# Patient Record
Sex: FEMALE | Race: WHITE | NOT HISPANIC OR LATINO | Employment: OTHER | RURAL
[De-identification: names, ages, dates, MRNs, and addresses within clinical notes are randomized per-mention and may not be internally consistent; named-entity substitution may affect disease eponyms.]

---

## 2017-01-02 ENCOUNTER — HOSPITAL ENCOUNTER (OUTPATIENT)
Dept: PHYSICAL THERAPY | Facility: HOSPITAL | Age: 72
Setting detail: RECURRING SERIES
Discharge: HOME | End: 2017-02-21
Attending: PHYSICIAN ASSISTANT | Admitting: PHYSICIAN ASSISTANT

## 2017-01-05 ENCOUNTER — HOSPITAL ENCOUNTER (OUTPATIENT)
Dept: ORTHOPEDIC SURGERY | Facility: CLINIC | Age: 72
Discharge: HOME | End: 2017-01-05
Attending: PODIATRIST | Admitting: PODIATRIST

## 2017-02-16 ENCOUNTER — HOSPITAL ENCOUNTER (OUTPATIENT)
Dept: ORTHOPEDIC SURGERY | Facility: CLINIC | Age: 72
Discharge: HOME | End: 2017-02-16
Attending: PODIATRIST | Admitting: PODIATRIST

## 2017-04-20 ENCOUNTER — HOSPITAL ENCOUNTER (OUTPATIENT)
Dept: ORTHOPEDIC SURGERY | Facility: CLINIC | Age: 72
Discharge: HOME | End: 2017-04-20
Attending: PODIATRIST | Admitting: PODIATRIST

## 2017-05-08 ENCOUNTER — HOSPITAL ENCOUNTER (OUTPATIENT)
Dept: LAB | Facility: HOSPITAL | Age: 72
Discharge: HOME | End: 2017-05-08
Attending: GENERAL PRACTICE | Admitting: GENERAL PRACTICE

## 2017-05-08 LAB
ALT SERPL-CCNC: 21 IU/L (ref 14–54)
CHOLEST SERPL-MCNC: 296 MG/DL
CHOLEST/HDLC SERPL: 5.9 {RATIO}
CONV LDL CHOLESTEROL DIRECT: 189 MG/DL (ref 0–100)
HDLC SERPL-MCNC: 50 MG/DL
LDLC/HDLC SERPL: 3.8 {RATIO}
LIPID INTERPRETATION: ABNORMAL
TRIGL SERPL-MCNC: 247 MG/DL
VLDLC SERPL CALC-MCNC: 57.1 MG/DL

## 2017-05-30 ENCOUNTER — HOSPITAL ENCOUNTER (OUTPATIENT)
Dept: OTHER | Facility: HOSPITAL | Age: 72
Discharge: HOME | End: 2017-05-30
Attending: GENERAL PRACTICE | Admitting: GENERAL PRACTICE

## 2017-05-30 LAB
ALBUMIN SERPL-MCNC: 4.1 G/DL (ref 3.5–4.8)
ALBUMIN/GLOB SERPL: 1.3 {RATIO} (ref 1–1.7)
ALP SERPL-CCNC: 74 IU/L (ref 32–91)
ALT SERPL-CCNC: 23 IU/L (ref 14–54)
ANION GAP SERPL CALC-SCNC: 14.8 MMOL/L (ref 10–20)
AST SERPL-CCNC: 25 IU/L (ref 15–41)
BASOPHILS # BLD AUTO: 0.1 10*3/UL (ref 0–0.2)
BASOPHILS NFR BLD AUTO: 1 % (ref 0–2)
BILIRUB SERPL-MCNC: 0.5 MG/DL (ref 0.3–1.2)
BUN SERPL-MCNC: 13 MG/DL (ref 8–20)
BUN/CREAT SERPL: 14.4 (ref 5.4–26.2)
CALCIUM SERPL-MCNC: 9.8 MG/DL (ref 8.9–10.3)
CHLORIDE SERPL-SCNC: 109 MMOL/L (ref 101–111)
CONV CO2: 22 MMOL/L (ref 22–32)
CONV TOTAL PROTEIN: 7.3 G/DL (ref 6.1–7.9)
CREAT UR-MCNC: 0.9 MG/DL (ref 0.4–1)
DIFFERENTIAL METHOD BLD: (no result)
EOSINOPHIL # BLD AUTO: 0.4 10*3/UL (ref 0–0.3)
EOSINOPHIL # BLD AUTO: 6 % (ref 0–3)
ERYTHROCYTE [DISTWIDTH] IN BLOOD BY AUTOMATED COUNT: 13 % (ref 11.5–14.5)
GLOBULIN UR ELPH-MCNC: 3.2 G/DL (ref 2.5–3.8)
GLUCOSE SERPL-MCNC: 104 MG/DL (ref 65–99)
HCT VFR BLD AUTO: 42.9 % (ref 35–49)
HGB BLD-MCNC: 14.3 G/DL (ref 12–15)
LYMPHOCYTES # BLD AUTO: 3.3 10*3/UL (ref 0.8–4.8)
LYMPHOCYTES NFR BLD AUTO: 44 % (ref 18–42)
MCH RBC QN AUTO: 29.5 PG (ref 26–32)
MCHC RBC AUTO-ENTMCNC: 33.3 G/DL (ref 32–36)
MCV RBC AUTO: 88.6 FL (ref 80–94)
MONOCYTES # BLD AUTO: 0.6 10*3/UL (ref 0.1–1.3)
MONOCYTES NFR BLD AUTO: 8 % (ref 2–11)
NEUTROPHILS # BLD AUTO: 3.1 10*3/UL (ref 2.3–8.6)
NEUTROPHILS NFR BLD AUTO: 41 % (ref 50–75)
NRBC BLD AUTO-RTO: 0 /100{WBCS}
NRBC/RBC NFR BLD MANUAL: 0 10*3/UL
PLATELET # BLD AUTO: 214 10*3/UL (ref 150–450)
PMV BLD AUTO: 10.9 FL (ref 7.4–10.4)
POTASSIUM SERPL-SCNC: 4.8 MMOL/L (ref 3.6–5.1)
RBC # BLD AUTO: 4.84 10*6/UL (ref 4–5.4)
SODIUM SERPL-SCNC: 141 MMOL/L (ref 136–144)
T3 SERPL-MCNC: 1.29 NG/ML (ref 0.87–1.78)
T4 FREE SERPL-MCNC: 0.66 NG/DL (ref 0.58–1.64)
TSH SERPL-ACNC: 2.13 UIU/ML (ref 0.34–5.6)
WBC # BLD AUTO: 7.6 10*3/UL (ref 4.5–11.5)

## 2017-08-08 ENCOUNTER — HOSPITAL ENCOUNTER (OUTPATIENT)
Dept: LAB | Facility: HOSPITAL | Age: 72
Discharge: HOME | End: 2017-08-08
Attending: GENERAL PRACTICE | Admitting: GENERAL PRACTICE

## 2017-08-08 LAB
CHOLEST SERPL-MCNC: 214 MG/DL
CHOLEST/HDLC SERPL: 2.7 {RATIO}
CONV LDL CHOLESTEROL DIRECT: 115 MG/DL (ref 0–100)
HDLC SERPL-MCNC: 79 MG/DL
LDLC/HDLC SERPL: 1.5 {RATIO}
LIPID INTERPRETATION: ABNORMAL
TRIGL SERPL-MCNC: 114 MG/DL
VLDLC SERPL CALC-MCNC: 20.2 MG/DL

## 2017-10-23 ENCOUNTER — HOSPITAL ENCOUNTER (OUTPATIENT)
Dept: LAB | Facility: HOSPITAL | Age: 72
Discharge: HOME | End: 2017-10-23
Attending: GENERAL PRACTICE | Admitting: GENERAL PRACTICE

## 2017-10-23 LAB
ALBUMIN SERPL-MCNC: 3.9 G/DL (ref 3.5–4.8)
ALBUMIN/GLOB SERPL: 1.3 {RATIO} (ref 1–1.7)
ALP SERPL-CCNC: 63 IU/L (ref 32–91)
ALT SERPL-CCNC: 28 IU/L (ref 14–54)
ANION GAP SERPL CALC-SCNC: 12.3 MMOL/L (ref 10–20)
AST SERPL-CCNC: 34 IU/L (ref 15–41)
BILIRUB SERPL-MCNC: 0.7 MG/DL (ref 0.3–1.2)
BUN SERPL-MCNC: 13 MG/DL (ref 8–20)
BUN/CREAT SERPL: 11.8 (ref 5.4–26.2)
CALCIUM SERPL-MCNC: 9.5 MG/DL (ref 8.9–10.3)
CHLORIDE SERPL-SCNC: 106 MMOL/L (ref 101–111)
CONV CO2: 25 MMOL/L (ref 22–32)
CONV TOTAL PROTEIN: 6.9 G/DL (ref 6.1–7.9)
CREAT UR-MCNC: 1.1 MG/DL (ref 0.4–1)
GLOBULIN UR ELPH-MCNC: 3 G/DL (ref 2.5–3.8)
GLUCOSE SERPL-MCNC: 97 MG/DL (ref 65–99)
POTASSIUM SERPL-SCNC: 4.3 MMOL/L (ref 3.6–5.1)
SODIUM SERPL-SCNC: 139 MMOL/L (ref 136–144)
T3 SERPL-MCNC: 1.96 NG/ML (ref 0.87–1.78)
T4 FREE SERPL-MCNC: 1.05 NG/DL (ref 0.58–1.64)
TSH SERPL-ACNC: 1.31 UIU/ML (ref 0.34–5.6)

## 2017-11-13 ENCOUNTER — HOSPITAL ENCOUNTER (OUTPATIENT)
Dept: CARDIOLOGY | Facility: HOSPITAL | Age: 72
Discharge: HOME | End: 2017-11-13
Attending: GENERAL PRACTICE | Admitting: GENERAL PRACTICE

## 2017-11-22 ENCOUNTER — HOSPITAL ENCOUNTER (OUTPATIENT)
Dept: CARDIOLOGY | Facility: HOSPITAL | Age: 72
Discharge: HOME | End: 2017-11-22
Attending: INTERNAL MEDICINE | Admitting: INTERNAL MEDICINE

## 2017-12-12 ENCOUNTER — ON CAMPUS - OUTPATIENT (AMBULATORY)
Dept: URBAN - METROPOLITAN AREA HOSPITAL 2 | Facility: HOSPITAL | Age: 72
End: 2017-12-12

## 2017-12-12 ENCOUNTER — OFFICE (AMBULATORY)
Dept: URBAN - METROPOLITAN AREA CLINIC 64 | Facility: CLINIC | Age: 72
End: 2017-12-12

## 2017-12-12 ENCOUNTER — HOSPITAL ENCOUNTER (OUTPATIENT)
Dept: OTHER | Facility: HOSPITAL | Age: 72
Setting detail: SPECIMEN
Discharge: HOME | End: 2017-12-12
Attending: INTERNAL MEDICINE | Admitting: INTERNAL MEDICINE

## 2017-12-12 VITALS
HEART RATE: 68 BPM | SYSTOLIC BLOOD PRESSURE: 113 MMHG | RESPIRATION RATE: 14 BRPM | SYSTOLIC BLOOD PRESSURE: 156 MMHG | DIASTOLIC BLOOD PRESSURE: 63 MMHG | SYSTOLIC BLOOD PRESSURE: 106 MMHG | RESPIRATION RATE: 16 BRPM | HEART RATE: 69 BPM | DIASTOLIC BLOOD PRESSURE: 80 MMHG | DIASTOLIC BLOOD PRESSURE: 72 MMHG | SYSTOLIC BLOOD PRESSURE: 155 MMHG | WEIGHT: 142.6 LBS | SYSTOLIC BLOOD PRESSURE: 166 MMHG | HEART RATE: 75 BPM | DIASTOLIC BLOOD PRESSURE: 61 MMHG | DIASTOLIC BLOOD PRESSURE: 59 MMHG | RESPIRATION RATE: 18 BRPM | DIASTOLIC BLOOD PRESSURE: 75 MMHG | HEART RATE: 70 BPM | SYSTOLIC BLOOD PRESSURE: 125 MMHG | DIASTOLIC BLOOD PRESSURE: 81 MMHG | OXYGEN SATURATION: 99 % | OXYGEN SATURATION: 98 % | RESPIRATION RATE: 19 BRPM | HEIGHT: 60 IN | TEMPERATURE: 98.2 F | HEART RATE: 65 BPM | OXYGEN SATURATION: 97 % | SYSTOLIC BLOOD PRESSURE: 132 MMHG | OXYGEN SATURATION: 100 % | DIASTOLIC BLOOD PRESSURE: 56 MMHG | HEART RATE: 72 BPM | SYSTOLIC BLOOD PRESSURE: 134 MMHG

## 2017-12-12 DIAGNOSIS — K57.30 DIVERTICULOSIS OF LARGE INTESTINE WITHOUT PERFORATION OR ABS: ICD-10-CM

## 2017-12-12 DIAGNOSIS — K62.1 RECTAL POLYP: ICD-10-CM

## 2017-12-12 DIAGNOSIS — Z86.010 PERSONAL HISTORY OF COLONIC POLYPS: ICD-10-CM

## 2017-12-12 DIAGNOSIS — K64.1 SECOND DEGREE HEMORRHOIDS: ICD-10-CM

## 2017-12-12 LAB
GI HISTOLOGY: A. UNSPECIFIED: (no result)
GI HISTOLOGY: PDF REPORT: (no result)

## 2017-12-12 PROCEDURE — 88305 TISSUE EXAM BY PATHOLOGIST: CPT | Mod: 26 | Performed by: INTERNAL MEDICINE

## 2017-12-12 PROCEDURE — 45380 COLONOSCOPY AND BIOPSY: CPT | Performed by: INTERNAL MEDICINE

## 2017-12-12 RX ADMIN — PROPOFOL: 10 INJECTION, EMULSION INTRAVENOUS at 14:10

## 2018-03-12 ENCOUNTER — HOSPITAL ENCOUNTER (OUTPATIENT)
Dept: PHYSICAL THERAPY | Facility: HOSPITAL | Age: 73
Setting detail: RECURRING SERIES
Discharge: HOME | End: 2018-03-22
Attending: ORTHOPAEDIC SURGERY | Admitting: ORTHOPAEDIC SURGERY

## 2018-04-12 ENCOUNTER — HOSPITAL ENCOUNTER (OUTPATIENT)
Dept: LAB | Facility: HOSPITAL | Age: 73
Discharge: HOME | End: 2018-04-12
Attending: GENERAL PRACTICE | Admitting: GENERAL PRACTICE

## 2018-04-12 LAB
ALBUMIN SERPL-MCNC: 4.1 G/DL (ref 3.5–4.8)
ALBUMIN/GLOB SERPL: 1.1 {RATIO} (ref 1–1.7)
ALP SERPL-CCNC: 67 IU/L (ref 32–91)
ALT SERPL-CCNC: 19 IU/L (ref 14–54)
ANION GAP SERPL CALC-SCNC: 11.1 MMOL/L (ref 10–20)
AST SERPL-CCNC: 25 IU/L (ref 15–41)
BASOPHILS # BLD AUTO: 0.1 10*3/UL (ref 0–0.2)
BASOPHILS NFR BLD AUTO: 1 % (ref 0–2)
BILIRUB SERPL-MCNC: 0.6 MG/DL (ref 0.3–1.2)
BUN SERPL-MCNC: 13 MG/DL (ref 8–20)
BUN/CREAT SERPL: 14.4 (ref 5.4–26.2)
CALCIUM SERPL-MCNC: 9.7 MG/DL (ref 8.9–10.3)
CHLORIDE SERPL-SCNC: 106 MMOL/L (ref 101–111)
CHOLEST SERPL-MCNC: 156 MG/DL
CHOLEST/HDLC SERPL: 3.2 {RATIO}
CONV CO2: 24 MMOL/L (ref 22–32)
CONV LDL CHOLESTEROL DIRECT: 82 MG/DL (ref 0–100)
CONV TOTAL PROTEIN: 7.7 G/DL (ref 6.1–7.9)
CREAT UR-MCNC: 0.9 MG/DL (ref 0.4–1)
DIFFERENTIAL METHOD BLD: (no result)
EOSINOPHIL # BLD AUTO: 0.3 10*3/UL (ref 0–0.3)
EOSINOPHIL # BLD AUTO: 5 % (ref 0–3)
ERYTHROCYTE [DISTWIDTH] IN BLOOD BY AUTOMATED COUNT: 13.2 % (ref 11.5–14.5)
GLOBULIN UR ELPH-MCNC: 3.6 G/DL (ref 2.5–3.8)
GLUCOSE SERPL-MCNC: 120 MG/DL (ref 65–99)
HCT VFR BLD AUTO: 43.1 % (ref 35–49)
HDLC SERPL-MCNC: 48 MG/DL
HGB BLD-MCNC: 14.5 G/DL (ref 12–15)
LDLC/HDLC SERPL: 1.7 {RATIO}
LIPID INTERPRETATION: ABNORMAL
LYMPHOCYTES # BLD AUTO: 2.8 10*3/UL (ref 0.8–4.8)
LYMPHOCYTES NFR BLD AUTO: 45 % (ref 18–42)
MCH RBC QN AUTO: 30 PG (ref 26–32)
MCHC RBC AUTO-ENTMCNC: 33.6 G/DL (ref 32–36)
MCV RBC AUTO: 89.2 FL (ref 80–94)
MONOCYTES # BLD AUTO: 0.5 10*3/UL (ref 0.1–1.3)
MONOCYTES NFR BLD AUTO: 8 % (ref 2–11)
NEUTROPHILS # BLD AUTO: 2.6 10*3/UL (ref 2.3–8.6)
NEUTROPHILS NFR BLD AUTO: 41 % (ref 50–75)
NRBC BLD AUTO-RTO: 0 /100{WBCS}
NRBC/RBC NFR BLD MANUAL: 0 10*3/UL
PLATELET # BLD AUTO: 238 10*3/UL (ref 150–450)
PMV BLD AUTO: 11.4 FL (ref 7.4–10.4)
POTASSIUM SERPL-SCNC: 4.1 MMOL/L (ref 3.6–5.1)
RBC # BLD AUTO: 4.83 10*6/UL (ref 4–5.4)
SODIUM SERPL-SCNC: 137 MMOL/L (ref 136–144)
TRIGL SERPL-MCNC: 135 MG/DL
VLDLC SERPL CALC-MCNC: 25.2 MG/DL
WBC # BLD AUTO: 6.2 10*3/UL (ref 4.5–11.5)

## 2018-04-30 ENCOUNTER — OFFICE (AMBULATORY)
Dept: URBAN - METROPOLITAN AREA CLINIC 64 | Facility: CLINIC | Age: 73
End: 2018-04-30

## 2018-04-30 VITALS
WEIGHT: 144 LBS | HEART RATE: 72 BPM | DIASTOLIC BLOOD PRESSURE: 64 MMHG | HEIGHT: 60 IN | SYSTOLIC BLOOD PRESSURE: 118 MMHG

## 2018-04-30 DIAGNOSIS — R11.2 NAUSEA WITH VOMITING, UNSPECIFIED: ICD-10-CM

## 2018-04-30 DIAGNOSIS — R14.2 ERUCTATION: ICD-10-CM

## 2018-04-30 PROCEDURE — 99213 OFFICE O/P EST LOW 20 MIN: CPT | Performed by: INTERNAL MEDICINE

## 2018-04-30 RX ORDER — PANTOPRAZOLE SODIUM 40 MG/1
TABLET, DELAYED RELEASE ORAL
Qty: 30 | Refills: 8 | Status: COMPLETED
End: 2022-10-21

## 2018-07-09 ENCOUNTER — HOSPITAL ENCOUNTER (OUTPATIENT)
Dept: PREOP | Facility: HOSPITAL | Age: 73
Setting detail: HOSPITAL OUTPATIENT SURGERY
Discharge: HOME | End: 2018-07-09
Attending: INTERNAL MEDICINE | Admitting: INTERNAL MEDICINE

## 2018-07-09 ENCOUNTER — ON CAMPUS - OUTPATIENT (AMBULATORY)
Dept: URBAN - METROPOLITAN AREA HOSPITAL 85 | Facility: HOSPITAL | Age: 73
End: 2018-07-09

## 2018-07-09 DIAGNOSIS — R11.2 NAUSEA WITH VOMITING, UNSPECIFIED: ICD-10-CM

## 2018-07-09 DIAGNOSIS — R14.2 ERUCTATION: ICD-10-CM

## 2018-07-09 DIAGNOSIS — K31.84 GASTROPARESIS: ICD-10-CM

## 2018-07-09 DIAGNOSIS — K29.60 OTHER GASTRITIS WITHOUT BLEEDING: ICD-10-CM

## 2018-07-09 DIAGNOSIS — R10.13 EPIGASTRIC PAIN: ICD-10-CM

## 2018-07-09 PROCEDURE — 43239 EGD BIOPSY SINGLE/MULTIPLE: CPT | Performed by: INTERNAL MEDICINE

## 2018-07-12 ENCOUNTER — HOSPITAL ENCOUNTER (OUTPATIENT)
Dept: CT IMAGING | Facility: HOSPITAL | Age: 73
Discharge: HOME | End: 2018-07-12
Attending: INTERNAL MEDICINE | Admitting: INTERNAL MEDICINE

## 2018-07-12 LAB — CREAT BLDA-MCNC: 1.1 MG/DL (ref 0.6–1.3)

## 2018-08-07 ENCOUNTER — HOSPITAL ENCOUNTER (OUTPATIENT)
Dept: NEUROLOGY | Facility: HOSPITAL | Age: 73
Discharge: HOME | End: 2018-08-07

## 2019-03-04 ENCOUNTER — HOSPITAL ENCOUNTER (OUTPATIENT)
Dept: OTHER | Facility: HOSPITAL | Age: 74
Discharge: HOME | End: 2019-03-04
Attending: FAMILY MEDICINE | Admitting: FAMILY MEDICINE

## 2019-03-29 ENCOUNTER — HOSPITAL ENCOUNTER (OUTPATIENT)
Dept: PREADMISSION TESTING | Facility: HOSPITAL | Age: 74
Discharge: HOME | End: 2019-04-15
Attending: SURGERY | Admitting: SURGERY

## 2019-03-29 LAB
ANION GAP SERPL CALC-SCNC: 15.5 MMOL/L (ref 10–20)
BASOPHILS # BLD AUTO: 0.1 10*3/UL (ref 0–0.2)
BASOPHILS NFR BLD AUTO: 1 % (ref 0–2)
BUN SERPL-MCNC: 11 MG/DL (ref 8–20)
BUN/CREAT SERPL: 12.2 (ref 5.4–26.2)
CALCIUM SERPL-MCNC: 9.6 MG/DL (ref 8.9–10.3)
CHLORIDE SERPL-SCNC: 106 MMOL/L (ref 101–111)
CONV CO2: 22 MMOL/L (ref 22–32)
CREAT UR-MCNC: 0.9 MG/DL (ref 0.4–1)
DIFFERENTIAL METHOD BLD: (no result)
EOSINOPHIL # BLD AUTO: 0.4 10*3/UL (ref 0–0.3)
EOSINOPHIL # BLD AUTO: 4 % (ref 0–3)
ERYTHROCYTE [DISTWIDTH] IN BLOOD BY AUTOMATED COUNT: 13.6 % (ref 11.5–14.5)
GLUCOSE SERPL-MCNC: 107 MG/DL (ref 65–99)
HCT VFR BLD AUTO: 43.3 % (ref 35–49)
HGB BLD-MCNC: 14.4 G/DL (ref 12–15)
LYMPHOCYTES # BLD AUTO: 3.4 10*3/UL (ref 0.8–4.8)
LYMPHOCYTES NFR BLD AUTO: 37 % (ref 18–42)
MCH RBC QN AUTO: 29.9 PG (ref 26–32)
MCHC RBC AUTO-ENTMCNC: 33.3 G/DL (ref 32–36)
MCV RBC AUTO: 89.8 FL (ref 80–94)
MONOCYTES # BLD AUTO: 0.8 10*3/UL (ref 0.1–1.3)
MONOCYTES NFR BLD AUTO: 9 % (ref 2–11)
NEUTROPHILS # BLD AUTO: 4.6 10*3/UL (ref 2.3–8.6)
NEUTROPHILS NFR BLD AUTO: 49 % (ref 50–75)
NRBC BLD AUTO-RTO: 0 /100{WBCS}
NRBC/RBC NFR BLD MANUAL: 0 10*3/UL
PLATELET # BLD AUTO: 282 10*3/UL (ref 150–450)
PMV BLD AUTO: 10.7 FL (ref 7.4–10.4)
POTASSIUM SERPL-SCNC: 4.5 MMOL/L (ref 3.6–5.1)
RBC # BLD AUTO: 4.82 10*6/UL (ref 4–5.4)
SODIUM SERPL-SCNC: 139 MMOL/L (ref 136–144)
WBC # BLD AUTO: 9.3 10*3/UL (ref 4.5–11.5)

## 2019-04-01 ENCOUNTER — HOSPITAL ENCOUNTER (OUTPATIENT)
Dept: OTHER | Facility: HOSPITAL | Age: 74
Discharge: HOME | End: 2019-04-01
Attending: FAMILY MEDICINE | Admitting: FAMILY MEDICINE

## 2019-04-05 ENCOUNTER — CONVERSION ENCOUNTER (OUTPATIENT)
Dept: FAMILY MEDICINE CLINIC | Facility: CLINIC | Age: 74
End: 2019-04-05

## 2019-04-05 LAB
BILIRUB UR QL STRIP: NEGATIVE
CONV PROTEIN IN URINE BY AUTOMATED TEST STRIP: NEGATIVE
CONV UROBILINOGEN IN URINE BY AUTOMATED TEST STRIP: NEGATIVE MG/DL
GLUCOSE UR QL: NEGATIVE MG/DL
KETONES UR QL STRIP: NEGATIVE
NITRITE UR QL STRIP: NEGATIVE
PH UR STRIP.AUTO: 5 [PH]
SP GR UR STRIP: 1.01 (ref 1–1.03)

## 2019-04-08 ENCOUNTER — HOSPITAL ENCOUNTER (OUTPATIENT)
Dept: CARDIOLOGY | Facility: HOSPITAL | Age: 74
Discharge: HOME | End: 2019-04-08
Attending: INTERNAL MEDICINE | Admitting: INTERNAL MEDICINE

## 2019-04-18 ENCOUNTER — HOSPITAL ENCOUNTER (OUTPATIENT)
Dept: PREOP | Facility: HOSPITAL | Age: 74
Setting detail: HOSPITAL OUTPATIENT SURGERY
Discharge: HOME | End: 2019-04-18
Attending: SURGERY | Admitting: SURGERY

## 2019-06-27 ENCOUNTER — CONVERSION ENCOUNTER (OUTPATIENT)
Dept: FAMILY MEDICINE CLINIC | Facility: CLINIC | Age: 74
End: 2019-06-27

## 2019-06-28 LAB
ALBUMIN SERPL-MCNC: 4.3 G/DL (ref 3.6–5.1)
ALP SERPL-CCNC: 87 U/L (ref 33–130)
ALT SERPL-CCNC: 11 U/L (ref 6–29)
AST SERPL-CCNC: 16 U/L (ref 10–35)
BILIRUB SERPL-MCNC: 0.4 MG/DL (ref 0.2–1.2)
BUN SERPL-MCNC: 17 MG/DL (ref 7–25)
BUN/CREAT SERPL: 17 (CALC) (ref 6–22)
CALCIUM SERPL-MCNC: 9.7 MG/DL (ref 8.6–10.4)
CHLORIDE SERPL-SCNC: 107 MMOL/L (ref 98–110)
CHOLEST SERPL-MCNC: 193 MG/DL
CHOLEST/HDLC SERPL: 3.7 (CALC)
CONV CO2: 24 MMOL/L (ref 20–32)
CONV TOTAL PROTEIN: 7.4 G/DL (ref 6.1–8.1)
CREAT UR-MCNC: 1 MG/DL (ref 0.6–0.93)
GLOBULIN UR ELPH-MCNC: 3.1 MG/DL (ref 1.9–3.7)
GLUCOSE UR QL: 105 MG/DL (ref 65–99)
HDLC SERPL-MCNC: 52 MG/DL
INSULIN SERPL-ACNC: 1.4 (CALC) (ref 1–2.5)
LDLC SERPL CALC-MCNC: 116 MG/DL
NONHDLC SERPL-MCNC: 141 MG/DL
POTASSIUM SERPL-SCNC: 4.3 MMOL/L (ref 3.5–5.3)
SODIUM SERPL-SCNC: 141 MMOL/L (ref 135–146)
TRIGL SERPL-MCNC: 142 MG/DL

## 2019-07-01 ENCOUNTER — OFFICE VISIT (OUTPATIENT)
Dept: FAMILY MEDICINE CLINIC | Facility: CLINIC | Age: 74
End: 2019-07-01

## 2019-07-01 VITALS
TEMPERATURE: 98.1 F | HEART RATE: 78 BPM | RESPIRATION RATE: 18 BRPM | WEIGHT: 135 LBS | DIASTOLIC BLOOD PRESSURE: 56 MMHG | OXYGEN SATURATION: 98 % | HEIGHT: 60 IN | BODY MASS INDEX: 26.5 KG/M2 | SYSTOLIC BLOOD PRESSURE: 124 MMHG

## 2019-07-01 DIAGNOSIS — E78.2 MIXED HYPERLIPIDEMIA: ICD-10-CM

## 2019-07-01 DIAGNOSIS — I25.10 CORONARY ARTERY DISEASE INVOLVING NATIVE CORONARY ARTERY OF NATIVE HEART WITHOUT ANGINA PECTORIS: Primary | ICD-10-CM

## 2019-07-01 DIAGNOSIS — K21.9 GERD WITHOUT ESOPHAGITIS: ICD-10-CM

## 2019-07-01 PROBLEM — R92.2 DENSE BREAST: Status: ACTIVE | Noted: 2019-07-01

## 2019-07-01 PROBLEM — Z95.818 PRESENCE OF OTHER CARDIAC IMPLANTS AND GRAFTS: Status: ACTIVE | Noted: 2018-06-25

## 2019-07-01 PROBLEM — M19.90 ARTHRITIS: Status: ACTIVE | Noted: 2019-07-01

## 2019-07-01 PROBLEM — E78.5 HYPERLIPIDEMIA: Status: ACTIVE | Noted: 2017-11-03

## 2019-07-01 PROBLEM — R92.30 DENSE BREAST: Status: ACTIVE | Noted: 2019-07-01

## 2019-07-01 PROBLEM — I44.7 LEFT BUNDLE BRANCH BLOCK (LBBB): Status: ACTIVE | Noted: 2019-04-05

## 2019-07-01 PROBLEM — F33.0 DEPRESSION, MAJOR, RECURRENT, MILD (HCC): Status: ACTIVE | Noted: 2019-07-01

## 2019-07-01 PROBLEM — Z86.19 HISTORY OF CHICKEN POX: Status: ACTIVE | Noted: 2019-07-01

## 2019-07-01 PROBLEM — N95.8 OTHER SPECIFIED MENOPAUSAL AND POSTMENOPAUSAL DISORDERS: Status: ACTIVE | Noted: 2019-07-01

## 2019-07-01 PROCEDURE — 99214 OFFICE O/P EST MOD 30 MIN: CPT | Performed by: FAMILY MEDICINE

## 2019-07-01 RX ORDER — MIDODRINE HYDROCHLORIDE 2.5 MG/1
2.5 TABLET ORAL 3 TIMES DAILY
COMMUNITY
Start: 2019-06-29 | End: 2019-12-30 | Stop reason: SDUPTHER

## 2019-07-01 RX ORDER — PANTOPRAZOLE SODIUM 40 MG/1
TABLET, DELAYED RELEASE ORAL DAILY
Status: ON HOLD | COMMUNITY
Start: 2019-01-30 | End: 2020-01-27

## 2019-07-01 RX ORDER — ROSUVASTATIN CALCIUM 10 MG/1
10 TABLET, COATED ORAL DAILY
COMMUNITY
Start: 2019-03-28 | End: 2020-01-16

## 2019-07-01 RX ORDER — ACETAMINOPHEN 160 MG/5ML
SUSPENSION, ORAL (FINAL DOSE FORM) ORAL DAILY
COMMUNITY
End: 2019-07-01

## 2019-07-01 NOTE — PROGRESS NOTES
Rooming Tab(CC,VS,Pt Hx,Fall Screen)  Chief Complaint   Patient presents with   • Hyperlipidemia       Subjective   Rachael Quevedo is a 73 y.o. female.     Hyperlipidemia   This is a chronic problem. The current episode started more than 1 year ago. The problem is controlled. Recent lipid tests were reviewed and are normal. Factors aggravating her hyperlipidemia include fatty foods. Pertinent negatives include no chest pain, myalgias or shortness of breath. Current antihyperlipidemic treatment includes diet change. The current treatment provides significant improvement of lipids. There are no compliance problems.    Heartburn   She reports no abdominal pain, no chest pain, no coughing or no nausea. This is a chronic problem. The current episode started more than 1 year ago. The problem occurs occasionally. The problem has been waxing and waning. The symptoms are aggravated by certain foods. Pertinent negatives include no orthopnea or weight loss. She has tried a PPI for the symptoms. The treatment provided moderate relief.   Coronary Artery Disease   Presents for follow-up visit. Pertinent negatives include no chest pain, chest pressure, chest tightness, dizziness or shortness of breath. Risk factors include hyperlipidemia. The symptoms have been resolved. Compliance with diet is good. Compliance with exercise is good. Compliance with medications is good.        The following portions of the patient's history were reviewed and updated as appropriate: allergies, current medications, past family history, past medical history, past social history, past surgical history and problem list.    Patient Care Team:  Alexa Shah MD as PCP - General (Family Medicine)  Alexa Shah MD as PCP - Family Medicine    Problem List Tab  Medications Tab  Synopsis Tab  Chart Review Tab  Care Everywhere Tab  Immunizations Tab  Patient History Tab    Social History     Tobacco Use   • Smoking status: Never Smoker   Substance  "Use Topics   • Alcohol use: Yes     Alcohol/week: 2.4 - 3.0 oz     Types: 4 - 5 Glasses of wine per week       Review of Systems   Constitutional: Negative for activity change, fever and unexpected weight loss.   HENT: Negative for ear pain, rhinorrhea, sinus pressure and voice change.    Eyes: Negative for visual disturbance.   Respiratory: Negative for cough, chest tightness and shortness of breath.    Cardiovascular: Negative for chest pain.   Gastrointestinal: Negative for abdominal pain, diarrhea, nausea and vomiting.   Endocrine: Negative for cold intolerance and heat intolerance.   Genitourinary: Negative for frequency and urgency.   Musculoskeletal: Negative for arthralgias and myalgias.   Skin: Negative for rash.   Neurological: Negative for dizziness and syncope.   Hematological: Does not bruise/bleed easily.   Psychiatric/Behavioral: Negative for depressed mood. The patient is not nervous/anxious.        Objective     Rooming Tab(CC,VS,Pt Hx,Fall Screen)  /56   Pulse 78   Temp 98.1 °F (36.7 °C)   Resp 18   Ht 152.4 cm (60\")   Wt 61.2 kg (135 lb)   SpO2 98%   BMI 26.37 kg/m²     Body mass index is 26.37 kg/m².    Physical Exam   Constitutional: She is oriented to person, place, and time. She appears well-developed and well-nourished. She is cooperative.   Cardiovascular: Normal rate, regular rhythm and normal heart sounds. Exam reveals no gallop and no friction rub.   No murmur heard.  Pulmonary/Chest: Effort normal and breath sounds normal. She has no wheezes. She has no rales.   Abdominal: Soft. Bowel sounds are normal. She exhibits no distension. There is no tenderness.   Neurological: She is alert and oriented to person, place, and time. Coordination normal.   Skin: Skin is warm and dry.   Psychiatric: She has a normal mood and affect.   Vitals reviewed.       Statin Choice Calculator  Data Reviewed:                   Assessment/Plan   Order Review Tab  Health Maintenance Tab  Patient " Plan/Order Tab    Problem List Items Addressed This Visit        Cardiovascular and Mediastinum    Coronary artery disease - Primary    Relevant Medications    midodrine (PROAMATINE) 2.5 MG tablet    Hyperlipidemia    Relevant Medications    rosuvastatin (CRESTOR) 10 MG tablet       Digestive    GERD without esophagitis    Overview     Dr Duncan- protonix         Relevant Medications    pantoprazole (PROTONIX) 40 MG EC tablet          Wrapup Tab  Return in about 3 months (around 10/1/2019) for Recheck.

## 2019-08-15 ENCOUNTER — OFFICE VISIT (OUTPATIENT)
Dept: PHYSICAL THERAPY | Facility: CLINIC | Age: 74
End: 2019-08-15

## 2019-08-15 DIAGNOSIS — M54.2 PAIN, NECK: ICD-10-CM

## 2019-08-15 DIAGNOSIS — G89.29 CHRONIC PAIN OF BOTH SHOULDERS: Primary | ICD-10-CM

## 2019-08-15 DIAGNOSIS — M25.512 CHRONIC PAIN OF BOTH SHOULDERS: Primary | ICD-10-CM

## 2019-08-15 DIAGNOSIS — M25.511 CHRONIC PAIN OF BOTH SHOULDERS: Primary | ICD-10-CM

## 2019-08-15 PROCEDURE — 97110 THERAPEUTIC EXERCISES: CPT | Performed by: PHYSICAL THERAPIST

## 2019-08-15 PROCEDURE — 97161 PT EVAL LOW COMPLEX 20 MIN: CPT | Performed by: PHYSICAL THERAPIST

## 2019-08-15 PROCEDURE — 97035 APP MDLTY 1+ULTRASOUND EA 15: CPT | Performed by: PHYSICAL THERAPIST

## 2019-08-15 PROCEDURE — 97140 MANUAL THERAPY 1/> REGIONS: CPT | Performed by: PHYSICAL THERAPIST

## 2019-08-15 NOTE — PROGRESS NOTES
Physical Therapy Initial Evaluation and Plan of Care    Patient: Rachael Quevedo   : 1945  Diagnosis/ICD-10 Code:  Chronic pain of both shoulders [M25.511, G89.29, M25.512]  Referring practitioner: No ref. provider found  Date of Initial Visit: 8/15/2019  Today's Date: 8/15/2019  Patient seen for 1 sessions             Subjective Evaluation    History of Present Illness  Mechanism of injury: Patient is a 73 y.o. WF who returns to PT with B shoulder pain of insidious onset over the past 12 months or so.  She had injections in both shoulders which helped L shoulder some but not much change in R.  She has had RCR in the past.  History of L sided neck pain which may be contributing to L shoulder.  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  No x-ray or special tests thus far.    Quality of life: good    Pain  Current pain ratin  At best pain ratin  At worst pain ratin  Quality: dull ache and sharp  Aggravating factors: overhead activity, lifting and movement  Progression: no change    Hand dominance: right    Patient Goals  Patient goals for therapy: decreased pain             Objective QuickDASH indicates 16% impairment with limitations of recreation, pain, ADLs.  Cervical rotation L lacks 25% and increases L shoulder pain.  R shoulder IR limited 50% compared to L.  Shoulders are slightly protracted but good posture.  She has pain returning to neutral from R shoulder flexion.  Mild tenderness R anterior shoulder.  MMT:  4/5 R flexion, abduction.      Assessment & Plan     Assessment  Impairments: abnormal or restricted ROM, impaired physical strength, lacks appropriate home exercise program and pain with function  Barriers to therapy: OA  Prognosis: good  Functional Limitations: carrying objects, lifting, uncomfortable because of pain and reaching overhead  Goals  Plan Goals: Patient independent with HEP in 2 weeks  Able to return to driving in 2 weeks  Decrease pain to 2/10 by  discharge (5/10 initially)  Improve R shoulder strength flexion and abduction to 4+/5 by discharge       Plan  Therapy options: will be seen for skilled physical therapy services  Planned modality interventions: ultrasound and traction  Other planned modality interventions: physical modalities as needed  Planned therapy interventions: strengthening, stretching, manual therapy, flexibility and home exercise program  Treatment plan discussed with: patient  Plan details: Plan to continue 1-2x's per week up to 30 visits if needed.        Timed:         Manual Therapy:    15     mins  27163;     Therapeutic Exercise:    15     mins  95002;     Neuromuscular Abelino:        mins  48365;    Therapeutic Activity:          mins  68886;     Gait Training:           mins  97015;     Ultrasound:    10      mins  84259;    Ionto                                   mins   71218  Self-care  ____ mins 73602    Un-Timed:  Electrical Stimulation:         mins  39279 ( );  Dry Needling          mins self-pay  Traction          mins 80865  Low Eval     20     Mins  65942  Mod Eval          Mins  94436  High Eval                            Mins  63781  Canal repositioning _____ mins  67098        Timed Treatment:   40   mins   Total Treatment:     60   mins    PT SIGNATURE: Robin A Sprigler, ANKIT   DATE TREATMENT INITIATED: 8/15/2019    Initial Certification  Certification Period: 11/13/2019  I certify that the therapy services are furnished while this patient is under my care.  The services outlined above are required by this patient, and will be reviewed every 90 days.     PHYSICIAN:  Dr. Bill Chandler MD ____________________________________________________________________________________________________________    DATE: _______________________________________________________________________________________________________________________    Please sign and return via fax to  [phone] Thank you, [facility].

## 2019-08-19 ENCOUNTER — TRANSCRIBE ORDERS (OUTPATIENT)
Dept: PHYSICAL THERAPY | Facility: CLINIC | Age: 74
End: 2019-08-19

## 2019-08-19 ENCOUNTER — OFFICE VISIT (OUTPATIENT)
Dept: PHYSICAL THERAPY | Facility: CLINIC | Age: 74
End: 2019-08-19

## 2019-08-19 DIAGNOSIS — M25.512 CHRONIC PAIN OF BOTH SHOULDERS: Primary | ICD-10-CM

## 2019-08-19 DIAGNOSIS — G89.29 CHRONIC PAIN OF BOTH SHOULDERS: Primary | ICD-10-CM

## 2019-08-19 DIAGNOSIS — M67.912 TENDINOPATHY OF ROTATOR CUFF, LEFT: ICD-10-CM

## 2019-08-19 DIAGNOSIS — M54.2 PAIN, NECK: ICD-10-CM

## 2019-08-19 DIAGNOSIS — M25.511 CHRONIC PAIN OF BOTH SHOULDERS: Primary | ICD-10-CM

## 2019-08-19 DIAGNOSIS — M67.911 TENDINOPATHY OF RIGHT SHOULDER: Primary | ICD-10-CM

## 2019-08-19 PROCEDURE — 97012 MECHANICAL TRACTION THERAPY: CPT | Performed by: PHYSICAL THERAPIST

## 2019-08-19 PROCEDURE — 97035 APP MDLTY 1+ULTRASOUND EA 15: CPT | Performed by: PHYSICAL THERAPIST

## 2019-08-19 PROCEDURE — 97110 THERAPEUTIC EXERCISES: CPT | Performed by: PHYSICAL THERAPIST

## 2019-08-19 NOTE — PROGRESS NOTES
Physical Therapy Daily Progress Note    Patient: Rachael Quevedo   : 1945  Diagnosis/ICD-10 Code:  Chronic pain of both shoulders [M25.511, G89.29, M25.512]  Referring practitioner: Bill Chandler MD  Date of Initial Visit: Type: THERAPY  Noted: 8/15/2019  Today's Date: 2019  Patient seen for 2 sessions             Subjective Patient reports some soreness R shoulder after last visit.  Feels like US helped.    Objective   See Exercise, Manual, and Modality Logs for complete treatment. Added scapular stabilization in gym with good response. Added cervical traction with good initial response.      Assessment/Plan Assess response to treatment next visit.    Progress per Plan of Care           Timed:         Manual Therapy:         mins  19056;     Therapeutic Exercise:    15     mins  23690;     Neuromuscular Abelino:        mins  38194;    Therapeutic Activity:          mins  99968;     Gait Training:           mins  57103;     Ultrasound:     10     mins  62126;    Ionto                                   mins   10066  Self Care                            mins   08651      Un-Timed:  Electrical Stimulation:         mins  70244 ( );  Dry Needling          mins self-pay  Traction     15     mins 55620  Low Eval          Mins  59735  Mod Eval          Mins  70452  High Eval                            Mins  28931  Canalith Repos                   mins  06979    Timed Treatment:   25   mins   Total Treatment:     40   mins    Robin A Sprigler, PT  Physical Therapist

## 2019-08-23 ENCOUNTER — OFFICE VISIT (OUTPATIENT)
Dept: PHYSICAL THERAPY | Facility: CLINIC | Age: 74
End: 2019-08-23

## 2019-08-23 DIAGNOSIS — M25.511 CHRONIC PAIN OF BOTH SHOULDERS: Primary | ICD-10-CM

## 2019-08-23 DIAGNOSIS — M25.512 CHRONIC PAIN OF BOTH SHOULDERS: Primary | ICD-10-CM

## 2019-08-23 DIAGNOSIS — M54.2 PAIN, NECK: ICD-10-CM

## 2019-08-23 DIAGNOSIS — G89.29 CHRONIC PAIN OF BOTH SHOULDERS: Primary | ICD-10-CM

## 2019-08-23 PROCEDURE — 97012 MECHANICAL TRACTION THERAPY: CPT | Performed by: PHYSICAL THERAPIST

## 2019-08-23 PROCEDURE — 97110 THERAPEUTIC EXERCISES: CPT | Performed by: PHYSICAL THERAPIST

## 2019-08-23 PROCEDURE — 97035 APP MDLTY 1+ULTRASOUND EA 15: CPT | Performed by: PHYSICAL THERAPIST

## 2019-08-23 NOTE — PROGRESS NOTES
Physical Therapy Daily Progress Note    Patient: Rachael Quevedo   : 1945  Diagnosis/ICD-10 Code:  Chronic pain of both shoulders [M25.511, G89.29, M25.512]  Referring practitioner: Bill Chandler MD  Date of Initial Visit: Type: THERAPY  Noted: 8/15/2019  Today's Date: 2019  Patient seen for 3 sessions             Subjective Patient reports she is doing well and neck and both shoulders are feeling better with less pain.    Objective   See Exercise, Manual, and Modality Logs for complete treatment. Added prone cervical extension/rotation and supine overhead flexion with weight.      Assessment/Plan Pain decreasing, ROM and strength improving.  Patient independent with HEP in 2 weeks - PARTIALLY MET  Able to return to driving in 2 weeks - MET  Decrease pain to 2/10 by discharge (5/10 initially) - PARTIALLY MET  Improve R shoulder strength flexion and abduction to 4+/5 by discharge - MET    Progress per Plan of Care         Timed:         Manual Therapy:         mins  02967;     Therapeutic Exercise:    15     mins  66217;     Neuromuscular Abelino:        mins  58433;    Therapeutic Activity:          mins  79204;     Gait Training:           mins  41650;     Ultrasound:     10     mins  08335;    Ionto                                   mins   67104  Self Care                            mins   41169      Un-Timed:  Electrical Stimulation:         mins  29239 ( );  Dry Needling          mins self-pay  Traction     15     mins 93558  Low Eval          Mins  62536  Mod Eval          Mins  04180  High Eval                            Mins  26293  Canalith Repos                   mins  33456    Timed Treatment:   25   mins   Total Treatment:     40   mins    Robin A Sprigler, PT  Physical Therapist

## 2019-08-29 ENCOUNTER — OFFICE VISIT (OUTPATIENT)
Dept: PHYSICAL THERAPY | Facility: CLINIC | Age: 74
End: 2019-08-29

## 2019-08-29 DIAGNOSIS — M25.512 CHRONIC PAIN OF BOTH SHOULDERS: Primary | ICD-10-CM

## 2019-08-29 DIAGNOSIS — M25.511 CHRONIC PAIN OF BOTH SHOULDERS: Primary | ICD-10-CM

## 2019-08-29 DIAGNOSIS — M54.2 PAIN, NECK: ICD-10-CM

## 2019-08-29 DIAGNOSIS — G89.29 CHRONIC PAIN OF BOTH SHOULDERS: Primary | ICD-10-CM

## 2019-08-29 PROCEDURE — 97035 APP MDLTY 1+ULTRASOUND EA 15: CPT | Performed by: PHYSICAL THERAPIST

## 2019-08-29 PROCEDURE — 97012 MECHANICAL TRACTION THERAPY: CPT | Performed by: PHYSICAL THERAPIST

## 2019-08-29 PROCEDURE — 97110 THERAPEUTIC EXERCISES: CPT | Performed by: PHYSICAL THERAPIST

## 2019-08-29 NOTE — PROGRESS NOTES
Physical Therapy Daily Progress Note    Patient: Rachael Quevedo   : 1945  Diagnosis/ICD-10 Code:  Chronic pain of both shoulders [M25.511, G89.29, M25.512]  Referring practitioner: Bill Chandler MD  Date of Initial Visit: Type: THERAPY  Noted: 8/15/2019  Today's Date: 2019  Patient seen for 4 sessions             Subjective Patient feeling well, voices readiness for self-management.    Objective   See Exercise, Manual, and Modality Logs for complete treatment.       Assessment/Plan     Patient independent with HEP in 2 weeks - MET  Able to return to driving in 2 weeks - MET  Decrease pain to 2/10 by discharge (5/10 initially) - MET  Improve R shoulder strength flexion and abduction to 4+/5 by discharge - MET    Other Goals set at evaluation all met.  Will plan to discharge  unless patient needs to return before then.             Timed:         Manual Therapy:         mins  32585;     Therapeutic Exercise:    15     mins  31337;     Neuromuscular Abeilno:        mins  14292;    Therapeutic Activity:          mins  45042;     Gait Training:           mins  44335;     Ultrasound:     10     mins  23714;    Ionto                                   mins   46233  Self Care                            mins   09536      Un-Timed:  Electrical Stimulation:         mins  92134 ( );  Dry Needling          mins self-pay  Traction     15     mins 96620  Low Eval          Mins  07155  Mod Eval          Mins  80426  High Eval                            Mins  24500  Canalith Repos                   mins  73028    Timed Treatment:   25   mins   Total Treatment:     40   mins    Robin A Sprigler, PT  Physical Therapist

## 2019-09-12 ENCOUNTER — DOCUMENTATION (OUTPATIENT)
Dept: PHYSICAL THERAPY | Facility: CLINIC | Age: 74
End: 2019-09-12

## 2019-09-20 ENCOUNTER — TELEPHONE (OUTPATIENT)
Dept: FAMILY MEDICINE CLINIC | Facility: CLINIC | Age: 74
End: 2019-09-20

## 2019-09-20 NOTE — TELEPHONE ENCOUNTER
She has a follow up 11/4 to check up on her kidneys. She wanted to know if she needs to have labs drawn before her appointment?

## 2019-09-25 DIAGNOSIS — E78.2 MIXED HYPERLIPIDEMIA: Primary | ICD-10-CM

## 2019-11-04 ENCOUNTER — OFFICE VISIT (OUTPATIENT)
Dept: FAMILY MEDICINE CLINIC | Facility: CLINIC | Age: 74
End: 2019-11-04

## 2019-11-04 VITALS
BODY MASS INDEX: 26.66 KG/M2 | TEMPERATURE: 98.3 F | DIASTOLIC BLOOD PRESSURE: 82 MMHG | RESPIRATION RATE: 18 BRPM | WEIGHT: 135.8 LBS | OXYGEN SATURATION: 98 % | HEART RATE: 84 BPM | HEIGHT: 60 IN | SYSTOLIC BLOOD PRESSURE: 142 MMHG

## 2019-11-04 DIAGNOSIS — N18.30 CKD (CHRONIC KIDNEY DISEASE) STAGE 3, GFR 30-59 ML/MIN (HCC): ICD-10-CM

## 2019-11-04 DIAGNOSIS — E78.2 MIXED HYPERLIPIDEMIA: ICD-10-CM

## 2019-11-04 DIAGNOSIS — R94.4 ABNORMAL RESULTS OF KIDNEY FUNCTION STUDIES: ICD-10-CM

## 2019-11-04 DIAGNOSIS — K21.9 GERD WITHOUT ESOPHAGITIS: Primary | ICD-10-CM

## 2019-11-04 DIAGNOSIS — R03.0 ELEVATED BLOOD PRESSURE READING: ICD-10-CM

## 2019-11-04 PROCEDURE — 99214 OFFICE O/P EST MOD 30 MIN: CPT | Performed by: FAMILY MEDICINE

## 2019-11-04 NOTE — PROGRESS NOTES
Subjective   Rachael Quevedo is a 73 y.o. female.     Chief Complaint   Patient presents with   • Abdominal Pain       Follow up laboratory test results:  Rachael is here today for a follow up on Lab results: abnormal kidney function tests. Date: (06/27/2019). Current symptom include no current symptoms.        Heartburn   She complains of belching. She reports no chest pain, no coughing or no nausea. This is a chronic problem. The current episode started more than 1 year ago. The problem occurs occasionally. The problem has been gradually worsening. Nothing aggravates the symptoms. Pertinent negatives include no orthopnea. Treatments tried: Protonix was discontinued at last office visit due to abnormal kidney results. The treatment provided no relief.   Hyperlipidemia   This is a chronic problem. The current episode started more than 1 year ago. The problem is uncontrolled. Recent lipid tests were reviewed and are high. Exacerbating diseases include chronic renal disease. There are no known factors aggravating her hyperlipidemia. Pertinent negatives include no chest pain, focal sensory loss or shortness of breath. Current antihyperlipidemic treatment includes statins. The current treatment provides mild improvement of lipids. Compliance problems include adherence to diet.  Risk factors for coronary artery disease include stress.   Chronic Kidney Disease   This is a chronic problem. The current episode started more than 1 month ago. The problem occurs constantly. The problem has been unchanged. Pertinent negatives include no arthralgias, chest pain, coughing, nausea, rash or vomiting. Nothing aggravates the symptoms. She has tried nothing for the symptoms.        The following portions of the patient's history were reviewed and updated as appropriate: allergies, current medications, past family history, past medical history, past social history, past surgical history and problem list.    Allergies:  No Known  Allergies    Social History:  Social History     Socioeconomic History   • Marital status:      Spouse name: Not on file   • Number of children: Not on file   • Years of education: Not on file   • Highest education level: Not on file   Tobacco Use   • Smoking status: Never Smoker   • Smokeless tobacco: Never Used   Substance and Sexual Activity   • Alcohol use: Yes     Alcohol/week: 2.4 - 3.0 oz     Types: 4 - 5 Glasses of wine per week     Frequency: 2-3 times a week     Drinks per session: 1 or 2     Binge frequency: Never   • Drug use: No   • Sexual activity: Defer       Family History:  Family History   Problem Relation Age of Onset   • Hyperlipidemia Mother         Elevated cholesterol   • Heart disease Mother    • Heart attack Mother    • Hypertension Father    • Colon cancer Father    • Hyperlipidemia Father         Elevated cholesterol   • Heart disease Father    • Diabetes Brother    • Heart disease Brother    • Diabetes Paternal Aunt    • Colon cancer Paternal Aunt    • Colon cancer Paternal Uncle    • Diabetes Paternal Uncle    • Diabetes Paternal Grandmother    • Heart disease Paternal Grandmother        Past Medical History :  Patient Active Problem List   Diagnosis   • Arthritis   • Coronary artery disease   • Degeneration of intervertebral disc of lumbosacral region   • Dense breast   • Depression, major, recurrent, mild (CMS/HCC)   • GERD without esophagitis   • History of chicken pox   • Presence of other cardiac implants and grafts   • Hyperlipidemia   • Left bundle branch block (LBBB)   • Lumbar disc herniation with radiculopathy   • Other specified menopausal and postmenopausal disorders       Medication List:    Current Outpatient Medications:   •  aspirin 81 MG tablet, Take 81 mg by mouth Daily., Disp: , Rfl:   •  midodrine (PROAMATINE) 2.5 MG tablet, Take 2.5 mg by mouth 3 (Three) Times a Day., Disp: , Rfl:   •  pantoprazole (PROTONIX) 40 MG EC tablet, Take  by mouth Daily., Disp: ,  "Rfl:   •  rosuvastatin (CRESTOR) 10 MG tablet, Take 10 mg by mouth Daily., Disp: , Rfl:     Past Surgical History:  Past Surgical History:   Procedure Laterality Date   • CATARACT EXTRACTION, BILATERAL Bilateral 1999   • GALLBLADDER SURGERY  1996   • LAPAROSCOPIC TUBAL LIGATION  1980   • ROTATOR CUFF REPAIR Right 2002       Review of Systems:  Review of Systems   Constitutional: Negative for activity change.   HENT: Negative for ear pain, rhinorrhea, sinus pressure and voice change.    Eyes: Negative for visual disturbance.   Respiratory: Negative for cough and shortness of breath.    Cardiovascular: Negative for chest pain.   Gastrointestinal: Negative for diarrhea, nausea and vomiting.   Endocrine: Negative for cold intolerance and heat intolerance.   Genitourinary: Negative for frequency and urgency.   Musculoskeletal: Negative for arthralgias.   Skin: Negative for rash.   Neurological: Negative for syncope.   Hematological: Does not bruise/bleed easily.   Psychiatric/Behavioral: Negative for depressed mood. The patient is not nervous/anxious.        Physical Exam:  Vital Signs:  Visit Vitals  /82 (BP Location: Left arm, Patient Position: Sitting, Cuff Size: Adult)   Pulse 84   Temp 98.3 °F (36.8 °C) (Oral)   Resp 18   Ht 152.4 cm (60\")   Wt 61.6 kg (135 lb 12.8 oz)   LMP  (LMP Unknown)   SpO2 98% Comment: Room air   BMI 26.52 kg/m²       Physical Exam   Constitutional: She is oriented to person, place, and time. She appears well-developed and well-nourished. She is cooperative.   Cardiovascular: Normal rate, regular rhythm and normal heart sounds. Exam reveals no gallop and no friction rub.   No murmur heard.  Pulmonary/Chest: Effort normal and breath sounds normal. She has no wheezes. She has no rales.   Abdominal: Soft. Bowel sounds are normal. She exhibits no distension. There is no tenderness. There is no rebound.   Neurological: She is alert and oriented to person, place, and time. Coordination " normal.   Skin: Skin is warm and dry.   Psychiatric: She has a normal mood and affect.   Vitals reviewed.      Assessment and Plan:  Problem List Items Addressed This Visit        Cardiovascular and Mediastinum    Hyperlipidemia       Digestive    GERD without esophagitis - Primary    Overview     Dr Duncan- protonix  Restart it. Will check kidney function           Other Visit Diagnoses     Abnormal results of kidney function studies        CKD (chronic kidney disease) stage 3, GFR 30-59 ml/min (CMS/[facility])        Elevated blood pressure reading              She is under a lot of stress that has made her gerd worse.   BP elevated slightly secondary to stress.   An After Visit Summary and PPPS were given to the patient.

## 2019-12-30 ENCOUNTER — TELEPHONE (OUTPATIENT)
Dept: CARDIOLOGY | Facility: CLINIC | Age: 74
End: 2019-12-30

## 2019-12-30 RX ORDER — MIDODRINE HYDROCHLORIDE 2.5 MG/1
2.5 TABLET ORAL 3 TIMES DAILY
Qty: 90 TABLET | Refills: 0 | Status: SHIPPED | OUTPATIENT
Start: 2019-12-30 | End: 2020-01-09

## 2020-01-09 ENCOUNTER — OFFICE VISIT (OUTPATIENT)
Dept: CARDIOLOGY | Facility: CLINIC | Age: 75
End: 2020-01-09

## 2020-01-09 ENCOUNTER — TELEPHONE (OUTPATIENT)
Dept: FAMILY MEDICINE CLINIC | Facility: CLINIC | Age: 75
End: 2020-01-09

## 2020-01-09 VITALS
HEART RATE: 81 BPM | SYSTOLIC BLOOD PRESSURE: 161 MMHG | WEIGHT: 138 LBS | HEIGHT: 60 IN | BODY MASS INDEX: 27.09 KG/M2 | OXYGEN SATURATION: 98 % | DIASTOLIC BLOOD PRESSURE: 84 MMHG

## 2020-01-09 DIAGNOSIS — I10 ESSENTIAL HYPERTENSION: ICD-10-CM

## 2020-01-09 DIAGNOSIS — I44.7 LEFT BUNDLE BRANCH BLOCK (LBBB): ICD-10-CM

## 2020-01-09 DIAGNOSIS — R06.02 SHORTNESS OF BREATH: ICD-10-CM

## 2020-01-09 DIAGNOSIS — I63.9 CEREBROVASCULAR ACCIDENT (CVA), UNSPECIFIED MECHANISM (HCC): ICD-10-CM

## 2020-01-09 DIAGNOSIS — I25.10 CORONARY ARTERY DISEASE INVOLVING NATIVE CORONARY ARTERY OF NATIVE HEART WITHOUT ANGINA PECTORIS: Primary | ICD-10-CM

## 2020-01-09 DIAGNOSIS — I44.7 LBBB (LEFT BUNDLE BRANCH BLOCK): ICD-10-CM

## 2020-01-09 DIAGNOSIS — E78.00 PURE HYPERCHOLESTEROLEMIA: ICD-10-CM

## 2020-01-09 PROCEDURE — 99213 OFFICE O/P EST LOW 20 MIN: CPT | Performed by: INTERNAL MEDICINE

## 2020-01-09 RX ORDER — CARVEDILOL 3.12 MG/1
3.12 TABLET ORAL 2 TIMES DAILY WITH MEALS
COMMUNITY
End: 2020-01-09 | Stop reason: SDUPTHER

## 2020-01-09 RX ORDER — CARVEDILOL 3.12 MG/1
3.12 TABLET ORAL 2 TIMES DAILY WITH MEALS
Qty: 180 TABLET | Refills: 3 | Status: SHIPPED | OUTPATIENT
Start: 2020-01-09 | End: 2021-01-04

## 2020-01-09 RX ORDER — CLOPIDOGREL BISULFATE 75 MG/1
75 TABLET ORAL DAILY
Status: ON HOLD | COMMUNITY
Start: 2020-01-09 | End: 2020-01-28 | Stop reason: SDUPTHER

## 2020-01-09 NOTE — PROGRESS NOTES
Subjective:     Encounter Date:01/09/2020      Patient ID: Rachael Quevedo is a 74 y.o. female.    Chief Complaint:  History of Present Illness 74-year-old white female with history of coronary disease history of hypertension hyperlipidemia history of recent CVA and new lumbar branch block presents to my office for follow-up.  Patient is currently stable without any symptoms of chest pain but has been having some shortness of breath with exertion.  No complaints of any PND orthopnea.  No palpitations but has some dizziness.  No syncope or swelling of the feet.  She was having some symptoms and went to the ER and was noted to have a CVA with problematic episode to the basal ganglia.  Patient had echocardiogram which showed EF of 45 to 50%.  She did not have any other test done at that time.  She is taking her medicines regularly.    The following portions of the patient's history were reviewed and updated as appropriate: allergies, current medications, past family history, past medical history, past social history, past surgical history and problem list.  Past Medical History:   Diagnosis Date   • Ankle fracture, left     Comments: 2016   • Arthritis    • Cataract, acquired    • Cough     Impression: Most likely reactive airway prescription as below Start antihistamine at home   • Dense breast    • Depression, major, recurrent, mild (CMS/HCC)    • GERD without esophagitis     Comments: Dr Duncan- protonix   • Hematuria, microscopic    • History of chicken pox    • History of loop recorder    • Hyperglycemia    • Hyperlipidemia    • Injury of back    • Injury of neck    • Malaise and fatigue    • Medicare annual wellness visit, initial     with abnormal findings   • Other specified menopausal and perimenopausal disorders     Other specified menopausal and postmenopausal disorders   • Pap smear, low-risk    • S/P right rotator cuff repair    • Screening for alcoholism     10 or more drinks per week   • Screening  "for depression     Negative Depression Screening ( 9 or less) ()   • Screening mammogram, encounter for    • Vasovagal near-syncope     Impression: from illness and cough Discussed if there is loss of vision in an eye, confusion, weakness or numbness in an extremity, drooping of a side of the face, intractible pain, intractible vomiting, to go to the ER.     Past Surgical History:   Procedure Laterality Date   • CATARACT EXTRACTION, BILATERAL Bilateral 1999   • GALLBLADDER SURGERY  1996   • LAPAROSCOPIC TUBAL LIGATION  1980   • ROTATOR CUFF REPAIR Right 2002     /84 (BP Location: Left arm, Patient Position: Sitting)   Pulse 81   Ht 152.4 cm (60\")   Wt 62.6 kg (138 lb)   LMP  (LMP Unknown)   SpO2 98%   BMI 26.95 kg/m²   Family History   Problem Relation Age of Onset   • Hyperlipidemia Mother         Elevated cholesterol   • Heart disease Mother    • Heart attack Mother    • Hypertension Father    • Colon cancer Father    • Hyperlipidemia Father         Elevated cholesterol   • Heart disease Father    • Diabetes Brother    • Heart disease Brother    • Diabetes Paternal Aunt    • Colon cancer Paternal Aunt    • Colon cancer Paternal Uncle    • Diabetes Paternal Uncle    • Diabetes Paternal Grandmother    • Heart disease Paternal Grandmother        Current Outpatient Medications:   •  clopidogrel (PLAVIX) 75 MG tablet, Take 75 mg by mouth Daily., Disp: , Rfl:   •  Magnesium 400 MG capsule, Take  by mouth., Disp: , Rfl:   •  pantoprazole (PROTONIX) 40 MG EC tablet, Take  by mouth Daily., Disp: , Rfl:   •  rosuvastatin (CRESTOR) 10 MG tablet, Take 10 mg by mouth Daily., Disp: , Rfl:   No Known Allergies  Social History     Socioeconomic History   • Marital status:      Spouse name: Not on file   • Number of children: Not on file   • Years of education: Not on file   • Highest education level: Not on file   Tobacco Use   • Smoking status: Never Smoker   • Smokeless tobacco: Never Used   Substance " and Sexual Activity   • Alcohol use: Yes     Alcohol/week: 4.0 - 5.0 standard drinks     Types: 4 - 5 Glasses of wine per week     Frequency: 2-3 times a week     Drinks per session: 1 or 2     Binge frequency: Never   • Drug use: No   • Sexual activity: Defer     Review of Systems   Constitution: Negative for fever and malaise/fatigue.   Cardiovascular: Negative for chest pain, dyspnea on exertion and palpitations.   Respiratory: Positive for shortness of breath. Negative for cough.    Skin: Negative for rash.   Gastrointestinal: Negative for abdominal pain, nausea and vomiting.   Neurological: Positive for dizziness and light-headedness. Negative for focal weakness and headaches.   All other systems reviewed and are negative.             Objective:     Physical Exam   Constitutional: She appears well-developed and well-nourished.   HENT:   Head: Normocephalic and atraumatic.   Eyes: Conjunctivae are normal. No scleral icterus.   Neck: Normal range of motion. Neck supple. No JVD present. Carotid bruit is not present.   Cardiovascular: Normal rate, regular rhythm, S1 normal, S2 normal, normal heart sounds and intact distal pulses. PMI is not displaced.   Pulmonary/Chest: Effort normal and breath sounds normal. She has no wheezes. She has no rales.   Abdominal: Soft. Bowel sounds are normal.   Neurological: She is alert. She has normal strength.   Skin: Skin is warm and dry. No rash noted.     Procedures    Lab Review:       Assessment:          Diagnosis Plan   1. Coronary artery disease involving native coronary artery of native heart without angina pectoris     2. Pure hypercholesterolemia     3. Left bundle branch block (LBBB)     4. Cerebrovascular accident (CVA), unspecified mechanism (CMS/HCC)     5. Essential hypertension     6. Shortness of breath     7. LBBB (left bundle branch block)            Plan:       Patient has history of coronary disease and is currently having symptoms of shortness of  breath  Patient had an echocardiogram which showed LV dysfunction which is new  Patient will have a Lexiscan Myoview to rule out ischemia  Patient had a recent CVA and will have a event monitor to rule out any significant arrhythmias  Patient is currently on Plavix for her CVA  Patient's blood pressure is high and hence was started on Coreg  Patient's lipid levels are followed by the primary care doctor and she is on Crestor

## 2020-01-10 ENCOUNTER — TRANSITIONAL CARE MANAGEMENT TELEPHONE ENCOUNTER (OUTPATIENT)
Dept: FAMILY MEDICINE CLINIC | Facility: CLINIC | Age: 75
End: 2020-01-10

## 2020-01-10 PROBLEM — I63.9 CVA (CEREBRAL VASCULAR ACCIDENT): Status: ACTIVE | Noted: 2020-01-06

## 2020-01-16 ENCOUNTER — OFFICE VISIT (OUTPATIENT)
Dept: FAMILY MEDICINE CLINIC | Facility: CLINIC | Age: 75
End: 2020-01-16

## 2020-01-16 VITALS
HEART RATE: 70 BPM | RESPIRATION RATE: 18 BRPM | DIASTOLIC BLOOD PRESSURE: 72 MMHG | SYSTOLIC BLOOD PRESSURE: 128 MMHG | OXYGEN SATURATION: 99 % | BODY MASS INDEX: 27.29 KG/M2 | TEMPERATURE: 98.1 F | HEIGHT: 60 IN | WEIGHT: 139 LBS

## 2020-01-16 DIAGNOSIS — E78.2 MIXED HYPERLIPIDEMIA: ICD-10-CM

## 2020-01-16 DIAGNOSIS — I63.9 CEREBROVASCULAR ACCIDENT (CVA), UNSPECIFIED MECHANISM (HCC): Primary | ICD-10-CM

## 2020-01-16 DIAGNOSIS — F33.0 DEPRESSION, MAJOR, RECURRENT, MILD (HCC): ICD-10-CM

## 2020-01-16 DIAGNOSIS — R53.81 MALAISE AND FATIGUE: ICD-10-CM

## 2020-01-16 DIAGNOSIS — I44.7 LEFT BUNDLE BRANCH BLOCK (LBBB): ICD-10-CM

## 2020-01-16 DIAGNOSIS — R53.83 MALAISE AND FATIGUE: ICD-10-CM

## 2020-01-16 PROCEDURE — 99214 OFFICE O/P EST MOD 30 MIN: CPT | Performed by: FAMILY MEDICINE

## 2020-01-16 RX ORDER — MIDODRINE HYDROCHLORIDE 2.5 MG/1
2.5 TABLET ORAL 3 TIMES DAILY
COMMUNITY
End: 2020-01-21

## 2020-01-16 RX ORDER — ROSUVASTATIN CALCIUM 20 MG/1
20 TABLET, COATED ORAL DAILY
Qty: 30 TABLET | Refills: 12 | Status: SHIPPED | OUTPATIENT
Start: 2020-01-16 | End: 2021-04-08

## 2020-01-16 NOTE — PROGRESS NOTES
Subjective   Rachael Quevedo is a 74 y.o. female.     Chief Complaint   Patient presents with   • Cerebrovascular Accident     Follow up from [facility]       Pt seen 1/6/2020 and on 1/13/2020    Stroke    Cerebrovascular Accident   This is a new problem. The current episode started 1 to 4 weeks ago. Associated symptoms include fatigue. Pertinent negatives include no abdominal pain, arthralgias, chest pain, coughing, fever, headaches, joint swelling, nausea, rash, sore throat, vertigo, visual change or vomiting. Nothing aggravates the symptoms.   Fatigue   This is a recurrent problem. The current episode started more than 1 month ago. The problem occurs 2 to 4 times per day. The problem has been waxing and waning. Associated symptoms include fatigue. Pertinent negatives include no abdominal pain, arthralgias, chest pain, coughing, fever, headaches, joint swelling, nausea, rash, sore throat, vertigo, visual change or vomiting. Nothing aggravates the symptoms.   Depression   Visit Type: follow-up  Patient presents with the following symptoms: fatigue.  Patient is not experiencing: anhedonia, chest pain, choking sensation, compulsions, confusion, decreased concentration, depressed mood, dizziness, dry mouth, excessive worry, feelings of worthlessness, hypersomnia, hyperventilation, impotence, insomnia, irritability, nervousness/anxiety, palpitations, psychomotor agitation, psychomotor retardation, shortness of breath and suicidal planning.  Frequency of symptoms: occasionally   Severity: moderate   Sleep quality: fair           The following portions of the patient's history were reviewed and updated as appropriate: allergies, current medications, past family history, past medical history, past social history, past surgical history and problem list.    Allergies:  No Known Allergies    Social History:  Social History     Socioeconomic History   • Marital status:      Spouse name: Not on file   • Number of  children: Not on file   • Years of education: Not on file   • Highest education level: Not on file   Tobacco Use   • Smoking status: Never Smoker   • Smokeless tobacco: Never Used   Substance and Sexual Activity   • Alcohol use: Yes     Alcohol/week: 4.0 - 5.0 standard drinks     Types: 4 - 5 Glasses of wine per week     Frequency: 2-3 times a week     Drinks per session: 1 or 2     Binge frequency: Never   • Drug use: No   • Sexual activity: Defer       Family History:  Family History   Problem Relation Age of Onset   • Hyperlipidemia Mother         Elevated cholesterol   • Heart disease Mother    • Heart attack Mother    • Hypertension Father    • Colon cancer Father    • Hyperlipidemia Father         Elevated cholesterol   • Heart disease Father    • Diabetes Brother    • Heart disease Brother    • Diabetes Paternal Aunt    • Colon cancer Paternal Aunt    • Colon cancer Paternal Uncle    • Diabetes Paternal Uncle    • Diabetes Paternal Grandmother    • Heart disease Paternal Grandmother        Past Medical History :  Patient Active Problem List   Diagnosis   • Arthritis   • Coronary artery disease   • Degeneration of intervertebral disc of lumbosacral region   • Dense breast   • Depression, major, recurrent, mild (CMS/HCC)   • GERD without esophagitis   • History of chicken pox   • Presence of other cardiac implants and grafts   • Mixed hyperlipidemia   • Left bundle branch block (LBBB)   • Lumbar disc herniation with radiculopathy   • Other specified menopausal and postmenopausal disorders   • CVA (cerebral vascular accident) (CMS/HCC)   • Angina at rest (CMS/HCC)   • Abnormal nuclear stress test       Medication List:    Current Outpatient Medications:   •  carvedilol (COREG) 3.125 MG tablet, Take 1 tablet by mouth 2 (Two) Times a Day With Meals., Disp: 180 tablet, Rfl: 3  •  aspirin 81 MG EC tablet, Take 1 tablet by mouth Daily., Disp: 30 tablet, Rfl: 5  •  clopidogrel (PLAVIX) 75 MG tablet, Take 1 tablet by  mouth Daily., Disp: 30 tablet, Rfl: 5  •  midodrine (PROAMATINE) 2.5 MG tablet, TAKE 1 TABLET BY MOUTH THREE TIMES A DAY, Disp: 90 tablet, Rfl: 2  •  rosuvastatin (CRESTOR) 20 MG tablet, Take 1 tablet by mouth Daily., Disp: 30 tablet, Rfl: 12    Past Surgical History:  Past Surgical History:   Procedure Laterality Date   • CARDIAC CATHETERIZATION N/A 1/27/2020    Procedure: Left Heart Cath with angiogram;  Surgeon: Jermaine Delong MD;  Location: [facility] CATH INVASIVE LOCATION;  Service: Cardiovascular   • CARDIAC CATHETERIZATION N/A 1/27/2020    Procedure: Stent BOBBY coronary;  Surgeon: Jermaine Delong MD;  Location: [facility] CATH INVASIVE LOCATION;  Service: Cardiovascular   • CARDIAC CATHETERIZATION N/A 1/27/2020    Procedure: Left ventriculography;  Surgeon: Jermaine Delong MD;  Location: [facility] CATH INVASIVE LOCATION;  Service: Cardiovascular   • CARDIAC CATHETERIZATION N/A 1/27/2020    Procedure: Coronary angiography;  Surgeon: Jermaine Delong MD;  Location: [facility] CATH INVASIVE LOCATION;  Service: Cardiovascular   • CATARACT EXTRACTION, BILATERAL Bilateral 1999   • GALLBLADDER SURGERY  1996   • LAPAROSCOPIC TUBAL LIGATION  1980   • ROTATOR CUFF REPAIR Right 2002       Review of Systems:  Review of Systems   Constitutional: Positive for fatigue. Negative for activity change, fever and irritability.   HENT: Negative for ear pain, rhinorrhea, sinus pressure, sore throat and voice change.    Eyes: Negative for visual disturbance.   Respiratory: Negative for cough, choking and shortness of breath.    Cardiovascular: Negative for chest pain and palpitations.   Gastrointestinal: Negative for abdominal pain, diarrhea, nausea and vomiting.   Endocrine: Negative for cold intolerance and heat intolerance.   Genitourinary: Negative for frequency, impotence and urgency.   Musculoskeletal: Negative for arthralgias and joint swelling.   Skin: Negative for rash.   Neurological: Negative for vertigo, syncope and confusion.  "  Hematological: Does not bruise/bleed easily.   Psychiatric/Behavioral: Negative for decreased concentration and depressed mood. The patient is not nervous/anxious and does not have insomnia.        Physical Exam:  Vital Signs:  Visit Vitals  /72 Comment: at home   Pulse 70   Temp 98.1 °F (36.7 °C) (Oral)   Resp 18   Ht 152.4 cm (60\")   Wt 63 kg (139 lb)   LMP  (LMP Unknown)   SpO2 99%   BMI 27.15 kg/m²       Physical Exam   Constitutional: She is oriented to person, place, and time. She appears well-developed and well-nourished. She is cooperative.   HENT:   Head: Normocephalic and atraumatic.   Right Ear: External ear normal.   Left Ear: External ear normal.   Eyes: Pupils are equal, round, and reactive to light. Conjunctivae and EOM are normal.   Cardiovascular: Normal rate, regular rhythm and normal heart sounds. Exam reveals no gallop and no friction rub.   No murmur heard.  Pulmonary/Chest: Effort normal and breath sounds normal. She has no wheezes. She has no rales.   Neurological: She is alert and oriented to person, place, and time. She has normal strength. She displays normal reflexes. No cranial nerve deficit or sensory deficit. She exhibits normal muscle tone. Coordination and gait normal.   Skin: Skin is warm and dry.   Psychiatric: She has a normal mood and affect.   Vitals reviewed.      Assessment and Plan:  Problem List Items Addressed This Visit        Cardiovascular and Mediastinum    Mixed hyperlipidemia    Overview     Crestor increased to 20mg post cva         Relevant Medications    rosuvastatin (CRESTOR) 20 MG tablet    Other Relevant Orders    Comprehensive Metabolic Panel    Lipid Panel With / Chol / HDL Ratio    Left bundle branch block (LBBB)    Overview     Seeing Dr Delong         CVA (cerebral vascular accident) (CMS/[facility]) - Primary    Overview     Right basal ganglia lacunar infarct. She is on a 30 day monitor to look for paroxysmal afib. asprin stopped. She is on plavix.       "       Other    Depression, major, recurrent, mild (CMS/HCC)    Overview     Stable  She is doing well without medication           Other Visit Diagnoses     Malaise and fatigue        Relevant Orders    CBC & Differential    TSH        She is going to see Dr Delong next week to discuss her midodrine.   Dicussed if there is loss of vision, confusion, weakness or numbness in an extremity, dropping of an eyelid or one side of the face, severe pain, intractable vomiting, go to the ER      An After Visit Summary and PPPS were given to the patient.

## 2020-01-21 RX ORDER — MIDODRINE HYDROCHLORIDE 2.5 MG/1
TABLET ORAL
Qty: 90 TABLET | Refills: 2 | Status: SHIPPED | OUTPATIENT
Start: 2020-01-21 | End: 2020-05-18

## 2020-01-23 ENCOUNTER — HOSPITAL ENCOUNTER (OUTPATIENT)
Dept: CARDIOLOGY | Facility: HOSPITAL | Age: 75
Discharge: HOME | End: 2020-01-23

## 2020-01-23 ENCOUNTER — TELEPHONE (OUTPATIENT)
Dept: CARDIOLOGY | Facility: CLINIC | Age: 75
End: 2020-01-23

## 2020-01-23 DIAGNOSIS — I44.7 LEFT BUNDLE BRANCH BLOCK (LBBB): ICD-10-CM

## 2020-01-23 DIAGNOSIS — R94.39 ABNORMAL NUCLEAR STRESS TEST: ICD-10-CM

## 2020-01-23 DIAGNOSIS — E78.00 PURE HYPERCHOLESTEROLEMIA: ICD-10-CM

## 2020-01-23 DIAGNOSIS — R06.02 SHORTNESS OF BREATH: ICD-10-CM

## 2020-01-23 DIAGNOSIS — I10 ESSENTIAL HYPERTENSION: ICD-10-CM

## 2020-01-23 DIAGNOSIS — I20.8 ANGINA AT REST (HCC): Primary | ICD-10-CM

## 2020-01-23 DIAGNOSIS — I25.10 CORONARY ARTERY DISEASE INVOLVING NATIVE CORONARY ARTERY OF NATIVE HEART WITHOUT ANGINA PECTORIS: ICD-10-CM

## 2020-01-23 DIAGNOSIS — I44.7 LBBB (LEFT BUNDLE BRANCH BLOCK): ICD-10-CM

## 2020-01-23 DIAGNOSIS — I63.9 CEREBROVASCULAR ACCIDENT (CVA), UNSPECIFIED MECHANISM (HCC): ICD-10-CM

## 2020-01-23 LAB
BH CV STRESS COMMENTS STAGE 1: NORMAL
BH CV STRESS DOSE REGADENOSON STAGE 1: 0.4
BH CV STRESS DURATION MIN STAGE 1: 0
BH CV STRESS DURATION SEC STAGE 1: 10
BH CV STRESS PROTOCOL 1: NORMAL
BH CV STRESS RECOVERY BP: NORMAL MMHG
BH CV STRESS RECOVERY HR: 100 BPM
BH CV STRESS STAGE 1: 1
LV EF NUC BP: 40 %
MAXIMAL PREDICTED HEART RATE: 147 BPM
STRESS BASELINE BP: NORMAL MMHG
STRESS BASELINE HR: 61 BPM
STRESS TARGET HR: 125 BPM

## 2020-01-23 PROCEDURE — 93017 CV STRESS TEST TRACING ONLY: CPT

## 2020-01-23 PROCEDURE — 25010000002 REGADENOSON 0.4 MG/5ML SOLUTION: Performed by: INTERNAL MEDICINE

## 2020-01-23 PROCEDURE — A9500 TC99M SESTAMIBI: HCPCS | Performed by: INTERNAL MEDICINE

## 2020-01-23 PROCEDURE — 0 TECHNETIUM SESTAMIBI: Performed by: INTERNAL MEDICINE

## 2020-01-23 PROCEDURE — 78452 HT MUSCLE IMAGE SPECT MULT: CPT

## 2020-01-23 PROCEDURE — 93016 CV STRESS TEST SUPVJ ONLY: CPT | Performed by: NURSE PRACTITIONER

## 2020-01-23 PROCEDURE — 93018 CV STRESS TEST I&R ONLY: CPT | Performed by: INTERNAL MEDICINE

## 2020-01-23 PROCEDURE — 78452 HT MUSCLE IMAGE SPECT MULT: CPT | Performed by: INTERNAL MEDICINE

## 2020-01-23 RX ADMIN — TECHNETIUM TC 99M SESTAMIBI 1 DOSE: 1 INJECTION INTRAVENOUS at 09:15

## 2020-01-23 RX ADMIN — REGADENOSON 0.4 MG: 0.08 INJECTION, SOLUTION INTRAVENOUS at 11:15

## 2020-01-24 PROBLEM — I20.89 ANGINA AT REST: Status: ACTIVE | Noted: 2020-01-24

## 2020-01-24 PROBLEM — R94.39 ABNORMAL NUCLEAR STRESS TEST: Status: ACTIVE | Noted: 2020-01-24

## 2020-01-24 PROBLEM — I20.8 ANGINA AT REST: Status: ACTIVE | Noted: 2020-01-24

## 2020-01-27 ENCOUNTER — HOSPITAL ENCOUNTER (OUTPATIENT)
Facility: HOSPITAL | Age: 75
Discharge: HOME | End: 2020-01-28
Attending: INTERNAL MEDICINE | Admitting: INTERNAL MEDICINE

## 2020-01-27 DIAGNOSIS — I20.8 ANGINA AT REST (HCC): ICD-10-CM

## 2020-01-27 DIAGNOSIS — R94.39 ABNORMAL NUCLEAR STRESS TEST: ICD-10-CM

## 2020-01-27 LAB
ACT BLD: 180 SECONDS (ref 89–137)
ACT BLD: 219 SECONDS (ref 89–137)
ANION GAP SERPL CALCULATED.3IONS-SCNC: 11 MMOL/L (ref 5–15)
BASOPHILS # BLD AUTO: 0.1 10*3/MM3 (ref 0–0.2)
BASOPHILS NFR BLD AUTO: 1.2 % (ref 0–1.5)
BUN BLD-MCNC: 16 MG/DL (ref 8–23)
BUN/CREAT SERPL: 17.8 (ref 7–25)
CALCIUM SPEC-SCNC: 10.4 MG/DL (ref 8.6–10.5)
CHLORIDE SERPL-SCNC: 103 MMOL/L (ref 98–107)
CHOLEST SERPL-MCNC: 168 MG/DL (ref 0–200)
CO2 SERPL-SCNC: 24 MMOL/L (ref 22–29)
CREAT BLD-MCNC: 0.9 MG/DL (ref 0.57–1)
DEPRECATED RDW RBC AUTO: 40.7 FL (ref 37–54)
EOSINOPHIL # BLD AUTO: 0.3 10*3/MM3 (ref 0–0.4)
EOSINOPHIL NFR BLD AUTO: 4.2 % (ref 0.3–6.2)
ERYTHROCYTE [DISTWIDTH] IN BLOOD BY AUTOMATED COUNT: 13.1 % (ref 12.3–15.4)
GFR SERPL CREATININE-BSD FRML MDRD: 61 ML/MIN/1.73
GLUCOSE BLD-MCNC: 96 MG/DL (ref 65–99)
HCT VFR BLD AUTO: 42.5 % (ref 34–46.6)
HDLC SERPL-MCNC: 56 MG/DL (ref 40–60)
HGB BLD-MCNC: 14.7 G/DL (ref 12–15.9)
LDLC SERPL CALC-MCNC: 62 MG/DL (ref 0–100)
LDLC/HDLC SERPL: 1.11 {RATIO}
LYMPHOCYTES # BLD AUTO: 2.4 10*3/MM3 (ref 0.7–3.1)
LYMPHOCYTES NFR BLD AUTO: 31.6 % (ref 19.6–45.3)
MCH RBC QN AUTO: 30.8 PG (ref 26.6–33)
MCHC RBC AUTO-ENTMCNC: 34.7 G/DL (ref 31.5–35.7)
MCV RBC AUTO: 88.9 FL (ref 79–97)
MONOCYTES # BLD AUTO: 0.6 10*3/MM3 (ref 0.1–0.9)
MONOCYTES NFR BLD AUTO: 7.9 % (ref 5–12)
NEUTROPHILS # BLD AUTO: 4.3 10*3/MM3 (ref 1.7–7)
NEUTROPHILS NFR BLD AUTO: 55.1 % (ref 42.7–76)
NRBC BLD AUTO-RTO: 0 /100 WBC (ref 0–0.2)
PLATELET # BLD AUTO: 242 10*3/MM3 (ref 140–450)
PMV BLD AUTO: 10.6 FL (ref 6–12)
POTASSIUM BLD-SCNC: 4.4 MMOL/L (ref 3.5–5.2)
RBC # BLD AUTO: 4.78 10*6/MM3 (ref 3.77–5.28)
SODIUM BLD-SCNC: 138 MMOL/L (ref 136–145)
TRIGL SERPL-MCNC: 249 MG/DL (ref 0–150)
VLDLC SERPL-MCNC: 49.8 MG/DL
WBC NRBC COR # BLD: 7.8 10*3/MM3 (ref 3.4–10.8)

## 2020-01-27 PROCEDURE — 93458 L HRT ARTERY/VENTRICLE ANGIO: CPT | Performed by: INTERNAL MEDICINE

## 2020-01-27 PROCEDURE — 0 IOPAMIDOL PER 1 ML: Performed by: INTERNAL MEDICINE

## 2020-01-27 PROCEDURE — 99152 MOD SED SAME PHYS/QHP 5/>YRS: CPT | Performed by: INTERNAL MEDICINE

## 2020-01-27 PROCEDURE — 99153 MOD SED SAME PHYS/QHP EA: CPT | Performed by: INTERNAL MEDICINE

## 2020-01-27 PROCEDURE — C1769 GUIDE WIRE: HCPCS | Performed by: INTERNAL MEDICINE

## 2020-01-27 PROCEDURE — 92928 PRQ TCAT PLMT NTRAC ST 1 LES: CPT | Performed by: INTERNAL MEDICINE

## 2020-01-27 PROCEDURE — 85347 COAGULATION TIME ACTIVATED: CPT

## 2020-01-27 PROCEDURE — C1874 STENT, COATED/COV W/DEL SYS: HCPCS | Performed by: INTERNAL MEDICINE

## 2020-01-27 PROCEDURE — C1894 INTRO/SHEATH, NON-LASER: HCPCS | Performed by: INTERNAL MEDICINE

## 2020-01-27 PROCEDURE — 63710000001 MIDODRINE 2.5 MG TABLET: Performed by: INTERNAL MEDICINE

## 2020-01-27 PROCEDURE — C9600 PERC DRUG-EL COR STENT SING: HCPCS | Performed by: INTERNAL MEDICINE

## 2020-01-27 PROCEDURE — C1887 CATHETER, GUIDING: HCPCS | Performed by: INTERNAL MEDICINE

## 2020-01-27 PROCEDURE — 25010000002 FENTANYL CITRATE (PF) 100 MCG/2ML SOLUTION: Performed by: INTERNAL MEDICINE

## 2020-01-27 PROCEDURE — 80048 BASIC METABOLIC PNL TOTAL CA: CPT | Performed by: INTERNAL MEDICINE

## 2020-01-27 PROCEDURE — 25010000002 MIDAZOLAM PER 1 MG: Performed by: INTERNAL MEDICINE

## 2020-01-27 PROCEDURE — A9270 NON-COVERED ITEM OR SERVICE: HCPCS | Performed by: INTERNAL MEDICINE

## 2020-01-27 PROCEDURE — G0378 HOSPITAL OBSERVATION PER HR: HCPCS

## 2020-01-27 PROCEDURE — 80061 LIPID PANEL: CPT | Performed by: INTERNAL MEDICINE

## 2020-01-27 PROCEDURE — 85025 COMPLETE CBC W/AUTO DIFF WBC: CPT | Performed by: INTERNAL MEDICINE

## 2020-01-27 PROCEDURE — 25010000002 HEPARIN (PORCINE) PER 1000 UNITS: Performed by: INTERNAL MEDICINE

## 2020-01-27 DEVICE — XIENCE SIERRA™ EVEROLIMUS ELUTING CORONARY STENT SYSTEM 2.50 MM X 12 MM / RAPID-EXCHANGE
Type: IMPLANTABLE DEVICE | Status: FUNCTIONAL
Brand: XIENCE SIERRA™

## 2020-01-27 DEVICE — XIENCE SIERRA™ EVEROLIMUS ELUTING CORONARY STENT SYSTEM 2.25 MM X 12 MM / RAPID-EXCHANGE
Type: IMPLANTABLE DEVICE | Status: FUNCTIONAL
Brand: XIENCE SIERRA™

## 2020-01-27 RX ORDER — CLOPIDOGREL BISULFATE 75 MG/1
TABLET ORAL AS NEEDED
Status: DISCONTINUED | OUTPATIENT
Start: 2020-01-27 | End: 2020-01-27 | Stop reason: HOSPADM

## 2020-01-27 RX ORDER — SODIUM CHLORIDE 9 MG/ML
75 INJECTION, SOLUTION INTRAVENOUS CONTINUOUS
Status: DISCONTINUED | OUTPATIENT
Start: 2020-01-27 | End: 2020-01-28 | Stop reason: HOSPADM

## 2020-01-27 RX ORDER — ASPIRIN 81 MG/1
81 TABLET ORAL DAILY
Status: DISCONTINUED | OUTPATIENT
Start: 2020-01-27 | End: 2020-01-28 | Stop reason: HOSPADM

## 2020-01-27 RX ORDER — SODIUM CHLORIDE 9 MG/ML
250 INJECTION, SOLUTION INTRAVENOUS ONCE AS NEEDED
Status: DISCONTINUED | OUTPATIENT
Start: 2020-01-27 | End: 2020-01-28 | Stop reason: HOSPADM

## 2020-01-27 RX ORDER — ASPIRIN 325 MG
TABLET ORAL AS NEEDED
Status: DISCONTINUED | OUTPATIENT
Start: 2020-01-27 | End: 2020-01-27 | Stop reason: HOSPADM

## 2020-01-27 RX ORDER — ROSUVASTATIN CALCIUM 10 MG/1
20 TABLET, COATED ORAL DAILY
Status: DISCONTINUED | OUTPATIENT
Start: 2020-01-27 | End: 2020-01-28 | Stop reason: HOSPADM

## 2020-01-27 RX ORDER — CARVEDILOL 3.12 MG/1
3.12 TABLET ORAL 2 TIMES DAILY WITH MEALS
Status: DISCONTINUED | OUTPATIENT
Start: 2020-01-27 | End: 2020-01-28 | Stop reason: HOSPADM

## 2020-01-27 RX ORDER — ATROPINE SULFATE 1 MG/ML
0.5 INJECTION, SOLUTION INTRAMUSCULAR; INTRAVENOUS; SUBCUTANEOUS
Status: DISCONTINUED | OUTPATIENT
Start: 2020-01-27 | End: 2020-01-28 | Stop reason: HOSPADM

## 2020-01-27 RX ORDER — LIDOCAINE HYDROCHLORIDE 20 MG/ML
INJECTION, SOLUTION INFILTRATION; PERINEURAL AS NEEDED
Status: DISCONTINUED | OUTPATIENT
Start: 2020-01-27 | End: 2020-01-27 | Stop reason: HOSPADM

## 2020-01-27 RX ORDER — HEPARIN SODIUM 1000 [USP'U]/ML
INJECTION, SOLUTION INTRAVENOUS; SUBCUTANEOUS AS NEEDED
Status: DISCONTINUED | OUTPATIENT
Start: 2020-01-27 | End: 2020-01-27 | Stop reason: HOSPADM

## 2020-01-27 RX ORDER — FENTANYL CITRATE 50 UG/ML
INJECTION, SOLUTION INTRAMUSCULAR; INTRAVENOUS AS NEEDED
Status: DISCONTINUED | OUTPATIENT
Start: 2020-01-27 | End: 2020-01-27 | Stop reason: HOSPADM

## 2020-01-27 RX ORDER — CLOPIDOGREL BISULFATE 75 MG/1
75 TABLET ORAL DAILY
Status: DISCONTINUED | OUTPATIENT
Start: 2020-01-28 | End: 2020-01-28 | Stop reason: HOSPADM

## 2020-01-27 RX ORDER — MIDAZOLAM HYDROCHLORIDE 1 MG/ML
INJECTION INTRAMUSCULAR; INTRAVENOUS AS NEEDED
Status: DISCONTINUED | OUTPATIENT
Start: 2020-01-27 | End: 2020-01-27 | Stop reason: HOSPADM

## 2020-01-27 RX ORDER — MIDODRINE HYDROCHLORIDE 2.5 MG/1
2.5 TABLET ORAL 3 TIMES DAILY
Status: DISCONTINUED | OUTPATIENT
Start: 2020-01-27 | End: 2020-01-28 | Stop reason: HOSPADM

## 2020-01-27 RX ADMIN — MIDODRINE HYDROCHLORIDE 2.5 MG: 2.5 TABLET ORAL at 22:37

## 2020-01-27 RX ADMIN — CARVEDILOL 3.12 MG: 3.12 TABLET, FILM COATED ORAL at 17:31

## 2020-01-27 RX ADMIN — ROSUVASTATIN CALCIUM 20 MG: 10 TABLET, FILM COATED ORAL at 17:30

## 2020-01-27 RX ADMIN — ASPIRIN 81 MG: 81 TABLET, DELAYED RELEASE ORAL at 17:30

## 2020-01-28 VITALS
OXYGEN SATURATION: 100 % | DIASTOLIC BLOOD PRESSURE: 51 MMHG | WEIGHT: 136.91 LBS | HEART RATE: 72 BPM | SYSTOLIC BLOOD PRESSURE: 124 MMHG | HEIGHT: 60 IN | BODY MASS INDEX: 26.88 KG/M2 | TEMPERATURE: 98.6 F | RESPIRATION RATE: 15 BRPM

## 2020-01-28 PROCEDURE — A9270 NON-COVERED ITEM OR SERVICE: HCPCS | Performed by: INTERNAL MEDICINE

## 2020-01-28 PROCEDURE — G0378 HOSPITAL OBSERVATION PER HR: HCPCS

## 2020-01-28 PROCEDURE — 99217 PR OBSERVATION CARE DISCHARGE MANAGEMENT: CPT | Performed by: INTERNAL MEDICINE

## 2020-01-28 PROCEDURE — 63710000001 MIDODRINE 2.5 MG TABLET: Performed by: INTERNAL MEDICINE

## 2020-01-28 PROCEDURE — 63710000001 CARVEDILOL 3.125 MG TABLET: Performed by: INTERNAL MEDICINE

## 2020-01-28 RX ORDER — ASPIRIN 81 MG/1
81 TABLET ORAL DAILY
Qty: 30 TABLET | Refills: 5 | Status: SHIPPED | OUTPATIENT
Start: 2020-01-29 | End: 2020-02-06

## 2020-01-28 RX ORDER — CLOPIDOGREL BISULFATE 75 MG/1
75 TABLET ORAL DAILY
Qty: 30 TABLET | Refills: 5 | Status: SHIPPED | OUTPATIENT
Start: 2020-01-28 | End: 2020-04-14

## 2020-01-28 RX ADMIN — CARVEDILOL 3.12 MG: 3.12 TABLET, FILM COATED ORAL at 08:18

## 2020-01-28 RX ADMIN — MIDODRINE HYDROCHLORIDE 2.5 MG: 2.5 TABLET ORAL at 08:19

## 2020-01-28 NOTE — PLAN OF CARE
Problem: Patient Care Overview  Goal: Interprofessional Rounds/Family Conf  Outcome: Ongoing (interventions implemented as appropriate)     Problem: Cardiac Catheterization (Diagnostic/Interventional) (Adult)  Goal: Signs and Symptoms of Listed Potential Problems Will be Absent, Minimized or Managed (Cardiac Catheterization)  Outcome: Ongoing (interventions implemented as appropriate)

## 2020-01-28 NOTE — DISCHARGE SUMMARY
Date of Discharge:  1/28/2020    Discharge Diagnosis: Chest pain, coronary disease    Presenting Problem/History of Present Illness  Active Hospital Problems    Diagnosis  POA   • Angina at rest (CMS/HCC) [I20.8]  Unknown     Priority: High   • Abnormal nuclear stress test [R94.39]  Unknown     Priority: High      Resolved Hospital Problems   No resolved problems to display.          Hospital Course  Patient was having symptoms of chest pain or shortness of breath and had a stress Myoview which is abnormal and she underwent a cardiac evaluation which showed 80% disease in the LAD and a 90% in the circumflex artery for which she underwent stent placement with drug-eluting stents.  Patient has normal LV systolic function.  Patient did very well postoperatively and ambulated very well.  She is being discharged to home on the following medications her diet and activity status are given.      Procedures Performed    Procedure(s):  Left Heart Cath with angiogram  Stent BOBBY coronary  Left ventriculography  Coronary angiography  -------------------       Consults:   Consults     No orders found for last 30 day(s).          Pertinent Test Results:   cardiac graphics:    ECG:   No orders to display      Echocardiogram:     Stress Test: Abnormal    Condition on Discharge: Good    Vital Signs  Temp:  [98.1 °F (36.7 °C)-98.6 °F (37 °C)] 98.6 °F (37 °C)  Heart Rate:  [60-77] 77  Resp:  [14-17] 15  BP: (105-154)/() 124/51    Physical Exam:  Physical Exam   Constitutional: She appears well-developed and well-nourished.   HENT:   Head: Normocephalic and atraumatic.   Eyes: Conjunctivae are normal. No scleral icterus.   Neck: Normal range of motion. Neck supple. No JVD present. Carotid bruit is not present.   Cardiovascular: Normal rate, regular rhythm, S1 normal, S2 normal, normal heart sounds and intact distal pulses. PMI is not displaced.   Pulmonary/Chest: Effort normal and breath sounds normal. She has no wheezes. She  has no rales.   Abdominal: Soft. Bowel sounds are normal.   Neurological: She is alert. She has normal strength.   Skin: Skin is warm and dry. No rash noted.       Discharge Disposition  Home or Self Care    Discharge Medications     Discharge Medications      New Medications      Instructions Start Date   aspirin 81 MG EC tablet   81 mg, Oral, Daily   Start Date:  January 29, 2020        Continue These Medications      Instructions Start Date   carvedilol 3.125 MG tablet  Commonly known as:  COREG   3.125 mg, Oral, 2 Times Daily With Meals      clopidogrel 75 MG tablet  Commonly known as:  PLAVIX   75 mg, Oral, Daily      midodrine 2.5 MG tablet  Commonly known as:  PROAMATINE   TAKE 1 TABLET BY MOUTH THREE TIMES A DAY      rosuvastatin 20 MG tablet  Commonly known as:  CRESTOR   20 mg, Oral, Daily             Discharge Diet:     Activity at Discharge:     Follow-up Appointments  Future Appointments   Date Time Provider Department Center   4/14/2020  9:45 AM Alexa Shah MD [facility]         Test Results Pending at Discharge       Jermaine Delong MD  01/28/20  10:22 AM    Time: Discharge 30 min

## 2020-01-28 NOTE — DISCHARGE INSTRUCTIONS
Post Cath Instructions    1) Drink plenty of fluids for the next 24 hours.  This helps to eliminate the dye used in your procedure through urination.  You may resume a normal diet; however, try to avoid foods that would cause gas or constipation.    2) Sedative medication given to you during your catheterization may decrease your judgement and reaction time for up to 24-48 hours.  Therefore:  a. DO NOT drive or operate hazardous machinery (48 hours)  b. DO NOT consume alcoholic beverages  c. DO NOT make any important/legal decisions  d. Have someone stay with you for at least 24 hours    3) To allow proper healing and prevent bleeding, the following activities are to be strictly avoided for the next 24-48 hours:  a. Excessive bending at wound site  b. Straining (anything that would tense up muscles around the affected puncture site)  c. Lifting objects greater than 5 pounds, pushing, or pulling for 5 days  i. For Groin Cases:  1. Refrain from sexual activity  2. Refrain from running or vigorous walking  3. No prolonged sitting or standing  4. Limit stair climbing as much as possible    4) Keep the puncture site clean and dry.  You may remove the dressing tomorrow and replace it with a band-aid for at least one additional day.  Gently clean the site with mild soap and water.  No scrubbing/rubbing and lightly pat the area dry.  Showers are acceptable; however, avoid submerging in water (tub baths, hot tubs, swimming pools, dishwater, etc…) for at least one week.  The site should be completely healed before resuming these activities to reduce the risk of infection.  Check the site often.  Watch for signs and symptoms of infection and notify your physician if any of the following occur:  a. Bleeding or an increase in swelling at the puncture site  b. Fever  c. Increased soreness around puncture site  d. Foul odor or significant drainage from the puncture site  e. Swelling, redness, or warmth at the puncture site    **A  bruise or small “pea sized” lump under the skin at the puncture site is not unusual.  This should disappear within 3-4 weeks.**    5) CONTACT YOUR PHYSICIAN OR CALL 911 IF YOU EXPERIENCE ANY OF THE FOLLOWING:  a. Increased angina (chest pain) or frequent sensations of pressure, burning, pain, or other discomfort in the chest, arm, jaws, or stomach  b. Lightheadedness, dizziness, faint feeling, sweating, or difficulty breathing  c. Odd sensation changes like numbness, tingling, coldness, or pain in the arm or leg in which the catheter was inserted  d. Limb in which the catheter was inserted becomes pale/bluish in color    IMPORTANT:  Although this occurs very rarely, if you should develop bright red or excessive bleeding, feel a “pop” inside at the insertion site, or notice a sudden increase in swelling larger than a walnut, you should call 911.  Hold continuous firm pressure to the access site until emergency personnel arrive.  It is best if someone else can do this for you.

## 2020-01-28 NOTE — PROGRESS NOTES
Discharge Planning Assessment  [facility]     Patient Name: Rachael Quevedo  MRN: [id number]  Today's Date: 1/28/2020    Admit Date: 1/27/2020    Discharge Needs Assessment     Row Name 01/28/20 0746       Living Environment    Lives With  spouse    Name(s) of Who Lives With Patient  spouse - Gene    Current Living Arrangements  home/apartment/condo    Primary Care Provided by  self    Family Caregiver if Needed  spouse    Quality of Family Relationships  helpful;involved    Able to Return to Prior Arrangements  yes       Resource/Environmental Concerns    Resource/Environmental Concerns  none    Transportation Concerns  car, none       Transition Planning    Patient/Family Anticipates Transition to  home with family    Patient/Family Anticipated Services at Transition  none    Transportation Anticipated  family or friend will provide       Discharge Needs Assessment    Concerns to be Addressed  no discharge needs identified;denies needs/concerns at this time    Equipment Currently Used at Home  none    Anticipated Changes Related to Illness  none    Equipment Needed After Discharge  none              Demographic Summary     Row Name 01/28/20 0745       General Information    Admission Type  observation    Arrived From  home    Referral Source  admission list    Reason for Consult  discharge planning    Preferred Language  English     Used During This Interaction  no          Liss [place] [address]  [phone] office  [phone]  fax  [email or web address]

## 2020-01-29 ENCOUNTER — TRANSITIONAL CARE MANAGEMENT TELEPHONE ENCOUNTER (OUTPATIENT)
Dept: FAMILY MEDICINE CLINIC | Facility: CLINIC | Age: 75
End: 2020-01-29

## 2020-01-29 NOTE — OUTREACH NOTE
Spoke with this nice lady. She is doing quite well s/p emergent cardiac cath and stent placement. She has had no further chest pain. No nausea, fever, chills. She did have episode today of mild SOB but feels it is due to her cold. She has started ASA and continues on Plavix. Confirms receipt and understanding of d/c orders. Her appetite is good. I have sched to to see Dr Shah for [facility] on 02/06/20.

## 2020-02-06 ENCOUNTER — OFFICE VISIT (OUTPATIENT)
Dept: FAMILY MEDICINE CLINIC | Facility: CLINIC | Age: 75
End: 2020-02-06

## 2020-02-06 VITALS
WEIGHT: 136.2 LBS | OXYGEN SATURATION: 99 % | RESPIRATION RATE: 16 BRPM | TEMPERATURE: 98.2 F | HEIGHT: 60 IN | SYSTOLIC BLOOD PRESSURE: 136 MMHG | HEART RATE: 88 BPM | DIASTOLIC BLOOD PRESSURE: 80 MMHG | BODY MASS INDEX: 26.74 KG/M2

## 2020-02-06 DIAGNOSIS — I25.10 CORONARY ARTERY DISEASE INVOLVING NATIVE CORONARY ARTERY OF NATIVE HEART WITHOUT ANGINA PECTORIS: ICD-10-CM

## 2020-02-06 DIAGNOSIS — I44.7 LEFT BUNDLE BRANCH BLOCK (LBBB): Primary | ICD-10-CM

## 2020-02-06 DIAGNOSIS — E78.2 MIXED HYPERLIPIDEMIA: ICD-10-CM

## 2020-02-06 PROBLEM — I20.89 ANGINA AT REST: Status: RESOLVED | Noted: 2020-01-24 | Resolved: 2020-02-06

## 2020-02-06 PROBLEM — I20.8 ANGINA AT REST: Status: RESOLVED | Noted: 2020-01-24 | Resolved: 2020-02-06

## 2020-02-06 PROBLEM — R94.39 ABNORMAL NUCLEAR STRESS TEST: Status: RESOLVED | Noted: 2020-01-24 | Resolved: 2020-02-06

## 2020-02-06 PROCEDURE — 99495 TRANSJ CARE MGMT MOD F2F 14D: CPT | Performed by: FAMILY MEDICINE

## 2020-02-06 RX ORDER — ASPIRIN 81 MG/1
81 TABLET ORAL DAILY
Qty: 30 TABLET | Refills: 5 | Status: SHIPPED | OUTPATIENT
Start: 2020-02-06 | End: 2021-04-13

## 2020-02-06 NOTE — PROGRESS NOTES
Subjective   Rachael Quevedo is a 74 y.o. female.     Chief Complaint   Patient presents with   • Shortness of Breath   • Transitional Care Management     BHF       Shortness of Breath   This is a new problem. The current episode started more than 1 month ago. The problem occurs intermittently. The problem has been gradually improving. Pertinent negatives include no abdominal pain, chest pain, ear pain, fever, leg swelling, rash, rhinorrhea or vomiting. The symptoms are aggravated by exercise. The patient has no known risk factors for DVT/PE. She has tried nothing for the symptoms.        The following portions of the patient's history were reviewed and updated as appropriate: allergies, current medications, past family history, past medical history, past social history, past surgical history and problem list.    Allergies:  No Known Allergies    Social History:  Social History     Socioeconomic History   • Marital status:      Spouse name: Not on file   • Number of children: Not on file   • Years of education: Not on file   • Highest education level: Not on file   Tobacco Use   • Smoking status: Never Smoker   • Smokeless tobacco: Never Used   Substance and Sexual Activity   • Alcohol use: Yes     Alcohol/week: 4.0 - 5.0 standard drinks     Types: 4 - 5 Glasses of wine per week     Frequency: 2-3 times a week     Drinks per session: 1 or 2     Binge frequency: Never   • Drug use: No   • Sexual activity: Defer       Family History:  Family History   Problem Relation Age of Onset   • Hyperlipidemia Mother         Elevated cholesterol   • Heart disease Mother    • Heart attack Mother    • Hypertension Father    • Colon cancer Father    • Hyperlipidemia Father         Elevated cholesterol   • Heart disease Father    • Diabetes Brother    • Heart disease Brother    • Diabetes Paternal Aunt    • Colon cancer Paternal Aunt    • Colon cancer Paternal Uncle    • Diabetes Paternal Uncle    • Diabetes Paternal  Grandmother    • Heart disease Paternal Grandmother        Past Medical History :  Patient Active Problem List   Diagnosis   • Arthritis   • Coronary artery disease   • Degeneration of intervertebral disc of lumbosacral region   • Dense breast   • Depression, major, recurrent, mild (CMS/HCC)   • GERD without esophagitis   • History of chicken pox   • Presence of other cardiac implants and grafts   • Mixed hyperlipidemia   • Left bundle branch block (LBBB)   • Lumbar disc herniation with radiculopathy   • Other specified menopausal and postmenopausal disorders   • CVA (cerebral vascular accident) (CMS/HCC)       Medication List:  Outpatient Encounter Medications as of 2/6/2020   Medication Sig Dispense Refill   • carvedilol (COREG) 3.125 MG tablet Take 1 tablet by mouth 2 (Two) Times a Day With Meals. 180 tablet 3   • clopidogrel (PLAVIX) 75 MG tablet Take 1 tablet by mouth Daily. 30 tablet 5   • midodrine (PROAMATINE) 2.5 MG tablet TAKE 1 TABLET BY MOUTH THREE TIMES A DAY 90 tablet 2   • rosuvastatin (CRESTOR) 20 MG tablet Take 1 tablet by mouth Daily. 30 tablet 12   • aspirin 81 MG EC tablet Take 1 tablet by mouth Daily. 30 tablet 5   • [DISCONTINUED] aspirin 81 MG EC tablet Take 1 tablet by mouth Daily. 30 tablet 5     No facility-administered encounter medications on file as of 2/6/2020.        Past Surgical History:  Past Surgical History:   Procedure Laterality Date   • CARDIAC CATHETERIZATION N/A 1/27/2020    Procedure: Left Heart Cath with angiogram;  Surgeon: Jermaine Delong MD;  Location: [facility] CATH INVASIVE LOCATION;  Service: Cardiovascular   • CARDIAC CATHETERIZATION N/A 1/27/2020    Procedure: Stent BOBBY coronary;  Surgeon: Jermaine Delong MD;  Location: [facility] CATH INVASIVE LOCATION;  Service: Cardiovascular   • CARDIAC CATHETERIZATION N/A 1/27/2020    Procedure: Left ventriculography;  Surgeon: Jermaine Delong MD;  Location: [facility] CATH INVASIVE LOCATION;  Service: Cardiovascular   • CARDIAC  "CATHETERIZATION N/A 1/27/2020    Procedure: Coronary angiography;  Surgeon: Jermaine Delong MD;  Location: [facility] CATH INVASIVE LOCATION;  Service: Cardiovascular   • CATARACT EXTRACTION, BILATERAL Bilateral 1999   • GALLBLADDER SURGERY  1996   • LAPAROSCOPIC TUBAL LIGATION  1980   • ROTATOR CUFF REPAIR Right 2002       Review of Systems:  Review of Systems   Constitutional: Negative for activity change and fever.   HENT: Negative for ear pain, rhinorrhea, sinus pressure and voice change.    Eyes: Negative for visual disturbance.   Respiratory: Positive for shortness of breath. Negative for cough.    Cardiovascular: Negative for chest pain and leg swelling.   Gastrointestinal: Negative for abdominal pain, diarrhea, nausea and vomiting.   Endocrine: Negative for cold intolerance and heat intolerance.   Genitourinary: Negative for frequency and urgency.   Musculoskeletal: Negative for arthralgias.   Skin: Negative for rash.   Neurological: Negative for syncope.   Hematological: Does not bruise/bleed easily.   Psychiatric/Behavioral: Negative for depressed mood. The patient is not nervous/anxious.        I have reviewed and confirmed the accuracy of the ROS as documented by the MA/LPN/RN Alexa Shah MD    Vital Signs:  Visit Vitals  /80   Pulse 88   Temp 98.2 °F (36.8 °C)   Resp 16   Ht 151.1 cm (59.5\")   Wt 61.8 kg (136 lb 3.2 oz)   LMP  (LMP Unknown)   SpO2 99%   BMI 27.05 kg/m²       Physical Exam   Constitutional: She is oriented to person, place, and time. She appears well-developed and well-nourished. She is cooperative.   Cardiovascular: Normal rate, regular rhythm and normal heart sounds. Exam reveals no gallop and no friction rub.   No murmur heard.  Pulmonary/Chest: Effort normal and breath sounds normal. She has no wheezes. She has no rales.   Neurological: She is alert and oriented to person, place, and time. Coordination normal.   Skin: Skin is warm and dry.   Psychiatric: She has a normal mood " and affect.   Vitals reviewed.      Assessment and Plan:  Problem List Items Addressed This Visit        Cardiovascular and Mediastinum    Coronary artery disease    Overview     Cath by Dr Delong showed 80% LAD, 90% in circumflex with normal LV. Drug eluding stents placed in both.          Relevant Medications    aspirin 81 MG EC tablet    Mixed hyperlipidemia    Overview     Crestor increased to 20mg post cva         Left bundle branch block (LBBB) - Primary    Overview     Seeing Dr Delong               An After Visit Summary and PPPS were given to the patient.

## 2020-02-13 ENCOUNTER — OFFICE VISIT (OUTPATIENT)
Dept: CARDIOLOGY | Facility: CLINIC | Age: 75
End: 2020-02-13

## 2020-02-13 VITALS
OXYGEN SATURATION: 98 % | SYSTOLIC BLOOD PRESSURE: 138 MMHG | HEART RATE: 71 BPM | BODY MASS INDEX: 26.5 KG/M2 | WEIGHT: 135 LBS | HEIGHT: 60 IN | DIASTOLIC BLOOD PRESSURE: 97 MMHG

## 2020-02-13 DIAGNOSIS — E78.00 PURE HYPERCHOLESTEROLEMIA: ICD-10-CM

## 2020-02-13 DIAGNOSIS — I25.10 CORONARY ARTERY DISEASE INVOLVING NATIVE CORONARY ARTERY OF NATIVE HEART WITHOUT ANGINA PECTORIS: Primary | ICD-10-CM

## 2020-02-13 DIAGNOSIS — I10 ESSENTIAL HYPERTENSION: ICD-10-CM

## 2020-02-13 PROCEDURE — 99213 OFFICE O/P EST LOW 20 MIN: CPT | Performed by: INTERNAL MEDICINE

## 2020-02-13 RX ORDER — CLOPIDOGREL BISULFATE 75 MG/1
75 TABLET ORAL DAILY
Qty: 90 TABLET | Refills: 3 | Status: SHIPPED | OUTPATIENT
Start: 2020-02-13 | End: 2021-03-03

## 2020-02-13 NOTE — PROGRESS NOTES
Subjective:     Encounter Date:02/13/2020      Patient ID: Rachael Quevedo is a 74 y.o. female.    Chief Complaint:  History of Present Illness 74-year-old white female with history of coronary status post stent placement to the LAD and circumflex artery history of hypertension hyperlipidemia presents to my office for follow-up.  Patient is currently stable without any symptoms of chest pain or shortness of breath at rest on exertion.  No complaint of any PND orthopnea.  No palpitation dizziness syncope or swelling of the feet.  He is taking her medicines regularly.  She does not smoke.  She is tried exercise regularly    The following portions of the patient's history were reviewed and updated as appropriate: allergies, current medications, past family history, past medical history, past social history, past surgical history and problem list.  Past Medical History:   Diagnosis Date   • Ankle fracture, left     Comments: 2016   • Arthritis    • Cataract, acquired    • Cough     Impression: Most likely reactive airway prescription as below Start antihistamine at home   • Dense breast    • Depression, major, recurrent, mild (CMS/HCC)    • GERD without esophagitis     Comments: Dr Maritza almonte   • Hematuria, microscopic    • History of chicken pox    • History of loop recorder    • Hyperglycemia    • Hyperlipidemia    • Injury of back    • Injury of neck    • Malaise and fatigue    • Medicare annual wellness visit, initial     with abnormal findings   • Other specified menopausal and perimenopausal disorders     Other specified menopausal and postmenopausal disorders   • Pap smear, low-risk    • S/P right rotator cuff repair    • Screening for alcoholism     10 or more drinks per week   • Screening for depression     Negative Depression Screening ( 9 or less) ()   • Screening mammogram, encounter for    • Stroke (CMS/HCC)     1/5/2020 no residual effects   • Vasovagal near-syncope     Impression: from  "illness and cough Discussed if there is loss of vision in an eye, confusion, weakness or numbness in an extremity, drooping of a side of the face, intractible pain, intractible vomiting, to go to the ER.     Past Surgical History:   Procedure Laterality Date   • CARDIAC CATHETERIZATION N/A 1/27/2020    Procedure: Left Heart Cath with angiogram;  Surgeon: Jermaine Delong MD;  Location: [facility] CATH INVASIVE LOCATION;  Service: Cardiovascular   • CARDIAC CATHETERIZATION N/A 1/27/2020    Procedure: Stent BOBBY coronary;  Surgeon: Jermaine Delong MD;  Location: [facility] CATH INVASIVE LOCATION;  Service: Cardiovascular   • CARDIAC CATHETERIZATION N/A 1/27/2020    Procedure: Left ventriculography;  Surgeon: Jermaine Delong MD;  Location: [facility] CATH INVASIVE LOCATION;  Service: Cardiovascular   • CARDIAC CATHETERIZATION N/A 1/27/2020    Procedure: Coronary angiography;  Surgeon: Jermaine Delong MD;  Location: [facility] CATH INVASIVE LOCATION;  Service: Cardiovascular   • CATARACT EXTRACTION, BILATERAL Bilateral 1999   • GALLBLADDER SURGERY  1996   • LAPAROSCOPIC TUBAL LIGATION  1980   • ROTATOR CUFF REPAIR Right 2002     /97 (BP Location: Left arm, Patient Position: Sitting)   Pulse 71   Ht 151.1 cm (59.5\")   Wt 61.2 kg (135 lb)   LMP  (LMP Unknown)   SpO2 98%   BMI 26.81 kg/m²   Family History   Problem Relation Age of Onset   • Hyperlipidemia Mother         Elevated cholesterol   • Heart disease Mother    • Heart attack Mother    • Hypertension Father    • Colon cancer Father    • Hyperlipidemia Father         Elevated cholesterol   • Heart disease Father    • Diabetes Brother    • Heart disease Brother    • Diabetes Paternal Aunt    • Colon cancer Paternal Aunt    • Colon cancer Paternal Uncle    • Diabetes Paternal Uncle    • Diabetes Paternal Grandmother    • Heart disease Paternal Grandmother        Current Outpatient Medications:   •  aspirin 81 MG EC tablet, Take 1 tablet by mouth Daily., Disp: 30 tablet, Rfl: " 5  •  carvedilol (COREG) 3.125 MG tablet, Take 1 tablet by mouth 2 (Two) Times a Day With Meals., Disp: 180 tablet, Rfl: 3  •  clopidogrel (PLAVIX) 75 MG tablet, Take 1 tablet by mouth Daily., Disp: 30 tablet, Rfl: 5  •  midodrine (PROAMATINE) 2.5 MG tablet, TAKE 1 TABLET BY MOUTH THREE TIMES A DAY, Disp: 90 tablet, Rfl: 2  •  rosuvastatin (CRESTOR) 20 MG tablet, Take 1 tablet by mouth Daily., Disp: 30 tablet, Rfl: 12  •  clopidogrel (PLAVIX) 75 MG tablet, Take 1 tablet by mouth Daily., Disp: 90 tablet, Rfl: 3  No Known Allergies  Social History     Socioeconomic History   • Marital status:      Spouse name: Not on file   • Number of children: Not on file   • Years of education: Not on file   • Highest education level: Not on file   Tobacco Use   • Smoking status: Never Smoker   • Smokeless tobacco: Never Used   Substance and Sexual Activity   • Alcohol use: Yes     Alcohol/week: 4.0 - 5.0 standard drinks     Types: 4 - 5 Glasses of wine per week     Frequency: 2-3 times a week     Drinks per session: 1 or 2     Binge frequency: Never   • Drug use: No   • Sexual activity: Defer     Review of Systems   Constitution: Negative for fever and malaise/fatigue.   Cardiovascular: Negative for chest pain, dyspnea on exertion and palpitations.   Respiratory: Negative for cough and shortness of breath.    Skin: Negative for rash.   Gastrointestinal: Negative for abdominal pain, nausea and vomiting.   Neurological: Negative for focal weakness and headaches.   All other systems reviewed and are negative.             Objective:     Physical Exam   Constitutional: She appears well-developed and well-nourished.   HENT:   Head: Normocephalic and atraumatic.   Eyes: Conjunctivae are normal. No scleral icterus.   Neck: Normal range of motion. Neck supple. No JVD present. Carotid bruit is not present.   Cardiovascular: Normal rate, regular rhythm, S1 normal, S2 normal, normal heart sounds and intact distal pulses. PMI is not  displaced.   Pulmonary/Chest: Effort normal and breath sounds normal. She has no wheezes. She has no rales.   Abdominal: Soft. Bowel sounds are normal.   Neurological: She is alert. She has normal strength.   Skin: Skin is warm and dry. No rash noted.     Procedures    Lab Review:       Assessment:          Diagnosis Plan   1. Coronary artery disease involving native coronary artery of native heart without angina pectoris     2. Essential hypertension     3. Pure hypercholesterolemia            Plan:       Patient has history of coronary disease status post and placement to the LAD and circumflex artery recently  Patient has mildly dysfunction with an EF of 40%  Patient is currently on medical therapy and stable especially beta-blockers  Patient blood pressure and heart rate stable  Patient lipid levels are followed by the primary care doctor

## 2020-03-13 ENCOUNTER — TELEPHONE (OUTPATIENT)
Dept: FAMILY MEDICINE CLINIC | Facility: CLINIC | Age: 75
End: 2020-03-13

## 2020-03-13 NOTE — TELEPHONE ENCOUNTER
----- Message from Rachael Quevedo sent at 3/7/2020 12:25 PM EST -----  Regarding: Prescription Question  Contact: [phone]  Would it be ok for me to take calcium, get back on Viactiv, and Vitamin D with the prescriptions I am currently taking?

## 2020-03-16 NOTE — TELEPHONE ENCOUNTER
Pt needs dye test (cath?) so does she need to continue to wear event monitor?  Please call her at [phone]   Home

## 2020-03-30 DIAGNOSIS — I63.9 CEREBROVASCULAR ACCIDENT (CVA), UNSPECIFIED MECHANISM (HCC): Primary | ICD-10-CM

## 2020-04-02 ENCOUNTER — TELEPHONE (OUTPATIENT)
Dept: FAMILY MEDICINE CLINIC | Facility: CLINIC | Age: 75
End: 2020-04-02

## 2020-04-02 NOTE — TELEPHONE ENCOUNTER
Pt called and reported that the Neurologist referral apt is not till August. Pt questioned if she needed to see to follow up from her stroke with Dr. Shah since the appointment was so far out ?

## 2020-04-02 NOTE — TELEPHONE ENCOUNTER
We can try a video visit if she has my chart and a phone with a camera or a computer with a camera

## 2020-04-09 ENCOUNTER — TELEPHONE (OUTPATIENT)
Dept: FAMILY MEDICINE CLINIC | Facility: CLINIC | Age: 75
End: 2020-04-09

## 2020-04-09 NOTE — TELEPHONE ENCOUNTER
Called to check on patient. She stated she was doing well and she has a video visit for next week to follow up with you next week.

## 2020-04-10 ENCOUNTER — RESULTS ENCOUNTER (OUTPATIENT)
Dept: FAMILY MEDICINE CLINIC | Facility: CLINIC | Age: 75
End: 2020-04-10

## 2020-04-10 DIAGNOSIS — R53.83 MALAISE AND FATIGUE: ICD-10-CM

## 2020-04-10 DIAGNOSIS — E78.2 MIXED HYPERLIPIDEMIA: ICD-10-CM

## 2020-04-10 DIAGNOSIS — R53.81 MALAISE AND FATIGUE: ICD-10-CM

## 2020-04-11 LAB
ALBUMIN SERPL-MCNC: 4.5 G/DL (ref 3.7–4.7)
ALBUMIN/GLOB SERPL: 1.4 {RATIO} (ref 1.2–2.2)
ALP SERPL-CCNC: 75 IU/L (ref 39–117)
ALT SERPL-CCNC: 15 IU/L (ref 0–32)
AST SERPL-CCNC: 21 IU/L (ref 0–40)
BASOPHILS # BLD AUTO: 0.1 X10E3/UL (ref 0–0.2)
BASOPHILS NFR BLD AUTO: 2 %
BILIRUB SERPL-MCNC: 0.4 MG/DL (ref 0–1.2)
BUN SERPL-MCNC: 15 MG/DL (ref 8–27)
BUN/CREAT SERPL: 13 (ref 12–28)
CALCIUM SERPL-MCNC: 9.5 MG/DL (ref 8.7–10.3)
CHLORIDE SERPL-SCNC: 104 MMOL/L (ref 96–106)
CHOLEST SERPL-MCNC: 169 MG/DL (ref 100–199)
CHOLEST/HDLC SERPL: 3.1 RATIO (ref 0–4.4)
CO2 SERPL-SCNC: 21 MMOL/L (ref 20–29)
CREAT SERPL-MCNC: 1.15 MG/DL (ref 0.57–1)
EOSINOPHIL # BLD AUTO: 0.4 X10E3/UL (ref 0–0.4)
EOSINOPHIL NFR BLD AUTO: 7 %
ERYTHROCYTE [DISTWIDTH] IN BLOOD BY AUTOMATED COUNT: 12.5 % (ref 11.7–15.4)
GLOBULIN SER CALC-MCNC: 3.2 G/DL (ref 1.5–4.5)
GLUCOSE SERPL-MCNC: 127 MG/DL (ref 65–99)
HCT VFR BLD AUTO: 42.1 % (ref 34–46.6)
HDLC SERPL-MCNC: 55 MG/DL
HGB BLD-MCNC: 13.9 G/DL (ref 11.1–15.9)
IMM GRANULOCYTES # BLD AUTO: 0 X10E3/UL (ref 0–0.1)
IMM GRANULOCYTES NFR BLD AUTO: 0 %
LDLC SERPL CALC-MCNC: 87 MG/DL (ref 0–99)
LYMPHOCYTES # BLD AUTO: 2.4 X10E3/UL (ref 0.7–3.1)
LYMPHOCYTES NFR BLD AUTO: 40 %
MCH RBC QN AUTO: 29.9 PG (ref 26.6–33)
MCHC RBC AUTO-ENTMCNC: 33 G/DL (ref 31.5–35.7)
MCV RBC AUTO: 91 FL (ref 79–97)
MONOCYTES # BLD AUTO: 0.6 X10E3/UL (ref 0.1–0.9)
MONOCYTES NFR BLD AUTO: 10 %
NEUTROPHILS # BLD AUTO: 2.6 X10E3/UL (ref 1.4–7)
NEUTROPHILS NFR BLD AUTO: 41 %
PLATELET # BLD AUTO: 261 X10E3/UL (ref 150–450)
POTASSIUM SERPL-SCNC: 4.5 MMOL/L (ref 3.5–5.2)
PROT SERPL-MCNC: 7.7 G/DL (ref 6–8.5)
RBC # BLD AUTO: 4.65 X10E6/UL (ref 3.77–5.28)
SODIUM SERPL-SCNC: 142 MMOL/L (ref 134–144)
TRIGL SERPL-MCNC: 135 MG/DL (ref 0–149)
TSH SERPL DL<=0.005 MIU/L-ACNC: 2.5 UIU/ML (ref 0.45–4.5)
VLDLC SERPL CALC-MCNC: 27 MG/DL (ref 5–40)
WBC # BLD AUTO: 6.1 X10E3/UL (ref 3.4–10.8)

## 2020-04-14 ENCOUNTER — TELEMEDICINE (OUTPATIENT)
Dept: FAMILY MEDICINE CLINIC | Facility: CLINIC | Age: 75
End: 2020-04-14

## 2020-04-14 VITALS
SYSTOLIC BLOOD PRESSURE: 117 MMHG | DIASTOLIC BLOOD PRESSURE: 68 MMHG | BODY MASS INDEX: 25.94 KG/M2 | WEIGHT: 130.6 LBS | HEART RATE: 63 BPM

## 2020-04-14 DIAGNOSIS — I63.9 CEREBROVASCULAR ACCIDENT (CVA), UNSPECIFIED MECHANISM (HCC): Primary | ICD-10-CM

## 2020-04-14 DIAGNOSIS — R73.9 HYPERGLYCEMIA: ICD-10-CM

## 2020-04-14 DIAGNOSIS — E78.2 MIXED HYPERLIPIDEMIA: ICD-10-CM

## 2020-04-14 DIAGNOSIS — N28.9 RENAL INSUFFICIENCY: ICD-10-CM

## 2020-04-14 DIAGNOSIS — I25.10 CORONARY ARTERY DISEASE INVOLVING NATIVE CORONARY ARTERY OF NATIVE HEART WITHOUT ANGINA PECTORIS: ICD-10-CM

## 2020-04-14 PROCEDURE — 99214 OFFICE O/P EST MOD 30 MIN: CPT | Performed by: FAMILY MEDICINE

## 2020-04-14 NOTE — PROGRESS NOTES
Subjective   Rachael Quevedo is a 74 y.o. female.     The use of a video visit has been reviewed with the patient and verbal informed consent has been obtained.      Cerebrovascular Accident   This is a new problem. The current episode started 1 to 4 weeks ago. The problem occurs rarely. The problem has been resolved. Pertinent negatives include no abdominal pain, arthralgias, chest pain, coughing, fever, nausea, rash or vomiting. Nothing aggravates the symptoms. Treatments tried: medication.   Coronary Artery Disease   Presents for follow-up visit. Pertinent negatives include no chest pain or shortness of breath. Risk factors include hyperlipidemia. The symptoms have been stable. Compliance with diet is good. Compliance with exercise is good. Compliance with medications is good.   Hyperlipidemia   This is a chronic problem. The current episode started more than 1 year ago. She has no history of chronic renal disease or diabetes. There are no known factors aggravating her hyperlipidemia. Pertinent negatives include no chest pain or shortness of breath.        The following portions of the patient's history were reviewed and updated as appropriate: allergies, current medications, past family history, past medical history, past social history, past surgical history and problem list.    Family History   Problem Relation Age of Onset   • Hyperlipidemia Mother         Elevated cholesterol   • Heart disease Mother    • Heart attack Mother    • Hypertension Father    • Colon cancer Father    • Hyperlipidemia Father         Elevated cholesterol   • Heart disease Father    • Diabetes Brother    • Heart disease Brother    • Diabetes Paternal Aunt    • Colon cancer Paternal Aunt    • Colon cancer Paternal Uncle    • Diabetes Paternal Uncle    • Diabetes Paternal Grandmother    • Heart disease Paternal Grandmother        Current Outpatient Medications:   •  midodrine (PROAMATINE) 2.5 MG tablet, TAKE 1 TABLET BY MOUTH THREE  TIMES A DAY (Patient taking differently: Take 2.5 mg by mouth 2 (Two) Times a Day.), Disp: 90 tablet, Rfl: 2  •  aspirin 81 MG EC tablet, Take 1 tablet by mouth Daily., Disp: 30 tablet, Rfl: 5  •  carvedilol (COREG) 3.125 MG tablet, Take 1 tablet by mouth 2 (Two) Times a Day With Meals., Disp: 180 tablet, Rfl: 3  •  clopidogrel (PLAVIX) 75 MG tablet, Take 1 tablet by mouth Daily., Disp: 90 tablet, Rfl: 3  •  rosuvastatin (CRESTOR) 20 MG tablet, Take 1 tablet by mouth Daily., Disp: 30 tablet, Rfl: 12    Past Surgical History:   Procedure Laterality Date   • CARDIAC CATHETERIZATION N/A 1/27/2020    Procedure: Left Heart Cath with angiogram;  Surgeon: Jermaine Delong MD;  Location: [facility] CATH INVASIVE LOCATION;  Service: Cardiovascular   • CARDIAC CATHETERIZATION N/A 1/27/2020    Procedure: Stent BOBBY coronary;  Surgeon: Jermaine Delong MD;  Location: [facility] CATH INVASIVE LOCATION;  Service: Cardiovascular   • CARDIAC CATHETERIZATION N/A 1/27/2020    Procedure: Left ventriculography;  Surgeon: Jermaine Delong MD;  Location: [facility] CATH INVASIVE LOCATION;  Service: Cardiovascular   • CARDIAC CATHETERIZATION N/A 1/27/2020    Procedure: Coronary angiography;  Surgeon: Jermaine Delong MD;  Location: [facility] CATH INVASIVE LOCATION;  Service: Cardiovascular   • CATARACT EXTRACTION, BILATERAL Bilateral 1999   • GALLBLADDER SURGERY  1996   • LAPAROSCOPIC TUBAL LIGATION  1980   • ROTATOR CUFF REPAIR Right 2002         Review of Systems   Constitutional: Negative for activity change and fever.   HENT: Negative for ear pain, rhinorrhea, sinus pressure and voice change.    Eyes: Negative for visual disturbance.   Respiratory: Negative for cough and shortness of breath.    Cardiovascular: Negative for chest pain.   Gastrointestinal: Negative for abdominal pain, diarrhea, nausea and vomiting.   Endocrine: Negative for cold intolerance and heat intolerance.   Genitourinary: Negative for frequency and urgency.   Musculoskeletal: Negative  for arthralgias.   Skin: Negative for rash.   Neurological: Negative for syncope.   Hematological: Does not bruise/bleed easily.   Psychiatric/Behavioral: Negative for depressed mood. The patient is not nervous/anxious.        Objective   Physical Exam   Constitutional: She is oriented to person, place, and time. She appears well-developed and well-nourished.   HENT:   Head: Normocephalic and atraumatic.   Eyes: Pupils are equal, round, and reactive to light. EOM are normal.   Pulmonary/Chest: Effort normal. No respiratory distress. She has no wheezes.   Neurological: She is alert and oriented to person, place, and time. No cranial nerve deficit. Coordination normal.   Skin: Skin is warm and dry.   Psychiatric: She has a normal mood and affect.       /68   Pulse 63   Wt 59.2 kg (130 lb 9.6 oz)   LMP  (LMP Unknown)   BMI 25.94 kg/m²       Assessment/Plan     Problem List Items Addressed This Visit        Cardiovascular and Mediastinum    Coronary artery disease     Cath by Dr Delong showed 80% LAD, 90% in circumflex with normal LV. Drug eluding stents placed in both.   Stable         Mixed hyperlipidemia     Crestor now at 20mg post CVA         CVA (cerebral vascular accident) (CMS/HCC) - Primary     She is on aspirin and plavix.   Stable            Genitourinary    Renal insufficiency     New  Will recheck labs in 3 months. Be careful with nsaids (ibuprofen, aleve etc)           Other Visit Diagnoses     Hyperglycemia        Noted pn labs. Will get A1C        Time spent 15 min

## 2020-04-15 ENCOUNTER — TELEPHONE (OUTPATIENT)
Dept: CARDIOLOGY | Facility: CLINIC | Age: 75
End: 2020-04-15

## 2020-04-15 NOTE — TELEPHONE ENCOUNTER
Please review patients message below and advise.     ----- Message from Rachael Quevedo sent at 4/15/2020 10:56 AM EDT -----    Regarding: Non-Urgent Medical Question  Contact: [phone]    I had a video appointment with my GP yesterday and she questioned the fact that I am on Midodrine (to keep my blood pressure up) and Coreg (to keep the BP down).  Am I correct that you want me to continue on both of these medications?  Also my lab test showed my    Creatinine at 1.15.  Is that a concern?  Thank you  Carley Quevedo

## 2020-04-16 NOTE — ASSESSMENT & PLAN NOTE
Cath by Dr Delong showed 80% LAD, 90% in circumflex with normal LV. Drug eluding stents placed in both.   Stable

## 2020-04-21 ENCOUNTER — TELEPHONE (OUTPATIENT)
Dept: FAMILY MEDICINE CLINIC | Facility: CLINIC | Age: 75
End: 2020-04-21

## 2020-04-21 DIAGNOSIS — E78.2 MIXED HYPERLIPIDEMIA: Primary | ICD-10-CM

## 2020-04-21 DIAGNOSIS — R73.9 HYPERGLYCEMIA: ICD-10-CM

## 2020-04-21 DIAGNOSIS — N28.9 RENAL INSUFFICIENCY: ICD-10-CM

## 2020-04-21 NOTE — TELEPHONE ENCOUNTER
Pt called and asked if the lab orders were entered in from her visit that was on 4/14/20 for 2 wks after her visit? Only see A1C in the orders as active?

## 2020-04-22 ENCOUNTER — RESULTS ENCOUNTER (OUTPATIENT)
Dept: FAMILY MEDICINE CLINIC | Facility: CLINIC | Age: 75
End: 2020-04-22

## 2020-04-22 DIAGNOSIS — N28.9 RENAL INSUFFICIENCY: ICD-10-CM

## 2020-04-22 DIAGNOSIS — R73.9 HYPERGLYCEMIA: ICD-10-CM

## 2020-04-24 ENCOUNTER — TELEPHONE (OUTPATIENT)
Dept: FAMILY MEDICINE CLINIC | Facility: CLINIC | Age: 75
End: 2020-04-24

## 2020-04-28 ENCOUNTER — TELEPHONE (OUTPATIENT)
Dept: CARDIOLOGY | Facility: CLINIC | Age: 75
End: 2020-04-28

## 2020-04-28 NOTE — TELEPHONE ENCOUNTER
Patient recently discharged from [facility]. Told to make f/u with Baljeet. She is not sure if she really needs to follow up at this time.  Has apt in September.  do you want to see her back for a f/u?

## 2020-05-07 ENCOUNTER — TELEMEDICINE (OUTPATIENT)
Dept: CARDIOLOGY | Facility: CLINIC | Age: 75
End: 2020-05-07

## 2020-05-07 VITALS — DIASTOLIC BLOOD PRESSURE: 72 MMHG | SYSTOLIC BLOOD PRESSURE: 136 MMHG | HEART RATE: 66 BPM

## 2020-05-07 DIAGNOSIS — I63.9 CEREBROVASCULAR ACCIDENT (CVA), UNSPECIFIED MECHANISM (HCC): ICD-10-CM

## 2020-05-07 DIAGNOSIS — I44.7 LEFT BUNDLE BRANCH BLOCK (LBBB): ICD-10-CM

## 2020-05-07 DIAGNOSIS — I25.10 CORONARY ARTERY DISEASE INVOLVING NATIVE CORONARY ARTERY OF NATIVE HEART WITHOUT ANGINA PECTORIS: Primary | ICD-10-CM

## 2020-05-07 DIAGNOSIS — E78.2 MIXED HYPERLIPIDEMIA: ICD-10-CM

## 2020-05-07 PROCEDURE — 99213 OFFICE O/P EST LOW 20 MIN: CPT | Performed by: INTERNAL MEDICINE

## 2020-05-07 NOTE — PROGRESS NOTES
You have chosen to receive care through a telehealth visit.  Do you consent to use a video/audio connection for your medical care today? Yes.  Time spent 15 minutes      Subjective:     Encounter Date:05/07/2020      Patient ID: Rachael Quevedo is a 74 y.o. female.    Chief Complaint:  History of Present Illness 74-year-old white female with history of coronary artery disease hypertension hyperlipidemia presents to my office for a video visit today.  Patient is currently stable without any symptoms of chest pain or shortness of breath at rest on exertion.  No complains of any PND orthopnea.  No palpitation dizziness syncope or swelling of the feet.  Recently patient had sharp pains in her chest and went to the ER and had EKG and blood work which did not show any heart attack.  She is taking her medicines regularly.  She is also exercising regularly.  She follows a good diet.    The following portions of the patient's history were reviewed and updated as appropriate: allergies, current medications, past family history, past medical history, past social history, past surgical history and problem list.  Past Medical History:   Diagnosis Date   • Ankle fracture, left     Comments: 2016   • Arthritis    • Cataract, acquired    • Coronary artery disease    • Cough     Impression: Most likely reactive airway prescription as below Start antihistamine at home   • Dense breast    • Depression, major, recurrent, mild (CMS/HCC)    • GERD without esophagitis     Comments: Dr Duncan- protonix   • Hematuria, microscopic    • History of chicken pox    • History of loop recorder    • Hyperglycemia    • Hyperlipidemia    • Hypertension 1-4-2020   • Injury of back    • Injury of neck    • Malaise and fatigue    • Medicare annual wellness visit, initial     with abnormal findings   • Other specified menopausal and perimenopausal disorders     Other specified menopausal and postmenopausal disorders   • Pap smear, low-risk    • S/P  right rotator cuff repair    • Screening for alcoholism     10 or more drinks per week   • Screening for depression     Negative Depression Screening ( 9 or less) ()   • Screening mammogram, encounter for    • Stroke (CMS/[facility])     1/5/2020 no residual effects   • Vasovagal near-syncope     Impression: from illness and cough Discussed if there is loss of vision in an eye, confusion, weakness or numbness in an extremity, drooping of a side of the face, intractible pain, intractible vomiting, to go to the ER.     Past Surgical History:   Procedure Laterality Date   • CARDIAC CATHETERIZATION N/A 1/27/2020    Procedure: Left Heart Cath with angiogram;  Surgeon: Jermaine Delong MD;  Location: [facility] CATH INVASIVE LOCATION;  Service: Cardiovascular   • CARDIAC CATHETERIZATION N/A 1/27/2020    Procedure: Stent BOBBY coronary;  Surgeon: Jermaine Delong MD;  Location: [facility] CATH INVASIVE LOCATION;  Service: Cardiovascular   • CARDIAC CATHETERIZATION N/A 1/27/2020    Procedure: Left ventriculography;  Surgeon: Jermaine Delong MD;  Location: [facility] CATH INVASIVE LOCATION;  Service: Cardiovascular   • CARDIAC CATHETERIZATION N/A 1/27/2020    Procedure: Coronary angiography;  Surgeon: Jermaine Delong MD;  Location: [facility] CATH INVASIVE LOCATION;  Service: Cardiovascular   • CATARACT EXTRACTION, BILATERAL Bilateral 1999   • GALLBLADDER SURGERY  1996   • LAPAROSCOPIC TUBAL LIGATION  1980   • RENAL ARTERY STENT  1-   • ROTATOR CUFF REPAIR Right 2002     /72   Pulse 66   LMP  (LMP Unknown)   Family History   Problem Relation Age of Onset   • Hyperlipidemia Mother         Elevated cholesterol   • Heart disease Mother    • Heart attack Mother    • Hypertension Father    • Colon cancer Father    • Hyperlipidemia Father         Elevated cholesterol   • Heart disease Father    • Diabetes Brother    • Heart disease Brother    • Diabetes Paternal Aunt    • Colon cancer Paternal Aunt    • Colon cancer Paternal Uncle    •  Diabetes Paternal Uncle    • Diabetes Paternal Grandmother    • Heart disease Paternal Grandmother        Current Outpatient Medications:   •  aspirin 81 MG EC tablet, Take 1 tablet by mouth Daily., Disp: 30 tablet, Rfl: 5  •  carvedilol (COREG) 3.125 MG tablet, Take 1 tablet by mouth 2 (Two) Times a Day With Meals., Disp: 180 tablet, Rfl: 3  •  clopidogrel (PLAVIX) 75 MG tablet, Take 1 tablet by mouth Daily., Disp: 90 tablet, Rfl: 3  •  midodrine (PROAMATINE) 2.5 MG tablet, TAKE 1 TABLET BY MOUTH THREE TIMES A DAY (Patient taking differently: Take 2.5 mg by mouth 2 (Two) Times a Day.), Disp: 90 tablet, Rfl: 2  •  rosuvastatin (CRESTOR) 20 MG tablet, Take 1 tablet by mouth Daily., Disp: 30 tablet, Rfl: 12  No Known Allergies  Social History     Socioeconomic History   • Marital status:      Spouse name: Not on file   • Number of children: Not on file   • Years of education: Not on file   • Highest education level: Not on file   Tobacco Use   • Smoking status: Never Smoker   • Smokeless tobacco: Never Used   • Tobacco comment: Many family members smoked around me when I was a child   Substance and Sexual Activity   • Alcohol use: Yes     Alcohol/week: 4.0 - 5.0 standard drinks     Types: 4 - 5 Glasses of wine per week     Frequency: 2-3 times a week     Drinks per session: 1 or 2     Binge frequency: Never   • Drug use: No   • Sexual activity: Yes     Partners: Male     Birth control/protection: None     Comment: Very occasional     Review of Systems   Constitution: Negative for fever and malaise/fatigue.   HENT: Negative for congestion and hearing loss.    Eyes: Negative for double vision and visual disturbance.   Cardiovascular: Negative for chest pain, claudication, dyspnea on exertion, leg swelling and syncope.   Respiratory: Negative for cough and shortness of breath.    Endocrine: Negative for cold intolerance.   Skin: Negative for color change and rash.   Musculoskeletal: Negative for arthritis and  joint pain.   Gastrointestinal: Negative for abdominal pain and heartburn.   Genitourinary: Negative for hematuria.   Neurological: Negative for excessive daytime sleepiness and dizziness.   Psychiatric/Behavioral: Negative for depression. The patient is not nervous/anxious.    All other systems reviewed and are negative.             Objective:     Physical Exam  Procedures    Lab Review:       Assessment:          Diagnosis Plan   1. Coronary artery disease involving native coronary artery of native heart without angina pectoris     2. Mixed hyperlipidemia     3. Left bundle branch block (LBBB)     4. Cerebrovascular accident (CVA), unspecified mechanism (CMS/HCC)            Plan:     Patient has history of coronary disease and is currently stable on medical therapy  Patient recently had atypical chest pains and is currently doing well  Patient's blood pressure and heart rate are stable  Patient's lipid levels are followed by the primary care doctor and she is on a statin  Patient had CVA in the past but does not have any residual deficits.  Continue current medicines and follow her in 3 to 6 months

## 2020-05-18 RX ORDER — MIDODRINE HYDROCHLORIDE 2.5 MG/1
TABLET ORAL
Qty: 270 TABLET | Refills: 3 | Status: SHIPPED | OUTPATIENT
Start: 2020-05-18 | End: 2021-04-20 | Stop reason: SDUPTHER

## 2020-06-29 NOTE — PROGRESS NOTES
Discharge Summary  Discharge Summary from Physical Therapy Report          Goals: All Met    Discharge Plan: Continue with current home exercise program as instructed    Comments   Patient seen for 4 sessions                  Subjective Patient feeling well, voices readiness for self-management.     Objective   See Exercise, Manual, and Modality Logs for complete treatment.         Assessment/Plan      Patient independent with HEP in 2 weeks - MET  Able to return to driving in 2 weeks - MET  Decrease pain to 2/10 by discharge (5/10 initially) - MET  Improve R shoulder strength flexion and abduction to 4+/5 by discharge - MET     Other Goals set at evaluation all met.  Will plan to discharge 9/12 unless patient needs to return before then.         Date of Discharge 9/12/19        Robin A Sprigler, PT  Physical Therapist

## 2020-08-17 ENCOUNTER — OFFICE VISIT (OUTPATIENT)
Dept: CARDIOLOGY | Facility: CLINIC | Age: 75
End: 2020-08-17

## 2020-08-17 VITALS
HEIGHT: 60 IN | SYSTOLIC BLOOD PRESSURE: 138 MMHG | WEIGHT: 133 LBS | HEART RATE: 69 BPM | BODY MASS INDEX: 26.11 KG/M2 | OXYGEN SATURATION: 98 % | DIASTOLIC BLOOD PRESSURE: 77 MMHG

## 2020-08-17 DIAGNOSIS — I10 ESSENTIAL HYPERTENSION: ICD-10-CM

## 2020-08-17 DIAGNOSIS — E78.00 PURE HYPERCHOLESTEROLEMIA: ICD-10-CM

## 2020-08-17 DIAGNOSIS — I25.10 CORONARY ARTERY DISEASE INVOLVING NATIVE CORONARY ARTERY OF NATIVE HEART WITHOUT ANGINA PECTORIS: Primary | ICD-10-CM

## 2020-08-17 PROCEDURE — 99213 OFFICE O/P EST LOW 20 MIN: CPT | Performed by: INTERNAL MEDICINE

## 2020-08-17 RX ORDER — MAGNESIUM GLUCONATE 27 MG(500)
27 TABLET ORAL DAILY
COMMUNITY

## 2020-08-17 NOTE — PROGRESS NOTES
Subjective:     Encounter Date:08/17/2020      Patient ID: Rachael Quevedo is a 74 y.o. female.    Chief Complaint:  History of Present Illness 74-year-old white female with history of coronary status post and placement of LAD and circumflex artery history of hypertension hyperlipidemia presents to my office for follow-up.  Patient is currently stable without any symptoms of chest pain or shortness of breath at rest on exertion.  No complains any PND orthopnea.  No palpitation dizziness syncope or swelling of the feet.  Patient is taking all the medicines regularly.  Patient does not smoke but she is trying to exercise regularly she follows a good diet    The following portions of the patient's history were reviewed and updated as appropriate: allergies, current medications, past family history, past medical history, past social history, past surgical history and problem list.  Past Medical History:   Diagnosis Date   • Ankle fracture, left     Comments: 2016   • Arthritis    • Cataract, acquired    • Coronary artery disease    • Cough     Impression: Most likely reactive airway prescription as below Start antihistamine at home   • Dense breast    • Depression, major, recurrent, mild (CMS/HCC)    • GERD without esophagitis     Comments: Dr Duncan- protonix   • Hematuria, microscopic    • History of chicken pox    • History of loop recorder    • Hyperglycemia    • Hyperlipidemia    • Hypertension 1-4-2020   • Injury of back    • Injury of neck    • Left bundle branch block (LBBB)    • Malaise and fatigue    • Medicare annual wellness visit, initial     with abnormal findings   • Other specified menopausal and perimenopausal disorders     Other specified menopausal and postmenopausal disorders   • Pap smear, low-risk    • Renal insufficiency    • S/P right rotator cuff repair    • Screening for alcoholism     10 or more drinks per week   • Screening for depression     Negative Depression Screening ( 9 or less)  "()   • Screening mammogram, encounter for    • Stroke (CMS/HCC)     1/5/2020 no residual effects   • Vasovagal near-syncope     Impression: from illness and cough Discussed if there is loss of vision in an eye, confusion, weakness or numbness in an extremity, drooping of a side of the face, intractible pain, intractible vomiting, to go to the ER.     Past Surgical History:   Procedure Laterality Date   • CARDIAC CATHETERIZATION N/A 1/27/2020    Procedure: Left Heart Cath with angiogram;  Surgeon: Jermaine Delong MD;  Location: [facility] CATH INVASIVE LOCATION;  Service: Cardiovascular   • CARDIAC CATHETERIZATION N/A 1/27/2020    Procedure: Stent BOBBY coronary;  Surgeon: Jermaine Delong MD;  Location: [facility] CATH INVASIVE LOCATION;  Service: Cardiovascular   • CARDIAC CATHETERIZATION N/A 1/27/2020    Procedure: Left ventriculography;  Surgeon: Jermaine Delong MD;  Location: [facility] CATH INVASIVE LOCATION;  Service: Cardiovascular   • CARDIAC CATHETERIZATION N/A 1/27/2020    Procedure: Coronary angiography;  Surgeon: Jermaine Delong MD;  Location: [facility] CATH INVASIVE LOCATION;  Service: Cardiovascular   • CATARACT EXTRACTION, BILATERAL Bilateral 1999   • GALLBLADDER SURGERY  1996   • LAPAROSCOPIC TUBAL LIGATION  1980   • RENAL ARTERY STENT  1-   • ROTATOR CUFF REPAIR Right 2002     /77 (BP Location: Left arm, Patient Position: Sitting)   Pulse 69   Ht 151.1 cm (59.5\")   Wt 60.3 kg (133 lb)   LMP  (LMP Unknown)   SpO2 98%   BMI 26.41 kg/m²   Family History   Problem Relation Age of Onset   • Hyperlipidemia Mother         Elevated cholesterol   • Heart disease Mother    • Heart attack Mother    • Hypertension Father    • Colon cancer Father    • Hyperlipidemia Father         Elevated cholesterol   • Heart disease Father    • Diabetes Brother    • Heart disease Brother    • Diabetes Paternal Aunt    • Colon cancer Paternal Aunt    • Colon cancer Paternal Uncle    • Diabetes Paternal Uncle    • Diabetes " Paternal Grandmother    • Heart disease Paternal Grandmother        Current Outpatient Medications:   •  aspirin 81 MG EC tablet, Take 1 tablet by mouth Daily., Disp: 30 tablet, Rfl: 5  •  carvedilol (COREG) 3.125 MG tablet, Take 1 tablet by mouth 2 (Two) Times a Day With Meals., Disp: 180 tablet, Rfl: 3  •  clopidogrel (PLAVIX) 75 MG tablet, Take 1 tablet by mouth Daily., Disp: 90 tablet, Rfl: 3  •  magnesium gluconate (MAGONATE) 500 MG tablet, Take 27 mg by mouth 2 (Two) Times a Day., Disp: , Rfl:   •  midodrine (PROAMATINE) 2.5 MG tablet, TAKE 1 TABLET BY MOUTH THREE TIMES A DAY, Disp: 270 tablet, Rfl: 3  •  rosuvastatin (CRESTOR) 20 MG tablet, Take 1 tablet by mouth Daily., Disp: 30 tablet, Rfl: 12  No Known Allergies  Social History     Socioeconomic History   • Marital status:      Spouse name: Not on file   • Number of children: Not on file   • Years of education: Not on file   • Highest education level: Not on file   Tobacco Use   • Smoking status: Never Smoker   • Smokeless tobacco: Never Used   • Tobacco comment: Many family members smoked around me when I was a child   Substance and Sexual Activity   • Alcohol use: Yes     Alcohol/week: 4.0 - 5.0 standard drinks     Types: 4 - 5 Glasses of wine per week     Frequency: 2-3 times a week     Drinks per session: 1 or 2     Binge frequency: Never   • Drug use: No   • Sexual activity: Yes     Partners: Male     Birth control/protection: None     Comment: Very occasional     Review of Systems   Constitution: Negative for fever and malaise/fatigue.   Cardiovascular: Negative for chest pain, dyspnea on exertion and palpitations.   Respiratory: Negative for cough and shortness of breath.    Skin: Negative for rash.   Gastrointestinal: Negative for abdominal pain, nausea and vomiting.   Neurological: Negative for focal weakness and headaches.   All other systems reviewed and are negative.             Objective:     Physical Exam   Constitutional: She appears  well-developed and well-nourished.   HENT:   Head: Normocephalic and atraumatic.   Eyes: Conjunctivae are normal. No scleral icterus.   Neck: Normal range of motion. Neck supple. No JVD present. Carotid bruit is not present.   Cardiovascular: Normal rate, regular rhythm, S1 normal, S2 normal, normal heart sounds and intact distal pulses. PMI is not displaced.   Pulmonary/Chest: Effort normal and breath sounds normal. She has no wheezes. She has no rales.   Abdominal: Soft. Bowel sounds are normal.   Neurological: She is alert. She has normal strength.   Skin: Skin is warm and dry. No rash noted.     Procedures    Lab Review:       Assessment:          Diagnosis Plan   1. Coronary artery disease involving native coronary artery of native heart without angina pectoris     2. Essential hypertension     3. Pure hypercholesterolemia            Plan:     Patient has history of coronary artery and is currently stable on medical therapy  Patient has normal systolic function and is status post and placement to the LAD and circumflex artery  Patient blood pressure heart rate stable  Patient lipids are followed by the primary care doctor she is on a statin  Continue current medicines and follow up in 6 months

## 2020-08-31 ENCOUNTER — OFFICE VISIT (OUTPATIENT)
Dept: NEUROLOGY | Facility: CLINIC | Age: 75
End: 2020-08-31

## 2020-08-31 ENCOUNTER — LAB (OUTPATIENT)
Dept: LAB | Facility: HOSPITAL | Age: 75
End: 2020-08-31

## 2020-08-31 VITALS
BODY MASS INDEX: 25.72 KG/M2 | HEIGHT: 60 IN | HEART RATE: 58 BPM | WEIGHT: 131 LBS | TEMPERATURE: 98.6 F | DIASTOLIC BLOOD PRESSURE: 77 MMHG | SYSTOLIC BLOOD PRESSURE: 137 MMHG

## 2020-08-31 DIAGNOSIS — I63.81 CEREBROVASCULAR ACCIDENT (CVA) DUE TO OCCLUSION OF SMALL ARTERY (HCC): ICD-10-CM

## 2020-08-31 DIAGNOSIS — R06.83 HABITUAL SNORING: ICD-10-CM

## 2020-08-31 DIAGNOSIS — G47.33 OBSTRUCTIVE SLEEP APNEA: ICD-10-CM

## 2020-08-31 DIAGNOSIS — I63.81 CEREBROVASCULAR ACCIDENT (CVA) DUE TO OCCLUSION OF SMALL ARTERY (HCC): Primary | ICD-10-CM

## 2020-08-31 PROBLEM — R55 VASOVAGAL SYNCOPE: Status: ACTIVE | Noted: 2020-08-31

## 2020-08-31 LAB
FOLATE SERPL-MCNC: 12.2 NG/ML (ref 4.78–24.2)
VIT B12 BLD-MCNC: 262 PG/ML (ref 211–946)

## 2020-08-31 PROCEDURE — 82607 VITAMIN B-12: CPT

## 2020-08-31 PROCEDURE — 99204 OFFICE O/P NEW MOD 45 MIN: CPT | Performed by: PSYCHIATRY & NEUROLOGY

## 2020-08-31 PROCEDURE — 82746 ASSAY OF FOLIC ACID SERUM: CPT | Performed by: PSYCHIATRY & NEUROLOGY

## 2020-08-31 PROCEDURE — 36415 COLL VENOUS BLD VENIPUNCTURE: CPT | Performed by: PSYCHIATRY & NEUROLOGY

## 2020-08-31 NOTE — PROGRESS NOTES
Subjective: CVA    Patient ID: Rachael Quevedo is a 74 y.o. female.    CHIEF COMPLAINT:CVA    New patient referred by Dr. Shah for CVA she is currently taking Plavix.   Patient had CVA 2020  Pt had symptoms of dizziness and feeling of being off, no headache, dizziness lasted for two or three day   So went to er at [facility],  On mri and ct of head had evidence of a new stroke  Then admitted at [facility] for several days  Work up was not remarkable,   Latter pt was found to have cad and had stints placed  History of coronary status post and placement of LAD and circumflex artery   history of hypertension and hyperlipidemia. No diabetes, never smoked,  FH of heart disease mother  of MI    No issues no medication problems.     She goes to bed at 10 pm, she wakes up 4:30.    Patient complains of numbness in right foot and calf.  She fell in  and has had the numbness since due to a disc in low back    No RLS    She takes benadryl at night so she can sleep, for three weeks.  She wakes up every two hours, it is hard for her to go back to sleep.sleeps better with the benadryl,     Patient states she snores.       The following portions of the patient's history were reviewed and updated as appropriate: allergies, current medications, past family history, past medical history, past social history, past surgical history and problem list.      Family History   Problem Relation Age of Onset   • Hyperlipidemia Mother         Elevated cholesterol   • Heart disease Mother    • Heart attack Mother    • Hypertension Father    • Colon cancer Father    • Hyperlipidemia Father         Elevated cholesterol   • Heart disease Father    • Diabetes Brother    • Heart disease Brother    • Diabetes Paternal Aunt    • Colon cancer Paternal Aunt    • Colon cancer Paternal Uncle    • Diabetes Paternal Uncle    • Diabetes Paternal Grandmother    • Heart disease Paternal Grandmother        Past Medical History:   Diagnosis Date   • Ankle  fracture, left     Comments: 2016   • Arthritis    • Cataract, acquired    • Coronary artery disease    • Cough     Impression: Most likely reactive airway prescription as below Start antihistamine at home   • Dense breast    • Depression, major, recurrent, mild (CMS/HCC)    • GERD without esophagitis     Comments: Dr Maritza almonte   • Hematuria, microscopic    • History of chicken pox    • History of loop recorder    • Hyperglycemia    • Hyperlipidemia    • Hypertension 1-4-2020   • Injury of back    • Injury of neck    • Left bundle branch block (LBBB)    • Malaise and fatigue    • Medicare annual wellness visit, initial     with abnormal findings   • Other specified menopausal and perimenopausal disorders     Other specified menopausal and postmenopausal disorders   • Pap smear, low-risk    • Renal insufficiency    • S/P right rotator cuff repair    • Screening for alcoholism     10 or more drinks per week   • Screening for depression     Negative Depression Screening ( 9 or less) ()   • Screening mammogram, encounter for    • Stroke (CMS/HCC)     1/5/2020 no residual effects   • Vasovagal near-syncope     Impression: from illness and cough Discussed if there is loss of vision in an eye, confusion, weakness or numbness in an extremity, drooping of a side of the face, intractible pain, intractible vomiting, to go to the ER.       Social History     Socioeconomic History   • Marital status:      Spouse name: Not on file   • Number of children: Not on file   • Years of education: Not on file   • Highest education level: Not on file   Tobacco Use   • Smoking status: Never Smoker   • Smokeless tobacco: Never Used   • Tobacco comment: Many family members smoked around me when I was a child   Substance and Sexual Activity   • Alcohol use: Yes     Alcohol/week: 4.0 - 5.0 standard drinks     Types: 4 - 5 Glasses of wine per week     Frequency: 2-3 times a week     Drinks per session: 1 or 2     Binge  frequency: Never   • Drug use: No   • Sexual activity: Yes     Partners: Male     Birth control/protection: None     Comment: Very occasional         Current Outpatient Medications:   •  aspirin 81 MG EC tablet, Take 1 tablet by mouth Daily., Disp: 30 tablet, Rfl: 5  •  carvedilol (COREG) 3.125 MG tablet, Take 1 tablet by mouth 2 (Two) Times a Day With Meals., Disp: 180 tablet, Rfl: 3  •  clopidogrel (PLAVIX) 75 MG tablet, Take 1 tablet by mouth Daily., Disp: 90 tablet, Rfl: 3  •  magnesium gluconate (MAGONATE) 500 MG tablet, Take 27 mg by mouth 2 (Two) Times a Day., Disp: , Rfl:   •  midodrine (PROAMATINE) 2.5 MG tablet, TAKE 1 TABLET BY MOUTH THREE TIMES A DAY, Disp: 270 tablet, Rfl: 3  •  rosuvastatin (CRESTOR) 20 MG tablet, Take 1 tablet by mouth Daily., Disp: 30 tablet, Rfl: 12    Review of Systems   Constitutional: Negative for activity change, appetite change and fatigue.   HENT: Positive for postnasal drip. Negative for sinus pressure, sinus pain and trouble swallowing.    Eyes: Negative for pain and itching.   Respiratory: Negative for chest tightness and shortness of breath.    Cardiovascular: Negative for chest pain.   Gastrointestinal: Negative for abdominal pain and nausea.   Endocrine: Negative for cold intolerance and heat intolerance.   Genitourinary: Negative for urgency.   Musculoskeletal: Positive for neck pain. Negative for back pain and neck stiffness.   Neurological: Negative for dizziness, light-headedness and headaches.   Psychiatric/Behavioral: Positive for sleep disturbance. Negative for agitation, behavioral problems and confusion.        I have reviewed ROS completed by medical assistant.     Objective:    Neurologic Exam     Mental Status   Oriented to person, place, and time.   Attention: normal.   Level of consciousness: alert    Cranial Nerves     CN III, IV, VI   Pupils are equal, round, and reactive to light.  Extraocular motions are normal.     CN V   Facial sensation intact.      CN VIII   CN VIII normal.     Motor Exam   Muscle bulk: normal  Overall muscle tone: normal  Right arm pronator drift: absent  Left arm pronator drift: absent    Sensory Exam   Light touch normal.     Gait, Coordination, and Reflexes     Gait  Gait: normal    Coordination   Romberg: negative    Tremor   Resting tremor: absent  Intention tremor: absent  Action tremor: absent    Reflexes   Reflexes 2+ except as noted.       Physical Exam   Constitutional: She is oriented to person, place, and time. She appears well-developed and well-nourished.   Eyes: Pupils are equal, round, and reactive to light. Conjunctivae and EOM are normal.   Neck: Normal range of motion. Neck supple.   Cardiovascular: Normal rate, regular rhythm and normal heart sounds.   Pulmonary/Chest: Effort normal and breath sounds normal. No respiratory distress.   Musculoskeletal: Normal range of motion. She exhibits no edema or deformity.   Neurological: She is alert and oriented to person, place, and time. No cranial nerve deficit. She has a normal Romberg Test. Gait normal.   Psychiatric: She has a normal mood and affect. Her behavior is normal.   Vitals reviewed.      Assessment/Plan:      EPWORTH SLEEPINESS SCALE  Sitting and reading  0  WatchingTV  0  Sitting, inactive, in a public place  0  As a passenger in a car for 1 hour w/o a break  0  Lying down to rest in the afternoon  1  Sitting and talking to someone  0  Sitting quietly after a lunch  0  In a car, while stopped for traffic or a light  0  Total 1      Rachael was seen today for cerebrovascular accident.    Diagnoses and all orders for this visit:    Cerebrovascular accident (CVA) due to occlusion of small artery (CMS/HCC)  -     Vitamin B12; Future  -     Folate; Future    Habitual snoring  -     Home Sleep Study; Future    Obstructive sleep apnea  -     Home Sleep Study; Future    history of basal ganglia stroke seen on mri  jan 2020, symptoms lasted about three days,   Work up at  [facility] in patient     Pt currently on a statin and anti platelet therapy    Possible tonya with habitual snoring,  Will check hst and b12 level    This document has been electronically signed by Joseph Seipel, MD on August 31, 2020 13:24

## 2020-12-14 ENCOUNTER — TELEPHONE (OUTPATIENT)
Dept: FAMILY MEDICINE CLINIC | Facility: CLINIC | Age: 75
End: 2020-12-14

## 2020-12-14 ENCOUNTER — TELEPHONE (OUTPATIENT)
Dept: CARDIOLOGY | Facility: CLINIC | Age: 75
End: 2020-12-14

## 2020-12-14 NOTE — TELEPHONE ENCOUNTER
She should be seen by someone as soon as she can be seen. I believe Dr Delong is her cardiologist. She needs an appt with him. But if it happens again she should go to the ER.

## 2020-12-14 NOTE — TELEPHONE ENCOUNTER
Pt had a near syncope event at home, pt stated that she rested the rest of the day, and just felt tired. Pt stated she did not go to the ER, due to previous history. Pt wanted noted in her chart if they start to come back more frequently like last time.

## 2020-12-15 NOTE — TELEPHONE ENCOUNTER
I scheduled her with Dr. Delong Monday 12/21/20 at 1:10PM. That is his soonest appointment, they had a cancellation that day.

## 2020-12-21 ENCOUNTER — OFFICE VISIT (OUTPATIENT)
Dept: CARDIOLOGY | Facility: CLINIC | Age: 75
End: 2020-12-21

## 2020-12-21 VITALS
WEIGHT: 130 LBS | DIASTOLIC BLOOD PRESSURE: 81 MMHG | TEMPERATURE: 97.3 F | OXYGEN SATURATION: 100 % | BODY MASS INDEX: 25.52 KG/M2 | SYSTOLIC BLOOD PRESSURE: 142 MMHG | HEART RATE: 59 BPM | HEIGHT: 60 IN

## 2020-12-21 DIAGNOSIS — I25.10 CORONARY ARTERY DISEASE INVOLVING NATIVE CORONARY ARTERY OF NATIVE HEART WITHOUT ANGINA PECTORIS: Primary | ICD-10-CM

## 2020-12-21 DIAGNOSIS — I10 ESSENTIAL HYPERTENSION: ICD-10-CM

## 2020-12-21 DIAGNOSIS — E78.00 PURE HYPERCHOLESTEROLEMIA: ICD-10-CM

## 2020-12-21 PROCEDURE — 99213 OFFICE O/P EST LOW 20 MIN: CPT | Performed by: INTERNAL MEDICINE

## 2020-12-21 RX ORDER — LANOLIN ALCOHOL/MO/W.PET/CERES
1000 CREAM (GRAM) TOPICAL DAILY
COMMUNITY

## 2020-12-21 NOTE — PROGRESS NOTES
Subjective:     Encounter Date:12/21/2020      Patient ID: Rachael Quevedo is a 75 y.o. female.    Chief Complaint:  History of Present Illness 75-year-old white female with history of coronary disease hypertension hyperlipidemia presents to my office for follow-up.  Patient is currently stable without any signs of chest pain with occasional shortness of breath with exertion.  No complains of any PND orthopnea.  She has occasional dizziness when she gets up suddenly.  No syncope or swelling of the feet.  She is taking her medicines regularly.  She is following a good diet.    The following portions of the patient's history were reviewed and updated as appropriate: allergies, current medications, past family history, past medical history, past social history, past surgical history and problem list.  Past Medical History:   Diagnosis Date   • Ankle fracture, left     Comments: 2016   • Arthritis    • Cataract, acquired    • Coronary artery disease    • Cough     Impression: Most likely reactive airway prescription as below Start antihistamine at home   • Dense breast    • Depression, major, recurrent, mild (CMS/HCC)    • GERD without esophagitis     Comments: Dr Duncan- protonix   • Hematuria, microscopic    • History of chicken pox    • History of loop recorder    • Hyperglycemia    • Hyperlipidemia    • Hypertension 1-4-2020   • Injury of back    • Injury of neck    • Left bundle branch block (LBBB)    • Malaise and fatigue    • Medicare annual wellness visit, initial     with abnormal findings   • Other specified menopausal and perimenopausal disorders     Other specified menopausal and postmenopausal disorders   • Pap smear, low-risk    • Renal insufficiency    • S/P right rotator cuff repair    • Screening for alcoholism     10 or more drinks per week   • Screening for depression     Negative Depression Screening ( 9 or less) ()   • Screening mammogram, encounter for    • Stroke (CMS/HCC)     1/5/2020  "no residual effects   • Vasovagal near-syncope     Impression: from illness and cough Discussed if there is loss of vision in an eye, confusion, weakness or numbness in an extremity, drooping of a side of the face, intractible pain, intractible vomiting, to go to the ER.     Past Surgical History:   Procedure Laterality Date   • CARDIAC CATHETERIZATION N/A 1/27/2020    Procedure: Left Heart Cath with angiogram;  Surgeon: Jermaine Delong MD;  Location: [facility] CATH INVASIVE LOCATION;  Service: Cardiovascular   • CARDIAC CATHETERIZATION N/A 1/27/2020    Procedure: Stent BOBBY coronary;  Surgeon: Jermaine Delong MD;  Location: [facility] CATH INVASIVE LOCATION;  Service: Cardiovascular   • CARDIAC CATHETERIZATION N/A 1/27/2020    Procedure: Left ventriculography;  Surgeon: Jermaine Delong MD;  Location: [facility] CATH INVASIVE LOCATION;  Service: Cardiovascular   • CARDIAC CATHETERIZATION N/A 1/27/2020    Procedure: Coronary angiography;  Surgeon: Jermaine Delong MD;  Location: [facility] CATH INVASIVE LOCATION;  Service: Cardiovascular   • CATARACT EXTRACTION, BILATERAL Bilateral 1999   • GALLBLADDER SURGERY  1996   • LAPAROSCOPIC TUBAL LIGATION  1980   • RENAL ARTERY STENT  1-   • ROTATOR CUFF REPAIR Right 2002     /81   Pulse 59   Temp 97.3 °F (36.3 °C)   Ht 152.4 cm (60\")   Wt 59 kg (130 lb)   LMP  (LMP Unknown)   SpO2 100%   BMI 25.39 kg/m²   Family History   Problem Relation Age of Onset   • Hyperlipidemia Mother         Elevated cholesterol   • Heart disease Mother    • Heart attack Mother    • Hypertension Father    • Colon cancer Father    • Hyperlipidemia Father         Elevated cholesterol   • Heart disease Father    • Diabetes Brother    • Heart disease Brother    • Diabetes Paternal Aunt    • Colon cancer Paternal Aunt    • Colon cancer Paternal Uncle    • Diabetes Paternal Uncle    • Diabetes Paternal Grandmother    • Heart disease Paternal Grandmother        Current Outpatient Medications:   •  aspirin " 81 MG EC tablet, Take 1 tablet by mouth Daily., Disp: 30 tablet, Rfl: 5  •  carvedilol (COREG) 3.125 MG tablet, Take 1 tablet by mouth 2 (Two) Times a Day With Meals., Disp: 180 tablet, Rfl: 3  •  clopidogrel (PLAVIX) 75 MG tablet, Take 1 tablet by mouth Daily., Disp: 90 tablet, Rfl: 3  •  magnesium gluconate (MAGONATE) 500 MG tablet, Take 27 mg by mouth 2 (Two) Times a Day., Disp: , Rfl:   •  midodrine (PROAMATINE) 2.5 MG tablet, TAKE 1 TABLET BY MOUTH THREE TIMES A DAY (Patient taking differently: Take 2.5 mg by mouth 2 (two) times a day.), Disp: 270 tablet, Rfl: 3  •  rosuvastatin (CRESTOR) 20 MG tablet, Take 1 tablet by mouth Daily., Disp: 30 tablet, Rfl: 12  •  vitamin B-12 (CYANOCOBALAMIN) 1000 MCG tablet, Take 1,000 mcg by mouth Daily., Disp: , Rfl:   No Known Allergies  Social History     Socioeconomic History   • Marital status:      Spouse name: Not on file   • Number of children: Not on file   • Years of education: Not on file   • Highest education level: Not on file   Tobacco Use   • Smoking status: Never Smoker   • Smokeless tobacco: Never Used   • Tobacco comment: Many family members smoked around me when I was a child   Substance and Sexual Activity   • Alcohol use: Yes     Alcohol/week: 4.0 - 5.0 standard drinks     Types: 4 - 5 Glasses of wine per week     Frequency: 2-3 times a week     Drinks per session: 1 or 2     Binge frequency: Never   • Drug use: No   • Sexual activity: Yes     Partners: Male     Birth control/protection: None     Comment: Very occasional     Review of Systems   Constitution: Negative for fever and malaise/fatigue.   Cardiovascular: Positive for near-syncope. Negative for chest pain, dyspnea on exertion, leg swelling and palpitations.   Respiratory: Positive for shortness of breath. Negative for cough.    Skin: Negative for rash.   Gastrointestinal: Negative for abdominal pain, nausea and vomiting.   Neurological: Positive for light-headedness. Negative for focal  weakness, headaches and numbness.   All other systems reviewed and are negative.             Objective:     Constitutional:       Appearance: Well-developed.   Eyes:      General: No scleral icterus.     Conjunctiva/sclera: Conjunctivae normal.   HENT:      Head: Normocephalic and atraumatic.   Neck:      Musculoskeletal: Normal range of motion and neck supple.      Vascular: No carotid bruit or JVD.   Pulmonary:      Effort: Pulmonary effort is normal.      Breath sounds: Normal breath sounds. No wheezing. No rales.   Cardiovascular:      Normal rate. Regular rhythm.      Murmurs: There is a systolic murmur.   Pulses:     Intact distal pulses.   Abdominal:      General: Bowel sounds are normal.      Palpations: Abdomen is soft.   Skin:     General: Skin is warm and dry.      Findings: No rash.   Neurological:      Mental Status: Alert.       Procedures    Lab Review:       Assessment:          Diagnosis Plan   1. Coronary artery disease involving native coronary artery of native heart without angina pectoris     2. Essential hypertension     3. Pure hypercholesterolemia            Plan:     Patient has history of coronary disease which is nonobstructive and is currently stable on medical therapy  Patient blood pressure slightly on the lower side but she needs her beta-blockers and hence she is also on midodrine and her blood pressure is stable now  Patient lipid levels are followed by the primary care doctor.

## 2021-01-04 RX ORDER — CARVEDILOL 3.12 MG/1
TABLET ORAL
Qty: 180 TABLET | Refills: 3 | Status: SHIPPED | OUTPATIENT
Start: 2021-01-04 | End: 2022-01-30

## 2021-03-03 RX ORDER — CLOPIDOGREL BISULFATE 75 MG/1
TABLET ORAL
Qty: 90 TABLET | Refills: 3 | Status: SHIPPED | OUTPATIENT
Start: 2021-03-03 | End: 2022-02-28

## 2021-04-08 DIAGNOSIS — E78.2 MIXED HYPERLIPIDEMIA: ICD-10-CM

## 2021-04-08 RX ORDER — ROSUVASTATIN CALCIUM 20 MG/1
TABLET, COATED ORAL
Qty: 90 TABLET | Refills: 0 | Status: SHIPPED | OUTPATIENT
Start: 2021-04-08 | End: 2021-05-10

## 2021-04-12 DIAGNOSIS — I25.10 CORONARY ARTERY DISEASE INVOLVING NATIVE CORONARY ARTERY OF NATIVE HEART WITHOUT ANGINA PECTORIS: ICD-10-CM

## 2021-04-13 RX ORDER — ASPIRIN 81 MG/1
TABLET ORAL
Qty: 90 TABLET | Refills: 3 | Status: SHIPPED | OUTPATIENT
Start: 2021-04-13

## 2021-04-20 RX ORDER — MIDODRINE HYDROCHLORIDE 2.5 MG/1
2.5 TABLET ORAL 2 TIMES DAILY
Qty: 180 TABLET | Refills: 1 | Status: SHIPPED | OUTPATIENT
Start: 2021-04-20 | End: 2021-10-19

## 2021-05-08 ENCOUNTER — TELEPHONE (OUTPATIENT)
Dept: FAMILY MEDICINE CLINIC | Facility: CLINIC | Age: 76
End: 2021-05-08

## 2021-05-08 DIAGNOSIS — E78.2 MIXED HYPERLIPIDEMIA: ICD-10-CM

## 2021-05-10 RX ORDER — ROSUVASTATIN CALCIUM 20 MG/1
TABLET, COATED ORAL
Qty: 90 TABLET | Refills: 0 | Status: SHIPPED | OUTPATIENT
Start: 2021-05-10 | End: 2021-10-15

## 2021-05-24 ENCOUNTER — OFFICE VISIT (OUTPATIENT)
Dept: FAMILY MEDICINE CLINIC | Facility: CLINIC | Age: 76
End: 2021-05-24

## 2021-05-24 VITALS
HEIGHT: 60 IN | BODY MASS INDEX: 26.5 KG/M2 | SYSTOLIC BLOOD PRESSURE: 124 MMHG | OXYGEN SATURATION: 97 % | RESPIRATION RATE: 18 BRPM | HEART RATE: 65 BPM | DIASTOLIC BLOOD PRESSURE: 88 MMHG | TEMPERATURE: 97.8 F | WEIGHT: 135 LBS

## 2021-05-24 DIAGNOSIS — R92.2 DENSE BREAST: ICD-10-CM

## 2021-05-24 DIAGNOSIS — E78.2 MIXED HYPERLIPIDEMIA: ICD-10-CM

## 2021-05-24 DIAGNOSIS — Z12.31 SCREENING MAMMOGRAM, ENCOUNTER FOR: ICD-10-CM

## 2021-05-24 DIAGNOSIS — N95.8 OTHER SPECIFIED MENOPAUSAL AND PERIMENOPAUSAL DISORDERS: ICD-10-CM

## 2021-05-24 DIAGNOSIS — I25.10 CORONARY ARTERY DISEASE INVOLVING NATIVE CORONARY ARTERY OF NATIVE HEART WITHOUT ANGINA PECTORIS: ICD-10-CM

## 2021-05-24 DIAGNOSIS — Z00.00 MEDICARE ANNUAL WELLNESS VISIT, SUBSEQUENT: ICD-10-CM

## 2021-05-24 DIAGNOSIS — F33.0 DEPRESSION, MAJOR, RECURRENT, MILD (HCC): Primary | ICD-10-CM

## 2021-05-24 PROBLEM — M54.81 CERVICO-OCCIPITAL NEURALGIA: Status: ACTIVE | Noted: 2021-05-06

## 2021-05-24 PROBLEM — M47.812 CERVICAL SPONDYLOSIS WITHOUT MYELOPATHY: Status: ACTIVE | Noted: 2021-05-06

## 2021-05-24 LAB
BILIRUB BLD-MCNC: NEGATIVE MG/DL
CLARITY, POC: CLEAR
COLOR UR: YELLOW
GLUCOSE UR STRIP-MCNC: NEGATIVE MG/DL
KETONES UR QL: NEGATIVE
LEUKOCYTE EST, POC: ABNORMAL
NITRITE UR-MCNC: NEGATIVE MG/ML
PH UR: 5 [PH] (ref 5–8)
PROT UR STRIP-MCNC: ABNORMAL MG/DL
RBC # UR STRIP: ABNORMAL /UL
SP GR UR: 1.02 (ref 1–1.03)
UROBILINOGEN UR QL: NORMAL

## 2021-05-24 PROCEDURE — G0439 PPPS, SUBSEQ VISIT: HCPCS | Performed by: FAMILY MEDICINE

## 2021-05-24 PROCEDURE — 81003 URINALYSIS AUTO W/O SCOPE: CPT | Performed by: FAMILY MEDICINE

## 2021-05-24 PROCEDURE — 99214 OFFICE O/P EST MOD 30 MIN: CPT | Performed by: FAMILY MEDICINE

## 2021-05-24 RX ORDER — CHLORAL HYDRATE 500 MG
1 CAPSULE ORAL DAILY
COMMUNITY
End: 2023-01-10

## 2021-05-24 RX ORDER — MULTIPLE VITAMINS W/ MINERALS TAB 9MG-400MCG
TAB ORAL
COMMUNITY
End: 2022-02-16

## 2021-05-24 NOTE — ASSESSMENT & PLAN NOTE
Patient's depression is recurrent and is moderate without psychosis. Their depression is currently in partial remission and the condition is improving with lifestyle modifications. This will be reassessed in 3 months. F/U as described: as needed

## 2021-06-02 ENCOUNTER — HOSPITAL ENCOUNTER (OUTPATIENT)
Dept: BONE DENSITY | Facility: HOSPITAL | Age: 76
Discharge: HOME | End: 2021-06-02

## 2021-06-02 ENCOUNTER — HOSPITAL ENCOUNTER (OUTPATIENT)
Dept: MAMMOGRAPHY | Facility: HOSPITAL | Age: 76
Discharge: HOME | End: 2021-06-02

## 2021-06-02 DIAGNOSIS — Z12.31 SCREENING MAMMOGRAM, ENCOUNTER FOR: ICD-10-CM

## 2021-06-02 DIAGNOSIS — N95.8 OTHER SPECIFIED MENOPAUSAL AND PERIMENOPAUSAL DISORDERS: ICD-10-CM

## 2021-06-02 PROCEDURE — 77067 SCR MAMMO BI INCL CAD: CPT

## 2021-06-02 PROCEDURE — 77063 BREAST TOMOSYNTHESIS BI: CPT

## 2021-06-02 PROCEDURE — 77080 DXA BONE DENSITY AXIAL: CPT

## 2021-06-07 ENCOUNTER — TELEPHONE (OUTPATIENT)
Dept: FAMILY MEDICINE CLINIC | Facility: CLINIC | Age: 76
End: 2021-06-07

## 2021-06-23 ENCOUNTER — TELEPHONE (OUTPATIENT)
Dept: FAMILY MEDICINE CLINIC | Facility: CLINIC | Age: 76
End: 2021-06-23

## 2021-07-21 ENCOUNTER — OFFICE VISIT (OUTPATIENT)
Dept: CARDIOLOGY | Facility: CLINIC | Age: 76
End: 2021-07-21

## 2021-07-21 VITALS
BODY MASS INDEX: 26.5 KG/M2 | HEIGHT: 60 IN | HEART RATE: 61 BPM | DIASTOLIC BLOOD PRESSURE: 69 MMHG | SYSTOLIC BLOOD PRESSURE: 121 MMHG | OXYGEN SATURATION: 98 % | WEIGHT: 135 LBS

## 2021-07-21 DIAGNOSIS — I25.10 CORONARY ARTERY DISEASE INVOLVING NATIVE CORONARY ARTERY OF NATIVE HEART WITHOUT ANGINA PECTORIS: Primary | ICD-10-CM

## 2021-07-21 DIAGNOSIS — I10 ESSENTIAL HYPERTENSION: ICD-10-CM

## 2021-07-21 DIAGNOSIS — E78.00 PURE HYPERCHOLESTEROLEMIA: ICD-10-CM

## 2021-07-21 PROCEDURE — 99213 OFFICE O/P EST LOW 20 MIN: CPT | Performed by: INTERNAL MEDICINE

## 2021-07-21 RX ORDER — CHOLECALCIFEROL (VITAMIN D3) 125 MCG
CAPSULE ORAL
COMMUNITY

## 2021-07-21 RX ORDER — PHENOL 1.4 %
600 AEROSOL, SPRAY (ML) MUCOUS MEMBRANE 2 TIMES DAILY WITH MEALS
COMMUNITY

## 2021-07-21 NOTE — PROGRESS NOTES
Subjective:     Encounter Date:07/21/2021      Patient ID: Rachael Quevedo is a 75 y.o. female.    Chief Complaint:  History of Present Illness 75-year-old white female with history of coronary disease hypertension hyperlipidemia presents to my office for follow-up.  Patient is currently stable without any symptoms of chest pain or shortness of breath at rest but has some shortness of breath with exertion.  No complains any PND orthopnea.  No palpitation dizziness syncope or swelling of the feet.  Patient is taking all meds regular.  Patient does not smoke but she is trying to exercise regularly follows a good diet.    The following portions of the patient's history were reviewed and updated as appropriate: allergies, current medications, past family history, past medical history, past social history, past surgical history and problem list.  Past Medical History:   Diagnosis Date   • Ankle fracture, left     Comments: 2016   • Arthritis    • Cataract, acquired    • Coronary artery disease    • Cough     Impression: Most likely reactive airway prescription as below Start antihistamine at home   • Dense breast    • Depression, major, recurrent, mild (CMS/HCC)    • GERD without esophagitis     Comments: Dr Duncan- protonix   • Hematuria, microscopic    • History of chicken pox    • History of loop recorder    • Hyperglycemia    • Hyperlipidemia    • Hypertension 1-4-2020   • Injury of back    • Injury of neck    • Left bundle branch block (LBBB)    • Malaise and fatigue    • Medicare annual wellness visit, initial     with abnormal findings   • Other specified menopausal and perimenopausal disorders     Other specified menopausal and postmenopausal disorders   • Pap smear, low-risk    • Renal insufficiency    • S/P right rotator cuff repair    • Screening for alcoholism     10 or more drinks per week   • Screening for depression     Negative Depression Screening ( 9 or less) ()   • Screening mammogram,  "encounter for    • Stroke (CMS/[facility])     1/5/2020 no residual effects   • Vasovagal near-syncope     Impression: from illness and cough Discussed if there is loss of vision in an eye, confusion, weakness or numbness in an extremity, drooping of a side of the face, intractible pain, intractible vomiting, to go to the ER.     Past Surgical History:   Procedure Laterality Date   • CARDIAC CATHETERIZATION N/A 1/27/2020    Procedure: Left Heart Cath with angiogram;  Surgeon: Jermaine Delong MD;  Location: [facility] CATH INVASIVE LOCATION;  Service: Cardiovascular   • CARDIAC CATHETERIZATION N/A 1/27/2020    Procedure: Stent BOBBY coronary;  Surgeon: Jermaine Delong MD;  Location:  [facility] CATH INVASIVE LOCATION;  Service: Cardiovascular   • CARDIAC CATHETERIZATION N/A 1/27/2020    Procedure: Left ventriculography;  Surgeon: Jermaine Delong MD;  Location:  [facility] CATH INVASIVE LOCATION;  Service: Cardiovascular   • CARDIAC CATHETERIZATION N/A 1/27/2020    Procedure: Coronary angiography;  Surgeon: Jermaine Delong MD;  Location: [facility] CATH INVASIVE LOCATION;  Service: Cardiovascular   • CATARACT EXTRACTION, BILATERAL Bilateral 1999   • GALLBLADDER SURGERY  1996   • LAPAROSCOPIC TUBAL LIGATION  1980   • RENAL ARTERY STENT  1-   • ROTATOR CUFF REPAIR Right 2002     /69   Pulse 61   Ht 152.4 cm (60\")   Wt 61.2 kg (135 lb)   LMP  (LMP Unknown)   SpO2 98%   BMI 26.37 kg/m²   Family History   Problem Relation Age of Onset   • Hyperlipidemia Mother         Elevated cholesterol   • Heart disease Mother    • Heart attack Mother    • Hypertension Father    • Colon cancer Father    • Hyperlipidemia Father         Elevated cholesterol   • Heart disease Father    • Diabetes Brother    • Heart disease Brother    • Diabetes Paternal Aunt    • Colon cancer Paternal Aunt    • Colon cancer Paternal Uncle    • Diabetes Paternal Uncle    • Diabetes Paternal Grandmother    • Heart disease Paternal Grandmother        Current Outpatient " Medications:   •  aspirin 81 MG EC tablet, TAKE 1 TABLET BY MOUTH EVERY DAY, Disp: 90 tablet, Rfl: 3  •  calcium carbonate (OS-SHYLA) 600 MG tablet, Take 600 mg by mouth Daily., Disp: , Rfl:   •  carvedilol (COREG) 3.125 MG tablet, TAKE 1 TABLET BY MOUTH TWICE A DAY WITH MEALS, Disp: 180 tablet, Rfl: 3  •  Cholecalciferol (Vitamin D3) 50 MCG (2000 UT) tablet, Take  by mouth., Disp: , Rfl:   •  clopidogrel (PLAVIX) 75 MG tablet, TAKE 1 TABLET BY MOUTH EVERY DAY, Disp: 90 tablet, Rfl: 3  •  magnesium gluconate (MAGONATE) 500 MG tablet, Take 27 mg by mouth 2 (Two) Times a Day., Disp: , Rfl:   •  midodrine (PROAMATINE) 2.5 MG tablet, Take 1 tablet by mouth 2 (Two) Times a Day., Disp: 180 tablet, Rfl: 1  •  multivitamin with minerals (Multivitamin Adult) tablet tablet, multivitamin capsule, Disp: , Rfl:   •  Omega-3 1000 MG capsule, Omega 3, Disp: , Rfl:   •  rosuvastatin (CRESTOR) 20 MG tablet, TAKE 1 TABLET BY MOUTH EVERY DAY *NEED TO SEE, Disp: 90 tablet, Rfl: 0  •  vitamin B-12 (CYANOCOBALAMIN) 1000 MCG tablet, Take 1,000 mcg by mouth Daily., Disp: , Rfl:   No Known Allergies  Social History     Socioeconomic History   • Marital status:      Spouse name: Not on file   • Number of children: Not on file   • Years of education: Not on file   • Highest education level: Not on file   Tobacco Use   • Smoking status: Never Smoker   • Smokeless tobacco: Never Used   • Tobacco comment: Many family members smoked around me when I was a child   Substance and Sexual Activity   • Alcohol use: Yes     Alcohol/week: 4.0 - 5.0 standard drinks     Types: 4 - 5 Glasses of wine per week   • Drug use: No   • Sexual activity: Yes     Partners: Male     Birth control/protection: None     Comment: Very occasional     Review of Systems   Constitutional: Negative for fever and malaise/fatigue.   Cardiovascular: Negative for chest pain, dyspnea on exertion, leg swelling and palpitations.   Respiratory: Positive for shortness of breath.  Negative for cough.    Skin: Negative for rash.   Gastrointestinal: Negative for abdominal pain, nausea and vomiting.   Neurological: Negative for focal weakness, headaches, light-headedness and numbness.   All other systems reviewed and are negative.             Objective:     Constitutional:       Appearance: Well-developed.   Eyes:      General: No scleral icterus.     Conjunctiva/sclera: Conjunctivae normal.   HENT:      Head: Normocephalic and atraumatic.   Neck:      Vascular: No carotid bruit or JVD.   Pulmonary:      Effort: Pulmonary effort is normal.      Breath sounds: Normal breath sounds. No wheezing. No rales.   Cardiovascular:      Normal rate. Regular rhythm.   Pulses:     Intact distal pulses.   Abdominal:      General: Bowel sounds are normal.      Palpations: Abdomen is soft.   Musculoskeletal:      Cervical back: Normal range of motion and neck supple. Skin:     General: Skin is warm and dry.      Findings: No rash.   Neurological:      Mental Status: Alert.       Procedures    Lab Review:         [facility]  1.  Coronary disease  Patient has nonobstructive disease with normal function is currently stable on medications  2.  Hypertension  Patient blood pressure currently stable on medical therapy including carvedilol  3.  Hyperlipidemia  Patient is on Crestor 20 mg and her lipid levels are followed by the primary doctor.      Patient's previous medical records, labs, and EKG were reviewed and discussed with the patient at today's visit.

## 2021-10-15 DIAGNOSIS — E78.2 MIXED HYPERLIPIDEMIA: ICD-10-CM

## 2021-10-15 RX ORDER — ROSUVASTATIN CALCIUM 20 MG/1
TABLET, COATED ORAL
Qty: 90 TABLET | Refills: 3 | Status: SHIPPED | OUTPATIENT
Start: 2021-10-15 | End: 2022-06-09 | Stop reason: SDUPTHER

## 2021-10-19 RX ORDER — MIDODRINE HYDROCHLORIDE 2.5 MG/1
TABLET ORAL
Qty: 180 TABLET | Refills: 1 | Status: SHIPPED | OUTPATIENT
Start: 2021-10-19 | End: 2022-04-28

## 2021-10-19 NOTE — TELEPHONE ENCOUNTER
Rx Refill Note  Requested Prescriptions     Pending Prescriptions Disp Refills   • midodrine (PROAMATINE) 2.5 MG tablet [Pharmacy Med Name: MIDODRINE HCL 2.5 MG TABLET] 180 tablet 1     Sig: TAKE 1 TABLET BY MOUTH TWICE A DAY      Last office visit with prescribing clinician: 7/21/2021      Next office visit with prescribing clinician: 2/16/2022            Liss Guzman MA  10/19/21, 08:36 EDT

## 2022-01-30 RX ORDER — CARVEDILOL 3.12 MG/1
TABLET ORAL
Qty: 180 TABLET | Refills: 3 | Status: SHIPPED | OUTPATIENT
Start: 2022-01-30 | End: 2023-02-27

## 2022-01-30 NOTE — TELEPHONE ENCOUNTER
Rx Refill Note  Requested Prescriptions     Pending Prescriptions Disp Refills   • carvedilol (COREG) 3.125 MG tablet [Pharmacy Med Name: CARVEDILOL 3.125 MG TABLET] 180 tablet 3     Sig: TAKE 1 TABLET BY MOUTH TWICE A DAY WITH MEALS      Last office visit with prescribing clinician: 7/21/2021      Next office visit with prescribing clinician: 2/16/2022            Erika Fuchs MA  01/30/22, 15:00 EST

## 2022-02-16 ENCOUNTER — OFFICE VISIT (OUTPATIENT)
Dept: CARDIOLOGY | Facility: CLINIC | Age: 77
End: 2022-02-16

## 2022-02-16 VITALS
OXYGEN SATURATION: 99 % | HEART RATE: 62 BPM | BODY MASS INDEX: 26.9 KG/M2 | HEIGHT: 60 IN | WEIGHT: 137 LBS | SYSTOLIC BLOOD PRESSURE: 134 MMHG | DIASTOLIC BLOOD PRESSURE: 83 MMHG

## 2022-02-16 DIAGNOSIS — E78.00 PURE HYPERCHOLESTEROLEMIA: ICD-10-CM

## 2022-02-16 DIAGNOSIS — I10 ESSENTIAL HYPERTENSION: ICD-10-CM

## 2022-02-16 DIAGNOSIS — I25.10 CORONARY ARTERY DISEASE INVOLVING NATIVE CORONARY ARTERY OF NATIVE HEART WITHOUT ANGINA PECTORIS: Primary | ICD-10-CM

## 2022-02-16 PROCEDURE — 99213 OFFICE O/P EST LOW 20 MIN: CPT | Performed by: INTERNAL MEDICINE

## 2022-02-16 NOTE — PROGRESS NOTES
Subjective:     Encounter Date:02/16/2022      Patient ID: Rachael Quevedo is a 76 y.o. female.    Chief Complaint:  History of Present Illness 75-year-old white female with history of coronary disease hypertension hyperlipidemia presents to my office for follow-up.  Patient is currently stable without any symptoms of chest pain but has some shortness of breath with exertion.  No complains of any PND orthopnea.  No palpitation dizziness syncope or swelling of the feet.  Patient has been taking all the medicines regularly.  Patient does not smoke.  Patient is trying to exercise regularly she follows a good diet.    The following portions of the patient's history were reviewed and updated as appropriate: allergies, current medications, past family history, past medical history, past social history, past surgical history and problem list.  Past Medical History:   Diagnosis Date   • Ankle fracture, left     Comments: 2016   • Arthritis    • Cataract, acquired    • Coronary artery disease    • Cough     Impression: Most likely reactive airway prescription as below Start antihistamine at home   • Dense breast    • Depression, major, recurrent, mild (HCC)    • GERD without esophagitis     Comments: Dr Duncan- protonix   • Hematuria, microscopic    • History of chicken pox    • History of loop recorder    • Hyperglycemia    • Hyperlipidemia    • Hypertension 1-4-2020   • Injury of back    • Injury of neck    • Left bundle branch block (LBBB)    • Malaise and fatigue    • Medicare annual wellness visit, initial     with abnormal findings   • Other specified menopausal and perimenopausal disorders     Other specified menopausal and postmenopausal disorders   • Pap smear, low-risk    • Renal insufficiency    • S/P right rotator cuff repair    • Screening for alcoholism     10 or more drinks per week   • Screening for depression     Negative Depression Screening ( 9 or less) ()   • Screening mammogram, encounter for   "  • Stroke (HCC)     1/5/2020 no residual effects   • Vasovagal near-syncope     Impression: from illness and cough Discussed if there is loss of vision in an eye, confusion, weakness or numbness in an extremity, drooping of a side of the face, intractible pain, intractible vomiting, to go to the ER.     Past Surgical History:   Procedure Laterality Date   • CARDIAC CATHETERIZATION N/A 1/27/2020    Procedure: Left Heart Cath with angiogram;  Surgeon: Jermaine Delong MD;  Location: [facility] CATH INVASIVE LOCATION;  Service: Cardiovascular   • CARDIAC CATHETERIZATION N/A 1/27/2020    Procedure: Stent BOBBY coronary;  Surgeon: Jermaine Delong MD;  Location: [facility] CATH INVASIVE LOCATION;  Service: Cardiovascular   • CARDIAC CATHETERIZATION N/A 1/27/2020    Procedure: Left ventriculography;  Surgeon: Jermaine Delong MD;  Location: [facility] CATH INVASIVE LOCATION;  Service: Cardiovascular   • CARDIAC CATHETERIZATION N/A 1/27/2020    Procedure: Coronary angiography;  Surgeon: Jermaine Delong MD;  Location: [facility] CATH INVASIVE LOCATION;  Service: Cardiovascular   • CATARACT EXTRACTION, BILATERAL Bilateral 1999   • GALLBLADDER SURGERY  1996   • LAPAROSCOPIC TUBAL LIGATION  1980   • RENAL ARTERY STENT  1-   • ROTATOR CUFF REPAIR Right 2002     /83 (BP Location: Left arm, Patient Position: Sitting)   Pulse 62   Ht 152.4 cm (60\")   Wt 62.1 kg (137 lb)   LMP  (LMP Unknown)   SpO2 99%   BMI 26.76 kg/m²   Family History   Problem Relation Age of Onset   • Hyperlipidemia Mother         Elevated cholesterol   • Heart disease Mother    • Heart attack Mother    • Hypertension Father    • Colon cancer Father    • Hyperlipidemia Father         Elevated cholesterol   • Heart disease Father    • Diabetes Brother    • Heart disease Brother    • Diabetes Paternal Aunt    • Colon cancer Paternal Aunt    • Colon cancer Paternal Uncle    • Diabetes Paternal Uncle    • Diabetes Paternal Grandmother    • Heart disease Paternal " Grandmother        Current Outpatient Medications:   •  aspirin 81 MG EC tablet, TAKE 1 TABLET BY MOUTH EVERY DAY, Disp: 90 tablet, Rfl: 3  •  calcium carbonate (OS-SHYLA) 600 MG tablet, Take 600 mg by mouth 2 (Two) Times a Day With Meals., Disp: , Rfl:   •  carvedilol (COREG) 3.125 MG tablet, TAKE 1 TABLET BY MOUTH TWICE A DAY WITH MEALS, Disp: 180 tablet, Rfl: 3  •  Cholecalciferol (Vitamin D3) 50 MCG (2000 UT) tablet, Take  by mouth., Disp: , Rfl:   •  clopidogrel (PLAVIX) 75 MG tablet, TAKE 1 TABLET BY MOUTH EVERY DAY, Disp: 90 tablet, Rfl: 3  •  magnesium gluconate (MAGONATE) 500 MG tablet, Take 27 mg by mouth 2 (Two) Times a Day., Disp: , Rfl:   •  midodrine (PROAMATINE) 2.5 MG tablet, TAKE 1 TABLET BY MOUTH TWICE A DAY, Disp: 180 tablet, Rfl: 1  •  Omega-3 1000 MG capsule, Omega 3, Disp: , Rfl:   •  rosuvastatin (CRESTOR) 20 MG tablet, TAKE 1 TABLET BY MOUTH EVERY DAY *NEED TO SEE, Disp: 90 tablet, Rfl: 3  •  vitamin B-12 (CYANOCOBALAMIN) 1000 MCG tablet, Take 1,000 mcg by mouth Daily., Disp: , Rfl:   No Known Allergies  Social History     Socioeconomic History   • Marital status:    Tobacco Use   • Smoking status: Never Smoker   • Smokeless tobacco: Never Used   • Tobacco comment: Many family members smoked around me when I was a child   Substance and Sexual Activity   • Alcohol use: Yes     Alcohol/week: 4.0 - 5.0 standard drinks     Types: 4 - 5 Glasses of wine per week   • Drug use: No   • Sexual activity: Yes     Partners: Male     Birth control/protection: None     Comment: Very occasional     Review of Systems   Constitutional: Negative for fever and malaise/fatigue.   Cardiovascular: Negative for chest pain, dyspnea on exertion and palpitations.   Respiratory: Positive for shortness of breath. Negative for cough.    Skin: Negative for rash.   Gastrointestinal: Negative for abdominal pain, nausea and vomiting.   Neurological: Negative for focal weakness and headaches.   All other systems reviewed  and are negative.             Objective:     Constitutional:       Appearance: Well-developed.   Eyes:      General: No scleral icterus.     Conjunctiva/sclera: Conjunctivae normal.   HENT:      Head: Normocephalic and atraumatic.   Neck:      Vascular: No carotid bruit or JVD.   Pulmonary:      Effort: Pulmonary effort is normal.      Breath sounds: Normal breath sounds. No wheezing. No rales.   Cardiovascular:      Normal rate. Regular rhythm.   Pulses:     Intact distal pulses.   Abdominal:      General: Bowel sounds are normal.      Palpations: Abdomen is soft.   Musculoskeletal:      Cervical back: Normal range of motion and neck supple. Skin:     General: Skin is warm and dry.      Findings: No rash.   Neurological:      Mental Status: Alert.       Procedures    Lab Review:         MDM  1.  Coronary disease  Patient has stent placement to the RCA in the past and is currently stable on medications and symptoms more than a year I will stop the Plavix  2.  Hypertension  Patient blood pressure currently stable on carvedilol   #3 hyperlipidemia  Patient on Crestor and the lipid levels are followed by the primary care doctor.    Patient's previous medical records, labs, and EKG were reviewed and discussed with the patient at today's visit.

## 2022-02-28 ENCOUNTER — TELEPHONE (OUTPATIENT)
Dept: CARDIOLOGY | Facility: CLINIC | Age: 77
End: 2022-02-28

## 2022-02-28 NOTE — TELEPHONE ENCOUNTER
Called patient, let her know that he did want her to stop Plavix and I removed it from her medication list. She verbally understood

## 2022-02-28 NOTE — TELEPHONE ENCOUNTER
Caller:  cesar    Relationship: self     Best call back number:[phone]    What is your medical concern? She was looking in her my chart, she would like her chart to reflect that she is to stop plavix as was discussed at 2/16 ov appt

## 2022-04-28 RX ORDER — CLOPIDOGREL BISULFATE 75 MG/1
TABLET ORAL
Qty: 90 TABLET | Refills: 3 | OUTPATIENT
Start: 2022-04-28

## 2022-04-28 RX ORDER — MIDODRINE HYDROCHLORIDE 2.5 MG/1
TABLET ORAL
Qty: 180 TABLET | Refills: 1 | Status: SHIPPED | OUTPATIENT
Start: 2022-04-28 | End: 2022-11-14

## 2022-04-28 NOTE — TELEPHONE ENCOUNTER
Rx Refill Note  Requested Prescriptions     Pending Prescriptions Disp Refills   • midodrine (PROAMATINE) 2.5 MG tablet [Pharmacy Med Name: MIDODRINE HCL 2.5 MG TABLET] 180 tablet 1     Sig: TAKE 1 TABLET BY MOUTH TWICE A DAY     Refused Prescriptions Disp Refills   • clopidogrel (PLAVIX) 75 MG tablet [Pharmacy Med Name: CLOPIDOGREL 75 MG TABLET] 90 tablet 3     Sig: TAKE 1 TABLET BY MOUTH EVERY DAY      Last office visit with prescribing clinician: 2/16/2022      Next office visit with prescribing clinician: 9/22/2022            Pranav Valerio MA  04/28/22, 07:59 EDT

## 2022-05-27 ENCOUNTER — TELEPHONE (OUTPATIENT)
Dept: FAMILY MEDICINE CLINIC | Facility: CLINIC | Age: 77
End: 2022-05-27

## 2022-05-27 DIAGNOSIS — E78.2 MIXED HYPERLIPIDEMIA: Primary | ICD-10-CM

## 2022-05-27 DIAGNOSIS — R53.83 MALAISE AND FATIGUE: ICD-10-CM

## 2022-05-27 DIAGNOSIS — Z11.59 NEED FOR HEPATITIS C SCREENING TEST: ICD-10-CM

## 2022-05-27 DIAGNOSIS — R53.81 MALAISE AND FATIGUE: ICD-10-CM

## 2022-05-27 DIAGNOSIS — I25.10 CORONARY ARTERY DISEASE INVOLVING NATIVE CORONARY ARTERY OF NATIVE HEART WITHOUT ANGINA PECTORIS: ICD-10-CM

## 2022-05-27 NOTE — TELEPHONE ENCOUNTER
Caller: Rachael Quevedo    Relationship: Self    Best call back number: [phone]    What orders are you requesting (i.e. lab or imaging): LABS    In what timeframe would the patient need to come in: BEFORE WELLNESS VISIT ON 6-9-22     Where will you receive your lab/imaging services: IN OFFICE    Additional notes: PATIENT IS WANTING TO KNOW IF SHE SHOULD HAVE LABS DONE BEFORE HER WELLNESS VISIT ON 6-9. PLEASE ADVISE.

## 2022-06-01 LAB
ALBUMIN SERPL-MCNC: 4.6 G/DL (ref 3.7–4.7)
ALBUMIN/GLOB SERPL: 1.8 {RATIO} (ref 1.2–2.2)
ALP SERPL-CCNC: 58 IU/L (ref 44–121)
ALT SERPL-CCNC: 14 IU/L (ref 0–32)
AST SERPL-CCNC: 21 IU/L (ref 0–40)
BASOPHILS # BLD AUTO: 0.1 X10E3/UL (ref 0–0.2)
BASOPHILS NFR BLD AUTO: 1 %
BILIRUB SERPL-MCNC: 0.4 MG/DL (ref 0–1.2)
BUN SERPL-MCNC: 15 MG/DL (ref 8–27)
BUN/CREAT SERPL: 13 (ref 12–28)
CALCIUM SERPL-MCNC: 10 MG/DL (ref 8.7–10.3)
CHLORIDE SERPL-SCNC: 106 MMOL/L (ref 96–106)
CHOLEST SERPL-MCNC: 156 MG/DL (ref 100–199)
CHOLEST/HDLC SERPL: 2.7 RATIO (ref 0–4.4)
CO2 SERPL-SCNC: 21 MMOL/L (ref 20–29)
CREAT SERPL-MCNC: 1.12 MG/DL (ref 0.57–1)
EGFRCR SERPLBLD CKD-EPI 2021: 51 ML/MIN/1.73
EOSINOPHIL # BLD AUTO: 0.4 X10E3/UL (ref 0–0.4)
EOSINOPHIL NFR BLD AUTO: 6 %
ERYTHROCYTE [DISTWIDTH] IN BLOOD BY AUTOMATED COUNT: 12.5 % (ref 11.7–15.4)
GLOBULIN SER CALC-MCNC: 2.5 G/DL (ref 1.5–4.5)
GLUCOSE SERPL-MCNC: 110 MG/DL (ref 65–99)
HCT VFR BLD AUTO: 42.9 % (ref 34–46.6)
HDLC SERPL-MCNC: 57 MG/DL
HGB BLD-MCNC: 13.9 G/DL (ref 11.1–15.9)
IMM GRANULOCYTES # BLD AUTO: 0 X10E3/UL (ref 0–0.1)
IMM GRANULOCYTES NFR BLD AUTO: 0 %
LDLC SERPL CALC-MCNC: 74 MG/DL (ref 0–99)
LYMPHOCYTES # BLD AUTO: 2.3 X10E3/UL (ref 0.7–3.1)
LYMPHOCYTES NFR BLD AUTO: 39 %
MCH RBC QN AUTO: 28.8 PG (ref 26.6–33)
MCHC RBC AUTO-ENTMCNC: 32.4 G/DL (ref 31.5–35.7)
MCV RBC AUTO: 89 FL (ref 79–97)
MONOCYTES # BLD AUTO: 0.6 X10E3/UL (ref 0.1–0.9)
MONOCYTES NFR BLD AUTO: 10 %
NEUTROPHILS # BLD AUTO: 2.6 X10E3/UL (ref 1.4–7)
NEUTROPHILS NFR BLD AUTO: 44 %
PLATELET # BLD AUTO: 234 X10E3/UL (ref 150–450)
POTASSIUM SERPL-SCNC: 5 MMOL/L (ref 3.5–5.2)
PROT SERPL-MCNC: 7.1 G/DL (ref 6–8.5)
RBC # BLD AUTO: 4.82 X10E6/UL (ref 3.77–5.28)
SODIUM SERPL-SCNC: 141 MMOL/L (ref 134–144)
TRIGL SERPL-MCNC: 148 MG/DL (ref 0–149)
TSH SERPL DL<=0.005 MIU/L-ACNC: 2.06 UIU/ML (ref 0.45–4.5)
VLDLC SERPL CALC-MCNC: 25 MG/DL (ref 5–40)
WBC # BLD AUTO: 6 X10E3/UL (ref 3.4–10.8)

## 2022-06-02 LAB
HCV RNA SERPL NAA+PROBE-ACNC: NORMAL IU/ML
TEST INFORMATION: NORMAL

## 2022-06-07 NOTE — PROGRESS NOTES
Subsequent Medicare Wellness Visit    Chief Complaint   Patient presents with   • Medicare Wellness-subsequent   • Hyperlipidemia       Subjective   History of Present Illness:  Rachael Quevedo is a 76 y.o. female who presents for a Subsequent Medicare Wellness Visit. The patient is here: for coordination of medical care to discuss health maintenance and disease prevention. Overall has: moderate activity with work/home activities, exercises 2 - 3 times per week, good appetite, feels well with no complaints, good energy level and is sleeping well. Nutrition: balanced diet. Last tetanus shot was unknown.    Hyperlipidemia  This is a chronic problem. The current episode started more than 1 year ago. The problem is controlled. Recent lipid tests were reviewed and are normal. She has no history of diabetes or obesity. Associated symptoms include leg pain. Pertinent negatives include no chest pain, myalgias or shortness of breath. Current antihyperlipidemic treatment includes statins. The current treatment provides moderate improvement of lipids. Risk factors for coronary artery disease include dyslipidemia, post-menopausal and family history.       HEALTH RISK ASSESSMENT    Recent Hospitalizations:  No hospitalization(s) within the last year.    Current Medical Providers:  Patient Care Team:  Alexa Shah MD as PCP - General (Family Medicine)  Alexa Shah MD as PCP - Family Medicine  Jermaine Delong MD as Consulting Physician (Cardiology)    Smoking Status:  Social History     Tobacco Use   Smoking Status Never Smoker   Smokeless Tobacco Never Used   Tobacco Comment    Many family members smoked around me when I was a child       Alcohol Consumption:  Social History     Substance and Sexual Activity   Alcohol Use Yes   • Alcohol/week: 4.0 - 5.0 standard drinks   • Types: 4 - 5 Glasses of wine per week       Depression Screen:   PHQ-2/PHQ-9 Depression Screening 6/9/2022   Retired PHQ-9 Total Score -   Retired  Total Score -   Little Interest or Pleasure in Doing Things 0-->not at all   Feeling Down, Depressed or Hopeless 0-->not at all   PHQ-9: Brief Depression Severity Measure Score 0     I spent more than 16 minutes asking patient questions, counseling and documenting in the chart    Fall Risk Screen:  AMY Fall Risk Assessment was completed, and patient is at MODERATE risk for falls. Assessment completed on:6/9/2022    Health Habits and Functional and Cognitive Screening:  Functional & Cognitive Status 6/9/2022   Do you have difficulty preparing food and eating? No   Do you have difficulty bathing yourself, getting dressed or grooming yourself? No   Do you have difficulty using the toilet? No   Do you have difficulty moving around from place to place? No   Do you have trouble with steps or getting out of a bed or a chair? No   Current Diet Well Balanced Diet   Dental Exam Up to date   Eye Exam Up to date   Exercise (times per week) 3 times per week   Current Exercises Include Treadmill   Do you need help using the phone?  No   Are you deaf or do you have serious difficulty hearing?  No   Do you need help with transportation? No   Do you need help shopping? No   Do you need help preparing meals?  No   Do you need help with housework?  No   Do you need help with laundry? No   Do you need help taking your medications? No   Do you need help managing money? No   Do you ever drive or ride in a car without wearing a seat belt? No   Have you felt unusual stress, anger or loneliness in the last month? No   Who do you live with? Spouse   If you need help, do you have trouble finding someone available to you? No   Have you been bothered in the last four weeks by sexual problems? No   Do you have difficulty concentrating, remembering or making decisions? No       Does the patient have evidence of cognitive impairment? No    Asprin use counseling:Taking ASA appropriately as indicated    Recent Lab Results:  Lab Results    Component Value Date    CHLPL 156 05/31/2022    TRIG 148 05/31/2022    HDL 57 05/31/2022    LDL 74 05/31/2022    VLDL 25 05/31/2022        CMP    CMP 5/31/22   Glucose 110 (A)   BUN 15   Creatinine 1.12 (A)   Sodium 141   Potassium 5.0   Chloride 106   Calcium 10.0   Total Protein 7.1   Albumin 4.6   Globulin 2.5   Total Bilirubin 0.4   Alkaline Phosphatase 58   AST (SGOT) 21   ALT (SGPT) 14   (A) Abnormal value            CBC w/diff    CBC w/Diff 5/31/22   WBC 6.0   RBC 4.82   Hemoglobin 13.9   Hematocrit 42.9   MCV 89   MCH 28.8   MCHC 32.4   RDW 12.5   Platelets 234   Neutrophil Rel % 44   Lymphocyte Rel % 39   Monocyte Rel % 10   Eosinophil Rel % 6   Basophil Rel % 1           TSH    TSH 5/31/22   TSH 2.060         CT Head Without Contrast    Result Date: 6/9/2022   1. No acute intracranial abnormality. 2. Chronic lacunar infarcts within the periventricular right frontal lobe and right basal.  Electronically Signed By-Almita Skaggs MD On:6/9/2022 2:45 PM This report was finalized on [id number] by  Almita Skaggs MD.        Age-appropriate Screening Schedule:  Refer to the list below for future screening recommendations based on patient's age, sex and/or medical conditions. Orders for these recommended tests are listed in the plan section. The patient has been provided with a written plan.    Health Maintenance   Topic Date Due   • TDAP/TD VACCINES (1 - Tdap) Never done   • ZOSTER VACCINE (1 of 2) Never done   • INFLUENZA VACCINE  10/01/2022   • LIPID PANEL  05/31/2023   • DXA SCAN  06/02/2023          The following portions of the patient's history were reviewed and updated as appropriate: allergies, current medications, past family history, past medical history, past social history, past surgical history and problem list.      Outpatient Medications Prior to Visit   Medication Sig Dispense Refill   • aspirin 81 MG EC tablet TAKE 1 TABLET BY MOUTH EVERY DAY 90 tablet 3   • calcium carbonate (OS-SHYLA) 600  MG tablet Take 600 mg by mouth 2 (Two) Times a Day With Meals.     • carvedilol (COREG) 3.125 MG tablet TAKE 1 TABLET BY MOUTH TWICE A DAY WITH MEALS 180 tablet 3   • Cholecalciferol (Vitamin D3) 50 MCG (2000 UT) tablet Take  by mouth.     • magnesium gluconate (MAGONATE) 500 MG tablet Take 27 mg by mouth 2 (Two) Times a Day.     • midodrine (PROAMATINE) 2.5 MG tablet TAKE 1 TABLET BY MOUTH TWICE A  tablet 1   • Omega-3 1000 MG capsule Omega 3     • Probiotic Product (PROBIOTIC DAILY PO) Take  by mouth.     • vitamin B-12 (CYANOCOBALAMIN) 1000 MCG tablet Take 1,000 mcg by mouth Daily.     • rosuvastatin (CRESTOR) 20 MG tablet TAKE 1 TABLET BY MOUTH EVERY DAY *NEED TO SEE 90 tablet 3     No facility-administered medications prior to visit.       Patient Active Problem List   Diagnosis   • Arthritis   • Coronary artery disease   • Degeneration of intervertebral disc of lumbosacral region   • Dense breast   • Depression, major, recurrent, mild (HCC)   • GERD without esophagitis   • History of chicken pox   • Presence of other cardiac implants and grafts   • Mixed hyperlipidemia   • Left bundle branch block (LBBB)   • Lumbar disc herniation with radiculopathy   • Other specified menopausal and postmenopausal disorders   • CVA (cerebral vascular accident) (HCC)   • Renal insufficiency   • Vasovagal syncope   • Cervical spondylosis without myelopathy   • Cervico-occipital neuralgia   • Long-term current use of opiate analgesic   • Lumbosacral radiculopathy   • Medicare annual wellness visit, subsequent   • Acute UTI   • Screening mammogram for breast cancer   • Finger pain   • Facial contusion, initial encounter   • Injury, head, initial encounter       Advanced Care Planning:  ACP discussion was held with the patient during this visit. Patient has an advance directive in EMR which is still valid.     A face-to-face visit was completed today with patient.  Counseling explanation, and discussion of advanced  "directives was performed.   The last advanced care visit was performed in 2021.  In a near life ending situation, from which she is not expected to recover functionally, and she is not able to speak for her, she does not want life sustaining measures. We discussed feeding tubes, ventilators and cardiac support as well as dialysis.    I spent less than 16 minutes discussing Advanced Care Planning information and documenting in the chart.    Review of Systems   Constitutional: Negative for activity change and fever.   HENT: Negative for ear pain, rhinorrhea, sinus pressure and voice change.    Eyes: Negative for visual disturbance.   Respiratory: Negative for cough and shortness of breath.    Cardiovascular: Negative for chest pain.   Gastrointestinal: Negative for abdominal pain, diarrhea, nausea and vomiting.   Endocrine: Negative for cold intolerance and heat intolerance.   Genitourinary: Negative for frequency and urgency.   Musculoskeletal: Negative for arthralgias and myalgias.   Skin: Negative for rash.   Neurological: Negative for syncope.   Hematological: Does not bruise/bleed easily.   Psychiatric/Behavioral: Negative for depressed mood. The patient is not nervous/anxious.        I have reviewed and confirmed the accuracy of the HPI and ROS as documented by the MA/LPN/RN Alexa Shah MD    Compared to one year ago, the patient feels her physical health is the same.  Compared to one year ago, the patient feels her mental health is the same.    Reviewed chart for potential of high risk medication in the elderly: yes  Reviewed chart for potential of harmful drug interactions in the elderly:yes    Objective      Vitals:    06/09/22 1336   BP: 140/62   BP Location: Right arm   Patient Position: Sitting   Cuff Size: Adult   Pulse: 73   Resp: 18   Temp: 97.3 °F (36.3 °C)   TempSrc: Temporal   SpO2: 99%   Weight: 61.8 kg (136 lb 3.2 oz)   Height: 152.4 cm (60\")   PainSc: 0-No pain       Body mass index is 26.6 " kg/m².  Discussed the patient's BMI with her. The BMI is above average; BMI management plan is completed.    Physical Exam  Vitals and nursing note reviewed.   Constitutional:       General: She is not in acute distress.     Appearance: She is well-developed. She is not diaphoretic.   HENT:      Head: Normocephalic.      Comments: Facial contusion on the right under eye     Right Ear: Tympanic membrane and external ear normal.      Left Ear: Tympanic membrane and external ear normal.      Nose: Nose normal.      Mouth/Throat:      Pharynx: No oropharyngeal exudate.   Eyes:      General: No scleral icterus.        Right eye: No discharge.         Left eye: No discharge.      Conjunctiva/sclera: Conjunctivae normal.      Pupils: Pupils are equal, round, and reactive to light.   Neck:      Thyroid: No thyromegaly.      Trachea: No tracheal deviation.   Cardiovascular:      Rate and Rhythm: Normal rate and regular rhythm.      Heart sounds: Normal heart sounds. No murmur heard.    No friction rub. No gallop.   Pulmonary:      Effort: Pulmonary effort is normal. No respiratory distress.      Breath sounds: Normal breath sounds. No stridor. No wheezing or rales.   Chest:   Breasts:      Right: Normal. No swelling, bleeding, inverted nipple, mass, nipple discharge, skin change, tenderness or axillary adenopathy.      Left: Normal. No swelling, bleeding, inverted nipple, mass, nipple discharge, skin change, tenderness or axillary adenopathy.       Abdominal:      General: Bowel sounds are normal. There is no distension.      Palpations: Abdomen is soft. There is no mass.      Tenderness: There is no abdominal tenderness. There is no guarding or rebound.   Musculoskeletal:         General: No tenderness or deformity. Normal range of motion.      Cervical back: Normal range of motion and neck supple.   Lymphadenopathy:      Cervical: No cervical adenopathy.      Upper Body:      Right upper body: No axillary adenopathy.       Left upper body: No axillary adenopathy.   Skin:     General: Skin is warm and dry.      Capillary Refill: Capillary refill takes less than 2 seconds.      Coloration: Skin is not pale.      Findings: No erythema or rash.   Neurological:      General: No focal deficit present.      Mental Status: She is alert and oriented to person, place, and time.      Cranial Nerves: No cranial nerve deficit.      Sensory: No sensory deficit.      Motor: No weakness, tremor, atrophy or abnormal muscle tone.      Coordination: Coordination normal.      Gait: Gait normal.      Deep Tendon Reflexes: Reflexes are normal and symmetric. Reflexes normal.   Psychiatric:         Behavior: Behavior normal.         Thought Content: Thought content normal.         Cognition and Memory: Memory is not impaired. She does not exhibit impaired recent memory or impaired remote memory.         Judgment: Judgment normal.         Assessment    Medicare Risks and Personalized Health Plan  CMS Preventative Services Quick Reference  Advance Directive Discussion  Breast Cancer/Mammogram Screening    The above risks/problems have been discussed with the patient.  Pertinent information has been shared with the patient in the After Visit Summary.  Follow up plans and orders are seen below in the Assessment/Plan Section.    Medications were reviewed and deemed appropriate to continue. Refills sent in. Labs ordered for surveillance or reviewed in note.       Visit Diagnoses:    Problems Addressed this Visit        Cardiac and Vasculature    Coronary artery disease     Coronary artery disease is unchanged.  Continue current treatment regimen.  Dietary sodium restriction.  Cardiac status will be reassessed in 3 months.           Mixed hyperlipidemia    Relevant Medications    rosuvastatin (CRESTOR) 10 MG tablet       Genitourinary and Reproductive     Acute UTI     Increase fluids  Urine sent for culture           Relevant Medications    cephalexin (KEFLEX) 500  MG capsule    Other Relevant Orders    Urinalysis With Microscopic - Urine, Clean Catch (Completed)    Urine Culture - Urine, Urine, Random Void (Completed)       Health Encounters    Medicare annual wellness visit, subsequent    Relevant Orders    POCT urinalysis dipstick, manual (Completed)       Mental Health    Depression, major, recurrent, mild (HCC) - Primary     Patient's depression is recurrent and is mild without psychosis. Their depression is currently active and the condition is unchanged. This will be reassessed in 3 months. F/U as described:patient will continue current medication therapy.              Other    Screening mammogram for breast cancer    Relevant Orders    Mammo Screening Digital Tomosynthesis Bilateral With CAD    Facial contusion, initial encounter    Relevant Orders    CT Head Without Contrast (Completed)    Injury, head, initial encounter     CT scan is negative           Relevant Orders    CT Head Without Contrast (Completed)      Other Visit Diagnoses     At low risk for fall        Stage 3a chronic kidney disease (HCC)          Diagnoses       Codes Comments    Depression, major, recurrent, mild (HCC)    -  Primary ICD-10-CM: F33.0  ICD-9-CM: 296.31     Medicare annual wellness visit, subsequent     ICD-10-CM: Z00.00  ICD-9-CM: V70.0     Mixed hyperlipidemia     ICD-10-CM: E78.2  ICD-9-CM: 272.2     At low risk for fall     ICD-10-CM: Z91.81  ICD-9-CM: V49.89     Stage 3a chronic kidney disease (HCC)     ICD-10-CM: N18.31  ICD-9-CM: 585.3     Acute UTI     ICD-10-CM: N39.0  ICD-9-CM: 599.0     Coronary artery disease involving native coronary artery of native heart without angina pectoris     ICD-10-CM: I25.10  ICD-9-CM: 414.01     Screening mammogram for breast cancer     ICD-10-CM: Z12.31  ICD-9-CM: V76.12     Facial contusion, initial encounter     ICD-10-CM: S00.83XA  ICD-9-CM: 920     Injury, head, initial encounter     ICD-10-CM: S09.90XA  ICD-9-CM: 959.01              Follow  Up:  No follow-ups on file.     BMI is >= 25 and <30. (Overweight) The following options were offered after discussion;: weight loss educational material (shared in after visit summary) and exercise counseling/recommendations      An After Visit Summary and PPPS were given to the patient.    I wore protective equipment throughout this patient encounter to include mask and eye protection. Hand hygiene was performed before donning protective equipment and after removal when leaving the room.    Discussed injury prevention, diet and exercise, safe sexual practices, and screening for common diseases. Encouraged use of sunscreen and seatbelts. Discussed timing of  screenings. Avoidance of tobacco encouraged. Limitation or avoidance of alcohol encouraged. Recommend yearly dental and eye exams. Also discussed monitoring of blood pressure, lipids.

## 2022-06-09 ENCOUNTER — OFFICE VISIT (OUTPATIENT)
Dept: FAMILY MEDICINE CLINIC | Facility: CLINIC | Age: 77
End: 2022-06-09

## 2022-06-09 ENCOUNTER — HOSPITAL ENCOUNTER (OUTPATIENT)
Dept: CT IMAGING | Facility: HOSPITAL | Age: 77
Discharge: HOME | End: 2022-06-09
Admitting: FAMILY MEDICINE

## 2022-06-09 VITALS
HEIGHT: 60 IN | TEMPERATURE: 97.3 F | WEIGHT: 136.2 LBS | BODY MASS INDEX: 26.74 KG/M2 | RESPIRATION RATE: 18 BRPM | HEART RATE: 73 BPM | OXYGEN SATURATION: 99 % | SYSTOLIC BLOOD PRESSURE: 140 MMHG | DIASTOLIC BLOOD PRESSURE: 62 MMHG

## 2022-06-09 DIAGNOSIS — I25.10 CORONARY ARTERY DISEASE INVOLVING NATIVE CORONARY ARTERY OF NATIVE HEART WITHOUT ANGINA PECTORIS: ICD-10-CM

## 2022-06-09 DIAGNOSIS — S09.90XA INJURY, HEAD, INITIAL ENCOUNTER: ICD-10-CM

## 2022-06-09 DIAGNOSIS — F33.0 DEPRESSION, MAJOR, RECURRENT, MILD: Primary | ICD-10-CM

## 2022-06-09 DIAGNOSIS — Z91.81 AT LOW RISK FOR FALL: ICD-10-CM

## 2022-06-09 DIAGNOSIS — N39.0 ACUTE UTI: ICD-10-CM

## 2022-06-09 DIAGNOSIS — N18.31 STAGE 3A CHRONIC KIDNEY DISEASE: ICD-10-CM

## 2022-06-09 DIAGNOSIS — S00.83XA FACIAL CONTUSION, INITIAL ENCOUNTER: ICD-10-CM

## 2022-06-09 DIAGNOSIS — E78.2 MIXED HYPERLIPIDEMIA: ICD-10-CM

## 2022-06-09 DIAGNOSIS — Z00.00 MEDICARE ANNUAL WELLNESS VISIT, SUBSEQUENT: ICD-10-CM

## 2022-06-09 DIAGNOSIS — Z12.31 SCREENING MAMMOGRAM FOR BREAST CANCER: ICD-10-CM

## 2022-06-09 PROBLEM — R55 VASOVAGAL SYNCOPE: Status: RESOLVED | Noted: 2020-08-31 | Resolved: 2022-06-09

## 2022-06-09 PROBLEM — M79.646 FINGER PAIN: Status: ACTIVE | Noted: 2022-06-09

## 2022-06-09 LAB
BILIRUB BLD-MCNC: NEGATIVE MG/DL
CLARITY, POC: CLEAR
COLOR UR: YELLOW
GLUCOSE UR STRIP-MCNC: NEGATIVE MG/DL
KETONES UR QL: NEGATIVE
LEUKOCYTE EST, POC: ABNORMAL
NITRITE UR-MCNC: NEGATIVE MG/ML
PH UR: 5 [PH] (ref 5–8)
PROT UR STRIP-MCNC: NEGATIVE MG/DL
RBC # UR STRIP: ABNORMAL /UL
SP GR UR: 1.01 (ref 1–1.03)
UROBILINOGEN UR QL: NORMAL

## 2022-06-09 PROCEDURE — 81002 URINALYSIS NONAUTO W/O SCOPE: CPT | Performed by: FAMILY MEDICINE

## 2022-06-09 PROCEDURE — 1159F MED LIST DOCD IN RCRD: CPT | Performed by: FAMILY MEDICINE

## 2022-06-09 PROCEDURE — 96160 PT-FOCUSED HLTH RISK ASSMT: CPT | Performed by: FAMILY MEDICINE

## 2022-06-09 PROCEDURE — 99214 OFFICE O/P EST MOD 30 MIN: CPT | Performed by: FAMILY MEDICINE

## 2022-06-09 PROCEDURE — 70450 CT HEAD/BRAIN W/O DYE: CPT

## 2022-06-09 PROCEDURE — G0439 PPPS, SUBSEQ VISIT: HCPCS | Performed by: FAMILY MEDICINE

## 2022-06-09 RX ORDER — ROSUVASTATIN CALCIUM 10 MG/1
10 TABLET, COATED ORAL DAILY
Qty: 90 TABLET | Refills: 3 | Status: SHIPPED | OUTPATIENT
Start: 2022-06-09

## 2022-06-09 RX ORDER — CEPHALEXIN 500 MG/1
500 CAPSULE ORAL 3 TIMES DAILY
Qty: 30 CAPSULE | Refills: 0 | Status: SHIPPED | OUTPATIENT
Start: 2022-06-09 | End: 2022-07-12

## 2022-06-09 NOTE — PATIENT INSTRUCTIONS

## 2022-06-10 LAB
APPEARANCE UR: CLEAR
BACTERIA #/AREA URNS HPF: NORMAL /[HPF]
BILIRUB UR QL STRIP: NEGATIVE
CASTS URNS QL MICRO: NORMAL /LPF
COLOR UR: YELLOW
EPI CELLS #/AREA URNS HPF: NORMAL /HPF (ref 0–10)
GLUCOSE UR QL STRIP: NEGATIVE
HGB UR QL STRIP: ABNORMAL
KETONES UR QL STRIP: NEGATIVE
LEUKOCYTE ESTERASE UR QL STRIP: ABNORMAL
MICRO URNS: ABNORMAL
NITRITE UR QL STRIP: NEGATIVE
PH UR STRIP: 7.5 [PH] (ref 5–7.5)
PROT UR QL STRIP: NEGATIVE
RBC #/AREA URNS HPF: NORMAL /HPF (ref 0–2)
SP GR UR STRIP: 1.01 (ref 1–1.03)
UROBILINOGEN UR STRIP-MCNC: 0.2 MG/DL (ref 0.2–1)
WBC #/AREA URNS HPF: NORMAL /HPF (ref 0–5)

## 2022-06-11 LAB
BACTERIA UR CULT: NORMAL
BACTERIA UR CULT: NORMAL

## 2022-06-14 ENCOUNTER — TELEPHONE (OUTPATIENT)
Dept: FAMILY MEDICINE CLINIC | Facility: CLINIC | Age: 77
End: 2022-06-14

## 2022-06-14 NOTE — TELEPHONE ENCOUNTER
----- Message from Alexa Shah MD sent at 6/14/2022  7:36 AM EDT -----  Culture is negative. I need a repeat clean catch

## 2022-06-15 NOTE — ASSESSMENT & PLAN NOTE
Coronary artery disease is unchanged.  Continue current treatment regimen.  Dietary sodium restriction.  Cardiac status will be reassessed in 3 months.

## 2022-06-15 NOTE — ASSESSMENT & PLAN NOTE
Patient's depression is recurrent and is mild without psychosis. Their depression is currently active and the condition is unchanged. This will be reassessed in 3 months. F/U as described:patient will continue current medication therapy.

## 2022-06-16 ENCOUNTER — CLINICAL SUPPORT (OUTPATIENT)
Dept: FAMILY MEDICINE CLINIC | Facility: CLINIC | Age: 77
End: 2022-06-16

## 2022-06-16 DIAGNOSIS — R31.29 MICROHEMATURIA: Primary | ICD-10-CM

## 2022-06-16 LAB
BILIRUB BLD-MCNC: NEGATIVE MG/DL
CLARITY, POC: CLEAR
COLOR UR: YELLOW
GLUCOSE UR STRIP-MCNC: NEGATIVE MG/DL
KETONES UR QL: NEGATIVE
LEUKOCYTE EST, POC: NEGATIVE
NITRITE UR-MCNC: NEGATIVE MG/ML
PH UR: 6 [PH] (ref 5–8)
PROT UR STRIP-MCNC: ABNORMAL MG/DL
RBC # UR STRIP: ABNORMAL /UL
SP GR UR: 1.01 (ref 1–1.03)
UROBILINOGEN UR QL: NORMAL

## 2022-06-16 PROCEDURE — 81003 URINALYSIS AUTO W/O SCOPE: CPT | Performed by: FAMILY MEDICINE

## 2022-06-23 ENCOUNTER — HOSPITAL ENCOUNTER (OUTPATIENT)
Dept: MAMMOGRAPHY | Facility: HOSPITAL | Age: 77
Discharge: HOME | End: 2022-06-23
Admitting: FAMILY MEDICINE

## 2022-06-23 DIAGNOSIS — Z12.31 SCREENING MAMMOGRAM FOR BREAST CANCER: ICD-10-CM

## 2022-06-23 PROCEDURE — 77063 BREAST TOMOSYNTHESIS BI: CPT

## 2022-06-23 PROCEDURE — 77067 SCR MAMMO BI INCL CAD: CPT

## 2022-07-12 ENCOUNTER — OFFICE VISIT (OUTPATIENT)
Dept: FAMILY MEDICINE CLINIC | Facility: CLINIC | Age: 77
End: 2022-07-12

## 2022-07-12 VITALS
BODY MASS INDEX: 26.55 KG/M2 | SYSTOLIC BLOOD PRESSURE: 136 MMHG | DIASTOLIC BLOOD PRESSURE: 86 MMHG | HEART RATE: 82 BPM | HEIGHT: 60 IN | WEIGHT: 135.2 LBS | OXYGEN SATURATION: 98 % | RESPIRATION RATE: 16 BRPM | TEMPERATURE: 96.6 F

## 2022-07-12 DIAGNOSIS — E66.3 OVERWEIGHT WITH BODY MASS INDEX (BMI) OF 26 TO 26.9 IN ADULT: ICD-10-CM

## 2022-07-12 DIAGNOSIS — J30.1 SEASONAL ALLERGIC RHINITIS DUE TO POLLEN: ICD-10-CM

## 2022-07-12 DIAGNOSIS — J01.00 ACUTE NON-RECURRENT MAXILLARY SINUSITIS: Primary | ICD-10-CM

## 2022-07-12 DIAGNOSIS — R05.9 COUGH: ICD-10-CM

## 2022-07-12 LAB
EXPIRATION DATE: NORMAL
FLUAV AG UPPER RESP QL IA.RAPID: NOT DETECTED
FLUBV AG UPPER RESP QL IA.RAPID: NOT DETECTED
INTERNAL CONTROL: NORMAL
Lab: NORMAL
SARS-COV-2 AG UPPER RESP QL IA.RAPID: NOT DETECTED

## 2022-07-12 PROCEDURE — 87428 SARSCOV & INF VIR A&B AG IA: CPT | Performed by: FAMILY MEDICINE

## 2022-07-12 PROCEDURE — 99213 OFFICE O/P EST LOW 20 MIN: CPT | Performed by: FAMILY MEDICINE

## 2022-07-12 RX ORDER — LORATADINE 10 MG/1
10 TABLET ORAL DAILY
Qty: 30 TABLET | Refills: 12 | Status: SHIPPED | OUTPATIENT
Start: 2022-07-12 | End: 2022-08-03

## 2022-07-12 RX ORDER — MONTELUKAST SODIUM 10 MG/1
10 TABLET ORAL NIGHTLY
Qty: 30 TABLET | Refills: 12 | Status: SHIPPED | OUTPATIENT
Start: 2022-07-12 | End: 2022-08-03

## 2022-07-12 RX ORDER — DOXYCYCLINE 100 MG/1
100 CAPSULE ORAL 2 TIMES DAILY
Qty: 20 CAPSULE | Refills: 0 | Status: SHIPPED | OUTPATIENT
Start: 2022-07-12 | End: 2023-01-10

## 2022-07-12 NOTE — PROGRESS NOTES
"Chief Complaint  URI    Subjective     CC  Problem List  Visit Diagnosis   Encounters  Notes  Medications  Labs  Result Review Imaging  Media    Rachael Quevedo presents to [facility] for   URI   This is a new problem. The current episode started in the past 7 days (Orestes 07/10/2022). The problem has been gradually worsening. There has been no fever. Associated symptoms include coughing (non productive) and a sore throat. Pertinent negatives include no abdominal pain, chest pain, congestion, diarrhea, ear pain, headaches, nausea, rhinorrhea, sinus pain, vomiting or wheezing. She has tried decongestant for the symptoms. The treatment provided no relief.       Review of Systems   Constitutional: Negative for chills, fatigue and fever.   HENT: Positive for sore throat. Negative for congestion, ear pain and rhinorrhea.    Respiratory: Positive for cough (non productive). Negative for shortness of breath and wheezing.    Cardiovascular: Negative for chest pain, palpitations and leg swelling.   Gastrointestinal: Negative for abdominal pain, diarrhea, nausea and vomiting.   Neurological: Positive for dizziness.   Hematological: Negative for adenopathy. Does not bruise/bleed easily.        Objective   Vital Signs:   /86 (BP Location: Right arm, Patient Position: Sitting, Cuff Size: Adult)   Pulse 82   Temp 96.6 °F (35.9 °C) (Temporal)   Resp 16   Ht 152.4 cm (60\")   Wt 61.3 kg (135 lb 3.2 oz)   SpO2 98% Comment: room air  BMI 26.40 kg/m²     Physical Exam  Constitutional:       General: She is not in acute distress.  HENT:      Right Ear: Tympanic membrane normal.      Left Ear: Tympanic membrane normal.      Nose:      Right Sinus: Maxillary sinus tenderness present.      Left Sinus: Maxillary sinus tenderness present.      Mouth/Throat:      Pharynx: No oropharyngeal exudate or posterior oropharyngeal erythema (moderate post nasal secretions. ).   Cardiovascular: "      Rate and Rhythm: Normal rate and regular rhythm.      Heart sounds: No murmur heard.  Pulmonary:      Effort: Pulmonary effort is normal.      Breath sounds: Normal breath sounds.   Musculoskeletal:      Cervical back: Neck supple.   Lymphadenopathy:      Cervical: No cervical adenopathy.   Skin:     Findings: No rash.   Neurological:      Mental Status: She is alert.        Result Review :Labs  Result Review  Imaging  Med Tab  Media                 Assessment and Plan CC Problem List  Visit Diagnosis  ROS  Review (Popup)  Health Maintenance  Quality  BestPractice  Medications  SmartSets  SnapShot Encounters  Media  Problem List Items Addressed This Visit        Unprioritized    Seasonal allergic rhinitis due to pollen    Overview     Begin maximum allergy Rx           Relevant Medications    montelukast (SINGULAIR) 10 MG tablet    loratadine (CLARITIN) 10 MG tablet    Overweight with body mass index (BMI) of 26 to 26.9 in adult    Current Assessment & Plan     Patient's (Body mass index is 26.4 kg/m².) indicates that they are overweight with health conditions that include hypertension and dyslipidemias . Weight is unchanged. BMI is is above average; BMI management plan is completed. We discussed low calorie, low carb based diet program, portion control and increasing exercise.              Other Visit Diagnoses     Acute non-recurrent maxillary sinusitis    -  Primary    Abx fluids, saline flushes Rx AR and follow up if no improvement.     Relevant Medications    doxycycline (MONODOX) 100 MG capsule    Cough        Neg exam, neg flu and covid swab.     Relevant Orders    POCT SARS-CoV-2 Antigen SUZY (Completed)          Follow Up Instructions Charge Capture  Follow-up Communications  Return if symptoms worsen or fail to improve.  Patient was given instructions and counseling regarding her condition or for health maintenance advice. Please see specific information pulled into the AVS if  appropriate.

## 2022-07-22 PROBLEM — E66.3 OVERWEIGHT WITH BODY MASS INDEX (BMI) OF 26 TO 26.9 IN ADULT: Status: ACTIVE | Noted: 2022-07-22

## 2022-07-22 NOTE — ASSESSMENT & PLAN NOTE
Patient's (Body mass index is 26.4 kg/m².) indicates that they are overweight with health conditions that include hypertension and dyslipidemias . Weight is unchanged. BMI is is above average; BMI management plan is completed. We discussed low calorie, low carb based diet program, portion control and increasing exercise.

## 2022-08-03 DIAGNOSIS — J30.1 SEASONAL ALLERGIC RHINITIS DUE TO POLLEN: ICD-10-CM

## 2022-08-03 RX ORDER — MONTELUKAST SODIUM 10 MG/1
TABLET ORAL
Qty: 30 TABLET | Refills: 12 | Status: SHIPPED | OUTPATIENT
Start: 2022-08-03 | End: 2023-01-10

## 2022-08-03 RX ORDER — LORATADINE 10 MG/1
TABLET ORAL
Qty: 30 TABLET | Refills: 12 | Status: SHIPPED | OUTPATIENT
Start: 2022-08-03 | End: 2023-01-10

## 2022-09-16 ENCOUNTER — TELEPHONE (OUTPATIENT)
Dept: FAMILY MEDICINE CLINIC | Facility: CLINIC | Age: 77
End: 2022-09-16

## 2022-09-16 NOTE — TELEPHONE ENCOUNTER
Called pt to let her know that the last one we have on file is pneumo 23 from 2013. She said that she plans on having another one soon.

## 2022-09-16 NOTE — TELEPHONE ENCOUNTER
Caller: Rachael Quevedo    Relationship: Self    Best call back number: [phone] (M)    What is the best time to reach you: ANYTIME    Who are you requesting to speak with (clinical staff, provider,  specific staff member): CLINICAL STAFF    Do you know the name of the person who called:      What was the call regarding: PATIENT CALLED TO ASK IF SOMEONE CAN GIVE HER A CALLBACK TO ADVISE ON THE STRAIN OF THE PNEUMONIA SHOT THAT SHE LAST HAD HERE IN THE OFFICE.     PLEASE CONTACT PATIENT TO ADVISE.      Do you require a callback: YES        THANKS

## 2022-09-22 ENCOUNTER — OFFICE VISIT (OUTPATIENT)
Dept: CARDIOLOGY | Facility: CLINIC | Age: 77
End: 2022-09-22

## 2022-09-22 VITALS
OXYGEN SATURATION: 98 % | BODY MASS INDEX: 26.31 KG/M2 | HEIGHT: 60 IN | WEIGHT: 134 LBS | SYSTOLIC BLOOD PRESSURE: 150 MMHG | HEART RATE: 62 BPM | DIASTOLIC BLOOD PRESSURE: 64 MMHG

## 2022-09-22 DIAGNOSIS — I25.10 CORONARY ARTERY DISEASE INVOLVING NATIVE CORONARY ARTERY OF NATIVE HEART WITHOUT ANGINA PECTORIS: Primary | ICD-10-CM

## 2022-09-22 DIAGNOSIS — I10 ESSENTIAL HYPERTENSION: ICD-10-CM

## 2022-09-22 DIAGNOSIS — E78.00 PURE HYPERCHOLESTEROLEMIA: ICD-10-CM

## 2022-09-22 PROCEDURE — 99213 OFFICE O/P EST LOW 20 MIN: CPT | Performed by: INTERNAL MEDICINE

## 2022-09-22 NOTE — PROGRESS NOTES
Subjective:     Encounter Date:09/22/2022      Patient ID: Rachael Quevedo is a 76 y.o. female.    Chief Complaint:  History of Present Illness 75-year-old white female with history of coronary disease hypertension hyperlipidemia presents to my office for follow-up.  Patient is currently stable without any symptoms of chest pain or shortness of breath at rest on exertion.  No complains any PND orthopnea.  No palpitation dizziness syncope or swelling of the feet but she is taking her medicines regularly she does not smoke.  She is quite active.  She follows a good diet.    The following portions of the patient's history were reviewed and updated as appropriate: allergies, current medications, past family history, past medical history, past social history, past surgical history and problem list.  Past Medical History:   Diagnosis Date   • Ankle fracture, left     Comments: 2016   • Arthritis    • Cataract, acquired    • Coronary artery disease    • Cough     Impression: Most likely reactive airway prescription as below Start antihistamine at home   • Dense breast    • Depression, major, recurrent, mild (HCC)    • GERD without esophagitis     Comments: Dr Duncan- protonix   • Hematuria, microscopic    • History of chicken pox    • History of loop recorder    • Hyperglycemia    • Hyperlipidemia    • Hypertension 01/04/2020   • Injury of back    • Injury of neck    • Left bundle branch block (LBBB)    • Malaise and fatigue    • Medicare annual wellness visit, initial     with abnormal findings   • Other specified menopausal and perimenopausal disorders     Other specified menopausal and postmenopausal disorders   • Pap smear, low-risk    • Renal insufficiency    • S/P right rotator cuff repair    • Screening for alcoholism     10 or more drinks per week   • Screening for depression     Negative Depression Screening ( 9 or less) ()   • Screening mammogram, encounter for    • Stroke (HCC)     1/5/2020 no  "residual effects   • Vasovagal near-syncope     Impression: from illness and cough Discussed if there is loss of vision in an eye, confusion, weakness or numbness in an extremity, drooping of a side of the face, intractible pain, intractible vomiting, to go to the ER.     Past Surgical History:   Procedure Laterality Date   • CARDIAC CATHETERIZATION N/A 01/27/2020    Procedure: Left Heart Cath with angiogram;  Surgeon: Jermaine Delong MD;  Location: [facility] CATH INVASIVE LOCATION;  Service: Cardiovascular   • CARDIAC CATHETERIZATION N/A 01/27/2020    Procedure: Stent BOBBY coronary;  Surgeon: Jermaine Delong MD;  Location: [facility] CATH INVASIVE LOCATION;  Service: Cardiovascular   • CARDIAC CATHETERIZATION N/A 01/27/2020    Procedure: Left ventriculography;  Surgeon: Jermaine Delong MD;  Location: [facility] CATH INVASIVE LOCATION;  Service: Cardiovascular   • CARDIAC CATHETERIZATION N/A 01/27/2020    Procedure: Coronary angiography;  Surgeon: Jermaine Delong MD;  Location: [facility] CATH INVASIVE LOCATION;  Service: Cardiovascular   • CATARACT EXTRACTION, BILATERAL Bilateral 1999   • CORONARY STENT PLACEMENT  1/28/2020   • GALLBLADDER SURGERY  1996   • LAPAROSCOPIC TUBAL LIGATION  1980   • RENAL ARTERY STENT  01/26/2020   • ROTATOR CUFF REPAIR Right 2002     /64 (BP Location: Left arm, Patient Position: Sitting, Cuff Size: Adult)   Pulse 62   Ht 152.4 cm (60\")   Wt 60.8 kg (134 lb)   LMP  (LMP Unknown)   SpO2 98%   BMI 26.17 kg/m²   Family History   Problem Relation Age of Onset   • Hyperlipidemia Mother         Elevated cholesterol   • Heart disease Mother    • Heart attack Mother    • Hypertension Father    • Colon cancer Father    • Hyperlipidemia Father         Elevated cholesterol   • Heart disease Father    • Diabetes Brother    • Heart disease Brother    • Hyperlipidemia Brother    • Hypertension Brother    • Diabetes Paternal Aunt    • Colon cancer Paternal Aunt    • Colon cancer Paternal Uncle    • Diabetes " Paternal Grandmother    • Heart disease Paternal Grandmother    • Heart disease Maternal Uncle    • Heart disease Maternal Uncle    • Breast cancer Neg Hx    • Ovarian cancer Neg Hx        Current Outpatient Medications:   •  aspirin 81 MG EC tablet, TAKE 1 TABLET BY MOUTH EVERY DAY, Disp: 90 tablet, Rfl: 3  •  calcium carbonate (OS-SHYLA) 600 MG tablet, Take 600 mg by mouth 2 (Two) Times a Day With Meals., Disp: , Rfl:   •  carvedilol (COREG) 3.125 MG tablet, TAKE 1 TABLET BY MOUTH TWICE A DAY WITH MEALS, Disp: 180 tablet, Rfl: 3  •  Cholecalciferol (Vitamin D3) 50 MCG (2000 UT) tablet, Take  by mouth., Disp: , Rfl:   •  doxycycline (MONODOX) 100 MG capsule, Take 1 capsule by mouth 2 (Two) Times a Day., Disp: 20 capsule, Rfl: 0  •  loratadine (CLARITIN) 10 MG tablet, TAKE 1 TABLET BY MOUTH EVERY DAY, Disp: 30 tablet, Rfl: 12  •  magnesium gluconate (MAGONATE) 500 MG tablet, Take 27 mg by mouth Daily., Disp: , Rfl:   •  midodrine (PROAMATINE) 2.5 MG tablet, TAKE 1 TABLET BY MOUTH TWICE A DAY, Disp: 180 tablet, Rfl: 1  •  montelukast (SINGULAIR) 10 MG tablet, TAKE 1 TABLET BY MOUTH EVERY DAY AT NIGHT, Disp: 30 tablet, Rfl: 12  •  Omega-3 1000 MG capsule, Take 1 capsule by mouth Daily., Disp: , Rfl:   •  Probiotic Product (PROBIOTIC DAILY PO), Take 1 capsule by mouth Daily., Disp: , Rfl:   •  rosuvastatin (CRESTOR) 10 MG tablet, Take 1 tablet by mouth Daily. (Patient taking differently: Take 5 mg by mouth Daily.), Disp: 90 tablet, Rfl: 3  •  vitamin B-12 (CYANOCOBALAMIN) 1000 MCG tablet, Take 1,000 mcg by mouth Daily., Disp: , Rfl:   No Known Allergies  Social History     Socioeconomic History   • Marital status:    Tobacco Use   • Smoking status: Never Smoker   • Smokeless tobacco: Never Used   • Tobacco comment: Many family members smoked around me when I was a child   Substance and Sexual Activity   • Alcohol use: Yes     Alcohol/week: 3.0 standard drinks     Types: 2 Glasses of wine, 1 Drinks containing 0.5  oz of alcohol per week     Comment: Not weekly but occassionally   • Drug use: No   • Sexual activity: Yes     Partners: Male     Birth control/protection: None     Comment: Very occasional     Review of Systems   Constitutional: Negative for fever and malaise/fatigue.   Cardiovascular: Negative for chest pain, dyspnea on exertion and palpitations.   Respiratory: Negative for cough and shortness of breath.    Skin: Negative for rash.   Gastrointestinal: Negative for abdominal pain, nausea and vomiting.   Neurological: Negative for focal weakness and headaches.   All other systems reviewed and are negative.             Objective:     Constitutional:       Appearance: Well-developed.   Eyes:      General: No scleral icterus.     Conjunctiva/sclera: Conjunctivae normal.   HENT:      Head: Normocephalic and atraumatic.   Neck:      Vascular: No carotid bruit or JVD.   Pulmonary:      Effort: Pulmonary effort is normal.      Breath sounds: Normal breath sounds. No wheezing. No rales.   Cardiovascular:      Normal rate. Regular rhythm.   Pulses:     Intact distal pulses.   Abdominal:      General: Bowel sounds are normal.      Palpations: Abdomen is soft.   Musculoskeletal:      Cervical back: Normal range of motion and neck supple. Skin:     General: Skin is warm and dry.      Findings: No rash.   Neurological:      Mental Status: Alert.       Procedures    Lab Review:         MDM  1.  Coronary disease  Patient has nonobstructive disease with normal B systolic function is currently stable on medications  2.  Hypertension  Patient blood pressure currently stable on medical therapy  3.  Hyperlipidemia  Patient is on statins and the lipid levels are well within normal limits.      Patient's previous medical records, labs, and EKG were reviewed and discussed with the patient at today's visit.

## 2022-10-21 ENCOUNTER — OFFICE (AMBULATORY)
Dept: URBAN - METROPOLITAN AREA CLINIC 64 | Facility: CLINIC | Age: 77
End: 2022-10-21
Payer: MEDICARE

## 2022-10-21 VITALS
DIASTOLIC BLOOD PRESSURE: 72 MMHG | HEIGHT: 60 IN | HEART RATE: 71 BPM | WEIGHT: 133 LBS | SYSTOLIC BLOOD PRESSURE: 147 MMHG

## 2022-10-21 DIAGNOSIS — I25.10 ATHEROSCLEROTIC HEART DISEASE OF NATIVE CORONARY ARTERY WITH: ICD-10-CM

## 2022-10-21 DIAGNOSIS — Z86.010 PERSONAL HISTORY OF COLONIC POLYPS: ICD-10-CM

## 2022-10-21 DIAGNOSIS — I63.9 CEREBRAL INFARCTION, UNSPECIFIED: ICD-10-CM

## 2022-10-21 DIAGNOSIS — Z80.0 FAMILY HISTORY OF MALIGNANT NEOPLASM OF DIGESTIVE ORGANS: ICD-10-CM

## 2022-10-21 PROCEDURE — 99204 OFFICE O/P NEW MOD 45 MIN: CPT

## 2022-10-24 ENCOUNTER — TELEPHONE (OUTPATIENT)
Dept: CARDIOLOGY | Facility: CLINIC | Age: 77
End: 2022-10-24

## 2022-10-24 NOTE — TELEPHONE ENCOUNTER
[facility]  Dr. Duncan  Colonoscopy  Scheduled 12/21/22  Phone# [phone]          Left message for a fax number to send clearance request.

## 2022-11-14 RX ORDER — CLOPIDOGREL BISULFATE 75 MG/1
TABLET ORAL
Qty: 90 TABLET | Refills: 3 | Status: SHIPPED | OUTPATIENT
Start: 2022-11-14 | End: 2023-01-10

## 2022-11-14 RX ORDER — MIDODRINE HYDROCHLORIDE 2.5 MG/1
TABLET ORAL
Qty: 180 TABLET | Refills: 1 | Status: SHIPPED | OUTPATIENT
Start: 2022-11-14

## 2022-11-14 NOTE — TELEPHONE ENCOUNTER
Rx Refill Note  Requested Prescriptions     Pending Prescriptions Disp Refills   • clopidogrel (PLAVIX) 75 MG tablet [Pharmacy Med Name: CLOPIDOGREL 75 MG TABLET] 90 tablet 3     Sig: TAKE 1 TABLET BY MOUTH EVERY DAY      Last office visit with prescribing clinician: 9/22/2022      Next office visit with prescribing clinician: 3/29/2023            Beryl Leong MA  11/14/22, 08:25 EST

## 2022-11-14 NOTE — TELEPHONE ENCOUNTER
Rx Refill Note  Requested Prescriptions     Pending Prescriptions Disp Refills   • midodrine (PROAMATINE) 2.5 MG tablet [Pharmacy Med Name: MIDODRINE HCL 2.5 MG TABLET] 180 tablet 1     Sig: TAKE 1 TABLET BY MOUTH TWICE A DAY      Last office visit with prescribing clinician: 9/22/2022      Next office visit with prescribing clinician: 3/29/2023            Halle Alexandra MA  11/14/22, 09:14 EST

## 2022-12-21 ENCOUNTER — ON CAMPUS - OUTPATIENT (AMBULATORY)
Dept: URBAN - METROPOLITAN AREA HOSPITAL 2 | Facility: HOSPITAL | Age: 77
End: 2022-12-21
Payer: MEDICARE

## 2022-12-21 ENCOUNTER — OFFICE (AMBULATORY)
Dept: URBAN - METROPOLITAN AREA PATHOLOGY 4 | Facility: PATHOLOGY | Age: 77
End: 2022-12-21
Payer: MEDICARE

## 2022-12-21 VITALS
OXYGEN SATURATION: 96 % | DIASTOLIC BLOOD PRESSURE: 79 MMHG | OXYGEN SATURATION: 100 % | SYSTOLIC BLOOD PRESSURE: 131 MMHG | DIASTOLIC BLOOD PRESSURE: 70 MMHG | HEIGHT: 60 IN | RESPIRATION RATE: 18 BRPM | SYSTOLIC BLOOD PRESSURE: 141 MMHG | SYSTOLIC BLOOD PRESSURE: 134 MMHG | SYSTOLIC BLOOD PRESSURE: 162 MMHG | SYSTOLIC BLOOD PRESSURE: 137 MMHG | RESPIRATION RATE: 12 BRPM | SYSTOLIC BLOOD PRESSURE: 117 MMHG | DIASTOLIC BLOOD PRESSURE: 102 MMHG | HEART RATE: 75 BPM | DIASTOLIC BLOOD PRESSURE: 55 MMHG | HEART RATE: 69 BPM | DIASTOLIC BLOOD PRESSURE: 73 MMHG | TEMPERATURE: 97 F | WEIGHT: 133 LBS | HEART RATE: 77 BPM | RESPIRATION RATE: 14 BRPM | DIASTOLIC BLOOD PRESSURE: 65 MMHG | SYSTOLIC BLOOD PRESSURE: 129 MMHG | HEART RATE: 74 BPM | RESPIRATION RATE: 16 BRPM | HEART RATE: 70 BPM | DIASTOLIC BLOOD PRESSURE: 63 MMHG | HEART RATE: 72 BPM | DIASTOLIC BLOOD PRESSURE: 75 MMHG | DIASTOLIC BLOOD PRESSURE: 61 MMHG | SYSTOLIC BLOOD PRESSURE: 145 MMHG | RESPIRATION RATE: 15 BRPM | OXYGEN SATURATION: 99 %

## 2022-12-21 DIAGNOSIS — K57.30 DIVERTICULOSIS OF LARGE INTESTINE WITHOUT PERFORATION OR ABS: ICD-10-CM

## 2022-12-21 DIAGNOSIS — Z86.010 PERSONAL HISTORY OF COLONIC POLYPS: ICD-10-CM

## 2022-12-21 DIAGNOSIS — D12.2 BENIGN NEOPLASM OF ASCENDING COLON: ICD-10-CM

## 2022-12-21 DIAGNOSIS — K64.1 SECOND DEGREE HEMORRHOIDS: ICD-10-CM

## 2022-12-21 PROBLEM — K63.5 POLYP OF COLON: Status: ACTIVE | Noted: 2022-12-21

## 2022-12-21 LAB
GI HISTOLOGY: A. UNSPECIFIED: (no result)
GI HISTOLOGY: PDF REPORT: (no result)

## 2022-12-21 PROCEDURE — 45380 COLONOSCOPY AND BIOPSY: CPT | Mod: PT | Performed by: INTERNAL MEDICINE

## 2022-12-21 PROCEDURE — 88305 TISSUE EXAM BY PATHOLOGIST: CPT | Mod: 26 | Performed by: INTERNAL MEDICINE

## 2023-01-10 ENCOUNTER — TELEMEDICINE (OUTPATIENT)
Dept: FAMILY MEDICINE CLINIC | Facility: TELEHEALTH | Age: 78
End: 2023-01-10
Payer: MEDICARE

## 2023-01-10 DIAGNOSIS — J01.00 ACUTE MAXILLARY SINUSITIS, RECURRENCE NOT SPECIFIED: Primary | ICD-10-CM

## 2023-01-10 PROBLEM — M60.9 MYOSITIS: Status: ACTIVE | Noted: 2019-07-01

## 2023-01-10 PROBLEM — M54.12 CERVICAL RADICULOPATHY: Status: ACTIVE | Noted: 2021-05-06

## 2023-01-10 PROBLEM — M54.2 NECK PAIN: Status: ACTIVE | Noted: 2022-06-30

## 2023-01-10 PROCEDURE — 99213 OFFICE O/P EST LOW 20 MIN: CPT | Performed by: NURSE PRACTITIONER

## 2023-01-10 RX ORDER — METHYLPREDNISOLONE 4 MG/1
TABLET ORAL
Qty: 21 TABLET | Refills: 0 | Status: SHIPPED | OUTPATIENT
Start: 2023-01-10

## 2023-01-10 RX ORDER — DEXTROMETHORPHN/ACETAMINOPH/CP 10-325-2MG
1 TABLET ORAL 2 TIMES DAILY PRN
COMMUNITY

## 2023-01-10 RX ORDER — DOXYCYCLINE HYCLATE 100 MG/1
100 CAPSULE ORAL 2 TIMES DAILY
Qty: 20 CAPSULE | Refills: 0 | Status: SHIPPED | OUTPATIENT
Start: 2023-01-10 | End: 2023-01-20

## 2023-01-10 RX ORDER — GUAIFENESIN AND DEXTROMETHORPHAN HYDROBROMIDE 100; 10 MG/5ML; MG/5ML
5 SOLUTION ORAL EVERY 12 HOURS
COMMUNITY

## 2023-01-10 NOTE — PATIENT INSTRUCTIONS
Drink plenty of water  Over the counter pain relievers okay   If symptoms do not improve in 3-5 days follow up with your primary care provider or urgent care    Most often these initial symptoms are associated with a virus or even allergies.  According to guidelines in treating sinus infections, studies show that 50%-67% of sinusitis cases are reportedly attributable to viruses.    Criteria for prescribing antibiotics for sinus infection:  1.  signs or symptoms of sinusitis (cough, congestion, and/or post-nasal drip) that last = 10 days without clinical improvement  2.  worsening signs or symptoms following initial improvement (double sickening)  3.  severe symptoms including fever = 39 degrees C (102 degrees F) and purulent nasal discharge lasting = 3-4 consecutive days  4.  unilateral cheek or maxillary tooth pain and purulent nasal discharge

## 2023-01-10 NOTE — PROGRESS NOTES
CHIEF COMPLAINT  Chief Complaint   Patient presents with   • Cough   • Sinusitis         HPI  Rachael Quevedo is a 77 y.o. female  presents with complaint of started on December 29, 2022. She had these symptoms in July and responded to the antibiotic she was given then.     Review of Systems   Constitutional: Negative for chills, diaphoresis, fatigue and fever.   HENT: Positive for postnasal drip, sinus pressure and sinus pain. Negative for congestion, rhinorrhea, sneezing and sore throat.    Respiratory: Positive for cough. Negative for chest tightness, shortness of breath and wheezing.    Cardiovascular: Negative for chest pain.   Gastrointestinal: Negative for diarrhea, nausea and vomiting.   Musculoskeletal: Negative for myalgias.   Neurological: Positive for headaches.       Past Medical History:   Diagnosis Date   • Ankle fracture, left     Comments: 2016   • Arthritis    • Cataract, acquired    • Coronary artery disease    • Cough     Impression: Most likely reactive airway prescription as below Start antihistamine at home   • Dense breast    • Depression, major, recurrent, mild (HCC)    • GERD without esophagitis     Comments: Dr Duncan- protonix   • Hematuria, microscopic    • History of chicken pox    • History of loop recorder    • Hyperglycemia    • Hyperlipidemia    • Hypertension 01/04/2020   • Injury of back    • Injury of neck    • Left bundle branch block (LBBB)    • Malaise and fatigue    • Medicare annual wellness visit, initial     with abnormal findings   • Other specified menopausal and perimenopausal disorders     Other specified menopausal and postmenopausal disorders   • Pap smear, low-risk    • Renal insufficiency    • S/P right rotator cuff repair    • Screening for alcoholism     10 or more drinks per week   • Screening for depression     Negative Depression Screening ( 9 or less) ()   • Screening mammogram, encounter for    • Stroke (HCC)     1/5/2020 no residual effects   •  Vasovagal near-syncope     Impression: from illness and cough Discussed if there is loss of vision in an eye, confusion, weakness or numbness in an extremity, drooping of a side of the face, intractible pain, intractible vomiting, to go to the ER.       Family History   Problem Relation Age of Onset   • Hyperlipidemia Mother         Elevated cholesterol   • Heart disease Mother    • Heart attack Mother    • Hypertension Father    • Colon cancer Father    • Hyperlipidemia Father         Elevated cholesterol   • Heart disease Father    • Diabetes Brother    • Heart disease Brother    • Hyperlipidemia Brother    • Hypertension Brother    • Diabetes Paternal Aunt    • Colon cancer Paternal Aunt    • Colon cancer Paternal Uncle    • Diabetes Paternal Grandmother    • Heart disease Paternal Grandmother    • Heart disease Maternal Uncle    • Heart disease Maternal Uncle    • Breast cancer Neg Hx    • Ovarian cancer Neg Hx        Social History     Socioeconomic History   • Marital status:    Tobacco Use   • Smoking status: Never   • Smokeless tobacco: Never   • Tobacco comments:     Many family members smoked around me when I was a child   Vaping Use   • Vaping Use: Never used   Substance and Sexual Activity   • Alcohol use: Yes     Alcohol/week: 3.0 standard drinks     Types: 2 Glasses of wine, 1 Drinks containing 0.5 oz of alcohol per week     Comment: Not weekly but occassionally   • Drug use: No   • Sexual activity: Yes     Partners: Male     Birth control/protection: None     Comment: Very occasional       Rachael Quevedo  reports that she has never smoked. She has never used smokeless tobacco.       LMP  (LMP Unknown)     PHYSICAL EXAM  Physical Exam   Constitutional: She is oriented to person, place, and time. She appears well-developed and well-nourished. She does not have a sickly appearance. She does not appear ill. No distress.   HENT:   Head: Normocephalic and atraumatic.   Nose: Right sinus exhibits  maxillary sinus tenderness (per patient report ). Right sinus exhibits no frontal sinus tenderness. Left sinus exhibits maxillary sinus tenderness (per patient report ). Left sinus exhibits no frontal sinus tenderness.   Eyes: EOM are normal.   Neck: Neck normal appearance.  Pulmonary/Chest: Effort normal.  No respiratory distress.  Neurological: She is alert and oriented to person, place, and time.   Skin: Skin is dry.   Psychiatric: She has a normal mood and affect.       Diagnoses and all orders for this visit:    1. Acute maxillary sinusitis, recurrence not specified (Primary)    Other orders  -     doxycycline (VIBRAMYCIN) 100 MG capsule; Take 1 capsule by mouth 2 (Two) Times a Day for 10 days.  Dispense: 20 capsule; Refill: 0  -     methylPREDNISolone (MEDROL) 4 MG dose pack; Take as directed on package instructions. Start in am and take with food  Dispense: 21 tablet; Refill: 0        The use of a video visit has been reviewed with the patient and verbal informed consent has been obtained. Myself and Rachael Quevedo participated in this visit. The patient is located in [address] [place] [address] [place] [facility]. I am located in [place], [place]. [employer] and [employer] were utilized.       Note Disclaimer: At [facility], we believe that sharing information builds trust and better   relationships. You are receiving this note because you recently visited [facility]. It is possible you   will see health information before a provider has talked with you about it. This kind of information can   be easy to misunderstand. To help you fully understand what it means for your health, we urge you to   discuss this note with your provider.    BINA Adam  01/10/2023  10:27 EST

## 2023-02-27 RX ORDER — CARVEDILOL 3.12 MG/1
TABLET ORAL
Qty: 180 TABLET | Refills: 3 | Status: SHIPPED | OUTPATIENT
Start: 2023-02-27

## 2023-02-27 NOTE — TELEPHONE ENCOUNTER
Rx Refill Note  Requested Prescriptions     Pending Prescriptions Disp Refills   • carvedilol (COREG) 3.125 MG tablet [Pharmacy Med Name: CARVEDILOL 3.125 MG TABLET] 180 tablet 3     Sig: TAKE 1 TABLET BY MOUTH TWICE A DAY WITH MEALS      Last office visit with prescribing clinician: 9/22/2022   Last telemedicine visit with prescribing clinician: 3/29/2023   Next office visit with prescribing clinician: 3/29/2023                         Would you like a call back once the refill request has been completed: [] Yes [] No    If the office needs to give you a call back, can they leave a voicemail: [] Yes [] No    Halle Alexandra MA  02/27/23, 08:02 EST

## 2023-02-28 ENCOUNTER — TREATMENT (OUTPATIENT)
Dept: PHYSICAL THERAPY | Facility: CLINIC | Age: 78
End: 2023-02-28
Payer: MEDICARE

## 2023-02-28 DIAGNOSIS — G89.29 CHRONIC LEFT SHOULDER PAIN: ICD-10-CM

## 2023-02-28 DIAGNOSIS — M25.512 CHRONIC LEFT SHOULDER PAIN: ICD-10-CM

## 2023-02-28 DIAGNOSIS — M54.2 PAIN, NECK: Primary | ICD-10-CM

## 2023-02-28 PROCEDURE — 97162 PT EVAL MOD COMPLEX 30 MIN: CPT | Performed by: PHYSICAL THERAPIST

## 2023-03-07 ENCOUNTER — TREATMENT (OUTPATIENT)
Dept: PHYSICAL THERAPY | Facility: CLINIC | Age: 78
End: 2023-03-07
Payer: MEDICARE

## 2023-03-07 DIAGNOSIS — G89.29 CHRONIC LEFT SHOULDER PAIN: ICD-10-CM

## 2023-03-07 DIAGNOSIS — M54.2 PAIN, NECK: Primary | ICD-10-CM

## 2023-03-07 DIAGNOSIS — M25.512 CHRONIC LEFT SHOULDER PAIN: ICD-10-CM

## 2023-03-07 PROCEDURE — 97140 MANUAL THERAPY 1/> REGIONS: CPT | Performed by: PHYSICAL THERAPIST

## 2023-03-07 PROCEDURE — 97110 THERAPEUTIC EXERCISES: CPT | Performed by: PHYSICAL THERAPIST

## 2023-03-07 PROCEDURE — 97012 MECHANICAL TRACTION THERAPY: CPT | Performed by: PHYSICAL THERAPIST

## 2023-03-07 PROCEDURE — 97112 NEUROMUSCULAR REEDUCATION: CPT | Performed by: PHYSICAL THERAPIST

## 2023-03-07 NOTE — PROGRESS NOTES
Physical Therapy Daily Treatment Note  [address] [facility] [address], [place] [address]      Patient: Rachael Quevedo   : 1945  Diagnosis/ICD-10 Code:  Pain, neck [M54.2]   Problems Addressed this Visit    None  Visit Diagnoses     Pain, neck    -  Primary    Chronic left shoulder pain          Diagnoses       Codes Comments    Pain, neck    -  Primary ICD-10-CM: M54.2  ICD-9-CM: 723.1     Chronic left shoulder pain     ICD-10-CM: M25.512, G89.29  ICD-9-CM: 719.41, 338.29          Referring practitioner: Bill Chandler MD  Date of Initial Visit: Type: THERAPY  Noted: 2023  Today's Date: 3/7/2023    VISIT#: 2    Subjective Patient states her neck has been more sore and cracking/popping since her evaluation.  She has been consistently performing her HEP.      Objective     See Exercise, Manual, and Modality Logs for complete treatment.     Assessment/Plan Patient tolerated exercise well with intermittent pain noted into L lateral neck with transitions.  She reports supporting her head with supine to sit seems to help ease the pain.  Added cervical isometrics with intermittent mild pain noted.  She was able to progress scapular and shoulder strengthening without complaints of pain.  Trialed mechanical traction with patient reporting tenderness in her neck after.  Therapist encouraged patient to use heat as needed at home and continue with cervical mobility within ROM that does not increase symptoms.  Patient verbalized understanding.    Progress per Plan of Care and Progress strengthening /stabilization /functional activity         Timed:         Manual Therapy:    13     mins  65119;     Therapeutic Exercise:    15     mins  42085;     Neuromuscular Abelino:    16    mins  21937;    Therapeutic Activity:          mins  62387;     Gait Training:           mins  30543;     Ultrasound:          mins  84922;    Ionto                                   mins   84683    Un-Timed:  Electrical Stimulation:          mins  54065 ( );  Dry Needling          mins self-pay 84853; 20561  Traction     15     mins 47867      Timed Treatment:   44   mins   Total Treatment:     59   mins    Haylee Navarro PT  [place] License # [id number]  Physical Therapist

## 2023-03-09 ENCOUNTER — TREATMENT (OUTPATIENT)
Dept: PHYSICAL THERAPY | Facility: CLINIC | Age: 78
End: 2023-03-09
Payer: MEDICARE

## 2023-03-09 DIAGNOSIS — G89.29 CHRONIC LEFT SHOULDER PAIN: ICD-10-CM

## 2023-03-09 DIAGNOSIS — M54.2 PAIN, NECK: Primary | ICD-10-CM

## 2023-03-09 DIAGNOSIS — M25.512 CHRONIC LEFT SHOULDER PAIN: ICD-10-CM

## 2023-03-09 PROCEDURE — 97112 NEUROMUSCULAR REEDUCATION: CPT | Performed by: PHYSICAL THERAPIST

## 2023-03-09 PROCEDURE — 97012 MECHANICAL TRACTION THERAPY: CPT | Performed by: PHYSICAL THERAPIST

## 2023-03-09 PROCEDURE — 97530 THERAPEUTIC ACTIVITIES: CPT | Performed by: PHYSICAL THERAPIST

## 2023-03-09 PROCEDURE — 97140 MANUAL THERAPY 1/> REGIONS: CPT | Performed by: PHYSICAL THERAPIST

## 2023-03-09 NOTE — PROGRESS NOTES
Physical Therapy Daily Treatment Note  [address], [place] [address]      Patient: Rachael Quevedo   : 1945  Diagnosis/ICD-10 Code:  Pain, neck [M54.2]   Problems Addressed this Visit    None  Visit Diagnoses     Pain, neck    -  Primary    Chronic left shoulder pain          Diagnoses       Codes Comments    Pain, neck    -  Primary ICD-10-CM: M54.2  ICD-9-CM: 723.1     Chronic left shoulder pain     ICD-10-CM: M25.512, G89.29  ICD-9-CM: 719.41, 338.29          Referring practitioner: Bill Chandler MD  Date of Initial Visit: Type: THERAPY  Noted: 2023  Today's Date: 3/9/2023    VISIT#: 3    Subjective Patient reports her neck has been a little more painful since her last session.  She also reports noting increased pain into her L shoulder with reaching overhead yesterday.      Objective     See Exercise, Manual, and Modality Logs for complete treatment.     Assessment/Plan Added shoulder pulleys for improved painfree ROM in order to complete ADLs such as reaching overhead into cabinets.  Patient reported improved tolerance to ROM with pulleys this date.  Also focused on posutral re-education due to patient holding head in slight extension versus neutral.  Patient did note decreased pain levels when performing lateral flexion with head in neutral versus extension.  She noted decreased stiffness in neck at end of session today.     STG:  -Patient will report a reduction in neck pain by >/=25% for improved tolerance to turning head with driving in 3 weeks.  -Patient will demonstrate increased cervical rotation L to >/=45 degrees AROM for improved ability to turn head with driving in 3 weeks.  -Patient will be independent with HEP in 3 weeks.    LTG:  -Patient will report min to no neck pain for painfree driving and improved sleep pattern in 12 weeks.  -Patient will demonstrate increased strength of L shoulder to grossly 4+ to 5/5 for improved ability to perform lifting  activities in 12 weeks.  -Patient will demonstrate increased L  strength to >/=40 pounds for improved ability to /lift items with L hand in 12 weeks.  -Patient will demonstrate cervical mobility to WFL without complaints of pain in 12 weeks.  -Patient will demonstrate increased L shoulder flexion and abduction to WFL without complaints of pain for ability to reach overhead in 12 weeks.    Progress per Plan of Care and Progress strengthening /stabilization /functional activity         Timed:         Manual Therapy:    15     mins  94798;     Therapeutic Exercise:         mins  24762;     Neuromuscular Abelino:    12    mins  62574;    Therapeutic Activity:     13     mins  35447;     Gait Training:           mins  85630;     Ultrasound:          mins  64745;    Ionto                                   mins   41589    Un-Timed:  Electrical Stimulation:         mins  88255 ( );  Dry Needling          mins self-pay 20560; 20561  Traction     15     mins 78303      Timed Treatment:   40   mins   Total Treatment:     55   mins    Haylee Navarro PT  [place] License # [id number]  Physical Therapist

## 2023-03-16 ENCOUNTER — TREATMENT (OUTPATIENT)
Dept: PHYSICAL THERAPY | Facility: CLINIC | Age: 78
End: 2023-03-16
Payer: MEDICARE

## 2023-03-16 DIAGNOSIS — M54.2 PAIN, NECK: Primary | ICD-10-CM

## 2023-03-16 DIAGNOSIS — G89.29 CHRONIC LEFT SHOULDER PAIN: ICD-10-CM

## 2023-03-16 DIAGNOSIS — M25.512 CHRONIC LEFT SHOULDER PAIN: ICD-10-CM

## 2023-03-16 PROCEDURE — 97110 THERAPEUTIC EXERCISES: CPT | Performed by: PHYSICAL THERAPIST

## 2023-03-16 PROCEDURE — 97012 MECHANICAL TRACTION THERAPY: CPT | Performed by: PHYSICAL THERAPIST

## 2023-03-16 PROCEDURE — 97112 NEUROMUSCULAR REEDUCATION: CPT | Performed by: PHYSICAL THERAPIST

## 2023-03-16 PROCEDURE — 97140 MANUAL THERAPY 1/> REGIONS: CPT | Performed by: PHYSICAL THERAPIST

## 2023-03-16 NOTE — PROGRESS NOTES
Physical Therapy Daily Treatment Note      Patient: Rachael Quevedo   : 1945  Diagnosis/ICD-10 Code:  No primary diagnosis found.  Referring practitioner: Bill Chandler MD  Date of Initial Visit: Type: THERAPY  Noted: 2023  Today's Date: 3/16/2023  Patient seen for 4 sessions         Rachael Quevedo reports: she is doing about the same so far but states she has been doing HEP and feels more confident with movements and she has became more conscious of when she is tensing up.     Objective   See Exercise, Manual, and Modality Logs for complete treatment.     Assessment/Plan   Pt. is responding well to stretches and execercise for increased mobility and stability in shoulder and cervical musculature. Pt. responds well to traction accompanied by heat for relaxation.    STG:  -Patient will report a reduction in neck pain by >/=25% for improved tolerance to turning head with driving in 3 weeks.  -Patient will demonstrate increased cervical rotation L to >/=45 degrees AROM for improved ability to turn head with driving in 3 weeks.  -Patient will be independent with HEP in 3 weeks.    LTG:  -Patient will report min to no neck pain for painfree driving and improved sleep pattern in 12 weeks.  -Patient will demonstrate increased strength of L shoulder to grossly 4+ to 5/5 for improved ability to perform lifting activities in 12 weeks.  -Patient will demonstrate increased L  strength to >/=40 pounds for improved ability to /lift items with L hand in 12 weeks.  -Patient will demonstrate cervical mobility to WFL without complaints of pain in 12 weeks.  -Patient will demonstrate increased L shoulder flexion and abduction to WFL without complaints of pain for ability to reach overhead in 12 weeks.    Progress strengthening /stabilization /functional activity           Timed:         Manual Therapy:    15     mins  89984;     Therapeutic Exercise:    15     mins  87626;     Neuromuscular Abelino:    10     mins  43380;    Un-Timed:  Traction     15     mins 54959;      Timed Treatment:   40   mins   Total Treatment:     55   mins    Silvia Gunn PTA  Physical Therapist Assistant License #[id number]

## 2023-03-21 ENCOUNTER — TREATMENT (OUTPATIENT)
Dept: PHYSICAL THERAPY | Facility: CLINIC | Age: 78
End: 2023-03-21
Payer: MEDICARE

## 2023-03-21 DIAGNOSIS — G89.29 CHRONIC LEFT SHOULDER PAIN: ICD-10-CM

## 2023-03-21 DIAGNOSIS — M25.512 CHRONIC LEFT SHOULDER PAIN: ICD-10-CM

## 2023-03-21 DIAGNOSIS — M54.2 PAIN, NECK: Primary | ICD-10-CM

## 2023-03-21 PROCEDURE — 97110 THERAPEUTIC EXERCISES: CPT | Performed by: PHYSICAL THERAPIST

## 2023-03-21 PROCEDURE — 97012 MECHANICAL TRACTION THERAPY: CPT | Performed by: PHYSICAL THERAPIST

## 2023-03-21 PROCEDURE — 97140 MANUAL THERAPY 1/> REGIONS: CPT | Performed by: PHYSICAL THERAPIST

## 2023-03-21 PROCEDURE — 97112 NEUROMUSCULAR REEDUCATION: CPT | Performed by: PHYSICAL THERAPIST

## 2023-03-21 NOTE — PROGRESS NOTES
Physical Therapy Daily Treatment Note  [address] [facility] [address], [place] [address]      Patient: Rachael Quevedo   : 1945  Diagnosis/ICD-10 Code:  Pain, neck [M54.2]   Problems Addressed this Visit    None  Visit Diagnoses     Pain, neck    -  Primary    Chronic left shoulder pain          Diagnoses       Codes Comments    Pain, neck    -  Primary ICD-10-CM: M54.2  ICD-9-CM: 723.1     Chronic left shoulder pain     ICD-10-CM: M25.512, G89.29  ICD-9-CM: 719.41, 338.29          Referring practitioner: Bill Chandler MD  Date of Initial Visit: Type: THERAPY  Noted: 2023  Today's Date: 3/21/2023    VISIT#: 5    Subjective Patient reports her neck has been pretty sore today and has noticed she has needed to use her hand to support her head to decrease pain with bed mobility.  She reports overall, her neck feels about the same but it does feel better for ~1 day after PT.  She does report her shoulder pain has been improving.      Objective   Active Range of Motion   Cervical/Thoracic Spine   Cervical     Flexion: 45 degrees   Extension: 34 degrees   Left lateral flexion: 13 degrees   Right lateral flexion: 22 degrees   Left rotation: 38 degrees   Right rotation: 44 degrees     See Exercise, Manual, and Modality Logs for complete treatment.     Assessment/Plan Patient is progressing well with strengthening and mobility exercises for her L shoulder but more slowly with cervical exercises.  Cervical mobility exercises performed in order to restore normal movement in her neck especially with turning her head.  Added chin retraction with OP with improved comfort noted in her neck after.  She is progressing with cervical mobility with increased rotation noted bilaterally.  Continue to progress activity as tolerated.    Progress per Plan of Care and Progress strengthening /stabilization /functional activity         Timed:         Manual Therapy:    15     mins  66246;     Therapeutic Exercise:    18      mins  20394;     Neuromuscular Abelino:  10      mins  44606;    Therapeutic Activity:          mins  56393;     Gait Training:           mins  78496;     Ultrasound:          mins  13166;    Ionto                                   mins   35174    Un-Timed:  Electrical Stimulation:         mins  07023 ( );  Dry Needling          mins self-pay 20560; 20561  Traction     15     mins 03226      Timed Treatment:   43   mins   Total Treatment:     58   mins    Haylee Navarro PT  [place] License # [id number]  Physical Therapist

## 2023-03-29 ENCOUNTER — TREATMENT (OUTPATIENT)
Dept: PHYSICAL THERAPY | Facility: CLINIC | Age: 78
End: 2023-03-29
Payer: MEDICARE

## 2023-03-29 ENCOUNTER — OFFICE VISIT (OUTPATIENT)
Dept: CARDIOLOGY | Facility: CLINIC | Age: 78
End: 2023-03-29
Payer: MEDICARE

## 2023-03-29 VITALS
BODY MASS INDEX: 27.29 KG/M2 | SYSTOLIC BLOOD PRESSURE: 123 MMHG | HEIGHT: 60 IN | OXYGEN SATURATION: 98 % | HEART RATE: 72 BPM | WEIGHT: 139 LBS | DIASTOLIC BLOOD PRESSURE: 68 MMHG

## 2023-03-29 DIAGNOSIS — I25.10 CORONARY ARTERY DISEASE INVOLVING NATIVE CORONARY ARTERY OF NATIVE HEART WITHOUT ANGINA PECTORIS: Primary | ICD-10-CM

## 2023-03-29 DIAGNOSIS — M54.2 PAIN, NECK: Primary | ICD-10-CM

## 2023-03-29 DIAGNOSIS — G89.29 CHRONIC LEFT SHOULDER PAIN: ICD-10-CM

## 2023-03-29 DIAGNOSIS — E78.00 PURE HYPERCHOLESTEROLEMIA: ICD-10-CM

## 2023-03-29 DIAGNOSIS — I10 ESSENTIAL HYPERTENSION: ICD-10-CM

## 2023-03-29 DIAGNOSIS — M25.512 CHRONIC LEFT SHOULDER PAIN: ICD-10-CM

## 2023-03-29 PROCEDURE — 97110 THERAPEUTIC EXERCISES: CPT | Performed by: PHYSICAL THERAPIST

## 2023-03-29 PROCEDURE — 1160F RVW MEDS BY RX/DR IN RCRD: CPT | Performed by: INTERNAL MEDICINE

## 2023-03-29 PROCEDURE — 97140 MANUAL THERAPY 1/> REGIONS: CPT | Performed by: PHYSICAL THERAPIST

## 2023-03-29 PROCEDURE — 97012 MECHANICAL TRACTION THERAPY: CPT | Performed by: PHYSICAL THERAPIST

## 2023-03-29 PROCEDURE — 99213 OFFICE O/P EST LOW 20 MIN: CPT | Performed by: INTERNAL MEDICINE

## 2023-03-29 PROCEDURE — 1159F MED LIST DOCD IN RCRD: CPT | Performed by: INTERNAL MEDICINE

## 2023-03-29 NOTE — PROGRESS NOTES
Subjective:     Encounter Date:03/29/2023      Patient ID: Rachael Quevedo is a 77 y.o. female.    Chief Complaint:  History of Present Illness 77-year-old white female with history of coronary disease hypertension hyperlipidemia presents to office for follow-up.  Patient is currently stable without any symptoms of chest pain or shortness of breath at rest or exertion radiograms any PND orthopnea.  No palpitation dizziness syncope or swelling of the feet.  Patient has been taking all her medicines regularly.  Patient does not smoke.    The following portions of the patient's history were reviewed and updated as appropriate: allergies, current medications, past family history, past medical history, past social history, past surgical history and problem list.  Past Medical History:   Diagnosis Date   • Ankle fracture, left     Comments: 2016   • Arthritis    • Cataract, acquired    • Coronary artery disease    • Cough     Impression: Most likely reactive airway prescription as below Start antihistamine at home   • Dense breast    • Depression, major, recurrent, mild (HCC)    • GERD without esophagitis     Comments: Dr Maritza almonte   • Hematuria, microscopic    • History of chicken pox    • History of loop recorder    • Hyperglycemia    • Hyperlipidemia    • Hypertension 01/04/2020   • Injury of back    • Injury of neck    • Left bundle branch block (LBBB)    • Malaise and fatigue    • Medicare annual wellness visit, initial     with abnormal findings   • Other specified menopausal and perimenopausal disorders     Other specified menopausal and postmenopausal disorders   • Pap smear, low-risk    • Renal insufficiency    • S/P right rotator cuff repair    • Screening for alcoholism     10 or more drinks per week   • Screening for depression     Negative Depression Screening ( 9 or less) ()   • Screening mammogram, encounter for    • Stroke (HCC)     1/5/2020 no residual effects   • Vasovagal near-syncope   "   Impression: from illness and cough Discussed if there is loss of vision in an eye, confusion, weakness or numbness in an extremity, drooping of a side of the face, intractible pain, intractible vomiting, to go to the ER.     Past Surgical History:   Procedure Laterality Date   • CARDIAC CATHETERIZATION N/A 01/27/2020    Procedure: Left Heart Cath with angiogram;  Surgeon: Jermaine Delong MD;  Location: [facility] CATH INVASIVE LOCATION;  Service: Cardiovascular   • CARDIAC CATHETERIZATION N/A 01/27/2020    Procedure: Stent BOBBY coronary;  Surgeon: Jermaine Delong MD;  Location: [facility] CATH INVASIVE LOCATION;  Service: Cardiovascular   • CARDIAC CATHETERIZATION N/A 01/27/2020    Procedure: Left ventriculography;  Surgeon: Jermaine Delong MD;  Location: [facility] CATH INVASIVE LOCATION;  Service: Cardiovascular   • CARDIAC CATHETERIZATION N/A 01/27/2020    Procedure: Coronary angiography;  Surgeon: Jermaine Delong MD;  Location: [facility] CATH INVASIVE LOCATION;  Service: Cardiovascular   • CATARACT EXTRACTION, BILATERAL Bilateral 1999   • CORONARY STENT PLACEMENT  1/28/2020   • GALLBLADDER SURGERY  1996   • LAPAROSCOPIC TUBAL LIGATION  1980   • RENAL ARTERY STENT  01/26/2020   • ROTATOR CUFF REPAIR Right 2002     /68   Pulse 72   Ht 152.4 cm (60\")   Wt 63 kg (139 lb)   LMP  (LMP Unknown)   SpO2 98%   BMI 27.15 kg/m²   Family History   Problem Relation Age of Onset   • Hyperlipidemia Mother         Elevated cholesterol   • Heart disease Mother    • Heart attack Mother    • Hypertension Father    • Colon cancer Father    • Hyperlipidemia Father         Elevated cholesterol   • Heart disease Father    • Diabetes Brother    • Heart disease Brother    • Hyperlipidemia Brother    • Hypertension Brother    • Diabetes Paternal Aunt    • Colon cancer Paternal Aunt    • Colon cancer Paternal Uncle    • Diabetes Paternal Grandmother    • Heart disease Paternal Grandmother    • Heart disease Maternal Uncle    • Heart disease " Maternal Uncle    • Breast cancer Neg Hx    • Ovarian cancer Neg Hx        Current Outpatient Medications:   •  aspirin 81 MG EC tablet, TAKE 1 TABLET BY MOUTH EVERY DAY, Disp: 90 tablet, Rfl: 3  •  calcium carbonate (OS-SHYLA) 600 MG tablet, Take 1 tablet by mouth 2 (Two) Times a Day With Meals., Disp: , Rfl:   •  carvedilol (COREG) 3.125 MG tablet, TAKE 1 TABLET BY MOUTH TWICE A DAY WITH MEALS, Disp: 180 tablet, Rfl: 3  •  Cholecalciferol (Vitamin D3) 50 MCG (2000 UT) tablet, Take  by mouth., Disp: , Rfl:   •  magnesium gluconate (MAGONATE) 500 MG tablet, Take 1 tablet by mouth Daily., Disp: , Rfl:   •  midodrine (PROAMATINE) 2.5 MG tablet, TAKE 1 TABLET BY MOUTH TWICE A DAY, Disp: 180 tablet, Rfl: 1  •  Multiple Vitamins-Minerals (WOMENS MULTIVITAMIN + COLLAGEN PO), , Disp: , Rfl:   •  Probiotic Product (PROBIOTIC DAILY PO), Take 1 capsule by mouth Daily., Disp: , Rfl:   •  rosuvastatin (CRESTOR) 10 MG tablet, Take 1 tablet by mouth Daily. (Patient taking differently: Take 5 mg by mouth Daily.), Disp: 90 tablet, Rfl: 3  •  vitamin B-12 (CYANOCOBALAMIN) 1000 MCG tablet, Take 1 tablet by mouth Daily., Disp: , Rfl:   •  dextromethorphan-guaifenesin (ROBITUSSIN-DM)  MG/5ML syrup, Take 5 mL by mouth Every 12 (Twelve) Hours., Disp: , Rfl:   •  DM-APAP-CPM (Coricidin HBP) -2 MG tablet, Take 1 tablet by mouth 2 (Two) Times a Day As Needed., Disp: , Rfl:   •  gabapentin (NEURONTIN) 100 MG capsule, , Disp: , Rfl:   •  methylPREDNISolone (MEDROL) 4 MG dose pack, Take as directed on package instructions. Start in am and take with food, Disp: 21 tablet, Rfl: 0  No Known Allergies  Social History     Socioeconomic History   • Marital status:    Tobacco Use   • Smoking status: Never   • Smokeless tobacco: Never   • Tobacco comments:     Many family members smoked around me when I was a child   Vaping Use   • Vaping Use: Never used   Substance and Sexual Activity   • Alcohol use: Yes     Alcohol/week: 3.0  standard drinks     Types: 2 Glasses of wine, 1 Drinks containing 0.5 oz of alcohol per week     Comment: Not weekly but occassionally   • Drug use: No   • Sexual activity: Yes     Partners: Male     Birth control/protection: None     Comment: Very occasional     Review of Systems   Constitutional: Negative for malaise/fatigue.   Cardiovascular: Negative for chest pain, dyspnea on exertion, leg swelling and palpitations.   Respiratory: Negative for cough and shortness of breath.    Gastrointestinal: Negative for abdominal pain, nausea and vomiting.   Neurological: Negative for dizziness, focal weakness, headaches, light-headedness and numbness.   All other systems reviewed and are negative.             Objective:     Constitutional:       Appearance: Well-developed.   Eyes:      General: No scleral icterus.     Conjunctiva/sclera: Conjunctivae normal.   HENT:      Head: Normocephalic and atraumatic.   Neck:      Vascular: No carotid bruit or JVD.   Pulmonary:      Effort: Pulmonary effort is normal.      Breath sounds: Normal breath sounds. No wheezing. No rales.   Cardiovascular:      Normal rate. Regular rhythm.   Pulses:     Intact distal pulses.   Abdominal:      General: Bowel sounds are normal.      Palpations: Abdomen is soft.   Musculoskeletal:      Cervical back: Normal range of motion and neck supple. Skin:     General: Skin is warm and dry.      Findings: No rash.   Neurological:      Mental Status: Alert.       Procedures    Lab Review:         MDM  1.  Coronary disease  Patient has nonobstructive disease in the past and is currently stable on medications with normal LV function  2.  Hypertension  Patient blood pressure is much stable now with medical therapy and does not need midodrine  3.  Hyperlipidemia  Patient on statins and the lipid levels are well within normal limits      Patient's previous medical records, labs, and EKG were reviewed and discussed with the patient at today's visit.

## 2023-03-29 NOTE — PROGRESS NOTES
Physical Therapy Daily Treatment Note      Patient: Rachael Quevedo   : 1945  Diagnosis/ICD-10 Code:  Pain, neck [M54.2]  Referring practitioner: Bill Chandler MD  Date of Initial Visit: Type: THERAPY  Noted: 2023  Today's Date: 3/29/2023  Patient seen for 6 sessions         Rachael Quevedo reports: she doesn't;'t feel like she is improving if anything the pain in her neck is a bit worse now feeling more painful at the base of her skull and shoots pain to he sides of her head. Pt. States her MD did tell her if the therapy wasn't helping she didn't have to continue treatment but she was unsure if she should continue or not.    Objective   See Exercise, Manual, and Modality Logs for complete treatment.     Assessment/Plan   Pt. Tolerates manual treatment well this date a nodule is noted in L side of neck that brown snot feel muscular in nature upon palpation. Pt. Does have trigger points in B UT's with more significant tightness in R side with greater pain noted on L. Pt. Continues to have some short term relief with PT but feels overall no significant progress is being made. This PTA will consult with PT on recommendation to continue or not.     STG:  -Patient will report a reduction in neck pain by >/=25% for improved tolerance to turning head with driving in 3 weeks.  -Patient will demonstrate increased cervical rotation L to >/=45 degrees AROM for improved ability to turn head with driving in 3 weeks.  -Patient will be independent with HEP in 3 weeks.    LTG:  -Patient will report min to no neck pain for painfree driving and improved sleep pattern in 12 weeks.  -Patient will demonstrate increased strength of L shoulder to grossly 4+ to 5/5 for improved ability to perform lifting activities in 12 weeks.  -Patient will demonstrate increased L  strength to >/=40 pounds for improved ability to /lift items with L hand in 12 weeks.  -Patient will demonstrate cervical mobility to WFL without  complaints of pain in 12 weeks.  -Patient will demonstrate increased L shoulder flexion and abduction to WFL without complaints of pain for ability to reach overhead in 12 weeks.    Progress strengthening /stabilization /functional activity           Timed:         Manual Therapy:    20     mins  57880;     Therapeutic Exercise:    15     mins  37779;     Un-Timed:  Traction    15    mins 86066;      Timed Treatment:   35   mins   Total Treatment:     50   mins    Silvia Gunn PTA  Physical Therapist Assistant License #[id number]

## 2023-03-31 ENCOUNTER — TREATMENT (OUTPATIENT)
Dept: PHYSICAL THERAPY | Facility: CLINIC | Age: 78
End: 2023-03-31
Payer: MEDICARE

## 2023-03-31 DIAGNOSIS — M54.2 PAIN, NECK: Primary | ICD-10-CM

## 2023-03-31 DIAGNOSIS — G89.29 CHRONIC LEFT SHOULDER PAIN: ICD-10-CM

## 2023-03-31 DIAGNOSIS — M25.512 CHRONIC LEFT SHOULDER PAIN: ICD-10-CM

## 2023-03-31 PROCEDURE — 97110 THERAPEUTIC EXERCISES: CPT | Performed by: PHYSICAL THERAPIST

## 2023-03-31 PROCEDURE — DRYNDLTRIAL DRY NEEDLING TRIAL: Performed by: PHYSICAL THERAPIST

## 2023-03-31 PROCEDURE — 97140 MANUAL THERAPY 1/> REGIONS: CPT | Performed by: PHYSICAL THERAPIST

## 2023-03-31 NOTE — PROGRESS NOTES
Physical Therapy Daily Treatment Note      Patient: Rachael Quevedo   : 1945  Diagnosis/ICD-10 Code:  Pain, neck [M54.2]  Referring practitioner: Bill Chandler MD  Date of Initial Visit: Type: THERAPY  Noted: 2023  Today's Date: 3/31/2023  Patient seen for 7 sessions         Rachael Quevedo reports: her neck and head continue to hurt and the most prominent pain continues to be in the L side. Pt. States she would like to try dry needling today. Pt. States traction helped again last visit but only for the rest of the day following treatment.    Objective   See Exercise, Manual, and Modality Logs for complete treatment.     Assessment/Plan   Pt. Tolerates treatment well this visit with Dry needling performed at the beginning of session by PT Kristin Chowdhury. Stretches and exercises were tolerated well after and Pt. Notes relief from pain and increased mobility following dry needling this date. Pt. Was encouraged to monitor symptoms and pain over next hours and couple days and report back next with overall response.    STG:  -Patient will report a reduction in neck pain by >/=25% for improved tolerance to turning head with driving in 3 weeks.  -Patient will demonstrate increased cervical rotation L to >/=45 degrees AROM for improved ability to turn head with driving in 3 weeks.  -Patient will be independent with HEP in 3 weeks.    LTG:  -Patient will report min to no neck pain for painfree driving and improved sleep pattern in 12 weeks.  -Patient will demonstrate increased strength of L shoulder to grossly 4+ to 5/5 for improved ability to perform lifting activities in 12 weeks.  -Patient will demonstrate increased L  strength to >/=40 pounds for improved ability to /lift items with L hand in 12 weeks.  -Patient will demonstrate cervical mobility to WFL without complaints of pain in 12 weeks.  -Patient will demonstrate increased L shoulder flexion and abduction to WFL without complaints  of pain for ability to reach overhead in 12 weeks.    Progress strengthening /stabilization /functional activity           Timed:         Manual Therapy:    15     mins  06675;     Therapeutic Exercise:    15     mins  69936;       Un-Timed:  Dry Needling     15     mins self-pay 20560, 20561; (No Charge this date first trial)      Timed Treatment:   30   mins   Total Treatment:     45   mins    Silvia Gunn PTA  Physical Therapist Assistant License #[id number]

## 2023-04-04 ENCOUNTER — TREATMENT (OUTPATIENT)
Dept: PHYSICAL THERAPY | Facility: CLINIC | Age: 78
End: 2023-04-04
Payer: MEDICARE

## 2023-04-04 DIAGNOSIS — M54.2 PAIN, NECK: Primary | ICD-10-CM

## 2023-04-04 DIAGNOSIS — M25.512 CHRONIC LEFT SHOULDER PAIN: ICD-10-CM

## 2023-04-04 DIAGNOSIS — G89.29 CHRONIC LEFT SHOULDER PAIN: ICD-10-CM

## 2023-04-04 PROCEDURE — 97140 MANUAL THERAPY 1/> REGIONS: CPT | Performed by: PHYSICAL THERAPIST

## 2023-04-04 PROCEDURE — 97110 THERAPEUTIC EXERCISES: CPT | Performed by: PHYSICAL THERAPIST

## 2023-04-04 PROCEDURE — 97012 MECHANICAL TRACTION THERAPY: CPT | Performed by: PHYSICAL THERAPIST

## 2023-04-04 NOTE — PROGRESS NOTES
Physical Therapy Daily Treatment Note      Patient: Rachael Quevedo   : 1945  Diagnosis/ICD-10 Code:  Pain, neck [M54.2]  Referring practitioner: Bill Chandler MD  Date of Initial Visit: Type: THERAPY  Noted: 2023  Today's Date: 2023  Patient seen for 8 sessions         Rachael Quevedo reports: she is doing better overall today and was very much a fan of dry needing Pt would like to do dry needling again due to the significant relief she has experienced.    Objective   See Exercise, Manual, and Modality Logs for complete treatment.     Assessment/Plan   PT Kristin Chowdhury will perform Dry needling next session due to no available time this date. Pt. Was given traction this date with good response following manual and exercise. Pt. Had visibly less tension in B UT's this date with improved posture and less guarding.    STG:  -Patient will report a reduction in neck pain by >/=25% for improved tolerance to turning head with driving in 3 weeks.  -Patient will demonstrate increased cervical rotation L to >/=45 degrees AROM for improved ability to turn head with driving in 3 weeks.  -Patient will be independent with HEP in 3 weeks.    LTG:  -Patient will report min to no neck pain for painfree driving and improved sleep pattern in 12 weeks.  -Patient will demonstrate increased strength of L shoulder to grossly 4+ to 5/5 for improved ability to perform lifting activities in 12 weeks.  -Patient will demonstrate increased L  strength to >/=40 pounds for improved ability to /lift items with L hand in 12 weeks.  -Patient will demonstrate cervical mobility to WFL without complaints of pain in 12 weeks.  -Patient will demonstrate increased L shoulder flexion and abduction to WFL without complaints of pain for ability to reach overhead in 12 weeks.    Progress strengthening /stabilization /functional activity           Timed:         Manual Therapy:    15     mins  98520;     Therapeutic  Exercise:    15     mins  10672;     Un-Timed:  Traction     15     mins 66688;      Timed Treatment:   45   mins   Total Treatment:     45   mins    Silvia Gunn PTA  Physical Therapist Assistant License #[id number]

## 2023-04-06 ENCOUNTER — TREATMENT (OUTPATIENT)
Dept: PHYSICAL THERAPY | Facility: CLINIC | Age: 78
End: 2023-04-06
Payer: MEDICARE

## 2023-04-06 DIAGNOSIS — M25.512 CHRONIC LEFT SHOULDER PAIN: ICD-10-CM

## 2023-04-06 DIAGNOSIS — M54.2 PAIN, NECK: Primary | ICD-10-CM

## 2023-04-06 DIAGNOSIS — G89.29 CHRONIC LEFT SHOULDER PAIN: ICD-10-CM

## 2023-04-06 PROCEDURE — 97140 MANUAL THERAPY 1/> REGIONS: CPT | Performed by: PHYSICAL THERAPIST

## 2023-04-06 PROCEDURE — 97110 THERAPEUTIC EXERCISES: CPT | Performed by: PHYSICAL THERAPIST

## 2023-04-06 PROCEDURE — 20560 NDL INSJ W/O NJX 1 OR 2 MUSC: CPT | Performed by: PHYSICAL THERAPIST

## 2023-04-06 PROCEDURE — G0283 ELEC STIM OTHER THAN WOUND: HCPCS | Performed by: PHYSICAL THERAPIST

## 2023-04-06 NOTE — PROGRESS NOTES
Physical Therapy Daily Treatment Note  [address], [place] [address]      Patient: Rachael Quevedo   : 1945  Diagnosis/ICD-10 Code:  Pain, neck [M54.2]   Problems Addressed this Visit    None  Visit Diagnoses     Pain, neck    -  Primary    Chronic left shoulder pain          Diagnoses       Codes Comments    Pain, neck    -  Primary ICD-10-CM: M54.2  ICD-9-CM: 723.1     Chronic left shoulder pain     ICD-10-CM: M25.512, G89.29  ICD-9-CM: 719.41, 338.29          Referring practitioner: Bill Chandler MD  Date of Initial Visit: Type: THERAPY  Noted: 2023  Today's Date: 2023    VISIT#: 9    Subjective Patient reports she is not experiencing the weakness into her arm and her shoulder has only mild pain.  She reports her neck has been feeling about the same.  She had TDN which helped her neck for 4-5 days but then the pain returned.       Objective     See Exercise, Manual, and Modality Logs for complete treatment.     Assessment/Plan     STG:  -Patient will report a reduction in neck pain by >/=25% for improved tolerance to turning head with driving in 3 weeks. Progressing  -Patient will demonstrate increased cervical rotation L to >/=45 degrees AROM for improved ability to turn head with driving in 3 weeks. Progressing  -Patient will be independent with HEP in 3 weeks. MET    LTG:  -Patient will report min to no neck pain for painfree driving and improved sleep pattern in 12 weeks.  -Patient will demonstrate increased strength of L shoulder to grossly 4+ to 5/5 for improved ability to perform lifting activities in 12 weeks.  -Patient will demonstrate increased L  strength to >/=40 pounds for improved ability to /lift items with L hand in 12 weeks.  -Patient will demonstrate cervical mobility to WFL without complaints of pain in 12 weeks.  -Patient will demonstrate increased L shoulder flexion and abduction to WFL without complaints of pain for ability to reach  overhead in 12 weeks.    Started with exercise followed by TDN, manual interventions, and ended with modalities.  Patient tolerated exercise without complaints of increased pain.  A muscle twitch was elicited at the R upper trap during TDN today and patient noted tenderness into her L suboccipitals with needling performed by PT Robin Sprigler.  Decreased muscle tightness noted into L suboccipitals during manual interventions following TDN today.  Ended session with estim and moist heat with no complaints of pain reported at end of session.     Progress per Plan of Care and Progress strengthening /stabilization /functional activity         Timed:         Manual Therapy:    15     mins  66812;     Therapeutic Exercise:    10     mins  27482;     Neuromuscular Abelino:        mins  37980;    Therapeutic Activity:          mins  97198;     Gait Training:           mins  27863;     Ultrasound:          mins  94611;    Ionto                                   mins   52543    Un-Timed:  Electrical Stimulation:    15     mins  53953 ( );  Dry Needling     15     mins self-pay 72008; 20561  Traction          mins 27070      Timed Treatment:   25   mins   Total Treatment:     55   mins    Haylee Navarro PT  [place] License # [id number]  Physical Therapist

## 2023-04-11 ENCOUNTER — TREATMENT (OUTPATIENT)
Dept: PHYSICAL THERAPY | Facility: CLINIC | Age: 78
End: 2023-04-11
Payer: MEDICARE

## 2023-04-11 DIAGNOSIS — M25.512 CHRONIC LEFT SHOULDER PAIN: ICD-10-CM

## 2023-04-11 DIAGNOSIS — M54.2 PAIN, NECK: Primary | ICD-10-CM

## 2023-04-11 DIAGNOSIS — G89.29 CHRONIC LEFT SHOULDER PAIN: ICD-10-CM

## 2023-04-11 PROCEDURE — 97140 MANUAL THERAPY 1/> REGIONS: CPT | Performed by: PHYSICAL THERAPIST

## 2023-04-11 PROCEDURE — 20561 NDL INSJ W/O NJX 3+ MUSC: CPT | Performed by: PHYSICAL THERAPIST

## 2023-04-11 PROCEDURE — G0283 ELEC STIM OTHER THAN WOUND: HCPCS | Performed by: PHYSICAL THERAPIST

## 2023-04-11 PROCEDURE — 97110 THERAPEUTIC EXERCISES: CPT | Performed by: PHYSICAL THERAPIST

## 2023-04-11 NOTE — PROGRESS NOTES
Physical Therapy Daily Treatment Note      Patient: Rachael Quevedo   : 1945  Diagnosis/ICD-10 Code:  Pain, neck [M54.2]  Referring practitioner: Bill Chandler MD  Date of Initial Visit: Type: THERAPY  Noted: 2023  Today's Date: 2023  Patient seen for 10 sessions         Rachael Quevedo reports: she would like dry needling again today but notes that she did not get as significant of relief from second dry needling session as the first. Pt. Also wants to correct her previous report of relief of 4-5 days stating she actually only had relief for 3 days when looking back at the calendar     Objective   See Exercise, Manual, and Modality Logs for complete treatment.     Assessment/Plan   Pt. Continues to tolerate dry needling and manual treatment well for the relief of trigger points. Pt. Shows improved posture at rest at this time and requires fewer cues to relax UT muscles as well. Pt. Needs persistent stretch and scapular re-education to preserve posture and relieve neck pain.    STG:  -Patient will report a reduction in neck pain by >/=25% for improved tolerance to turning head with driving in 3 weeks. Progressing  -Patient will demonstrate increased cervical rotation L to >/=45 degrees AROM for improved ability to turn head with driving in 3 weeks. Progressing  -Patient will be independent with HEP in 3 weeks. MET    LTG:  -Patient will report min to no neck pain for painfree driving and improved sleep pattern in 12 weeks.  -Patient will demonstrate increased strength of L shoulder to grossly 4+ to 5/5 for improved ability to perform lifting activities in 12 weeks.  -Patient will demonstrate increased L  strength to >/=40 pounds for improved ability to /lift items with L hand in 12 weeks.  -Patient will demonstrate cervical mobility to WFL without complaints of pain in 12 weeks.  -Patient will demonstrate increased L shoulder flexion and abduction to WFL without complaints of  pain for ability to reach overhead in 12 weeks.    Progress strengthening /stabilization /functional activity           Timed:         Manual Therapy:    15     mins  97331;     Therapeutic Exercise:    15     mins  85744;       Un-Timed:  Electrical Stimulation:    15     mins  88326 ( );  Dry Needling     15     mins self-pay 70819, 20561;        Timed Treatment:   30   mins   Total Treatment:     60   mins    Silvia Gunn PTA  Physical Therapist Assistant License #[id number]

## 2023-04-13 ENCOUNTER — TREATMENT (OUTPATIENT)
Dept: PHYSICAL THERAPY | Facility: CLINIC | Age: 78
End: 2023-04-13
Payer: MEDICARE

## 2023-04-13 DIAGNOSIS — M25.512 CHRONIC LEFT SHOULDER PAIN: ICD-10-CM

## 2023-04-13 DIAGNOSIS — G89.29 CHRONIC LEFT SHOULDER PAIN: ICD-10-CM

## 2023-04-13 DIAGNOSIS — M54.2 PAIN, NECK: Primary | ICD-10-CM

## 2023-04-13 PROCEDURE — 97535 SELF CARE MNGMENT TRAINING: CPT | Performed by: PHYSICAL THERAPIST

## 2023-04-13 PROCEDURE — G0283 ELEC STIM OTHER THAN WOUND: HCPCS | Performed by: PHYSICAL THERAPIST

## 2023-04-13 PROCEDURE — 97140 MANUAL THERAPY 1/> REGIONS: CPT | Performed by: PHYSICAL THERAPIST

## 2023-04-13 NOTE — PROGRESS NOTES
Physical Therapy Daily Treatment Note  [address], [place] [address]      Patient: Rachael Quevedo   : 1945  Diagnosis/ICD-10 Code:  Pain, neck [M54.2]   Problems Addressed this Visit    None  Visit Diagnoses     Pain, neck    -  Primary    Chronic left shoulder pain          Diagnoses       Codes Comments    Pain, neck    -  Primary ICD-10-CM: M54.2  ICD-9-CM: 723.1     Chronic left shoulder pain     ICD-10-CM: M25.512, G89.29  ICD-9-CM: 719.41, 338.29          Referring practitioner: Bill Chandler MD  Date of Initial Visit: Type: THERAPY  Noted: 2023  Today's Date: 2023    VISIT#: 11    Subjective Patient reports she had a couple days of relief from dry needling last time but did not seem to last as long.  She is curious if she should continue with PT due to continued pain at left lateral neck with turning.  She is able to complete all her activities but reports increased pain with turning head with driving and sometimes with working outside in her flower gardens.       Objective     See Exercise, Manual, and Modality Logs for complete treatment.     Assessment/Plan Discussed progression of plan of care versus referral to neurosurgeon due to continued pain at L lateral neck.  She continues to have a positive Spurling's test on the left which reproduces her symptoms with associated upper trap tenderness. Patient with pain at left lateral neck with left cervical rotation indicated pain with facet loading.  Recommending patient follow up with PCP for possible referral to neurosurgeon.    Anticipate DC next Visit         Timed:         Manual Therapy:    15     mins  58742;     Therapeutic Exercise:         mins  30834;     Neuromuscular Abelino:        mins  08349;    Therapeutic Activity:          mins  93511;     Gait Training:           mins  77926;     Ultrasound:          mins  43146;    Ionto                                   mins   47754  Self-care                        10   mins 26449    Un-Timed:  Electrical Stimulation:    15     mins  92386 ( );  Dry Needling          mins self-pay 20560; 20561  Traction          mins 07018      Timed Treatment:   25   mins   Total Treatment:     40   mins    Haylee Navarro PT  [place] License # [id number]  Physical Therapist

## 2023-05-09 ENCOUNTER — TELEPHONE (OUTPATIENT)
Dept: FAMILY MEDICINE CLINIC | Facility: CLINIC | Age: 78
End: 2023-05-09

## 2023-05-09 NOTE — TELEPHONE ENCOUNTER
"Caller: Rachael Quevedo \"Carley\"    Relationship to patient: Self    Best call back number:     Patient is needing: PATIENT IS SCHEDULED TO HAVE HER ANNUAL WELLNESS EXAM ON 6/12 AND SHE WOULD LIKE TO HAVE THE OFFICE PLACE A LAB ORDER AND CALL HER TO SCHEDULE THE LAB VISIT ON WEEK PRIOR TO THE APPOINTMENT.    "

## 2023-05-11 ENCOUNTER — TREATMENT (OUTPATIENT)
Dept: PHYSICAL THERAPY | Facility: CLINIC | Age: 78
End: 2023-05-11
Payer: MEDICARE

## 2023-05-11 DIAGNOSIS — M54.2 PAIN, NECK: Primary | ICD-10-CM

## 2023-05-11 DIAGNOSIS — G89.29 CHRONIC LEFT SHOULDER PAIN: ICD-10-CM

## 2023-05-11 DIAGNOSIS — M25.512 CHRONIC LEFT SHOULDER PAIN: ICD-10-CM

## 2023-05-11 NOTE — PROGRESS NOTES
Physical Therapy Progress Note  [address], [place] [address]      Patient: Rachael Quevedo   : 1945  Diagnosis/ICD-10 Code:  Pain, neck [M54.2]  Referring practitioner: Bill Chandler MD  Date of Initial Visit: Type: THERAPY  Noted: 2023  Today's Date: 2023  Patient seen for 12 sessions      Subjective:   Rachael Quevedo reports: having pain into the back of her neck is returning.  Patient states the pain at the side of her neck is relatively unchanged since her last visit.    Subjective Questionnaire: PT Functional Test: NDI  Clinical Progress: unchanged  Home Program Compliance: Yes  Treatment has included: therapeutic exercise, neuromuscular re-education, manual therapy, therapeutic activity, electrical stimulation, traction, dry needling and moist heat      Subjective   Objective   Active Range of Motion   Cervical/Thoracic Spine   Cervical     Flexion: 45 degrees   Extension: 34 degrees   Left lateral flexion: 13 degrees   Right lateral flexion: 22 degrees   Left rotation: 28 degrees   Right rotation: 40 degrees   Left Shoulder   Flexion: WFL   Abduction: WFL   External rotation BTH: C7   Internal rotation BTB: T7    Right Shoulder   Flexion: WFL  Abduction: WFL  External rotation BTH: C7   Internal rotation BTB: T11      Strength/Myotome Testing     Left Shoulder      Planes of Motion   Flexion: 4-   Abduction: 4-   External rotation at 0°: 4   Internal rotation at 0°: 5     Right Shoulder      Planes of Motion   Flexion: 4+   Abduction: 4+   External rotation at 0°: 4+   Internal rotation at 0°: 5      Left Elbow   Flexion: 4+  Extension: 4+     Right Elbow   Flexion: 4+  Extension: 4+     Left Wrist/Hand   Wrist extension: 4+  Wrist flexion: 4       Right Wrist/Hand   Wrist extension: 4+  Wrist flexion: 5        Assessment/Plan   STG:  -Patient will report a reduction in neck pain by >/=25% for improved tolerance to turning head with driving in 3  weeks.MET  -Patient will demonstrate increased cervical rotation L to >/=45 degrees AROM for improved ability to turn head with driving in 3 weeks. Progressing  -Patient will be independent with HEP in 3 weeks. MET    LTG:  -Patient will report min to no neck pain for painfree driving and improved sleep pattern in 12 weeks. NOT MET, progressing  -Patient will demonstrate increased strength of L shoulder to grossly 4+ to 5/5 for improved ability to perform lifting activities in 12 weeks.  NOT MET, progressing   -Patient will demonstrate increased L  strength to >/=40 pounds for improved ability to /lift items with L hand in 12 weeks. NOT MET, progressing  -Patient will demonstrate cervical mobility to WFL without complaints of pain in 12 weeks. NOT MET, progressing  -Patient will demonstrate increased L shoulder flexion and abduction to WFL without complaints of pain for ability to reach overhead in 12 weeks. MET   Progress toward previous goals: Partially Met    Patient has attended 12 PT sessions with steady progress towards goals for L shoulder pain but continued pain noted into L side of her neck.  Patient states she has not been experiencing pain into her L clavicle recently which has improved but still having pain into her L suboccipital region that radiates to the origin of her SCM.  Trialed ultrasound this date to L suboccipitals and SCM origin with decreased pain noted after but still noted pain with turning.  Recommending continued PT for 2x/week for 2 weeks to assess tolerance to ultrasound and emphasis on suboccipitals and L SCM.          Recommendations: Continue as planned  Timeframe: 2 weeks  Frequency: 2 per week  Duration in visits: 4  Planned therapy and modality interventions: therapeutic exercise, neuromuscular re-education, manual therapy, therapeutic activity, electrical stimulation, ultrasound, traction, dry needling and moist heat  Prognosis to achieve goals: fair    PT Signature: Haylee  FLORENCIO Navarro PT  [place] License # [id number]  Electronically signed by Haylee Navarro PT, 05/11/23, 9:09 AM EDT    Based upon review of the patient's progress and continued therapy plan, it is my medical opinion that Rachael Quevedo should continue physical therapy treatment at [facility]  [address] [place] [address] DR TAWANA CHIRINOS [address] [place] [address] [place] [address] [phone].      Timed:         Manual Therapy:    15     mins  28824;     Therapeutic Exercise:         mins  36635;     Neuromuscular Abelino:        mins  73466;    Therapeutic Activity:     14     mins  77240;     Gait Training:          mins  05540;     Ultrasound:     10     mins  36792;    Ionto                                   mins   07449  Self Care                            mins   53385    Un-Timed:  Electrical Stimulation:         mins  61962 ( );  Dry Needling          mins 04953; 20561  Traction          mins 61962      Timed Treatment:   39   mins   Total Treatment:     39   mins

## 2023-05-16 ENCOUNTER — TREATMENT (OUTPATIENT)
Dept: PHYSICAL THERAPY | Facility: CLINIC | Age: 78
End: 2023-05-16
Payer: MEDICARE

## 2023-05-16 DIAGNOSIS — G89.29 CHRONIC LEFT SHOULDER PAIN: ICD-10-CM

## 2023-05-16 DIAGNOSIS — M54.2 PAIN, NECK: Primary | ICD-10-CM

## 2023-05-16 DIAGNOSIS — M25.512 CHRONIC LEFT SHOULDER PAIN: ICD-10-CM

## 2023-05-16 NOTE — PROGRESS NOTES
Physical Therapy Daily Treatment Note  [address] [facility] [address], [place] [address]      Patient: Rachael Quevedo   : 1945  Diagnosis/ICD-10 Code:  Pain, neck [M54.2]   Problems Addressed this Visit    None  Visit Diagnoses     Pain, neck    -  Primary    Chronic left shoulder pain          Diagnoses       Codes Comments    Pain, neck    -  Primary ICD-10-CM: M54.2  ICD-9-CM: 723.1     Chronic left shoulder pain     ICD-10-CM: M25.512, G89.29  ICD-9-CM: 719.41, 338.29          Referring practitioner: No ref. provider found  Date of Initial Visit: Type: THERAPY  Noted: 2023  Today's Date: 2023    VISIT#: 13    Subjective Patient reports not feeling well for the past several days since getting her shingles shot.  Patient states she felt achy all weekend and has some redness at her R lateral upper arm around the injection site.      Objective     See Exercise, Manual, and Modality Logs for complete treatment.     Assessment/Plan Patient educated to monitor redness at injection site of vaccine and encouraged to contact MD if redness worsens.  Patient verbalized understanding. Resumed traction this date with patient reporting continued stiffness into her neck after.  Patient encouraged to monitor symptoms and mobility to assess tolerance for next visit.  She continues to report increased pain at her L lateral neck with L cervical rotation and lateral flexion indicating pain with facet loading.      Progress per Plan of Care and Progress strengthening /stabilization /functional activity         Timed:         Manual Therapy:         mins  27108;     Therapeutic Exercise:    15     mins  43724;     Neuromuscular Abelino:        mins  86262;    Therapeutic Activity:          mins  40121;     Gait Training:           mins  42703;     Ultrasound:     10     mins  95821;    Ionto                                   mins   70099    Un-Timed:  Electrical Stimulation:         mins  90592 ( );  Dry  Needling          mins self-pay 20560; 20561  Traction     15     mins 23516      Timed Treatment:   25   mins   Total Treatment:     40   mins    Haylee Navarro PT  [place] License # [id number]  Physical Therapist

## 2023-05-23 ENCOUNTER — TREATMENT (OUTPATIENT)
Dept: PHYSICAL THERAPY | Facility: CLINIC | Age: 78
End: 2023-05-23
Payer: MEDICARE

## 2023-05-23 DIAGNOSIS — M54.2 PAIN, NECK: Primary | ICD-10-CM

## 2023-05-23 DIAGNOSIS — M25.512 CHRONIC LEFT SHOULDER PAIN: ICD-10-CM

## 2023-05-23 DIAGNOSIS — G89.29 CHRONIC LEFT SHOULDER PAIN: ICD-10-CM

## 2023-05-23 NOTE — PROGRESS NOTES
Physical Therapy Daily Treatment Note  [address]  [address], [place] [address]      Patient: Rachael Quevedo   : 1945  Diagnosis/ICD-10 Code:  Pain, neck [M54.2]   Problems Addressed this Visit    None  Visit Diagnoses     Pain, neck    -  Primary    Chronic left shoulder pain          Diagnoses       Codes Comments    Pain, neck    -  Primary ICD-10-CM: M54.2  ICD-9-CM: 723.1     Chronic left shoulder pain     ICD-10-CM: M25.512, G89.29  ICD-9-CM: 719.41, 338.29          Referring practitioner: Bill Chandler MD  Date of Initial Visit: Type: THERAPY  Noted: 2023  Today's Date: 2023    VISIT#: 14    Subjective Patient reports her neck feels about the same.  She does feel like the manual therapy and soft tissue mobilization seems to help it for awhile.      Objective     See Exercise, Manual, and Modality Logs for complete treatment.     Assessment/Plan Focused on manual interventions this date with patient reporting tenderness into L suboccipitals and upper trap.  She reported mild pain relief following manual interventions but continued sharp pain with turning to the left.  Progress discussed with patient.  Plan to d/c with HEP at next visit due to plateau in progress.    Progress per Plan of Care, Progress strengthening /stabilization /functional activity and Anticipate DC next Visit         Timed:         Manual Therapy:    25     mins  16693;     Therapeutic Exercise:         mins  13288;     Neuromuscular Abelino:        mins  17287;    Therapeutic Activity:          mins  61564;     Gait Training:           mins  30186;     Ultrasound:          mins  50189;    Ionto                                   mins   88517    Un-Timed:  Electrical Stimulation:    15     mins  76528 ( );  Dry Needling          mins self-pay ;   Traction          mins 10656      Timed Treatment:   25   mins   Total Treatment:     40   mins    Haylee Navarro PT  [place] License #  [id number]  Physical Therapist

## 2023-05-25 ENCOUNTER — TREATMENT (OUTPATIENT)
Dept: PHYSICAL THERAPY | Facility: CLINIC | Age: 78
End: 2023-05-25
Payer: MEDICARE

## 2023-05-25 DIAGNOSIS — M25.512 CHRONIC LEFT SHOULDER PAIN: ICD-10-CM

## 2023-05-25 DIAGNOSIS — G89.29 CHRONIC LEFT SHOULDER PAIN: ICD-10-CM

## 2023-05-25 DIAGNOSIS — M54.2 PAIN, NECK: Primary | ICD-10-CM

## 2023-05-25 NOTE — PROGRESS NOTES
Physical Therapy Daily Treatment Note  [address] [facility] [address], [place] [address]      Patient: Rachael Quevedo   : 1945  Diagnosis/ICD-10 Code:  Pain, neck [M54.2]   Problems Addressed this Visit    None  Visit Diagnoses     Pain, neck    -  Primary    Chronic left shoulder pain          Diagnoses       Codes Comments    Pain, neck    -  Primary ICD-10-CM: M54.2  ICD-9-CM: 723.1     Chronic left shoulder pain     ICD-10-CM: M25.512, G89.29  ICD-9-CM: 719.41, 338.29          Referring practitioner: Bill Chandler MD  Date of Initial Visit: Type: THERAPY  Noted: 2023  Today's Date: 2023    VISIT#: 15    Subjective Patient reports no change since last visit.  She continues to report pain with turning her head, especially to the left.        Objective   Active Range of Motion   Cervical/Thoracic Spine   Cervical     Flexion: 45 degrees   Extension: 34 degrees   Left lateral flexion: 27 degrees   Right lateral flexion: 33 degrees   Left rotation: 37 degrees   Right rotation: 50 degrees   Left Shoulder   Flexion: WFL   Abduction: WFL   External rotation BTH: C7   Internal rotation BTB: T7    Right Shoulder   Flexion: WFL  Abduction: WFL  External rotation BTH: C7   Internal rotation BTB: T11      Strength/Myotome Testing     Left Shoulder      Planes of Motion   Flexion: 4  Abduction: 4  External rotation at 0°: 4   Internal rotation at 0°: 5     Right Shoulder      Planes of Motion   Flexion: 4+   Abduction: 4+   External rotation at 0°: 4+   Internal rotation at 0°: 5      Left Elbow   Flexion: 4+  Extension: 4+     Right Elbow   Flexion: 4+  Extension: 4+     Left Wrist/Hand   Wrist extension: 4+  Wrist flexion: 4        Right Wrist/Hand   Wrist extension: 4+  Wrist flexion: 5     L  strength 34 pounds   See Exercise, Manual, and Modality Logs for complete treatment.     Assessment/Plan  STG:  -Patient will report a reduction in neck pain by >/=25% for improved tolerance to turning  head with driving in 3 weeks.MET  -Patient will demonstrate increased cervical rotation L to >/=45 degrees AROM for improved ability to turn head with driving in 3 weeks. Progressing  -Patient will be independent with HEP in 3 weeks. MET    LTG:  -Patient will report min to no neck pain for painfree driving and improved sleep pattern in 12 weeks. NOT MET  -Patient will demonstrate increased strength of L shoulder to grossly 4+ to 5/5 for improved ability to perform lifting activities in 12 weeks.  NOT MET, progressing   -Patient will demonstrate increased L  strength to >/=40 pounds for improved ability to /lift items with L hand in 12 weeks. NOT MET, progressing  -Patient will demonstrate cervical mobility to WFL without complaints of pain in 12 weeks. NOT MET, progressing  -Patient will demonstrate increased L shoulder flexion and abduction to WFL without complaints of pain for ability to reach overhead in 12 weeks. MET     Patient has attended 15 PT sessions with good progress towards goals for shoulder pain but continued pain into cervical spine on the left.  She continues to demonstrate a positive Spurling's on the L side and improves with opening of the facet joints and R lateral flexion.  Due to continued pain into her neck with no significant change, patient to return to referring MD.  D/C from PT at this time.    Plan to DC with HEP         Timed:         Manual Therapy:    25     mins  36677;     Therapeutic Exercise:    5     mins  64825;     Neuromuscular Abelino:        mins  17135;    Therapeutic Activity:          mins  80435;     Gait Training:           mins  15266;     Ultrasound:          mins  70960;    Ionto                                   mins   56497    Un-Timed:  Electrical Stimulation:    15     mins  88814 ( );  Dry Needling          mins self-pay 20560; 20561  Traction          mins 00277      Timed Treatment:   30   mins   Total Treatment:     45   mins    Haylee Navarro  PT  [place] License # [id number]  Physical Therapist      Discharge Summary  Discharge Summary from Physical Therapy Report      Dates  PT visit: 2/28/23 - 5/25/23  Number of Visits: 15     Discharge Status of Patient: See MD Note dated 5/25/23    Goals: Partially Met    Discharge Plan: Continue with current home exercise program as instructed    Comments See above assessment    Date of Discharge 5/25/23        Haylee Navarro, PT  Physical Therapist

## 2023-06-08 DIAGNOSIS — Z00.00 MEDICARE ANNUAL WELLNESS VISIT, SUBSEQUENT: ICD-10-CM

## 2023-06-08 DIAGNOSIS — E78.2 MIXED HYPERLIPIDEMIA: Primary | ICD-10-CM

## 2023-06-08 DIAGNOSIS — N18.31 STAGE 3A CHRONIC KIDNEY DISEASE: ICD-10-CM

## 2023-06-09 LAB
ALBUMIN SERPL-MCNC: 4.8 G/DL (ref 3.7–4.7)
ALBUMIN/GLOB SERPL: 1.9 {RATIO} (ref 1.2–2.2)
ALP SERPL-CCNC: 67 IU/L (ref 44–121)
ALT SERPL-CCNC: 18 IU/L (ref 0–32)
AST SERPL-CCNC: 23 IU/L (ref 0–40)
BASOPHILS # BLD AUTO: 0.1 X10E3/UL (ref 0–0.2)
BASOPHILS NFR BLD AUTO: 1 %
BILIRUB SERPL-MCNC: 0.3 MG/DL (ref 0–1.2)
BUN SERPL-MCNC: 10 MG/DL (ref 8–27)
BUN/CREAT SERPL: 11 (ref 12–28)
CALCIUM SERPL-MCNC: 10 MG/DL (ref 8.7–10.3)
CHLORIDE SERPL-SCNC: 106 MMOL/L (ref 96–106)
CHOLEST SERPL-MCNC: 166 MG/DL (ref 100–199)
CHOLEST/HDLC SERPL: 2.8 RATIO (ref 0–4.4)
CO2 SERPL-SCNC: 21 MMOL/L (ref 20–29)
CREAT SERPL-MCNC: 0.95 MG/DL (ref 0.57–1)
EGFRCR SERPLBLD CKD-EPI 2021: 62 ML/MIN/1.73
EOSINOPHIL # BLD AUTO: 0.5 X10E3/UL (ref 0–0.4)
EOSINOPHIL NFR BLD AUTO: 7 %
ERYTHROCYTE [DISTWIDTH] IN BLOOD BY AUTOMATED COUNT: 12 % (ref 11.7–15.4)
GLOBULIN SER CALC-MCNC: 2.5 G/DL (ref 1.5–4.5)
GLUCOSE SERPL-MCNC: 116 MG/DL (ref 70–99)
HCT VFR BLD AUTO: 43.3 % (ref 34–46.6)
HDLC SERPL-MCNC: 59 MG/DL
HGB BLD-MCNC: 14.1 G/DL (ref 11.1–15.9)
IMM GRANULOCYTES # BLD AUTO: 0 X10E3/UL (ref 0–0.1)
IMM GRANULOCYTES NFR BLD AUTO: 0 %
LDLC SERPL CALC-MCNC: 77 MG/DL (ref 0–99)
LYMPHOCYTES # BLD AUTO: 2.6 X10E3/UL (ref 0.7–3.1)
LYMPHOCYTES NFR BLD AUTO: 38 %
MCH RBC QN AUTO: 29.3 PG (ref 26.6–33)
MCHC RBC AUTO-ENTMCNC: 32.6 G/DL (ref 31.5–35.7)
MCV RBC AUTO: 90 FL (ref 79–97)
MONOCYTES # BLD AUTO: 0.7 X10E3/UL (ref 0.1–0.9)
MONOCYTES NFR BLD AUTO: 11 %
NEUTROPHILS # BLD AUTO: 2.9 X10E3/UL (ref 1.4–7)
NEUTROPHILS NFR BLD AUTO: 43 %
PLATELET # BLD AUTO: 226 X10E3/UL (ref 150–450)
POTASSIUM SERPL-SCNC: 5 MMOL/L (ref 3.5–5.2)
PROT SERPL-MCNC: 7.3 G/DL (ref 6–8.5)
RBC # BLD AUTO: 4.82 X10E6/UL (ref 3.77–5.28)
SODIUM SERPL-SCNC: 142 MMOL/L (ref 134–144)
TRIGL SERPL-MCNC: 178 MG/DL (ref 0–149)
TSH SERPL DL<=0.005 MIU/L-ACNC: 1.68 UIU/ML (ref 0.45–4.5)
VLDLC SERPL CALC-MCNC: 30 MG/DL (ref 5–40)
WBC # BLD AUTO: 6.9 X10E3/UL (ref 3.4–10.8)

## 2023-06-23 PROBLEM — R73.9 HYPERGLYCEMIA: Status: ACTIVE | Noted: 2023-06-23

## 2023-06-23 PROBLEM — N39.0 ACUTE UTI: Status: RESOLVED | Noted: 2022-06-09 | Resolved: 2023-06-23

## 2023-07-10 PROBLEM — R73.03 PREDIABETES: Status: ACTIVE | Noted: 2023-07-10

## 2023-07-24 NOTE — PROGRESS NOTES
Subjective   Rachael Quevedo is a 77 y.o. female. Presents to [facility]    Chief Complaint   Patient presents with    Neck Mass     RT side        History of Present Illness  Neck Mass:  Patient is here today to discuss a neck mass on the right side. Onset was 07/06/2023. Symptoms include: swelling, pain with palpitation,talking and swallowing. Symptoms are stable. She was seen by Dr. Wilkes on 07/10/23. Thyroid labs were performed and came back normal. Thyroid ultrasound was done 07/10/23. She is here today to follow up.      I personally reviewed and updated the patient's allergies, medications, problem list, and past medical, surgical, social, and family history. I have reviewed and confirmed the accuracy of the History of Present Illness and Review of Symptoms as documented by the MA/LPN/RN. Alexa Shah MD    Allergies:  No Known Allergies    Social History:  Social History     Socioeconomic History    Marital status:    Tobacco Use    Smoking status: Never    Smokeless tobacco: Never    Tobacco comments:     Many family members smoked around me when I was a child   Vaping Use    Vaping Use: Never used   Substance and Sexual Activity    Alcohol use: Yes     Alcohol/week: 3.0 standard drinks     Types: 2 Glasses of wine, 1 Drinks containing 0.5 oz of alcohol per week     Comment: Both not weekly but occassionally    Drug use: No    Sexual activity: Yes     Partners: Male     Birth control/protection: None     Comment: Very occasional       Family History:  Family History   Problem Relation Age of Onset    Hyperlipidemia Mother         Elevated cholesterol    Heart disease Mother     Heart attack Mother     Hypertension Father     Colon cancer Father     Hyperlipidemia Father         Elevated cholesterol    Heart disease Father     Cancer Father     Diabetes Brother     Heart disease Brother     Hyperlipidemia Brother     Hypertension Brother     Diabetes Paternal Aunt     Colon  cancer Paternal Aunt     Colon cancer Paternal Uncle     Diabetes Paternal Grandmother     Heart disease Paternal Grandmother     Arthritis Paternal Grandmother     Heart disease Maternal Uncle     Heart disease Maternal Uncle     Cancer Maternal Aunt     Cancer Paternal Uncle     Breast cancer Neg Hx     Ovarian cancer Neg Hx        Past Medical History :  Patient Active Problem List   Diagnosis    Myositis    Coronary artery disease    Degeneration of intervertebral disc of lumbosacral region    Dense breast    Depression, major, recurrent, mild    GERD without esophagitis    History of chicken pox    Presence of other cardiac implants and grafts    Mixed hyperlipidemia    Left bundle branch block (LBBB)    Lumbar disc herniation with radiculopathy    Other specified menopausal and postmenopausal disorders    CVA (cerebral vascular accident)    Renal insufficiency    Vasovagal syncope    Cervical radiculopathy    Cervico-occipital neuralgia    Long-term current use of opiate analgesic    DDD (degenerative disc disease), cervical    Medicare annual wellness visit, subsequent    Screening mammogram for breast cancer    Finger pain    Facial contusion, initial encounter    Injury, head, initial encounter    Seasonal allergic rhinitis due to pollen    Overweight with body mass index (BMI) of 26 to 26.9 in adult    Neck pain    Hyperglycemia    Prediabetes    Thyroiditis       Medication List:    Current Outpatient Medications:     aspirin 81 MG EC tablet, TAKE 1 TABLET BY MOUTH EVERY DAY, Disp: 90 tablet, Rfl: 3    calcium carbonate (OS-SHYLA) 600 MG tablet, Take 1 tablet by mouth 2 (Two) Times a Day With Meals., Disp: , Rfl:     carvedilol (COREG) 3.125 MG tablet, TAKE 1 TABLET BY MOUTH TWICE A DAY WITH MEALS, Disp: 180 tablet, Rfl: 3    Cholecalciferol (Vitamin D3) 50 MCG (2000 UT) tablet, Take  by mouth., Disp: , Rfl:     magnesium gluconate (MAGONATE) 500 MG tablet, Take 1 tablet by mouth Daily., Disp: , Rfl:      "Multiple Vitamins-Minerals (WOMENS MULTIVITAMIN + COLLAGEN PO), , Disp: , Rfl:     Probiotic Product (PROBIOTIC DAILY PO), Take 1 capsule by mouth Daily., Disp: , Rfl:     rosuvastatin (CRESTOR) 10 MG tablet, Take 1 tablet by mouth Daily. (Patient taking differently: Take 5 mg by mouth Daily.), Disp: 90 tablet, Rfl: 3    vitamin B-12 (CYANOCOBALAMIN) 1000 MCG tablet, Take 1 tablet by mouth Daily., Disp: , Rfl:     Past Surgical History:  Past Surgical History:   Procedure Laterality Date    CARDIAC CATHETERIZATION N/A 01/27/2020    Procedure: Left Heart Cath with angiogram;  Surgeon: Jermaine Delong MD;  Location:  [facility] CATH INVASIVE LOCATION;  Service: Cardiovascular    CARDIAC CATHETERIZATION N/A 01/27/2020    Procedure: Stent BOBBY coronary;  Surgeon: Jermaine Delong MD;  Location:  [facility] CATH INVASIVE LOCATION;  Service: Cardiovascular    CARDIAC CATHETERIZATION N/A 01/27/2020    Procedure: Left ventriculography;  Surgeon: Jermaine Delong MD;  Location:  [facility] CATH INVASIVE LOCATION;  Service: Cardiovascular    CARDIAC CATHETERIZATION N/A 01/27/2020    Procedure: Coronary angiography;  Surgeon: Jermaine Delong MD;  Location: [facility] CATH INVASIVE LOCATION;  Service: Cardiovascular    CATARACT EXTRACTION, BILATERAL Bilateral 1999    CHOLECYSTECTOMY  1993??    COLONOSCOPY  Last one 2023    CORONARY STENT PLACEMENT  1/28/2020    GALLBLADDER SURGERY  1996    LAPAROSCOPIC TUBAL LIGATION  1980    RENAL ARTERY STENT  01/26/2020    ROTATOR CUFF REPAIR Right 2002    TUBAL ABDOMINAL LIGATION  1979??         Physical Exam:      Vital Signs:    Vitals:    07/25/23 1050   BP: 140/82   Pulse: 70   Resp: 18   Temp: 97.5 °F (36.4 °C)   SpO2: 98%        /82 (BP Location: Left arm, Patient Position: Sitting, Cuff Size: Adult)   Pulse 70   Temp 97.5 °F (36.4 °C) (Temporal)   Resp 18   Ht 152.4 cm (60\")   Wt 60.1 kg (132 lb 9.6 oz)   LMP  (LMP Unknown)   SpO2 98% Comment: room air  BMI 25.90 kg/m²     Wt Readings from Last " 3 Encounters:   07/25/23 60.1 kg (132 lb 9.6 oz)   07/10/23 61.1 kg (134 lb 9.6 oz)   06/23/23 61.8 kg (136 lb 3.2 oz)       Result Review :   The following data was reviewed by: Alexa Shah MD on 07/25/2023:            Latest Reference Range & Units 07/10/23 00:00   TSH Baseline 0.450 - 4.500 uIU/mL 0.951   Free T4 0.82 - 1.77 ng/dL 1.05   T3, Free 2.0 - 4.4 pg/mL 3.0   Thyroid Peroxidase Antibody 0 - 34 IU/mL <9         Study Result    Narrative & Impression   US THYROID     Date of Exam: 7/14/2023 8:55 AM EDT     Indication: neck pain. Acute thyroiditis.     Comparison: MRI cervical spine 4/9/2021. No prior thyroid ultrasound for comparison     Technique: Grayscale and color and Doppler ultrasound imaging of the thyroid performed according to routine thyroid ultrasound protocol, real time with image documentation.        Findings:  The right thyroid lobe measures 3.8 x 1.6 x 1.5 cm. The left thyroid lobe measures 3.5 x 1.6 x 1.2 cm. The isthmus measures approximately 2.2 cm thickness.     The left thyroid lobe demonstrates normal homogeneous echotexture without focal abnormality.     The right thyroid lobe is abnormal. It demonstrates heterogeneous hypoechogenicity superimposed upon more normal isoechoic parenchyma. The right thyroid lobe appears more highly vascularized or hyperemic on color flow imaging, compared to the left. No   discrete right thyroid nodule is identified.     The technologist has placed calipers upon sonographically benign-appearing right cervical lymph nodes.     IMPRESSION:  Impression:     1. No discrete or well-defined thyroid nodule is identified. This is classified as a TI-RADS category 1 ultrasound.  2. The right thyroid lobe, nonetheless, has an abnormal appearance, with ill-defined scattered hypoechogenicity and hyperemia. The findings are nonspecific and could represent changes of active thyroid inflammation, which would correspond to the provided   history. Correlate to  thyroid function tests.     TI-RADS: TR1 - Benign. No follow up needed.     Electronically Signed: Almita Skaggs    7/14/2023 10:15 AM EDT    Workstation ID: [id number]       Physical Exam  Vitals reviewed.   Constitutional:       Appearance: Normal appearance. She is well-developed.   HENT:      Head: Normocephalic and atraumatic.   Eyes:      General:         Right eye: No discharge.         Left eye: No discharge.   Neck:     Cardiovascular:      Rate and Rhythm: Normal rate and regular rhythm.      Heart sounds: Normal heart sounds. No murmur heard.    No friction rub. No gallop.   Pulmonary:      Effort: Pulmonary effort is normal. No respiratory distress.      Breath sounds: Normal breath sounds. No wheezing or rales.   Skin:     General: Skin is warm and dry.      Findings: No rash.   Neurological:      Mental Status: She is alert and oriented to person, place, and time.      Coordination: Coordination normal.      Gait: Gait normal.   Psychiatric:         Behavior: Behavior is cooperative.       Assessment and Plan:  Problems Addressed this Visit          Endocrine and Metabolic    Thyroiditis     Mild on right. Confirmed by u/s  Labs negative  Recheck in 2 weeks            Other    Overweight with body mass index (BMI) of 26 to 26.9 in adult - Primary     Patient's (Body mass index is 25.9 kg/m².) indicates that they are overweight with health conditions that include dyslipidemias . Weight is improving with lifestyle modifications. BMI is is above average; BMI management plan is completed. We discussed portion control and increasing exercise.           Diagnoses         Codes Comments    Overweight with body mass index (BMI) of 26 to 26.9 in adult    -  Primary ICD-10-CM: E66.3, Z68.26  ICD-9-CM: 278.02, V85.22     Thyroiditis     ICD-10-CM: E06.9  ICD-9-CM: 245.9                     An After Visit Summary and PPPS were given to the patient.

## 2023-07-25 ENCOUNTER — OFFICE VISIT (OUTPATIENT)
Dept: FAMILY MEDICINE CLINIC | Facility: CLINIC | Age: 78
End: 2023-07-25
Payer: MEDICARE

## 2023-07-25 VITALS
SYSTOLIC BLOOD PRESSURE: 128 MMHG | HEART RATE: 70 BPM | DIASTOLIC BLOOD PRESSURE: 86 MMHG | OXYGEN SATURATION: 98 % | HEIGHT: 60 IN | BODY MASS INDEX: 26.03 KG/M2 | RESPIRATION RATE: 18 BRPM | WEIGHT: 132.6 LBS | TEMPERATURE: 97.5 F

## 2023-07-25 DIAGNOSIS — E66.3 OVERWEIGHT WITH BODY MASS INDEX (BMI) OF 26 TO 26.9 IN ADULT: Primary | ICD-10-CM

## 2023-07-25 DIAGNOSIS — E06.9 THYROIDITIS: ICD-10-CM

## 2023-07-25 PROBLEM — R22.1 NECK MASS: Status: ACTIVE | Noted: 2023-07-25

## 2023-07-25 PROBLEM — R22.1 NECK MASS: Status: RESOLVED | Noted: 2023-07-25 | Resolved: 2023-07-25

## 2023-07-25 PROCEDURE — 99213 OFFICE O/P EST LOW 20 MIN: CPT | Performed by: FAMILY MEDICINE

## 2023-07-25 NOTE — ASSESSMENT & PLAN NOTE
Patient's (Body mass index is 25.9 kg/m².) indicates that they are overweight with health conditions that include dyslipidemias . Weight is improving with lifestyle modifications. BMI is is above average; BMI management plan is completed. We discussed portion control and increasing exercise.

## 2023-08-07 NOTE — PROGRESS NOTES
Subjective   Rachael Quevedo is a 77 y.o. female. Presents to [facility]    Chief Complaint   Patient presents with    Neck Mass       History of Present Illness  Neck Mass:  Patient is here today to discuss a neck mass on the right side. Onset was 07/06/2023. Symptoms include: pain with palpitation, coughing and swallowing. Symptoms are stable. She is here today to follow up.    The spot is still there and she is having issues with swallowing  I personally reviewed and updated the patient's allergies, medications, problem list, and past medical, surgical, social, and family history. I have reviewed and confirmed the accuracy of the History of Present Illness and Review of Symptoms as documented by the MA/LPN/RN. Alexa Shah MD    Allergies:  No Known Allergies    Social History:  Social History     Socioeconomic History    Marital status:    Tobacco Use    Smoking status: Never    Smokeless tobacco: Never    Tobacco comments:     Many family members smoked around me when I was a child   Vaping Use    Vaping Use: Never used   Substance and Sexual Activity    Alcohol use: Yes     Alcohol/week: 3.0 standard drinks     Types: 2 Glasses of wine, 1 Drinks containing 0.5 oz of alcohol per week     Comment: Both not weekly but occassionally    Drug use: No    Sexual activity: Yes     Partners: Male     Birth control/protection: None     Comment: Very occasional       Family History:  Family History   Problem Relation Age of Onset    Hyperlipidemia Mother         Elevated cholesterol    Heart disease Mother     Heart attack Mother     Hypertension Father     Colon cancer Father     Hyperlipidemia Father         Elevated cholesterol    Heart disease Father     Cancer Father     Diabetes Brother     Heart disease Brother     Hyperlipidemia Brother     Hypertension Brother     Diabetes Paternal Aunt     Colon cancer Paternal Aunt     Colon cancer Paternal Uncle     Diabetes Paternal Grandmother      Heart disease Paternal Grandmother     Arthritis Paternal Grandmother     Heart disease Maternal Uncle     Heart disease Maternal Uncle     Cancer Maternal Aunt     Cancer Paternal Uncle     Breast cancer Neg Hx     Ovarian cancer Neg Hx        Past Medical History :  Patient Active Problem List   Diagnosis    Coronary artery disease    Degeneration of intervertebral disc of lumbosacral region    Dense breast    Depression, major, recurrent, mild    GERD without esophagitis    History of chicken pox    Presence of other cardiac implants and grafts    Mixed hyperlipidemia    Left bundle branch block (LBBB)    Lumbar disc herniation with radiculopathy    Other specified menopausal and postmenopausal disorders    CVA (cerebral vascular accident)    Vasovagal syncope    Cervical radiculopathy    Long-term current use of opiate analgesic    DDD (degenerative disc disease), cervical    Medicare annual wellness visit, subsequent    Screening mammogram for breast cancer    Seasonal allergic rhinitis due to pollen    Overweight with body mass index (BMI) of 26 to 26.9 in adult    Hyperglycemia    Prediabetes    Neck mass    Thyroiditis    Lymphadenopathy    Oral phase dysphagia       Medication List:    Current Outpatient Medications:     aspirin 81 MG EC tablet, TAKE 1 TABLET BY MOUTH EVERY DAY, Disp: 90 tablet, Rfl: 3    calcium carbonate (OS-SHYLA) 600 MG tablet, Take 1 tablet by mouth 2 (Two) Times a Day With Meals., Disp: , Rfl:     carvedilol (COREG) 3.125 MG tablet, TAKE 1 TABLET BY MOUTH TWICE A DAY WITH MEALS, Disp: 180 tablet, Rfl: 3    Cholecalciferol (Vitamin D3) 50 MCG (2000 UT) tablet, Take  by mouth., Disp: , Rfl:     magnesium gluconate (MAGONATE) 500 MG tablet, Take 1 tablet by mouth Daily., Disp: , Rfl:     Multiple Vitamins-Minerals (WOMENS MULTIVITAMIN + COLLAGEN PO), , Disp: , Rfl:     Probiotic Product (PROBIOTIC DAILY PO), Take 1 capsule by mouth Daily., Disp: , Rfl:     rosuvastatin (CRESTOR) 10 MG  "tablet, Take 1 tablet by mouth Daily. (Patient taking differently: Take 0.5 tablets by mouth Daily.), Disp: 90 tablet, Rfl: 3    vitamin B-12 (CYANOCOBALAMIN) 1000 MCG tablet, Take 1 tablet by mouth Daily., Disp: , Rfl:     fluticasone (FLONASE) 50 MCG/ACT nasal spray, 2 sprays into the nostril(s) as directed by provider Daily., Disp: 16 g, Rfl: 2    Past Surgical History:  Past Surgical History:   Procedure Laterality Date    CARDIAC CATHETERIZATION N/A 01/27/2020    Procedure: Left Heart Cath with angiogram;  Surgeon: Jermaine Delong MD;  Location:  [facility] CATH INVASIVE LOCATION;  Service: Cardiovascular    CARDIAC CATHETERIZATION N/A 01/27/2020    Procedure: Stent BOBBY coronary;  Surgeon: Jermaine Delong MD;  Location:  [facility] CATH INVASIVE LOCATION;  Service: Cardiovascular    CARDIAC CATHETERIZATION N/A 01/27/2020    Procedure: Left ventriculography;  Surgeon: Jermaine Delong MD;  Location:  [facility] CATH INVASIVE LOCATION;  Service: Cardiovascular    CARDIAC CATHETERIZATION N/A 01/27/2020    Procedure: Coronary angiography;  Surgeon: Jermaine Delong MD;  Location:  [facility] CATH INVASIVE LOCATION;  Service: Cardiovascular    CATARACT EXTRACTION, BILATERAL Bilateral 1999    CHOLECYSTECTOMY  1993??    COLONOSCOPY  Last one 2023    CORONARY STENT PLACEMENT  1/28/2020    GALLBLADDER SURGERY  1996    LAPAROSCOPIC TUBAL LIGATION  1980    RENAL ARTERY STENT  01/26/2020    ROTATOR CUFF REPAIR Right 2002    TUBAL ABDOMINAL LIGATION  1979??         Physical Exam:      Vital Signs:    Vitals:    08/08/23 0908   BP: 130/80   Pulse: 78   Resp: 18   Temp: 97.3 øF (36.3 øC)   SpO2: 99%        /80 (BP Location: Left arm, Patient Position: Sitting, Cuff Size: Adult)   Pulse 78   Temp 97.3 øF (36.3 øC) (Temporal)   Resp 18   Ht 152.4 cm (60\")   Wt 59.5 kg (131 lb 3.2 oz)   LMP  (LMP Unknown)   SpO2 99% Comment: room air  BMI 25.62 kg/mý     Wt Readings from Last 3 Encounters:   08/08/23 59.5 kg (131 lb 3.2 oz)   07/25/23 60.1 " kg (132 lb 9.6 oz)   07/10/23 61.1 kg (134 lb 9.6 oz)       Result Review :                Physical Exam  Vitals reviewed.   Constitutional:       Appearance: Normal appearance. She is well-developed.   HENT:      Head: Normocephalic and atraumatic.   Eyes:      General:         Right eye: No discharge.         Left eye: No discharge.   Neck:     Cardiovascular:      Rate and Rhythm: Normal rate and regular rhythm.      Heart sounds: Normal heart sounds. No murmur heard.    No friction rub. No gallop.   Pulmonary:      Effort: Pulmonary effort is normal. No respiratory distress.      Breath sounds: Normal breath sounds. No wheezing or rales.   Skin:     General: Skin is warm and dry.      Findings: No rash.   Neurological:      Mental Status: She is alert and oriented to person, place, and time.      Coordination: Coordination normal.      Gait: Gait normal.   Psychiatric:         Behavior: Behavior is cooperative.       Assessment and Plan:  Problems Addressed this Visit          Allergies and Adverse Reactions    Seasonal allergic rhinitis due to pollen    Relevant Medications    fluticasone (FLONASE) 50 MCG/ACT nasal spray       ENT    Neck mass - Primary       Endocrine and Metabolic    Thyroiditis     Much better            Gastrointestinal Abdominal     Oral phase dysphagia     With liquids not solids         Relevant Orders    FL Esophagram Complete Single Contrast       Symptoms and Signs    Lymphadenopathy     Mild on left anterior neck         Relevant Orders    CT Soft Tissue Neck With Contrast       Other    Overweight with body mass index (BMI) of 26 to 26.9 in adult     Patient's (Body mass index is 25.62 kg/mý.) indicates that they are overweight with health conditions that include dyslipidemias . Weight is improving with lifestyle modifications. BMI is is above average; BMI management plan is completed. We discussed portion control and increasing exercise.           Other Visit Diagnoses       Stage  3a chronic kidney disease              Diagnoses         Codes Comments    Neck mass    -  Primary ICD-10-CM: R22.1  ICD-9-CM: 784.2     Overweight with body mass index (BMI) of 26 to 26.9 in adult     ICD-10-CM: E66.3, Z68.26  ICD-9-CM: 278.02, V85.22     Thyroiditis     ICD-10-CM: E06.9  ICD-9-CM: 245.9     Stage 3a chronic kidney disease     ICD-10-CM: N18.31  ICD-9-CM: 585.3     Lymphadenopathy     ICD-10-CM: R59.1  ICD-9-CM: 785.6     Oral phase dysphagia     ICD-10-CM: R13.11  ICD-9-CM: 787.21     Seasonal allergic rhinitis due to pollen     ICD-10-CM: J30.1  ICD-9-CM: 477.0                     An After Visit Summary and PPPS were given to the patient.              83

## 2023-08-08 ENCOUNTER — OFFICE VISIT (OUTPATIENT)
Dept: FAMILY MEDICINE CLINIC | Facility: CLINIC | Age: 78
End: 2023-08-08
Payer: MEDICARE

## 2023-08-08 VITALS
TEMPERATURE: 97.3 F | DIASTOLIC BLOOD PRESSURE: 80 MMHG | OXYGEN SATURATION: 99 % | HEART RATE: 78 BPM | BODY MASS INDEX: 25.76 KG/M2 | HEIGHT: 60 IN | SYSTOLIC BLOOD PRESSURE: 130 MMHG | WEIGHT: 131.2 LBS | RESPIRATION RATE: 18 BRPM

## 2023-08-08 DIAGNOSIS — J30.1 SEASONAL ALLERGIC RHINITIS DUE TO POLLEN: ICD-10-CM

## 2023-08-08 DIAGNOSIS — N18.31 STAGE 3A CHRONIC KIDNEY DISEASE: ICD-10-CM

## 2023-08-08 DIAGNOSIS — R13.11 ORAL PHASE DYSPHAGIA: ICD-10-CM

## 2023-08-08 DIAGNOSIS — R22.1 NECK MASS: Primary | ICD-10-CM

## 2023-08-08 DIAGNOSIS — E06.9 THYROIDITIS: ICD-10-CM

## 2023-08-08 DIAGNOSIS — E66.3 OVERWEIGHT WITH BODY MASS INDEX (BMI) OF 26 TO 26.9 IN ADULT: ICD-10-CM

## 2023-08-08 DIAGNOSIS — R59.1 LYMPHADENOPATHY: ICD-10-CM

## 2023-08-08 PROBLEM — M54.81 CERVICO-OCCIPITAL NEURALGIA: Status: RESOLVED | Noted: 2021-05-06 | Resolved: 2023-08-08

## 2023-08-08 PROBLEM — M79.646 FINGER PAIN: Status: RESOLVED | Noted: 2022-06-09 | Resolved: 2023-08-08

## 2023-08-08 PROBLEM — N28.9 RENAL INSUFFICIENCY: Status: RESOLVED | Noted: 2020-04-14 | Resolved: 2023-08-08

## 2023-08-08 PROBLEM — M54.2 NECK PAIN: Status: RESOLVED | Noted: 2022-06-30 | Resolved: 2023-08-08

## 2023-08-08 PROBLEM — S09.90XA INJURY, HEAD, INITIAL ENCOUNTER: Status: RESOLVED | Noted: 2022-06-09 | Resolved: 2023-08-08

## 2023-08-08 PROBLEM — S00.83XA FACIAL CONTUSION, INITIAL ENCOUNTER: Status: RESOLVED | Noted: 2022-06-09 | Resolved: 2023-08-08

## 2023-08-08 PROBLEM — M60.9 MYOSITIS: Status: RESOLVED | Noted: 2019-07-01 | Resolved: 2023-08-08

## 2023-08-08 PROCEDURE — 99213 OFFICE O/P EST LOW 20 MIN: CPT | Performed by: FAMILY MEDICINE

## 2023-08-08 PROCEDURE — 1159F MED LIST DOCD IN RCRD: CPT | Performed by: FAMILY MEDICINE

## 2023-08-08 PROCEDURE — 1160F RVW MEDS BY RX/DR IN RCRD: CPT | Performed by: FAMILY MEDICINE

## 2023-08-08 RX ORDER — FLUTICASONE PROPIONATE 50 MCG
2 SPRAY, SUSPENSION (ML) NASAL DAILY
Qty: 16 G | Refills: 2 | Status: SHIPPED | OUTPATIENT
Start: 2023-08-08

## 2023-08-08 NOTE — ASSESSMENT & PLAN NOTE
Patient's (Body mass index is 25.62 kg/mý.) indicates that they are overweight with health conditions that include dyslipidemias . Weight is improving with lifestyle modifications. BMI is is above average; BMI management plan is completed. We discussed portion control and increasing exercise.

## 2023-08-18 DIAGNOSIS — E78.2 MIXED HYPERLIPIDEMIA: ICD-10-CM

## 2023-08-18 RX ORDER — ROSUVASTATIN CALCIUM 10 MG/1
TABLET, COATED ORAL
Qty: 90 TABLET | Refills: 3 | Status: SHIPPED | OUTPATIENT
Start: 2023-08-18

## 2023-09-07 ENCOUNTER — HOSPITAL ENCOUNTER (OUTPATIENT)
Dept: CT IMAGING | Facility: HOSPITAL | Age: 78
Discharge: HOME | End: 2023-09-07
Payer: MEDICARE

## 2023-09-07 ENCOUNTER — HOSPITAL ENCOUNTER (OUTPATIENT)
Dept: GENERAL RADIOLOGY | Facility: HOSPITAL | Age: 78
Discharge: HOME | End: 2023-09-07
Payer: MEDICARE

## 2023-09-07 DIAGNOSIS — R59.1 LYMPHADENOPATHY: ICD-10-CM

## 2023-09-07 DIAGNOSIS — R13.11 ORAL PHASE DYSPHAGIA: ICD-10-CM

## 2023-09-07 LAB
CREAT BLDA-MCNC: 1 MG/DL (ref 0.6–1.3)
EGFRCR SERPLBLD CKD-EPI 2021: 58.5 ML/MIN/1.73

## 2023-09-07 PROCEDURE — 63710000001 BARIUM SULFATE 700 MG TABLET: Performed by: FAMILY MEDICINE

## 2023-09-07 PROCEDURE — 63710000001 BARIUM SULFATE 98 % RECONSTITUTED SUSPENSION: Performed by: FAMILY MEDICINE

## 2023-09-07 PROCEDURE — 25510000001 IOPAMIDOL PER 1 ML: Performed by: FAMILY MEDICINE

## 2023-09-07 PROCEDURE — 63710000001 BARIUM SULFATE 96 % RECONSTITUTED SUSPENSION: Performed by: FAMILY MEDICINE

## 2023-09-07 PROCEDURE — 74220 X-RAY XM ESOPHAGUS 1CNTRST: CPT

## 2023-09-07 PROCEDURE — A9270 NON-COVERED ITEM OR SERVICE: HCPCS | Performed by: FAMILY MEDICINE

## 2023-09-07 PROCEDURE — 82565 ASSAY OF CREATININE: CPT

## 2023-09-07 PROCEDURE — 70491 CT SOFT TISSUE NECK W/DYE: CPT

## 2023-09-07 RX ADMIN — BARIUM SULFATE 700 MG: 700 TABLET ORAL at 11:52

## 2023-09-07 RX ADMIN — BARIUM SULFATE 183 ML: 960 POWDER, FOR SUSPENSION ORAL at 11:52

## 2023-09-07 RX ADMIN — BARIUM SULFATE 135 ML: 980 POWDER, FOR SUSPENSION ORAL at 11:52

## 2023-09-07 RX ADMIN — IOPAMIDOL 100 ML: 755 INJECTION, SOLUTION INTRAVENOUS at 11:25

## 2023-09-18 ENCOUNTER — TELEPHONE (OUTPATIENT)
Dept: FAMILY MEDICINE CLINIC | Facility: CLINIC | Age: 78
End: 2023-09-18

## 2023-09-18 NOTE — TELEPHONE ENCOUNTER
"    Caller: Rachael Quevedo \"Carley\"    Relationship: Self    Best call back number: [phone]     What specialty or service is being requested: GASTRO    What is the provider, practice or medical service name: ANY    Any additional details: PATIENT STATES SHE CAN NOT GET AN APPOINTMENT WITH HER GI DOCTOR FOR MORE THAN 90 DAYS. PATIENT WOULD LIKE TO KNOW IF SHE SHOULD BE REFERRED TO SOMEONE ELSE. PLEASE ADVISE.         "

## 2023-09-21 ENCOUNTER — TELEPHONE (OUTPATIENT)
Dept: CARDIOLOGY | Facility: CLINIC | Age: 78
End: 2023-09-21
Payer: MEDICARE

## 2023-09-21 ENCOUNTER — TELEPHONE (OUTPATIENT)
Dept: FAMILY MEDICINE CLINIC | Facility: CLINIC | Age: 78
End: 2023-09-21
Payer: MEDICARE

## 2023-09-21 NOTE — TELEPHONE ENCOUNTER
No, patient does not need labs.  It appears she recently had labs drawn to include BMP, lipid panel, CBC in June of this year which is recent enough.  Thank you and let me know if she has any other questions or concerns.

## 2023-09-22 ENCOUNTER — TELEPHONE (OUTPATIENT)
Dept: CARDIOLOGY | Facility: CLINIC | Age: 78
End: 2023-09-22
Payer: MEDICARE

## 2023-09-22 NOTE — TELEPHONE ENCOUNTER
"  Caller: Rachael Quevedo \"Carley\"    Relationship: Self    Best call back number: [phone]    What is the best time to reach you: ANY    Who are you requesting to speak with (clinical staff, provider,  specific staff member): CLINICAL    Do you know the name of the person who called: ROM    What was the call regarding: LABS    Is it okay if the provider responds through MyChart:         "

## 2023-10-02 ENCOUNTER — TELEPHONE (OUTPATIENT)
Dept: FAMILY MEDICINE CLINIC | Facility: CLINIC | Age: 78
End: 2023-10-02
Payer: MEDICARE

## 2023-10-02 DIAGNOSIS — R13.11 ORAL PHASE DYSPHAGIA: Primary | ICD-10-CM

## 2023-10-02 NOTE — TELEPHONE ENCOUNTER
----- Message from Rachael Quevedo sent at 10/1/2023  3:04 PM EDT -----  Regarding: results  Contact: [phone]  Since I don't care that I stay with Dr. [place]'s office, could I ask that Dr. Shah refer me to [facility] in [place], [place]?  Going there would be about the same distance for me as going to [place].  They preform Endoscopies in their office if that should become necessary.  Their address is [address] [place] [address], [facility], [place], [place].  Phone is [phone]  Carley Echavarria

## 2023-10-03 DIAGNOSIS — J30.1 SEASONAL ALLERGIC RHINITIS DUE TO POLLEN: ICD-10-CM

## 2023-10-03 RX ORDER — FLUTICASONE PROPIONATE 50 MCG
SPRAY, SUSPENSION (ML) NASAL
Qty: 16 ML | Refills: 2 | Status: SHIPPED | OUTPATIENT
Start: 2023-10-03

## 2023-10-05 ENCOUNTER — OFFICE VISIT (OUTPATIENT)
Dept: CARDIOLOGY | Facility: CLINIC | Age: 78
End: 2023-10-05
Payer: MEDICARE

## 2023-10-05 VITALS
HEART RATE: 61 BPM | WEIGHT: 130 LBS | SYSTOLIC BLOOD PRESSURE: 135 MMHG | HEIGHT: 60 IN | DIASTOLIC BLOOD PRESSURE: 66 MMHG | BODY MASS INDEX: 25.52 KG/M2 | OXYGEN SATURATION: 98 %

## 2023-10-05 DIAGNOSIS — I10 ESSENTIAL HYPERTENSION: ICD-10-CM

## 2023-10-05 DIAGNOSIS — I25.10 CORONARY ARTERY DISEASE INVOLVING NATIVE CORONARY ARTERY OF NATIVE HEART WITHOUT ANGINA PECTORIS: Primary | ICD-10-CM

## 2023-10-05 DIAGNOSIS — E78.00 PURE HYPERCHOLESTEROLEMIA: ICD-10-CM

## 2023-10-05 PROCEDURE — 1159F MED LIST DOCD IN RCRD: CPT | Performed by: INTERNAL MEDICINE

## 2023-10-05 PROCEDURE — 1160F RVW MEDS BY RX/DR IN RCRD: CPT | Performed by: INTERNAL MEDICINE

## 2023-10-05 PROCEDURE — 99213 OFFICE O/P EST LOW 20 MIN: CPT | Performed by: INTERNAL MEDICINE

## 2023-10-05 NOTE — PROGRESS NOTES
Subjective:     Encounter Date:10/05/2023      Patient ID: Rachael Quevedo is a 77 y.o. female.    Chief Complaint:  History of Present Illness 77-year-old white female with history of coronary disease hypertension hyperlipidemia presents to my office for a follow-up.  Patient is currently stable without any symptoms of chest pain or shortness of breath at rest or exertion.  No complaint of any PND orthopnea.  No palpitation dizziness syncope or swelling of the feet.  Patient is taking all her medicines regularly.  Patient does not smoke.    The following portions of the patient's history were reviewed and updated as appropriate: allergies, current medications, past family history, past medical history, past social history, past surgical history, and problem list.  Past Medical History:   Diagnosis Date    Allergic     Ankle fracture, left     Comments: 2016    Arthritis     Cataract, acquired     Coronary artery disease     Cough     Impression: Most likely reactive airway prescription as below Start antihistamine at home    Dense breast     Depression, major, recurrent, mild     GERD without esophagitis     Comments: Dr Duncan- protonix    Hematuria, microscopic     History of chicken pox     History of loop recorder     Hyperglycemia     Hyperlipidemia     Hypertension 01/04/2020    Injury of back     Injury of neck     Left bundle branch block (LBBB)     Malaise and fatigue     Medicare annual wellness visit, initial     with abnormal findings    Osteopenia 2022    Other specified menopausal and perimenopausal disorders     Other specified menopausal and postmenopausal disorders    Pap smear, low-risk     Renal insufficiency     S/P right rotator cuff repair     Screening for alcoholism     10 or more drinks per week    Screening for depression     Negative Depression Screening ( 9 or less) ()    Screening mammogram, encounter for     Stroke     1/5/2020 no residual effects    Vasovagal near-syncope   "   Impression: from illness and cough Discussed if there is loss of vision in an eye, confusion, weakness or numbness in an extremity, drooping of a side of the face, intractible pain, intractible vomiting, to go to the ER.     Past Surgical History:   Procedure Laterality Date    CARDIAC CATHETERIZATION N/A 01/27/2020    Procedure: Left Heart Cath with angiogram;  Surgeon: Jermaine Delong MD;  Location: [facility] CATH INVASIVE LOCATION;  Service: Cardiovascular    CARDIAC CATHETERIZATION N/A 01/27/2020    Procedure: Stent BOBBY coronary;  Surgeon: Jermaine Delong MD;  Location: [facility] CATH INVASIVE LOCATION;  Service: Cardiovascular    CARDIAC CATHETERIZATION N/A 01/27/2020    Procedure: Left ventriculography;  Surgeon: Jermaine Delong MD;  Location: [facility] CATH INVASIVE LOCATION;  Service: Cardiovascular    CARDIAC CATHETERIZATION N/A 01/27/2020    Procedure: Coronary angiography;  Surgeon: Jermaine Delong MD;  Location: [facility] CATH INVASIVE LOCATION;  Service: Cardiovascular    CATARACT EXTRACTION, BILATERAL Bilateral 1999    CHOLECYSTECTOMY  1993??    COLONOSCOPY  Last one 2023    CORONARY STENT PLACEMENT  1/28/2020    GALLBLADDER SURGERY  1996    LAPAROSCOPIC TUBAL LIGATION  1980    RENAL ARTERY STENT  01/26/2020    ROTATOR CUFF REPAIR Right 2002    TUBAL ABDOMINAL LIGATION  1979??     /66   Pulse 61   Ht 152.4 cm (60\")   Wt 59 kg (130 lb)   LMP  (LMP Unknown)   SpO2 98%   BMI 25.39 kg/m²   Family History   Problem Relation Age of Onset    Hyperlipidemia Mother         Elevated cholesterol    Heart disease Mother     Heart attack Mother     Hypertension Father     Colon cancer Father     Hyperlipidemia Father         Elevated cholesterol    Heart disease Father     Cancer Father     Diabetes Brother     Heart disease Brother     Hyperlipidemia Brother     Hypertension Brother     Diabetes Paternal Aunt     Colon cancer Paternal Aunt     Colon cancer Paternal Uncle     Diabetes Paternal Grandmother     Heart " disease Paternal Grandmother     Arthritis Paternal Grandmother     Heart disease Maternal Uncle     Heart disease Maternal Uncle     Cancer Maternal Aunt     Cancer Paternal Uncle     Breast cancer Neg Hx     Ovarian cancer Neg Hx        Current Outpatient Medications:     aspirin 81 MG EC tablet, TAKE 1 TABLET BY MOUTH EVERY DAY, Disp: 90 tablet, Rfl: 3    calcium carbonate (OS-SHYLA) 600 MG tablet, Take 1 tablet by mouth 2 (Two) Times a Day With Meals., Disp: , Rfl:     carvedilol (COREG) 3.125 MG tablet, TAKE 1 TABLET BY MOUTH TWICE A DAY WITH MEALS, Disp: 180 tablet, Rfl: 3    Cholecalciferol (Vitamin D3) 50 MCG (2000 UT) tablet, Take  by mouth., Disp: , Rfl:     fluticasone (FLONASE) 50 MCG/ACT nasal spray, SPRAY 2 SPRAYS INTO THE NOSTRIL AS DIRECTED BY PROVIDER DAILY., Disp: 16 mL, Rfl: 2    magnesium gluconate (MAGONATE) 500 MG tablet, Take 1 tablet by mouth Daily., Disp: , Rfl:     Multiple Vitamins-Minerals (WOMENS MULTIVITAMIN + COLLAGEN PO), , Disp: , Rfl:     Probiotic Product (PROBIOTIC DAILY PO), Take 1 capsule by mouth Daily., Disp: , Rfl:     rosuvastatin (CRESTOR) 10 MG tablet, TAKE 1 TABLET BY MOUTH EVERY DAY, Disp: 90 tablet, Rfl: 3    vitamin B-12 (CYANOCOBALAMIN) 1000 MCG tablet, Take 1 tablet by mouth Daily., Disp: , Rfl:   No Known Allergies  Social History     Socioeconomic History    Marital status:    Tobacco Use    Smoking status: Never    Smokeless tobacco: Never    Tobacco comments:     Many family members smoked around me when I was a child   Vaping Use    Vaping Use: Never used   Substance and Sexual Activity    Alcohol use: Yes     Alcohol/week: 3.0 standard drinks     Types: 2 Glasses of wine, 1 Drinks containing 0.5 oz of alcohol per week     Comment: Both not weekly but occassionally    Drug use: No    Sexual activity: Yes     Partners: Male     Birth control/protection: None     Comment: Very occasional     Review of Systems   Constitutional: Negative for malaise/fatigue.    Cardiovascular:  Negative for chest pain, dyspnea on exertion, leg swelling and palpitations.   Respiratory:  Negative for cough and shortness of breath.    Gastrointestinal:  Negative for abdominal pain, nausea and vomiting.   Neurological:  Negative for dizziness, focal weakness, headaches, light-headedness and numbness.   All other systems reviewed and are negative.           Objective:     Constitutional:       Appearance: Well-developed.   Eyes:      General: No scleral icterus.     Conjunctiva/sclera: Conjunctivae normal.   HENT:      Head: Normocephalic and atraumatic.   Neck:      Vascular: No carotid bruit or JVD.   Pulmonary:      Effort: Pulmonary effort is normal.      Breath sounds: Normal breath sounds. No wheezing. No rales.   Cardiovascular:      Normal rate. Regular rhythm.   Pulses:     Intact distal pulses.   Abdominal:      General: Bowel sounds are normal.      Palpations: Abdomen is soft.   Musculoskeletal:      Cervical back: Normal range of motion and neck supple. Skin:     General: Skin is warm and dry.      Findings: No rash.   Neurological:      Mental Status: Alert.     Procedures    Lab Review:         MDM    #1 coronary disease  Patient had nonobstructive disease in the past and has normal function is currently stable on medications  2.  Hypertension  Patient blood pressure currently stable on carvedilol  3.  Hyperlipidemia  Patient on Crestor and the lipid levels are well within normal limits.    Patient's previous medical records, labs, and EKG were reviewed and discussed with the patient at today's visit.

## 2023-12-18 NOTE — PROGRESS NOTES
Subjective   Rachael Quevedo is a 78 y.o. female. Presents to [facility]    Chief Complaint   Patient presents with    Hyperlipidemia    Prediabetes       Hyperlipidemia  This is a chronic problem. The current episode started more than 1 year ago. Exacerbating diseases include diabetes. She has no history of obesity. Pertinent negatives include no chest pain, myalgias or shortness of breath.   Blood Sugar Problem  This is a chronic problem. The current episode started more than 1 year ago. Pertinent negatives include no chest pain, fatigue or myalgias.        I personally reviewed and updated the patient's allergies, medications, problem list, and past medical, surgical, social, and family history. I have reviewed and confirmed the accuracy of the History of Present Illness and Review of Symptoms as documented by the MA/LPN/RN. Alexa Shah MD    Allergies:  No Known Allergies    Social History:  Social History     Socioeconomic History    Marital status:    Tobacco Use    Smoking status: Never    Smokeless tobacco: Never    Tobacco comments:     Many family members smoked around me when I was a child   Vaping Use    Vaping Use: Never used   Substance and Sexual Activity    Alcohol use: Yes     Alcohol/week: 3.0 standard drinks of alcohol     Types: 2 Glasses of wine, 1 Drinks containing 0.5 oz of alcohol per week     Comment: Both not weekly but occassionally    Drug use: No    Sexual activity: Yes     Partners: Male     Birth control/protection: None     Comment: Very occasional       Family History:  Family History   Problem Relation Age of Onset    Hyperlipidemia Mother         Elevated cholesterol    Heart disease Mother     Heart attack Mother     Hypertension Father     Colon cancer Father     Hyperlipidemia Father         Elevated cholesterol    Heart disease Father     Cancer Father     Diabetes Brother     Heart disease Brother     Hyperlipidemia Brother     Hypertension  Brother     Diabetes Paternal Aunt     Colon cancer Paternal Aunt     Colon cancer Paternal Uncle     Diabetes Paternal Grandmother     Heart disease Paternal Grandmother     Arthritis Paternal Grandmother     Heart disease Maternal Uncle     Heart disease Maternal Uncle     Cancer Maternal Aunt     Cancer Paternal Uncle     Breast cancer Neg Hx     Ovarian cancer Neg Hx        Past Medical History :  Patient Active Problem List   Diagnosis    Coronary artery disease    Degeneration of intervertebral disc of lumbosacral region    Dense breast    Depression, major, recurrent, mild    GERD without esophagitis    History of chicken pox    Presence of other cardiac implants and grafts    Mixed hyperlipidemia    Left bundle branch block (LBBB)    Lumbar disc herniation with radiculopathy    Other specified menopausal and postmenopausal disorders    CVA (cerebral vascular accident)    Vasovagal syncope    Cervical radiculopathy    Long-term current use of opiate analgesic    DDD (degenerative disc disease), cervical    Medicare annual wellness visit, subsequent    Screening mammogram for breast cancer    Seasonal allergic rhinitis due to pollen    Overweight with body mass index (BMI) of 26 to 26.9 in adult    Type 2 diabetes mellitus without complication, without long-term current use of insulin    Neck mass    Thyroiditis    Lymphadenopathy    Oral phase dysphagia       Medication List:    Current Outpatient Medications:     aspirin 81 MG EC tablet, TAKE 1 TABLET BY MOUTH EVERY DAY, Disp: 90 tablet, Rfl: 3    calcium carbonate (OS-SHYLA) 600 MG tablet, Take 1 tablet by mouth 2 (Two) Times a Day With Meals., Disp: , Rfl:     carvedilol (COREG) 3.125 MG tablet, TAKE 1 TABLET BY MOUTH TWICE A DAY WITH MEALS, Disp: 180 tablet, Rfl: 3    Cholecalciferol (Vitamin D3) 50 MCG (2000 UT) tablet, Take  by mouth., Disp: , Rfl:     fexofenadine (ALLEGRA) 180 MG tablet, Take 1 tablet by mouth Daily. Pt taking 1/2 tablet daily, Disp: ,  Rfl:     fluticasone (FLONASE) 50 MCG/ACT nasal spray, SPRAY 2 SPRAYS INTO THE NOSTRIL AS DIRECTED BY PROVIDER DAILY., Disp: 16 mL, Rfl: 2    magnesium gluconate (MAGONATE) 500 MG tablet, Take 1 tablet by mouth Daily., Disp: , Rfl:     Multiple Vitamins-Minerals (WOMENS MULTIVITAMIN + COLLAGEN PO), , Disp: , Rfl:     Probiotic Product (PROBIOTIC DAILY PO), Take 1 capsule by mouth Daily., Disp: , Rfl:     rosuvastatin (CRESTOR) 10 MG tablet, TAKE 1 TABLET BY MOUTH EVERY DAY, Disp: 90 tablet, Rfl: 3    vitamin B-12 (CYANOCOBALAMIN) 1000 MCG tablet, Take 1 tablet by mouth Daily., Disp: , Rfl:     Past Surgical History:  Past Surgical History:   Procedure Laterality Date    CARDIAC CATHETERIZATION N/A 01/27/2020    Procedure: Left Heart Cath with angiogram;  Surgeon: Jermaine Delong MD;  Location: [facility] CATH INVASIVE LOCATION;  Service: Cardiovascular    CARDIAC CATHETERIZATION N/A 01/27/2020    Procedure: Stent BOBBY coronary;  Surgeon: Jermaine Delong MD;  Location: [facility] CATH INVASIVE LOCATION;  Service: Cardiovascular    CARDIAC CATHETERIZATION N/A 01/27/2020    Procedure: Left ventriculography;  Surgeon: Jermaine Delong MD;  Location:  [facility] CATH INVASIVE LOCATION;  Service: Cardiovascular    CARDIAC CATHETERIZATION N/A 01/27/2020    Procedure: Coronary angiography;  Surgeon: Jermaine Delong MD;  Location: [facility] CATH INVASIVE LOCATION;  Service: Cardiovascular    CATARACT EXTRACTION, BILATERAL Bilateral 1999    CHOLECYSTECTOMY  1993??    COLONOSCOPY  Last one 2023    CORONARY STENT PLACEMENT  1/28/2020    GALLBLADDER SURGERY  1996    LAPAROSCOPIC TUBAL LIGATION  1980    RENAL ARTERY STENT  01/26/2020    ROTATOR CUFF REPAIR Right 2002    TUBAL ABDOMINAL LIGATION  1979??         Physical Exam:      Vital Signs:    Vitals:    12/19/23 0842   BP: 116/62   Pulse: 87   Resp: 18   Temp: 97.3 °F (36.3 °C)   SpO2: 99%        /62 (BP Location: Left arm, Patient Position: Sitting, Cuff Size: Adult)   Pulse 87   Temp 97.3  "°F (36.3 °C) (Temporal)   Resp 18   Ht 152.4 cm (60\")   Wt 60.5 kg (133 lb 6.4 oz)   LMP  (LMP Unknown)   SpO2 99%   BMI 26.05 kg/m²     Wt Readings from Last 3 Encounters:   12/19/23 60.5 kg (133 lb 6.4 oz)   10/05/23 59 kg (130 lb)   08/08/23 59.5 kg (131 lb 3.2 oz)       Result Review :   The following data was reviewed by: Alexa Shah MD on 12/19/2023:  A1C Last 3 Results          6/23/2023    14:15 12/19/2023    09:04   HGBA1C Last 3 Results   Hemoglobin A1C 5.7  6.7               Physical Exam  Vitals reviewed.   Constitutional:       Appearance: Normal appearance. She is well-developed.   HENT:      Head: Normocephalic and atraumatic.   Eyes:      General:         Right eye: No discharge.         Left eye: No discharge.   Cardiovascular:      Rate and Rhythm: Normal rate and regular rhythm.      Heart sounds: Normal heart sounds. No murmur heard.     No friction rub. No gallop.   Pulmonary:      Effort: Pulmonary effort is normal. No respiratory distress.      Breath sounds: Normal breath sounds. No wheezing or rales.   Skin:     General: Skin is warm and dry.      Findings: No rash.   Neurological:      Mental Status: She is alert and oriented to person, place, and time.      Coordination: Coordination normal.      Gait: Gait normal.   Psychiatric:         Behavior: Behavior is cooperative.         Assessment and Plan:  Problems Addressed this Visit          Cardiac and Vasculature    Coronary artery disease    Mixed hyperlipidemia    Relevant Orders    Comprehensive Metabolic Panel    Lipid Panel With / Chol / HDL Ratio       Endocrine and Metabolic    Type 2 diabetes mellitus without complication, without long-term current use of insulin - Primary     Diabetes is worsening.   Continue current treatment regimen.  Dietary recommendations for ADA diet.  Diabetes will be reassessed in 3 months.         Relevant Orders    POC Glycosylated Hemoglobin (Hb A1C) (Completed)       Mental Health    " Depression, major, recurrent, mild     Patient's depression is recurrent and is mild without psychosis. Their depression is currently in partial remission and the condition is improving with lifestyle modifications. This will be reassessed at the next regular appointment. F/U as described:continue treatment            Other    Overweight with body mass index (BMI) of 26 to 26.9 in adult     Diagnoses         Codes Comments    Type 2 diabetes mellitus without complication, without long-term current use of insulin    -  Primary ICD-10-CM: E11.9  ICD-9-CM: 250.00     Mixed hyperlipidemia     ICD-10-CM: E78.2  ICD-9-CM: 272.2     Coronary artery disease involving native coronary artery of native heart without angina pectoris     ICD-10-CM: I25.10  ICD-9-CM: 414.01     Depression, major, recurrent, mild     ICD-10-CM: F33.0  ICD-9-CM: 296.31     Overweight with body mass index (BMI) of 26 to 26.9 in adult     ICD-10-CM: E66.3, Z68.26  ICD-9-CM: 278.02, V85.22                     An After Visit Summary and PPPS were given to the patient.

## 2023-12-19 ENCOUNTER — OFFICE VISIT (OUTPATIENT)
Dept: FAMILY MEDICINE CLINIC | Facility: CLINIC | Age: 78
End: 2023-12-19
Payer: MEDICARE

## 2023-12-19 VITALS
TEMPERATURE: 97.3 F | RESPIRATION RATE: 18 BRPM | DIASTOLIC BLOOD PRESSURE: 62 MMHG | BODY MASS INDEX: 26.19 KG/M2 | WEIGHT: 133.4 LBS | OXYGEN SATURATION: 99 % | HEIGHT: 60 IN | SYSTOLIC BLOOD PRESSURE: 116 MMHG | HEART RATE: 87 BPM

## 2023-12-19 DIAGNOSIS — E78.2 MIXED HYPERLIPIDEMIA: ICD-10-CM

## 2023-12-19 DIAGNOSIS — F33.0 DEPRESSION, MAJOR, RECURRENT, MILD: ICD-10-CM

## 2023-12-19 DIAGNOSIS — E11.9 TYPE 2 DIABETES MELLITUS WITHOUT COMPLICATION, WITHOUT LONG-TERM CURRENT USE OF INSULIN: Primary | ICD-10-CM

## 2023-12-19 DIAGNOSIS — E66.3 OVERWEIGHT WITH BODY MASS INDEX (BMI) OF 26 TO 26.9 IN ADULT: ICD-10-CM

## 2023-12-19 DIAGNOSIS — I25.10 CORONARY ARTERY DISEASE INVOLVING NATIVE CORONARY ARTERY OF NATIVE HEART WITHOUT ANGINA PECTORIS: ICD-10-CM

## 2023-12-19 PROBLEM — R73.9 HYPERGLYCEMIA: Status: RESOLVED | Noted: 2023-06-23 | Resolved: 2023-12-19

## 2023-12-19 LAB
EXPIRATION DATE: ABNORMAL
HBA1C MFR BLD: 6.7 % (ref 4.5–5.7)
Lab: ABNORMAL

## 2023-12-19 RX ORDER — FEXOFENADINE HCL 180 MG/1
180 TABLET ORAL DAILY
COMMUNITY

## 2023-12-20 ENCOUNTER — TELEPHONE (OUTPATIENT)
Dept: FAMILY MEDICINE CLINIC | Facility: CLINIC | Age: 78
End: 2023-12-20
Payer: MEDICARE

## 2023-12-20 NOTE — TELEPHONE ENCOUNTER
On call would have to evaluate to treat, no available appointments. Okay to send to Dr. Shah to evaluate tomorrow.

## 2023-12-20 NOTE — TELEPHONE ENCOUNTER
Mrs fowler called in today, she mentioned that she hafd an appointment yesterday with Dr Shah and while here she forgot  to mention that she's had a lot of congestion and can't seem to get anything out. She wants to know if we could send her anything in to help get everything cleared.

## 2023-12-27 PROBLEM — E11.9 TYPE 2 DIABETES MELLITUS: Status: ACTIVE | Noted: 2023-07-10

## 2023-12-27 NOTE — ASSESSMENT & PLAN NOTE
Patient's depression is recurrent and is mild without psychosis. Their depression is currently in partial remission and the condition is improving with lifestyle modifications. This will be reassessed at the next regular appointment. F/U as described:continue treatment

## 2024-01-24 LAB
ALBUMIN SERPL-MCNC: 4.2 G/DL (ref 3.8–4.8)
ALBUMIN/GLOB SERPL: 1.8 {RATIO} (ref 1.2–2.2)
ALP SERPL-CCNC: 69 IU/L (ref 44–121)
ALT SERPL-CCNC: 13 IU/L (ref 0–32)
AST SERPL-CCNC: 20 IU/L (ref 0–40)
BILIRUB SERPL-MCNC: 0.5 MG/DL (ref 0–1.2)
BUN SERPL-MCNC: 11 MG/DL (ref 8–27)
BUN/CREAT SERPL: 11 (ref 12–28)
CALCIUM SERPL-MCNC: 9.5 MG/DL (ref 8.7–10.3)
CHLORIDE SERPL-SCNC: 106 MMOL/L (ref 96–106)
CHOLEST SERPL-MCNC: 228 MG/DL (ref 100–199)
CHOLEST/HDLC SERPL: 4.4 RATIO (ref 0–4.4)
CO2 SERPL-SCNC: 24 MMOL/L (ref 20–29)
CREAT SERPL-MCNC: 1.04 MG/DL (ref 0.57–1)
EGFRCR SERPLBLD CKD-EPI 2021: 55 ML/MIN/1.73
GLOBULIN SER CALC-MCNC: 2.4 G/DL (ref 1.5–4.5)
GLUCOSE SERPL-MCNC: 114 MG/DL (ref 70–99)
HDLC SERPL-MCNC: 52 MG/DL
LDLC SERPL CALC-MCNC: 145 MG/DL (ref 0–99)
POTASSIUM SERPL-SCNC: 4.4 MMOL/L (ref 3.5–5.2)
PROT SERPL-MCNC: 6.6 G/DL (ref 6–8.5)
SODIUM SERPL-SCNC: 142 MMOL/L (ref 134–144)
TRIGL SERPL-MCNC: 173 MG/DL (ref 0–149)
VLDLC SERPL CALC-MCNC: 31 MG/DL (ref 5–40)

## 2024-03-06 RX ORDER — CARVEDILOL 3.12 MG/1
TABLET ORAL
Qty: 180 TABLET | Refills: 3 | Status: SHIPPED | OUTPATIENT
Start: 2024-03-06

## 2024-03-06 NOTE — TELEPHONE ENCOUNTER
Rx Refill Note  Requested Prescriptions     Pending Prescriptions Disp Refills    carvedilol (COREG) 3.125 MG tablet [Pharmacy Med Name: CARVEDILOL 3.125 MG TABLET] 180 tablet 3     Sig: TAKE 1 TABLET BY MOUTH TWICE A DAY WITH MEALS      Last office visit with prescribing clinician: 10/5/2023   Last telemedicine visit with prescribing clinician: Visit date not found   Next office visit with prescribing clinician: 4/11/2024                         Would you like a call back once the refill request has been completed: [] Yes [] No    If the office needs to give you a call back, can they leave a voicemail: [] Yes [] No    Pranav Valerio MA  03/06/24, 08:35 EST

## 2024-03-11 ENCOUNTER — TELEPHONE (OUTPATIENT)
Dept: FAMILY MEDICINE CLINIC | Facility: CLINIC | Age: 79
End: 2024-03-11
Payer: MEDICARE

## 2024-03-11 DIAGNOSIS — I63.81 CEREBROVASCULAR ACCIDENT (CVA) DUE TO OCCLUSION OF SMALL ARTERY: Primary | ICD-10-CM

## 2024-03-11 NOTE — TELEPHONE ENCOUNTER
"Patient is asking for a referral to Dr. Figueroa She said that she saw him 4-5 years ago as a follow up after a stroke. She said that she would like to follow up again because she has been having some \"\"woozey spells\" and some pains in her head occasionally.   "

## 2024-03-11 NOTE — TELEPHONE ENCOUNTER
"Caller: Rachael Quevedo \"Carley\"    Relationship: Self    Best call back number: [phone]    What is the medical concern/diagnosis: STROKE IN 2020. FEELING LIKE PASSING    What specialty or service is being requested: NEURO    What is the provider, practice or medical service name: DR MORELAND    What is the office phone number: [phone]     "

## 2024-03-12 NOTE — TELEPHONE ENCOUNTER
Patient called me back she said she has had two head pains about 1 month ago they were a few days apart she said it was sharp and behind her left ear it traveled to the top of her head. She said it lasted a few seconds and went away.   She said that she has had two episodes of the whoozey spells, once she was sitting down and it lasted a few seconds, not very serious the second one she said that she was in the kitchen and had trouble stepping. I scheduled for next week to see Dr. Shah since it was 1-2 months ago I did advise ER if she starts to experience those symptoms again or they are worse symptoms.

## 2024-03-15 DIAGNOSIS — J30.1 SEASONAL ALLERGIC RHINITIS DUE TO POLLEN: ICD-10-CM

## 2024-03-15 RX ORDER — FLUTICASONE PROPIONATE 50 MCG
2 SPRAY, SUSPENSION (ML) NASAL DAILY
Qty: 48 ML | Refills: 3 | Status: SHIPPED | OUTPATIENT
Start: 2024-03-15

## 2024-03-18 ENCOUNTER — OFFICE VISIT (OUTPATIENT)
Dept: FAMILY MEDICINE CLINIC | Facility: CLINIC | Age: 79
End: 2024-03-18
Payer: MEDICARE

## 2024-03-18 VITALS
OXYGEN SATURATION: 98 % | HEART RATE: 74 BPM | SYSTOLIC BLOOD PRESSURE: 128 MMHG | HEIGHT: 60 IN | BODY MASS INDEX: 26.78 KG/M2 | RESPIRATION RATE: 18 BRPM | DIASTOLIC BLOOD PRESSURE: 82 MMHG | WEIGHT: 136.4 LBS | TEMPERATURE: 97.8 F

## 2024-03-18 DIAGNOSIS — E11.9 TYPE 2 DIABETES MELLITUS WITHOUT COMPLICATION, WITHOUT LONG-TERM CURRENT USE OF INSULIN: Primary | ICD-10-CM

## 2024-03-18 DIAGNOSIS — R55 NEAR SYNCOPE: ICD-10-CM

## 2024-03-18 DIAGNOSIS — E78.2 MIXED HYPERLIPIDEMIA: ICD-10-CM

## 2024-03-18 DIAGNOSIS — E66.3 OVERWEIGHT WITH BODY MASS INDEX (BMI) OF 26 TO 26.9 IN ADULT: ICD-10-CM

## 2024-03-18 LAB
EXPIRATION DATE: NORMAL
HBA1C MFR BLD: 5.7 % (ref 4.5–5.7)
Lab: NORMAL

## 2024-03-18 RX ORDER — ACYCLOVIR 400 MG/1
1 TABLET ORAL DAILY
Qty: 1 EACH | Refills: 0 | Status: SHIPPED | OUTPATIENT
Start: 2024-03-18

## 2024-03-18 RX ORDER — DIPHENHYDRAMINE HYDROCHLORIDE 25 MG/1
TABLET ORAL
COMMUNITY

## 2024-03-18 RX ORDER — ACYCLOVIR 400 MG/1
1 TABLET ORAL
Qty: 3 EACH | Refills: 2 | Status: SHIPPED | OUTPATIENT
Start: 2024-03-18

## 2024-03-18 RX ORDER — PANTOPRAZOLE SODIUM 40 MG/1
TABLET, DELAYED RELEASE ORAL
COMMUNITY
Start: 2024-01-11

## 2024-03-18 NOTE — PROGRESS NOTES
Subjective   Rachael Quevedo is a 78 y.o. female. Presents to [facility]    Chief Complaint   Patient presents with    Diabetes    Hyperlipidemia    Syncope       History of Present Illness  Syncope:   Patient complains of near syncope. History was given by the patient. Onset was 3 weeks ago, with stable course since that time. The syncopal episode occurred while the patient was standing.  The fainting episode followed none.  Patient describes the episode as never actually lost consciousness, had precursor symptoms only, including headache. Patient also has associated symptoms of  headache. There has been only two episodes. The patient denies abdominal pain, diarrhea, excessive thirst, general feeling of lightheadedness, heavy menstrual bleeding, melena, nausea, tachycardia/palpitations, and visual aura. Taking culprit meds?: beta blockers.  Current treatment includes none.         Hyperlipidemia  This is a chronic problem. The current episode started more than 1 year ago. Exacerbating diseases include diabetes. She has no history of obesity. Pertinent negatives include no chest pain or shortness of breath. Current antihyperlipidemic treatment includes statins. The current treatment provides moderate improvement of lipids. Risk factors for coronary artery disease include dyslipidemia and diabetes mellitus.   Diabetes  She presents for her follow-up diabetic visit. She has type 2 diabetes mellitus. Pertinent negatives for hypoglycemia include no dizziness or headaches. Pertinent negatives for diabetes include no chest pain and no fatigue. Risk factors for coronary artery disease include diabetes mellitus, dyslipidemia and post-menopausal. She is following a generally healthy diet. Meal planning includes avoidance of concentrated sweets. She participates in exercise intermittently. (Does not check at home ) An ACE inhibitor/angiotensin II receptor blocker is not being taken. She does not see a  podiatrist. Eye exam is current.      She has not passed out but felt like she was going to. She has been seeing a pain doc in [facility]. She had ablation and a shot in her occipital nerve.   Nothing happened until 3 weeks. She can be sitting and a feeling of warmth radiating from her stomach up to her head. Then she would feel light headed. It lasts a few seconds. It has happened 2 times in the last 3 weeks. She has hx of cva.     I personally reviewed and updated the patient's allergies, medications, problem list, and past medical, surgical, social, and family history. I have reviewed and confirmed the accuracy of the History of Present Illness and Review of Symptoms as documented by the MA/LPN/RN. Alexa Shah MD    Allergies:  No Known Allergies    Social History:  Social History     Socioeconomic History    Marital status:    Tobacco Use    Smoking status: Never    Smokeless tobacco: Never    Tobacco comments:     Many family members smoked around me when I was a child   Vaping Use    Vaping status: Never Used   Substance and Sexual Activity    Alcohol use: Yes     Alcohol/week: 3.0 standard drinks of alcohol     Types: 2 Glasses of wine, 1 Drinks containing 0.5 oz of alcohol per week     Comment: Both not weekly but occassionally    Drug use: No    Sexual activity: Yes     Partners: Male     Birth control/protection: None     Comment: Very occasional       Family History:  Family History   Problem Relation Age of Onset    Hyperlipidemia Mother         Elevated cholesterol    Heart disease Mother     Heart attack Mother     Hypertension Father     Colon cancer Father     Hyperlipidemia Father         Elevated cholesterol    Heart disease Father     Cancer Father     Diabetes Brother     Heart disease Brother     Hyperlipidemia Brother     Hypertension Brother     Diabetes Paternal Aunt     Colon cancer Paternal Aunt     Colon cancer Paternal Uncle     Diabetes Paternal Grandmother     Heart disease  Paternal Grandmother     Arthritis Paternal Grandmother     Heart disease Maternal Uncle     Heart disease Maternal Uncle     Cancer Maternal Aunt     Cancer Paternal Uncle     Breast cancer Neg Hx     Ovarian cancer Neg Hx        Past Medical History :  Patient Active Problem List   Diagnosis    Coronary artery disease    Degeneration of intervertebral disc of lumbosacral region    Dense breast    Depression, major, recurrent, mild    GERD without esophagitis    History of chicken pox    Presence of other cardiac implants and grafts    Mixed hyperlipidemia    Left bundle branch block (LBBB)    Lumbar disc herniation with radiculopathy    Other specified menopausal and postmenopausal disorders    CVA (cerebral vascular accident)    Vasovagal syncope    Cervical radiculopathy    Long-term current use of opiate analgesic    DDD (degenerative disc disease), cervical    Medicare annual wellness visit, subsequent    Screening mammogram for breast cancer    Seasonal allergic rhinitis due to pollen    Overweight with body mass index (BMI) of 26 to 26.9 in adult    Type 2 diabetes mellitus without complication, without long-term current use of insulin    Neck mass    Thyroiditis    Lymphadenopathy    Oral phase dysphagia       Medication List:    Current Outpatient Medications:     APPLE CIDER VINEGAR PO, Take  by mouth., Disp: , Rfl:     aspirin 81 MG EC tablet, TAKE 1 TABLET BY MOUTH EVERY DAY, Disp: 90 tablet, Rfl: 3    Biotin 5 MG capsule, Take  by mouth., Disp: , Rfl:     calcium carbonate (OS-SHYLA) 600 MG tablet, Take 1 tablet by mouth 2 (Two) Times a Day With Meals., Disp: , Rfl:     carvedilol (COREG) 3.125 MG tablet, TAKE 1 TABLET BY MOUTH TWICE A DAY WITH MEALS, Disp: 180 tablet, Rfl: 3    Cholecalciferol (Vitamin D3) 50 MCG (2000 UT) tablet, Take  by mouth., Disp: , Rfl:     fexofenadine (ALLEGRA) 180 MG tablet, Take 1 tablet by mouth Daily. Pt taking 1/2 tablet daily, Disp: , Rfl:     fluticasone (FLONASE) 50  MCG/ACT nasal spray, SPRAY 2 SPRAYS INTO THE NOSTRIL DAILY AS DIRECTED BY PROVIDER, Disp: 48 mL, Rfl: 3    magnesium gluconate (MAGONATE) 500 MG tablet, Take 1 tablet by mouth Daily., Disp: , Rfl:     Multiple Vitamins-Minerals (WOMENS MULTIVITAMIN + COLLAGEN PO), , Disp: , Rfl:     pantoprazole (PROTONIX) 40 MG EC tablet, TAKE 1 TABLET BY MOUTH EVERY DAY IN THE MORNING BEFORE BREAKFAST, Disp: , Rfl:     Probiotic Product (PROBIOTIC DAILY PO), Take 1 capsule by mouth Daily., Disp: , Rfl:     rosuvastatin (CRESTOR) 10 MG tablet, TAKE 1 TABLET BY MOUTH EVERY DAY, Disp: 90 tablet, Rfl: 3    vitamin B-12 (CYANOCOBALAMIN) 1000 MCG tablet, Take 1 tablet by mouth Daily., Disp: , Rfl:     Continuous Blood Gluc  (Dexcom G7 ) device, Use 1 Device Daily. One  device to continuously monitor blood sugar for insulin dependent diabetic patient, Disp: 1 each, Rfl: 0    Continuous Blood Gluc Sensor (Dexcom G7 Sensor) misc, Use 1 package Every 10 (Ten) Days. Use with dexcom  to continuously monitor blood sugar for insulin dependent diabetic patient, Disp: 3 each, Rfl: 2    Past Surgical History:  Past Surgical History:   Procedure Laterality Date    CARDIAC CATHETERIZATION N/A 01/27/2020    Procedure: Left Heart Cath with angiogram;  Surgeon: Jermaine Delong MD;  Location: [facility] CATH INVASIVE LOCATION;  Service: Cardiovascular    CARDIAC CATHETERIZATION N/A 01/27/2020    Procedure: Stent BOBBY coronary;  Surgeon: Jermaine Delong MD;  Location: [facility] CATH INVASIVE LOCATION;  Service: Cardiovascular    CARDIAC CATHETERIZATION N/A 01/27/2020    Procedure: Left ventriculography;  Surgeon: Jermaine Delong MD;  Location: [facility] CATH INVASIVE LOCATION;  Service: Cardiovascular    CARDIAC CATHETERIZATION N/A 01/27/2020    Procedure: Coronary angiography;  Surgeon: Jermaine Delong MD;  Location: [facility] CATH INVASIVE LOCATION;  Service: Cardiovascular    CATARACT EXTRACTION, BILATERAL Bilateral 1999     "CHOLECYSTECTOMY  1993??    COLONOSCOPY  Last one 2023    CORONARY STENT PLACEMENT  1/28/2020    GALLBLADDER SURGERY  1996    LAPAROSCOPIC TUBAL LIGATION  1980    RENAL ARTERY STENT  01/26/2020    ROTATOR CUFF REPAIR Right 2002    TUBAL ABDOMINAL LIGATION  1979??         Physical Exam:      Vital Signs:    Vitals:    03/18/24 1518   BP: 128/82   Pulse: 74   Resp: 18   Temp: 97.8 °F (36.6 °C)   SpO2: 98%        /82 (BP Location: Left arm, Patient Position: Sitting, Cuff Size: Adult)   Pulse 74   Temp 97.8 °F (36.6 °C) (Temporal)   Resp 18   Ht 152.4 cm (60\")   Wt 61.9 kg (136 lb 6.4 oz)   LMP  (LMP Unknown)   SpO2 98% Comment: room air  BMI 26.64 kg/m²     Wt Readings from Last 3 Encounters:   03/18/24 61.9 kg (136 lb 6.4 oz)   12/19/23 60.5 kg (133 lb 6.4 oz)   10/05/23 59 kg (130 lb)       Result Review :   The following data was reviewed by: Alexa Shah MD on 03/18/2024:  A1C Last 3 Results          6/23/2023    14:15 12/19/2023    09:04 3/18/2024    15:35   HGBA1C Last 3 Results   Hemoglobin A1C 5.7  6.7  5.7               Physical Exam  Vitals reviewed.   Constitutional:       Appearance: Normal appearance. She is well-developed.   HENT:      Head: Normocephalic and atraumatic.      Right Ear: Tympanic membrane normal.      Left Ear: Tympanic membrane normal.   Eyes:      General:         Right eye: No discharge.         Left eye: No discharge.      Extraocular Movements: Extraocular movements intact.      Pupils: Pupils are equal, round, and reactive to light.   Cardiovascular:      Rate and Rhythm: Normal rate and regular rhythm.      Heart sounds: Normal heart sounds. No murmur heard.     No friction rub. No gallop.   Pulmonary:      Effort: Pulmonary effort is normal. No respiratory distress.      Breath sounds: Normal breath sounds. No wheezing or rales.   Musculoskeletal:         General: Normal range of motion.   Skin:     General: Skin is warm and dry.      Findings: No rash. "   Neurological:      General: No focal deficit present.      Mental Status: She is alert and oriented to person, place, and time.      Cranial Nerves: No cranial nerve deficit.      Motor: No weakness.      Coordination: Coordination normal.      Gait: Gait normal.      Deep Tendon Reflexes: Reflexes normal.   Psychiatric:         Behavior: Behavior is cooperative.         Assessment and Plan:  Dicussed if there is loss of vision, confusion, weakness or numbness in an extremity, dropping of an eyelid or one side of the face, severe pain, intractable vomiting, go to the ER      Problems Addressed this Visit          Cardiac and Vasculature    Mixed hyperlipidemia     She is on crestor             Endocrine and Metabolic    Type 2 diabetes mellitus without complication, without long-term current use of insulin - Primary     Diabetes is improving with lifestyle modifications.   Continue current treatment regimen.  Recommended an ADA diet.  Recommended a Mediterranean style of eating  Diabetes will be reassessed in 3 months         Relevant Medications    Continuous Blood Gluc  (Dexcom G7 ) device    Continuous Blood Gluc Sensor (Dexcom G7 Sensor) misc    Other Relevant Orders    POC Glycosylated Hemoglobin (Hb A1C) (Completed)       Other    Overweight with body mass index (BMI) of 26 to 26.9 in adult     Patient's (Body mass index is 26.64 kg/m².) indicates that they are overweight with health conditions that include hypertension and dyslipidemias . Weight is worsening. BMI is is above average; BMI management plan is completed. We discussed low calorie, low carb based diet program, portion control, and increasing exercise.           Other Visit Diagnoses       Near syncope        neurovascularly intact. Nelly get CT scan. She is slightly orthostatic. Increase fluids. Will get dexcom ordered to check glucose    Relevant Orders    CT Head Without Contrast          Diagnoses         Codes Comments    Type 2  diabetes mellitus without complication, without long-term current use of insulin    -  Primary ICD-10-CM: E11.9  ICD-9-CM: 250.00     Mixed hyperlipidemia     ICD-10-CM: E78.2  ICD-9-CM: 272.2     Near syncope     ICD-10-CM: R55  ICD-9-CM: 780.2 neurovascularly intact. Nelly get CT scan. She is slightly orthostatic. Increase fluids. Will get dexcom ordered to check glucose    Overweight with body mass index (BMI) of 26 to 26.9 in adult     ICD-10-CM: E66.3, Z68.26  ICD-9-CM: 278.02, V85.22                          An After Visit Summary and PPPS were given to the patient.

## 2024-03-18 NOTE — ASSESSMENT & PLAN NOTE
Diabetes is improving with lifestyle modifications.   Continue current treatment regimen.  Recommended an ADA diet.  Recommended a Mediterranean style of eating  Diabetes will be reassessed in 3 months

## 2024-03-26 ENCOUNTER — TELEPHONE (OUTPATIENT)
Dept: FAMILY MEDICINE CLINIC | Facility: CLINIC | Age: 79
End: 2024-03-26
Payer: MEDICARE

## 2024-03-26 NOTE — TELEPHONE ENCOUNTER
"  Caller: Rachael Quevedo \"David\"    Relationship: Self    Best call back number: [phone]     What is the best time to reach you: ANY    Who are you requesting to speak with (clinical staff, provider,  specific staff member): CLINICAL    Do you know the name of the person who called: DAVID    What was the call regarding:   PATIENT GOT HER DEXACOM YESTERDAY AND PUT IT ON. SHE STATES THAT AT ABOUT 2AM THIS MORNING IT WENT OFF. AND THE MONITOR READ FOR HER TO \"DO SOMETHING\" BUT IT DID NOT TELL HER WHAT. SHE GOT UP AND DRANK SOME WATER.   SHE STATES THAT THE MONITOR IS OK AT PRESENT BUT SHE NEEDS SOME INSTRUCTIONS ON THE MONITOR. THE INSTRUCTIONS IN THE BOX DID NOT TELL HER WHAT TO DO.  PLEASE CALL PATIENT TO DISCUSS ISSUE    "

## 2024-03-26 NOTE — TELEPHONE ENCOUNTER
Called pt and let her know she said it read 66. I told her if it happens again to please drink orange juice or a spoon of peanut butter can help bring her sugar up.

## 2024-03-27 ENCOUNTER — TELEPHONE (OUTPATIENT)
Dept: FAMILY MEDICINE CLINIC | Facility: CLINIC | Age: 79
End: 2024-03-27
Payer: MEDICARE

## 2024-03-28 NOTE — ASSESSMENT & PLAN NOTE
Patient's (Body mass index is 26.64 kg/m².) indicates that they are overweight with health conditions that include hypertension and dyslipidemias . Weight is worsening. BMI is is above average; BMI management plan is completed. We discussed low calorie, low carb based diet program, portion control, and increasing exercise.

## 2024-03-28 NOTE — TELEPHONE ENCOUNTER
Sent [employer] message asking if patient is okay with increaseing her crestor back to 20mg?

## 2024-04-04 ENCOUNTER — HOSPITAL ENCOUNTER (OUTPATIENT)
Dept: CT IMAGING | Facility: HOSPITAL | Age: 79
Discharge: HOME | End: 2024-04-04
Admitting: FAMILY MEDICINE
Payer: MEDICARE

## 2024-04-04 DIAGNOSIS — R55 NEAR SYNCOPE: ICD-10-CM

## 2024-04-04 PROCEDURE — 70450 CT HEAD/BRAIN W/O DYE: CPT

## 2024-04-11 ENCOUNTER — OFFICE VISIT (OUTPATIENT)
Dept: CARDIOLOGY | Facility: CLINIC | Age: 79
End: 2024-04-11
Payer: MEDICARE

## 2024-04-11 VITALS
HEART RATE: 63 BPM | WEIGHT: 134 LBS | HEIGHT: 60 IN | SYSTOLIC BLOOD PRESSURE: 123 MMHG | BODY MASS INDEX: 26.31 KG/M2 | OXYGEN SATURATION: 98 % | DIASTOLIC BLOOD PRESSURE: 72 MMHG

## 2024-04-11 DIAGNOSIS — I10 PRIMARY HYPERTENSION: ICD-10-CM

## 2024-04-11 DIAGNOSIS — I25.10 CORONARY ARTERY DISEASE INVOLVING NATIVE CORONARY ARTERY OF NATIVE HEART WITHOUT ANGINA PECTORIS: Primary | ICD-10-CM

## 2024-04-11 DIAGNOSIS — E78.00 PURE HYPERCHOLESTEROLEMIA: ICD-10-CM

## 2024-04-11 PROCEDURE — 1160F RVW MEDS BY RX/DR IN RCRD: CPT | Performed by: INTERNAL MEDICINE

## 2024-04-11 PROCEDURE — 1159F MED LIST DOCD IN RCRD: CPT | Performed by: INTERNAL MEDICINE

## 2024-04-11 PROCEDURE — 99214 OFFICE O/P EST MOD 30 MIN: CPT | Performed by: INTERNAL MEDICINE

## 2024-04-11 NOTE — PROGRESS NOTES
Subjective:     Encounter Date:04/11/2024      Patient ID: Rachael Quevedo is a 78 y.o. female.    Chief Complaint:  History of Present Illness 78-year-old white female with history of coronary disease hypertension hyperlipidemia presents to my office for a follow-up.  Patient is currently stable without any symptoms of chest pain or shortness of breath at rest or exertion.  No complaint any PND or orthopnea.  No palpitation dizziness syncope or swelling of the feet.  Patient is taking all medicines regularly.  Patient does not smoke.    The following portions of the patient's history were reviewed and updated as appropriate: allergies, current medications, past family history, past medical history, past social history, past surgical history, and problem list.  Past Medical History:   Diagnosis Date    Allergic     Ankle fracture, left     Comments: 2016    Arthritis     Cataract, acquired     Coronary artery disease     Cough     Impression: Most likely reactive airway prescription as below Start antihistamine at home    Dense breast     Depression, major, recurrent, mild     GERD without esophagitis     Comments: Dr Duncan- protonix    Hematuria, microscopic     History of chicken pox     History of loop recorder     Hyperglycemia     Hyperlipidemia     Hypertension 01/04/2020    Injury of back     Injury of neck     Left bundle branch block (LBBB)     Malaise and fatigue     Medicare annual wellness visit, initial     with abnormal findings    Osteopenia 2022    Other specified menopausal and perimenopausal disorders     Other specified menopausal and postmenopausal disorders    Pap smear, low-risk     Renal insufficiency     S/P right rotator cuff repair     Screening for alcoholism     10 or more drinks per week    Screening for depression     Negative Depression Screening ( 9 or less) ()    Screening mammogram, encounter for     Stroke     1/5/2020 no residual effects    Vasovagal near-syncope      "Impression: from illness and cough Discussed if there is loss of vision in an eye, confusion, weakness or numbness in an extremity, drooping of a side of the face, intractible pain, intractible vomiting, to go to the ER.     Past Surgical History:   Procedure Laterality Date    CARDIAC CATHETERIZATION N/A 01/27/2020    Procedure: Left Heart Cath with angiogram;  Surgeon: Jermaine Delong MD;  Location: [facility] CATH INVASIVE LOCATION;  Service: Cardiovascular    CARDIAC CATHETERIZATION N/A 01/27/2020    Procedure: Stent BOBBY coronary;  Surgeon: Jermaine Delong MD;  Location: [facility] CATH INVASIVE LOCATION;  Service: Cardiovascular    CARDIAC CATHETERIZATION N/A 01/27/2020    Procedure: Left ventriculography;  Surgeon: Jermaine Delong MD;  Location: [facility] CATH INVASIVE LOCATION;  Service: Cardiovascular    CARDIAC CATHETERIZATION N/A 01/27/2020    Procedure: Coronary angiography;  Surgeon: Jermaine Delong MD;  Location: [facility] CATH INVASIVE LOCATION;  Service: Cardiovascular    CATARACT EXTRACTION, BILATERAL Bilateral 1999    CHOLECYSTECTOMY  1993??    COLONOSCOPY  Last one 2023    CORONARY STENT PLACEMENT  1/28/2020    GALLBLADDER SURGERY  1996    LAPAROSCOPIC TUBAL LIGATION  1980    RENAL ARTERY STENT  01/26/2020    ROTATOR CUFF REPAIR Right 2002    TUBAL ABDOMINAL LIGATION  1979??     /72   Pulse 63   Ht 152.4 cm (60\")   Wt 60.8 kg (134 lb)   LMP  (LMP Unknown)   SpO2 98%   BMI 26.17 kg/m²   Family History   Problem Relation Age of Onset    Hyperlipidemia Mother         Elevated cholesterol    Heart disease Mother     Heart attack Mother     Hypertension Father     Colon cancer Father     Hyperlipidemia Father         Elevated cholesterol    Heart disease Father     Cancer Father     Diabetes Brother     Heart disease Brother     Hyperlipidemia Brother     Hypertension Brother     Diabetes Paternal Aunt     Colon cancer Paternal Aunt     Colon cancer Paternal Uncle     Diabetes Paternal Grandmother     Heart " disease Paternal Grandmother     Arthritis Paternal Grandmother     Heart disease Maternal Uncle     Heart disease Maternal Uncle     Cancer Maternal Aunt     Cancer Paternal Uncle     Breast cancer Neg Hx     Ovarian cancer Neg Hx        Current Outpatient Medications:     APPLE CIDER VINEGAR PO, Take  by mouth., Disp: , Rfl:     aspirin 81 MG EC tablet, TAKE 1 TABLET BY MOUTH EVERY DAY, Disp: 90 tablet, Rfl: 3    Biotin 5 MG capsule, Take  by mouth., Disp: , Rfl:     calcium carbonate (OS-SHYLA) 600 MG tablet, Take 1 tablet by mouth 2 (Two) Times a Day With Meals., Disp: , Rfl:     carvedilol (COREG) 3.125 MG tablet, TAKE 1 TABLET BY MOUTH TWICE A DAY WITH MEALS, Disp: 180 tablet, Rfl: 3    Cholecalciferol (Vitamin D3) 50 MCG (2000 UT) tablet, Take  by mouth., Disp: , Rfl:     Continuous Blood Gluc  (Dexcom G7 ) device, Use 1 Device Daily. One  device to continuously monitor blood sugar for insulin dependent diabetic patient, Disp: 1 each, Rfl: 0    Continuous Blood Gluc Sensor (Dexcom G7 Sensor) misc, Use 1 package Every 10 (Ten) Days. Use with dexcom  to continuously monitor blood sugar for insulin dependent diabetic patient, Disp: 3 each, Rfl: 2    fexofenadine (ALLEGRA) 180 MG tablet, Take 1 tablet by mouth Daily. Pt taking 1/2 tablet daily, Disp: , Rfl:     fluticasone (FLONASE) 50 MCG/ACT nasal spray, SPRAY 2 SPRAYS INTO THE NOSTRIL DAILY AS DIRECTED BY PROVIDER, Disp: 48 mL, Rfl: 3    magnesium gluconate (MAGONATE) 500 MG tablet, Take 1 tablet by mouth Daily., Disp: , Rfl:     Multiple Vitamins-Minerals (WOMENS MULTIVITAMIN + COLLAGEN PO), , Disp: , Rfl:     pantoprazole (PROTONIX) 40 MG EC tablet, TAKE 1 TABLET BY MOUTH EVERY DAY IN THE MORNING BEFORE BREAKFAST, Disp: , Rfl:     Probiotic Product (PROBIOTIC DAILY PO), Take 1 capsule by mouth Daily., Disp: , Rfl:     rosuvastatin (CRESTOR) 10 MG tablet, TAKE 1 TABLET BY MOUTH EVERY DAY (Patient taking differently: Take 2  tablets by mouth Daily.), Disp: 90 tablet, Rfl: 3    vitamin B-12 (CYANOCOBALAMIN) 1000 MCG tablet, Take 1 tablet by mouth Daily., Disp: , Rfl:   No Known Allergies  Social History     Socioeconomic History    Marital status:    Tobacco Use    Smoking status: Never    Smokeless tobacco: Never    Tobacco comments:     Many family members smoked around me when I was a child   Vaping Use    Vaping status: Never Used   Substance and Sexual Activity    Alcohol use: Yes     Alcohol/week: 3.0 standard drinks of alcohol     Types: 2 Glasses of wine, 1 Drinks containing 0.5 oz of alcohol per week     Comment: Both not weekly but occassionally    Drug use: No    Sexual activity: Yes     Partners: Male     Birth control/protection: None     Comment: Very occasional     Review of Systems   Constitutional: Negative for malaise/fatigue.   Cardiovascular:  Negative for chest pain, dyspnea on exertion, leg swelling and palpitations.   Respiratory:  Negative for cough and shortness of breath.    Gastrointestinal:  Negative for abdominal pain, nausea and vomiting.   Neurological:  Negative for dizziness, focal weakness, headaches, light-headedness and numbness.   All other systems reviewed and are negative.             Objective:     Constitutional:       Appearance: Well-developed.   Eyes:      General: No scleral icterus.     Conjunctiva/sclera: Conjunctivae normal.   HENT:      Head: Normocephalic and atraumatic.   Neck:      Vascular: No carotid bruit or JVD.   Pulmonary:      Effort: Pulmonary effort is normal.      Breath sounds: Normal breath sounds. No wheezing. No rales.   Cardiovascular:      Normal rate. Regular rhythm.   Pulses:     Intact distal pulses.   Abdominal:      General: Bowel sounds are normal.      Palpations: Abdomen is soft.   Musculoskeletal:      Cervical back: Normal range of motion and neck supple. Skin:     General: Skin is warm and dry.      Findings: No rash.   Neurological:      Mental Status:  Alert.       Procedures    Lab Review:         MDM    #1 coronary artery disease  Patient has nonobstructive disease in the past and is currently stable on medications with normal function  2.  Hypertension  Patient blood pressure currently stable on medications including carvedilol  3.  Hyperlipidemia  Patient is on Crestor and the lipid levels are well within normal limits.    Patient's previous medical records, labs, and EKG were reviewed and discussed with the patient at today's visit.

## 2024-05-15 ENCOUNTER — TELEPHONE (OUTPATIENT)
Dept: FAMILY MEDICINE CLINIC | Facility: CLINIC | Age: 79
End: 2024-05-15
Payer: MEDICARE

## 2024-05-15 NOTE — TELEPHONE ENCOUNTER
"Caller: Rachael Quevedo \"Carley\"    Relationship: Self    Best call back number: [phone]    What medication are you requesting: CRESTOR 20 MG ONCE A DAY    What are your current symptoms: N/A    How long have you been experiencing symptoms: N/A    Have you had these symptoms before:    [x] Yes  [] No    Have you been treated for these symptoms before:   [x] Yes  [] No    If a prescription is needed, what is your preferred pharmacy and phone number: [id number] - [facility] [place] [address] - [phone] [facility] [phone]      Additional notes:    PATIENT SAID SHE IS NOW OUT OF 10 MG CRESTOR AND WOULD LIKE TO GET A PRESCRIPTION SENT IN FOR 20 MG A DAY    "

## 2024-05-16 RX ORDER — ROSUVASTATIN CALCIUM 20 MG/1
20 TABLET, COATED ORAL DAILY
Qty: 30 TABLET | Refills: 12 | Status: SHIPPED | OUTPATIENT
Start: 2024-05-16

## 2024-07-11 NOTE — PROGRESS NOTES
Subjective   Rachael Quevedo is a 78 y.o. female. Presents to [facility]    Chief Complaint   Patient presents with    Diabetes    Hyperlipidemia       History of Present Illness         Hyperlipidemia  This is a chronic problem. The current episode started more than 1 year ago. Exacerbating diseases include diabetes. She has no history of obesity. Pertinent negatives include no chest pain or shortness of breath. Current antihyperlipidemic treatment includes statins. The current treatment provides moderate improvement of lipids. Risk factors for coronary artery disease include dyslipidemia and diabetes mellitus.   Diabetes  She presents for her follow-up diabetic visit. She has type 2 diabetes mellitus. Pertinent negatives for hypoglycemia include no dizziness, headaches or sweats. Pertinent negatives for diabetes include no chest pain, no fatigue and no weakness. Risk factors for coronary artery disease include diabetes mellitus, dyslipidemia and post-menopausal. She is following a generally healthy diet. Meal planning includes avoidance of concentrated sweets. She participates in exercise intermittently. (Does not check at home ) An ACE inhibitor/angiotensin II receptor blocker is not being taken. She does not see a podiatrist. Eye exam is current.      She noticed a lump in her left leg 1 month ago. Not changed in size. Not tender. No leg swelling, no erythema.    She has had sharp pains in her pelvis. She eats oranges and she drinks coffee. It has happened a few times and has been a while since it occured    I personally reviewed and updated the patient's allergies, medications, problem list, and past medical, surgical, social, and family history. I have reviewed and confirmed the accuracy of the History of Present Illness and Review of Symptoms as documented by the MA/LPN/RN. Alexa Shah MD    Allergies:  No Known Allergies    Social History:  Social History     Socioeconomic History     Marital status:    Tobacco Use    Smoking status: Never    Smokeless tobacco: Never    Tobacco comments:     Many family members smoked around me when I was a child   Vaping Use    Vaping status: Never Used   Substance and Sexual Activity    Alcohol use: Yes     Alcohol/week: 3.0 standard drinks of alcohol     Types: 2 Glasses of wine, 1 Drinks containing 0.5 oz of alcohol per week     Comment: Both not weekly but occassionally    Drug use: No    Sexual activity: Yes     Partners: Male     Birth control/protection: None     Comment: Very occasional       Family History:  Family History   Problem Relation Age of Onset    Hyperlipidemia Mother         Elevated cholesterol    Heart disease Mother     Heart attack Mother     Hypertension Father     Colon cancer Father     Hyperlipidemia Father         Elevated cholesterol    Heart disease Father     Cancer Father     Diabetes Brother     Heart disease Brother     Hyperlipidemia Brother     Hypertension Brother     Diabetes Paternal Aunt     Colon cancer Paternal Aunt     Colon cancer Paternal Uncle     Diabetes Paternal Grandmother     Heart disease Paternal Grandmother     Arthritis Paternal Grandmother     Heart disease Maternal Uncle     Heart disease Maternal Uncle     Cancer Maternal Aunt     Cancer Paternal Uncle     Breast cancer Neg Hx     Ovarian cancer Neg Hx        Past Medical History :  Patient Active Problem List   Diagnosis    Coronary artery disease    Degeneration of intervertebral disc of lumbosacral region    Dense breast    Depression, major, recurrent, mild    GERD without esophagitis    History of chicken pox    Presence of other cardiac implants and grafts    Mixed hyperlipidemia    Left bundle branch block (LBBB)    Lumbar disc herniation with radiculopathy    Other specified menopausal and postmenopausal disorders    CVA (cerebral vascular accident)    Vasovagal syncope    Cervical radiculopathy    Long-term current use of opiate  analgesic    DDD (degenerative disc disease), cervical    Medicare annual wellness visit, subsequent    Screening mammogram for breast cancer    Seasonal allergic rhinitis due to pollen    Overweight with body mass index (BMI) of 26 to 26.9 in adult    Type 2 diabetes mellitus without complication, without long-term current use of insulin    Neck mass    Thyroiditis    Lymphadenopathy    Oral phase dysphagia    Dysuria    Skin lesion       Medication List:    Current Outpatient Medications:     aspirin 81 MG EC tablet, TAKE 1 TABLET BY MOUTH EVERY DAY, Disp: 90 tablet, Rfl: 3    Biotin 5 MG capsule, Take  by mouth., Disp: , Rfl:     calcium carbonate (OS-SHYLA) 600 MG tablet, Take 1 tablet by mouth 2 (Two) Times a Day With Meals., Disp: , Rfl:     carvedilol (COREG) 3.125 MG tablet, TAKE 1 TABLET BY MOUTH TWICE A DAY WITH MEALS, Disp: 180 tablet, Rfl: 3    Cholecalciferol (Vitamin D3) 50 MCG (2000 UT) tablet, Take  by mouth., Disp: , Rfl:     fexofenadine (ALLEGRA) 180 MG tablet, Take 1 tablet by mouth Daily. Pt taking 1/2 tablet daily, Disp: , Rfl:     fluticasone (FLONASE) 50 MCG/ACT nasal spray, 2 sprays into the nostril(s) as directed by provider Daily. As directed by the provider, Disp: 48 mL, Rfl: 3    magnesium gluconate (MAGONATE) 500 MG tablet, Take 1 tablet by mouth Daily., Disp: , Rfl:     Multiple Vitamins-Minerals (WOMENS MULTIVITAMIN + COLLAGEN PO), , Disp: , Rfl:     pantoprazole (PROTONIX) 40 MG EC tablet, TAKE 1 TABLET BY MOUTH EVERY DAY IN THE MORNING BEFORE BREAKFAST, Disp: , Rfl:     Probiotic Product (PROBIOTIC DAILY PO), Take 1 capsule by mouth Daily., Disp: , Rfl:     rosuvastatin (CRESTOR) 20 MG tablet, Take 1 tablet by mouth Daily., Disp: 30 tablet, Rfl: 12    vitamin B-12 (CYANOCOBALAMIN) 1000 MCG tablet, Take 1 tablet by mouth Daily., Disp: , Rfl:     Past Surgical History:  Past Surgical History:   Procedure Laterality Date    CARDIAC CATHETERIZATION N/A 01/27/2020    Procedure: Left Heart  "Cath with angiogram;  Surgeon: Jermaine Delong MD;  Location: [facility] CATH INVASIVE LOCATION;  Service: Cardiovascular    CARDIAC CATHETERIZATION N/A 01/27/2020    Procedure: Stent BOBBY coronary;  Surgeon: Jermaine Delong MD;  Location: [facility] CATH INVASIVE LOCATION;  Service: Cardiovascular    CARDIAC CATHETERIZATION N/A 01/27/2020    Procedure: Left ventriculography;  Surgeon: Jermaine Delong MD;  Location: [facility] CATH INVASIVE LOCATION;  Service: Cardiovascular    CARDIAC CATHETERIZATION N/A 01/27/2020    Procedure: Coronary angiography;  Surgeon: Jermaine Delong MD;  Location: [facility] CATH INVASIVE LOCATION;  Service: Cardiovascular    CATARACT EXTRACTION, BILATERAL Bilateral 1999    CHOLECYSTECTOMY  1993??    COLONOSCOPY  Last one 2023    CORONARY STENT PLACEMENT  1/28/2020    GALLBLADDER SURGERY  1996    LAPAROSCOPIC TUBAL LIGATION  1980    RENAL ARTERY STENT  01/26/2020    ROTATOR CUFF REPAIR Right 2002    TUBAL ABDOMINAL LIGATION  1979??         Physical Exam:      Vital Signs:    Vitals:    07/15/24 1339   BP: 122/60   Pulse: 75   Resp: 17   Temp: 97.6 °F (36.4 °C)   SpO2: 100%        /60 (BP Location: Right arm, Patient Position: Sitting, Cuff Size: Adult)   Pulse 75   Temp 97.6 °F (36.4 °C) (Temporal)   Resp 17   Ht 152.4 cm (60\")   Wt 59.9 kg (132 lb)   LMP  (LMP Unknown)   SpO2 100% Comment: room air  BMI 25.78 kg/m²     Wt Readings from Last 3 Encounters:   07/15/24 59.9 kg (132 lb)   04/11/24 60.8 kg (134 lb)   03/18/24 61.9 kg (136 lb 6.4 oz)       Result Review :   The following data was reviewed by: Alexa Shah MD on 07/15/2024:  A1C Last 3 Results          12/19/2023    09:04 3/18/2024    15:35 7/15/2024    13:58   HGBA1C Last 3 Results   Hemoglobin A1C 6.7  5.7  5.9               Brief Urine Lab Results  (Last result in the past 365 days)        Color   Clarity   Blood   Leuk Est   Nitrite   Protein   CREAT   Urine HCG        07/15/24 1400 Yellow   Clear   Negative   Negative   " Negative   Negative                     Physical Exam  Vitals reviewed.   Constitutional:       Appearance: Normal appearance. She is well-developed.   HENT:      Head: Normocephalic and atraumatic.   Eyes:      General:         Right eye: No discharge.         Left eye: No discharge.   Cardiovascular:      Rate and Rhythm: Normal rate and regular rhythm.      Heart sounds: Normal heart sounds. No murmur heard.     No friction rub. No gallop.   Pulmonary:      Effort: Pulmonary effort is normal. No respiratory distress.      Breath sounds: Normal breath sounds. No wheezing or rales.   Musculoskeletal:        Legs:       Comments: Negative maci sign  Soft lesion, non tender, small. lipoma     Skin:     General: Skin is warm and dry.      Findings: No rash.   Neurological:      Mental Status: She is alert and oriented to person, place, and time.      Coordination: Coordination normal.      Gait: Gait normal.   Psychiatric:         Behavior: Behavior is cooperative.         Assessment and Plan:  Problems Addressed this Visit          Allergies and Adverse Reactions    Seasonal allergic rhinitis due to pollen    Relevant Medications    fluticasone (FLONASE) 50 MCG/ACT nasal spray       Cardiac and Vasculature    Mixed hyperlipidemia      She is on crestor  Continue current treatment  Check labs         Relevant Orders    Comprehensive Metabolic Panel (Completed)    Lipid Panel With / Chol / HDL Ratio (Completed)       Endocrine and Metabolic    Type 2 diabetes mellitus without complication, without long-term current use of insulin - Primary     Diabetes is stable.   Continue current treatment regimen.  Recommended a Mediterranean style of eating  Diabetes will be reassessed in 3 months         Relevant Orders    POC Glycosylated Hemoglobin (Hb A1C) (Completed)    POCT urinalysis dipstick, manual (Completed)    Thyroiditis     Recheck labs         Relevant Orders    TSH (Completed)       Genitourinary and Reproductive      Dysuria     Most likely cystitis. Stop acidic foods and drinks            Other    Overweight with body mass index (BMI) of 26 to 26.9 in adult     Diagnoses         Codes Comments    Type 2 diabetes mellitus without complication, without long-term current use of insulin    -  Primary ICD-10-CM: E11.9  ICD-9-CM: 250.00     Mixed hyperlipidemia     ICD-10-CM: E78.2  ICD-9-CM: 272.2     Overweight with body mass index (BMI) of 26 to 26.9 in adult     ICD-10-CM: E66.3, Z68.26  ICD-9-CM: 278.02, V85.22     Seasonal allergic rhinitis due to pollen     ICD-10-CM: J30.1  ICD-9-CM: 477.0     Dysuria     ICD-10-CM: R30.0  ICD-9-CM: 788.1     Thyroiditis     ICD-10-CM: E06.9  ICD-9-CM: 245.9                          An After Visit Summary and PPPS were given to the patient.

## 2024-07-15 ENCOUNTER — OFFICE VISIT (OUTPATIENT)
Dept: FAMILY MEDICINE CLINIC | Facility: CLINIC | Age: 79
End: 2024-07-15
Payer: MEDICARE

## 2024-07-15 VITALS
RESPIRATION RATE: 17 BRPM | BODY MASS INDEX: 25.91 KG/M2 | TEMPERATURE: 97.6 F | HEART RATE: 75 BPM | WEIGHT: 132 LBS | DIASTOLIC BLOOD PRESSURE: 60 MMHG | SYSTOLIC BLOOD PRESSURE: 122 MMHG | OXYGEN SATURATION: 100 % | HEIGHT: 60 IN

## 2024-07-15 DIAGNOSIS — E11.9 TYPE 2 DIABETES MELLITUS WITHOUT COMPLICATION, WITHOUT LONG-TERM CURRENT USE OF INSULIN: Primary | ICD-10-CM

## 2024-07-15 DIAGNOSIS — J30.1 SEASONAL ALLERGIC RHINITIS DUE TO POLLEN: ICD-10-CM

## 2024-07-15 DIAGNOSIS — R30.0 DYSURIA: ICD-10-CM

## 2024-07-15 DIAGNOSIS — E06.9 THYROIDITIS: ICD-10-CM

## 2024-07-15 DIAGNOSIS — E66.3 OVERWEIGHT WITH BODY MASS INDEX (BMI) OF 26 TO 26.9 IN ADULT: ICD-10-CM

## 2024-07-15 DIAGNOSIS — E78.2 MIXED HYPERLIPIDEMIA: ICD-10-CM

## 2024-07-15 LAB
BILIRUB BLD-MCNC: NEGATIVE MG/DL
CLARITY, POC: CLEAR
COLOR UR: YELLOW
EXPIRATION DATE: ABNORMAL
GLUCOSE UR STRIP-MCNC: NEGATIVE MG/DL
HBA1C MFR BLD: 5.9 % (ref 4.5–5.7)
KETONES UR QL: NEGATIVE
LEUKOCYTE EST, POC: NEGATIVE
Lab: ABNORMAL
NITRITE UR-MCNC: NEGATIVE MG/ML
PH UR: 5 [PH] (ref 5–8)
PROT UR STRIP-MCNC: NEGATIVE MG/DL
RBC # UR STRIP: NEGATIVE /UL
SP GR UR: 1.01 (ref 1–1.03)
UROBILINOGEN UR QL: NORMAL

## 2024-07-15 PROCEDURE — 81002 URINALYSIS NONAUTO W/O SCOPE: CPT | Performed by: FAMILY MEDICINE

## 2024-07-15 PROCEDURE — 83036 HEMOGLOBIN GLYCOSYLATED A1C: CPT | Performed by: FAMILY MEDICINE

## 2024-07-15 PROCEDURE — 1126F AMNT PAIN NOTED NONE PRSNT: CPT | Performed by: FAMILY MEDICINE

## 2024-07-15 PROCEDURE — 99214 OFFICE O/P EST MOD 30 MIN: CPT | Performed by: FAMILY MEDICINE

## 2024-07-15 PROCEDURE — 3044F HG A1C LEVEL LT 7.0%: CPT | Performed by: FAMILY MEDICINE

## 2024-07-15 PROCEDURE — 3062F POS MACROALBUMINURIA REV: CPT | Performed by: FAMILY MEDICINE

## 2024-07-15 RX ORDER — FLUTICASONE PROPIONATE 50 MCG
2 SPRAY, SUSPENSION (ML) NASAL DAILY
Qty: 48 ML | Refills: 3 | Status: SHIPPED | OUTPATIENT
Start: 2024-07-15

## 2024-07-23 LAB
ALBUMIN SERPL-MCNC: 4.3 G/DL (ref 3.8–4.8)
ALP SERPL-CCNC: 82 IU/L (ref 44–121)
ALT SERPL-CCNC: 18 IU/L (ref 0–32)
AST SERPL-CCNC: 25 IU/L (ref 0–40)
BILIRUB SERPL-MCNC: 0.4 MG/DL (ref 0–1.2)
BUN SERPL-MCNC: 15 MG/DL (ref 8–27)
BUN/CREAT SERPL: 14 (ref 12–28)
CALCIUM SERPL-MCNC: 9.7 MG/DL (ref 8.7–10.3)
CHLORIDE SERPL-SCNC: 105 MMOL/L (ref 96–106)
CHOLEST SERPL-MCNC: 150 MG/DL (ref 100–199)
CHOLEST/HDLC SERPL: 2.5 RATIO (ref 0–4.4)
CO2 SERPL-SCNC: 25 MMOL/L (ref 20–29)
CREAT SERPL-MCNC: 1.09 MG/DL (ref 0.57–1)
EGFRCR SERPLBLD CKD-EPI 2021: 52 ML/MIN/1.73
GLOBULIN SER CALC-MCNC: 2.8 G/DL (ref 1.5–4.5)
GLUCOSE SERPL-MCNC: 104 MG/DL (ref 70–99)
HDLC SERPL-MCNC: 59 MG/DL
LDLC SERPL CALC-MCNC: 71 MG/DL (ref 0–99)
POTASSIUM SERPL-SCNC: 4.4 MMOL/L (ref 3.5–5.2)
PROT SERPL-MCNC: 7.1 G/DL (ref 6–8.5)
SODIUM SERPL-SCNC: 141 MMOL/L (ref 134–144)
TRIGL SERPL-MCNC: 113 MG/DL (ref 0–149)
TSH SERPL DL<=0.005 MIU/L-ACNC: 4.6 UIU/ML (ref 0.45–4.5)
VLDLC SERPL CALC-MCNC: 20 MG/DL (ref 5–40)

## 2024-07-28 ENCOUNTER — TELEPHONE (OUTPATIENT)
Dept: FAMILY MEDICINE CLINIC | Facility: CLINIC | Age: 79
End: 2024-07-28
Payer: MEDICARE

## 2024-07-28 PROBLEM — L98.9 SKIN LESION: Status: ACTIVE | Noted: 2024-07-28

## 2024-07-28 NOTE — TELEPHONE ENCOUNTER
I want to see her sooner than October to recheck her pain in her pelvis   Parent/Legal Guardian Access to the Online Jovie Record of a Patient 15to 16Years Old  Return completed form by Secure email to [facility] Records Department: [email or web address].     Requirements and Procedures   Under [facility] MyChart ID and password with another person, that person may be able to view my or my child’s health information, and health information about someone who has authorized me as a MyChart proxy.    ·  I agree that it is my responsibility to select a confident Sign-Up Form and I agree to its terms.        Authorization Form     Please enter Patient’s information below:   Name (last, first, middle initial) __________________________________________   Gender  Male  Female    Last 4 Digits of Social Security Number Parent/Legal Guardian Signature                                  For Patient (1517 years of age)  I agree to allow my parent/legal guardian, named above, online access to my medical information currently available and that may become available as a result

## 2024-07-29 NOTE — TELEPHONE ENCOUNTER
No answer. For HUB to read: I spoke to Dr. Shah she said that you don't need an appt since you are not having anymore pain. If you do start to have pelvic pain again please let us know. We will see you in October

## 2024-08-30 ENCOUNTER — TELEPHONE (OUTPATIENT)
Dept: FAMILY MEDICINE CLINIC | Facility: CLINIC | Age: 79
End: 2024-08-30
Payer: MEDICARE

## 2024-08-30 DIAGNOSIS — E06.9 THYROIDITIS: Primary | ICD-10-CM

## 2024-09-04 LAB
T3 SERPL-MCNC: 132 NG/DL (ref 71–180)
T4 FREE SERPL-MCNC: 1.02 NG/DL (ref 0.82–1.77)
TSH SERPL DL<=0.005 MIU/L-ACNC: 3.22 UIU/ML (ref 0.45–4.5)

## 2024-10-04 ENCOUNTER — TELEPHONE (OUTPATIENT)
Dept: FAMILY MEDICINE CLINIC | Facility: CLINIC | Age: 79
End: 2024-10-04
Payer: MEDICARE

## 2024-10-04 DIAGNOSIS — E06.9 THYROIDITIS: Primary | ICD-10-CM

## 2024-10-04 DIAGNOSIS — E78.2 MIXED HYPERLIPIDEMIA: ICD-10-CM

## 2024-10-04 DIAGNOSIS — E11.9 TYPE 2 DIABETES MELLITUS WITHOUT COMPLICATION, WITHOUT LONG-TERM CURRENT USE OF INSULIN: ICD-10-CM

## 2024-10-17 LAB
ALBUMIN SERPL-MCNC: 4.2 G/DL (ref 3.8–4.8)
ALP SERPL-CCNC: 85 IU/L (ref 44–121)
ALT SERPL-CCNC: 49 IU/L (ref 0–32)
AST SERPL-CCNC: 29 IU/L (ref 0–40)
BASOPHILS # BLD AUTO: 0.1 X10E3/UL (ref 0–0.2)
BASOPHILS NFR BLD AUTO: 1 %
BILIRUB SERPL-MCNC: 0.3 MG/DL (ref 0–1.2)
BUN SERPL-MCNC: 18 MG/DL (ref 8–27)
BUN/CREAT SERPL: 16 (ref 12–28)
CALCIUM SERPL-MCNC: 9.5 MG/DL (ref 8.7–10.3)
CHLORIDE SERPL-SCNC: 103 MMOL/L (ref 96–106)
CHOLEST SERPL-MCNC: 171 MG/DL (ref 100–199)
CHOLEST/HDLC SERPL: 2.2 RATIO (ref 0–4.4)
CO2 SERPL-SCNC: 23 MMOL/L (ref 20–29)
CREAT SERPL-MCNC: 1.12 MG/DL (ref 0.57–1)
EGFRCR SERPLBLD CKD-EPI 2021: 51 ML/MIN/1.73
EOSINOPHIL # BLD AUTO: 0.2 X10E3/UL (ref 0–0.4)
EOSINOPHIL NFR BLD AUTO: 2 %
ERYTHROCYTE [DISTWIDTH] IN BLOOD BY AUTOMATED COUNT: 12.7 % (ref 11.7–15.4)
GLOBULIN SER CALC-MCNC: 2.5 G/DL (ref 1.5–4.5)
GLUCOSE SERPL-MCNC: 96 MG/DL (ref 70–99)
HBA1C MFR BLD: 6.3 % (ref 4.8–5.6)
HCT VFR BLD AUTO: 43 % (ref 34–46.6)
HDLC SERPL-MCNC: 78 MG/DL
HGB BLD-MCNC: 13.9 G/DL (ref 11.1–15.9)
IMM GRANULOCYTES # BLD AUTO: 0 X10E3/UL (ref 0–0.1)
IMM GRANULOCYTES NFR BLD AUTO: 0 %
LDLC SERPL CALC-MCNC: 80 MG/DL (ref 0–99)
LYMPHOCYTES # BLD AUTO: 2.7 X10E3/UL (ref 0.7–3.1)
LYMPHOCYTES NFR BLD AUTO: 29 %
MCH RBC QN AUTO: 30 PG (ref 26.6–33)
MCHC RBC AUTO-ENTMCNC: 32.3 G/DL (ref 31.5–35.7)
MCV RBC AUTO: 93 FL (ref 79–97)
MONOCYTES # BLD AUTO: 0.8 X10E3/UL (ref 0.1–0.9)
MONOCYTES NFR BLD AUTO: 9 %
NEUTROPHILS # BLD AUTO: 5.6 X10E3/UL (ref 1.4–7)
NEUTROPHILS NFR BLD AUTO: 59 %
PLATELET # BLD AUTO: 232 X10E3/UL (ref 150–450)
POTASSIUM SERPL-SCNC: 4.7 MMOL/L (ref 3.5–5.2)
PROT SERPL-MCNC: 6.7 G/DL (ref 6–8.5)
RBC # BLD AUTO: 4.63 X10E6/UL (ref 3.77–5.28)
SODIUM SERPL-SCNC: 142 MMOL/L (ref 134–144)
TRIGL SERPL-MCNC: 68 MG/DL (ref 0–149)
TSH SERPL DL<=0.005 MIU/L-ACNC: 3.49 UIU/ML (ref 0.45–4.5)
VLDLC SERPL CALC-MCNC: 13 MG/DL (ref 5–40)
WBC # BLD AUTO: 9.4 X10E3/UL (ref 3.4–10.8)

## 2024-10-17 NOTE — PROGRESS NOTES
Subsequent Medicare Wellness Visit    Subjective    History of Present Illness:  Rachael Quevedo is a 78 y.o. female who presents for a Subsequent Medicare Wellness Visit.    The following portions of the patient's history were reviewed and   updated as appropriate: allergies, current medications, past family history, past medical history, past social history, past surgical history, and problem list.  Family History   Problem Relation Age of Onset    Hyperlipidemia Mother         Elevated cholesterol    Heart disease Mother     Heart attack Mother     Hypertension Father     Colon cancer Father     Hyperlipidemia Father         Elevated cholesterol    Heart disease Father     Cancer Father     Diabetes Brother     Heart disease Brother     Hyperlipidemia Brother     Hypertension Brother     Diabetes Paternal Aunt     Colon cancer Paternal Aunt     Colon cancer Paternal Uncle     Diabetes Paternal Grandmother     Heart disease Paternal Grandmother     Arthritis Paternal Grandmother     Heart disease Maternal Uncle     Heart disease Maternal Uncle     Cancer Maternal Aunt     Cancer Paternal Uncle     Breast cancer Neg Hx     Ovarian cancer Neg Hx      Past Surgical History:   Procedure Laterality Date    CARDIAC CATHETERIZATION N/A 01/27/2020    Procedure: Left Heart Cath with angiogram;  Surgeon: Jermaine Delong MD;  Location: [facility] CATH INVASIVE LOCATION;  Service: Cardiovascular    CARDIAC CATHETERIZATION N/A 01/27/2020    Procedure: Stent BOBBY coronary;  Surgeon: Jermaine Delong MD;  Location: [facility] CATH INVASIVE LOCATION;  Service: Cardiovascular    CARDIAC CATHETERIZATION N/A 01/27/2020    Procedure: Left ventriculography;  Surgeon: Jermaine Delong MD;  Location: [facility] CATH INVASIVE LOCATION;  Service: Cardiovascular    CARDIAC CATHETERIZATION N/A 01/27/2020    Procedure: Coronary angiography;  Surgeon: Jermaine Delong MD;  Location: [facility] CATH INVASIVE LOCATION;  Service: Cardiovascular    CATARACT EXTRACTION,  BILATERAL Bilateral 1999    CHOLECYSTECTOMY  1993??    COLONOSCOPY  Last one 2023    CORONARY STENT PLACEMENT  1/28/2020    GALLBLADDER SURGERY  1996    LAPAROSCOPIC TUBAL LIGATION  1980    RENAL ARTERY STENT  01/26/2020    ROTATOR CUFF REPAIR Right 2002    TUBAL ABDOMINAL LIGATION  1979??        Compared to one year ago, the patient feels her physical   health is the same.    Compared to one year ago, the patient feels her mental   health is the same.    Recent Hospitalizations:  She was not admitted to the hospital during the last year.       Current Medical Providers:  Patient Care Team:  Alexa Shah MD as PCP - General (Family Medicine)  Alexa Shah MD as PCP - Family Medicine  Jermaine Delong MD as Consulting Physician (Cardiology)    Outpatient Medications Prior to Visit   Medication Sig Dispense Refill    aspirin 81 MG EC tablet TAKE 1 TABLET BY MOUTH EVERY DAY 90 tablet 3    Biotin 5 MG capsule Take  by mouth.      calcium carbonate (OS-SHYLA) 600 MG tablet Take 1 tablet by mouth 2 (Two) Times a Day With Meals.      carvedilol (COREG) 3.125 MG tablet TAKE 1 TABLET BY MOUTH TWICE A DAY WITH MEALS 180 tablet 3    Cholecalciferol (Vitamin D3) 50 MCG (2000 UT) tablet Take  by mouth.      fexofenadine (ALLEGRA) 180 MG tablet Take 1 tablet by mouth Daily. Pt taking 1/2 tablet daily      fluticasone (FLONASE) 50 MCG/ACT nasal spray 2 sprays into the nostril(s) as directed by provider Daily. As directed by the provider 48 mL 3    magnesium gluconate (MAGONATE) 500 MG tablet Take 1 tablet by mouth Daily.      Multiple Vitamins-Minerals (WOMENS MULTIVITAMIN + COLLAGEN PO)       pantoprazole (PROTONIX) 40 MG EC tablet TAKE 1 TABLET BY MOUTH EVERY DAY IN THE MORNING BEFORE BREAKFAST      Probiotic Product (PROBIOTIC DAILY PO) Take 1 capsule by mouth Daily.      rosuvastatin (CRESTOR) 20 MG tablet Take 1 tablet by mouth Daily. 30 tablet 12    vitamin B-12 (CYANOCOBALAMIN) 1000 MCG tablet Take 1 tablet by mouth  Daily.       No facility-administered medications prior to visit.     Pain Score    10/21/24 1349   PainSc: 0-No pain      No opioid medication identified on active medication list. I have reviewed chart for other potential  high risk medication/s and harmful drug interactions in the elderly.        Aspirin is on active medication list. Aspirin use is indicated based on review of current medical condition/s. Pros and cons of this therapy have been discussed today. Benefits of this medication outweigh potential harm.  Patient has been encouraged to continue taking this medication.  .      Patient Active Problem List   Diagnosis    Coronary artery disease    Degeneration of intervertebral disc of lumbosacral region    Dense breast    Depression, major, recurrent, mild    GERD without esophagitis    History of chicken pox    Presence of other cardiac implants and grafts    Mixed hyperlipidemia    Left bundle branch block (LBBB)    Lumbar disc herniation with radiculopathy    Other specified menopausal and postmenopausal disorders    CVA (cerebral vascular accident)    Vasovagal syncope    Cervical radiculopathy    Long-term current use of opiate analgesic    DDD (degenerative disc disease), cervical    Medicare annual wellness visit, subsequent    Screening mammogram for breast cancer    Seasonal allergic rhinitis due to pollen    Overweight with body mass index (BMI) of 25 to 25.9 in adult    Type 2 diabetes mellitus without complication, without long-term current use of insulin    Neck mass    Thyroiditis    Lymphadenopathy    Oral phase dysphagia    Dysuria    Skin lesion    Mammogram declined     Advance Care Planning  Advance Directive is on file.  ACP discussion was held with the patient during this visit. Patient has an advance directive in EMR which is still valid.  No changes           Objective    Vitals:    10/21/24 1349   BP: 130/68   BP Location: Right arm   Patient Position: Sitting   Cuff Size: Adult  "  Pulse: 90   Resp: 18   Temp: 97.6 °F (36.4 °C)   TempSrc: Temporal   SpO2: 99%  Comment: ra   Weight: 59.8 kg (131 lb 12.8 oz)   Height: 152.4 cm (60\")   PainSc: 0-No pain       Does the patient have evidence of cognitive impairment? No           HEALTH RISK ASSESSMENT    Smoking Status:  Social History     Tobacco Use   Smoking Status Never   Smokeless Tobacco Never   Tobacco Comments    Many family members smoked around me when I was a child     Alcohol Consumption:  Social History     Substance and Sexual Activity   Alcohol Use Yes    Alcohol/week: 3.0 standard drinks of alcohol    Types: 2 Glasses of wine, 1 Drinks containing 0.5 oz of alcohol per week    Comment: Both not weekly but occassionally     Fall Risk Screen:    AMY Fall Risk Assessment was completed, and patient is at LOW risk for falls.Assessment completed on:10/21/2024    Depression Screening:      10/21/2024     1:00 PM   PHQ-2/PHQ-9 Depression Screening   Little interest or pleasure in doing things Not at all   Feeling down, depressed, or hopeless Not at all       Health Habits and Functional and Cognitive Screening:      10/21/2024     9:06 AM   Functional & Cognitive Status   Do you have difficulty preparing food and eating? No    Do you have difficulty bathing yourself, getting dressed or grooming yourself? No    Do you have difficulty using the toilet? No    Do you have difficulty moving around from place to place? No    Do you have trouble with steps or getting out of a bed or a chair? No    Current Diet Unhealthy Diet    Dental Exam Up to date    Eye Exam Up to date    Exercise (times per week) 4 times per week    Current Exercises Include Stationary Bicycling/Spin Class;Treadmill;Yard Work    Do you need help using the phone?  No    Are you deaf or do you have serious difficulty hearing?  No    Do you need help to go to places out of walking distance? No    Do you need help shopping? No    Do you need help preparing meals?  No    Do " you need help with housework?  No    Do you need help with laundry? No    Do you need help taking your medications? No    Do you need help managing money? No    Do you ever drive or ride in a car without wearing a seat belt? No    Have you felt unusual stress, anger or loneliness in the last month? No    Who do you live with? Spouse    If you need help, do you have trouble finding someone available to you? No    Have you been bothered in the last four weeks by sexual problems? No    Do you have difficulty concentrating, remembering or making decisions? No        Patient-reported       Age-appropriate Screening Schedule:  Refer to the list below for future screening recommendations based on patient's age, sex and/or medical conditions. Orders for these recommended tests are listed in the plan section. The patient has been provided with a written plan.    Health Maintenance   Topic Date Due    TDAP/TD VACCINES (1 - Tdap) Never done    ZOSTER VACCINE (1 of 2) Never done    Pneumococcal Vaccine 65+ (2 of 2 - PCV) 10/23/2014    RSV Vaccine - Adults (1 - 1-dose 75+ series) Never done    COVID-19 Vaccine (5 - 2023-24 season) 09/01/2024    INFLUENZA VACCINE  03/31/2025 (Originally 8/1/2024)    BMI FOLLOWUP  03/18/2025    DXA SCAN  07/07/2025    LIPID PANEL  10/16/2025    ANNUAL WELLNESS VISIT  10/21/2025    HEPATITIS C SCREENING  Completed    HEMOGLOBIN A1C  Discontinued    DIABETIC EYE EXAM  Discontinued    URINE MICROALBUMIN  Discontinued    COLORECTAL CANCER SCREENING  Discontinued              Assessment & Plan   Medically necessary, significant, and separately identifiable medical problems identified during this visit are addressed on a separate visit note.    CMS Preventative Services Quick Reference  Risk Factors Identified During Encounter  None Identified  The above risks/problems have been discussed with the patient.  Follow up actions/plans if indicated are seen below in the Assessment/Plan Section.  Pertinent  information has been shared with the patient in the After Visit Summary.    Follow Up:   No follow-ups on file.     An After Visit Summary and PPPS were made available to the patient.    Medicare Wellness  Personal Prevention Plan of Service     Date of Office Visit:  10/21/2024  Encounter Provider:  Alexa Shah MD  Place of Service:  [facility]  Patient Name: Rachael Quevedo  :  1945    As part of the Medicare Wellness portion of your visit today, we are providing you with this personalized preventive plan of services (PPPS). This plan is based upon recommendations of the [place] Preventive Services Task Force (USPSTF) and the Advisory Committee on Immunization Practices (ACIP).    This lists the preventive care services that should be considered, and provides dates of when you are due. Items listed as completed are up-to-date and do not require any further intervention.    Health Maintenance   Topic Date Due    TDAP/TD VACCINES (1 - Tdap) Never done    ZOSTER VACCINE (1 of 2) Never done    Pneumococcal Vaccine 65+ (2 of 2 - PCV) 10/23/2014    RSV Vaccine - Adults (1 - 1-dose 75+ series) Never done    COVID-19 Vaccine (5 - -24 season) 2024    INFLUENZA VACCINE  2025 (Originally 2024)    BMI FOLLOWUP  2025    DXA SCAN  2025    LIPID PANEL  10/16/2025    ANNUAL WELLNESS VISIT  10/21/2025    HEPATITIS C SCREENING  Completed    HEMOGLOBIN A1C  Discontinued    DIABETIC EYE EXAM  Discontinued    URINE MICROALBUMIN  Discontinued    COLORECTAL CANCER SCREENING  Discontinued       Orders Placed This Encounter   Procedures    POCT urinalysis dipstick, manual     Order Specific Question:   Release to patient     Answer:   Routine Release [[id number]]       No follow-ups on file.  Sit-to-Stand Exercise    The sit-to-stand exercise (also known as the chair stand or chair rise exercise) strengthens your lower body and helps you maintain or  improve your mobility and independence. The goal is to do the sit-to-stand exercise without using your hands. This will be easier as you become stronger. You should always talk with your health care provider before starting any exercise program, especially if you have had recent surgery.  Do the exercise exactly as told by your health care provider and adjust it as directed. It is normal to feel mild stretching, pulling, tightness, or discomfort as you do this exercise, but you should stop right away if you feel sudden pain or your pain gets worse. Do not begin doing this exercise until told by your health care provider.  What the sit-to-stand exercise does  The sit-to-stand exercise helps to strengthen the muscles in your thighs and the muscles in the center of your body that give you stability (core muscles). This exercise is especially helpful if:  You have had knee or hip surgery.  You have trouble getting up from a chair, out of a car, or off the toilet.  How to do the sit-to-stand exercise  Sit toward the front edge of a sturdy chair without armrests. Your knees should be bent and your feet should be flat on the floor and shoulder-width apart.  Place your hands lightly on each side of the seat. Keep your back and neck as straight as possible, with your chest slightly forward.  Breathe in slowly. Lean forward and slightly shift your weight to the front of your feet.  Breathe out as you slowly stand up. Use your hands as little as possible.  Stand and pause for a full breath in and out.  Breathe in as you sit down slowly. Tighten your core and abdominal muscles to control your lowering as much as possible.  Breathe out slowly.  Do this exercise 10-15 times. If needed, do it fewer times until you build up strength.  Rest for 1 minute, then do another set of 10-15 repetitions.  To change the difficulty of the sit-to-stand exercise  If the exercise is too difficult, use a chair with sturdy armrests, and push off  the armrests to help you come to the standing position. You can also use the armrests to help slowly lower yourself back to sitting. As this gets easier, try to use your arms less. You can also place a firm cushion or pillow on the chair to make the surface higher.  If this exercise is too easy, do not use your arms to help raise or lower yourself. You can also wear a weighted vest, use hand weights, increase your repetitions, or try a lower chair.  General tips  You may feel tired when starting an exercise routine. This is normal.  You may have muscle soreness that lasts a few days. This is normal. As you get stronger, you may not feel muscle soreness.  Use smooth, steady movements.  Do not  hold your breath during strength exercises. This can cause unsafe changes in your blood pressure.  Breathe in slowly through your nose, and breathe out slowly through your mouth.  Summary  Strengthening your lower body is an important step to help you move safely and independently.  The sit-to-stand exercise helps strengthen the muscles in your thighs and core.  You should always talk with your health care provider before starting any exercise program, especially if you have had recent surgery.  This information is not intended to replace advice given to you by your health care provider. Make sure you discuss any questions you have with your health care provider.  Document Revised: 10/16/2019 Document Reviewed: 02/08/2018  [employer]vier Patient Education © 2021 [employer]    Advance Care Planning and Advance Directives     You make decisions on a daily basis - decisions about where you want to live, your career, your home, your life. Perhaps one of the most important decisions you face is your choice for future medical care. Take time to talk with your family and your healthcare team and start planning today.  Advance Care Planning is a process that can help you:  Understand possible future healthcare decisions in light of your  own experiences  Reflect on those decision in light of your goals and values  Discuss your decisions with those closest to you and the healthcare professionals that care for you  Make a plan by creating a document that reflects your wishes    Surrogate Decision Maker  In the event of a medical emergency, which has left you unable to communicate or to make your own decisions, you would need someone to make decisions for you.  It is important to discuss your preferences for medical treatment with this person while you are in good health.     Qualities of a surrogate decision maker:  Willing to take on this role and responsibility  Knows what you want for future medical care  Willing to follow your wishes even if they don't agree with them  Able to make difficult medical decisions under stressful circumstances    Advance Directives  These are legal documents you can create that will guide your healthcare team and decision maker(s) when needed. These documents can be stored in the electronic medical record.    Living Will - a legal document to guide your care if you have a terminal condition or a serious illness and are unable to communicate. States vary by statute in document names/types, but most forms may include one or more of the following:        -  Directions regarding life-prolonging treatments        -  Directions regarding artificially provided nutrition/hydration        -  Choosing a healthcare decision maker        -  Direction regarding organ/tissue donation    Durable Power of  for Healthcare - this document names an -in-fact to make medical decisions for you, but it may also allow this person to make personal and financial decisions for you. Please seek the advice of an  if you need this type of document.    **Advance Directives are not required and no one may discriminate against you if you do not sign one.    Medical Orders  Many states allow specific forms/orders signed by your  physician to record your wishes for medical treatment in your current state of health. This form, signed in personal communication with your physician, addresses resuscitation and other medical interventions that you may or may not want.  For more information or to schedule a time with a [facility] Advance Care Planning Facilitator contact: [facility] or call [phone] and someone will contact you directly.    Fall Prevention in the Home, Adult  Falls can cause injuries and can happen to people of all ages. There are many things you can do to make your home safe and to help prevent falls. Ask for help when making these changes.  What actions can I take to prevent falls?  General Instructions  Use good lighting in all rooms. Replace any light bulbs that burn out.  Turn on the lights in dark areas. Use night-lights.  Keep items that you use often in easy-to-reach places. Lower the shelves around your home if needed.  Set up your furniture so you have a clear path. Avoid moving your furniture around.  Do not have throw rugs or other things on the floor that can make you trip.  Avoid walking on wet floors.  If any of your floors are uneven, fix them.  Add color or contrast paint or tape to clearly janet and help you see:  Grab bars or handrails.  First and last steps of staircases.  Where the edge of each step is.  If you use a stepladder:  Make sure that it is fully opened. Do not climb a closed stepladder.  Make sure the sides of the stepladder are locked in place.  Ask someone to hold the stepladder while you use it.  Know where your pets are when moving through your home.  What can I do in the bathroom?         Keep the floor dry. Clean up any water on the floor right away.  Remove soap buildup in the tub or shower.  Use nonskid mats or decals on the floor of the tub or shower.  Attach bath mats securely with double-sided, nonslip rug tape.  If you need to sit down in the shower, use a plastic,  nonslip stool.  Install grab bars by the toilet and in the tub and shower. Do not use towel bars as grab bars.  What can I do in the bedroom?  Make sure that you have a light by your bed that is easy to reach.  Do not use any sheets or blankets for your bed that hang to the floor.  Have a firm chair with side arms that you can use for support when you get dressed.  What can I do in the kitchen?  Clean up any spills right away.  If you need to reach something above you, use a step stool with a grab bar.  Keep electrical cords out of the way.  Do not use floor polish or wax that makes floors slippery.  What can I do with my stairs?  Do not leave any items on the stairs.  Make sure that you have a light switch at the top and the bottom of the stairs.  Make sure that there are handrails on both sides of the stairs. Fix handrails that are broken or loose.  Install nonslip stair treads on all your stairs.  Avoid having throw rugs at the top or bottom of the stairs.  Choose a carpet that does not hide the edge of the steps on the stairs.  Check carpeting to make sure that it is firmly attached to the stairs. Fix carpet that is loose or worn.  What can I do on the outside of my home?  Use bright outdoor lighting.  Fix the edges of walkways and driveways and fix any cracks.  Remove anything that might make you trip as you walk through a door, such as a raised step or threshold.  Trim any bushes or trees on paths to your home.  Check to see if handrails are loose or broken and that both sides of all steps have handrails.  Install guardrails along the edges of any raised decks and porches.  Clear paths of anything that can make you trip, such as tools or rocks.  Have leaves, snow, or ice cleared regularly.  Use sand or salt on paths during winter.  Clean up any spills in your garage right away. This includes grease or oil spills.  What other actions can I take?  Wear shoes that:  Have a low heel. Do not wear high heels.  Have  rubber bottoms.  Feel good on your feet and fit well.  Are closed at the toe. Do not wear open-toe sandals.  Use tools that help you move around if needed. These include:  Canes.  Walkers.  Scooters.  Crutches.  Review your medicines with your doctor. Some medicines can make you feel dizzy. This can increase your chance of falling.  Ask your doctor what else you can do to help prevent falls.  Where to find more information  Centers for Disease Control and Prevention, AMY: www.cdc.gov  National [place] on Aging: www.mark.nih.gov  Contact a doctor if:  You are afraid of falling at home.  You feel weak, drowsy, or dizzy at home.  You fall at home.  Summary  There are many simple things that you can do to make your home safe and to help prevent falls.  Ways to make your home safe include removing things that can make you trip and installing grab bars in the bathroom.  Ask for help when making these changes in your home.  This information is not intended to replace advice given to you by your health care provider. Make sure you discuss any questions you have with your health care provider.  Document Revised: 07/21/2021 Document Reviewed: 07/21/2021  [employer] Patient Education © 2021 [employer]         Additional E&M Note during same encounter follows:  Patient has additional, significant, and separately identifiable condition(s)/problem(s) that require work above and beyond the Medicare Wellness Visit       Objective     Subjective   Rachael Quevedo is here for:    Chief Complaint   Patient presents with    Medicare Wellness-subsequent    Diabetes    Hyperlipidemia       Subjective   Carley is also being seen today for an annual adult preventative physical exam.  and Carley is also being seen today for additional medical problem/s.    Hyperlipidemia  This is a chronic problem. The current episode started more than 1 year ago. She has no history of diabetes or obesity. Pertinent negatives include no chest pain or  shortness of breath. Current antihyperlipidemic treatment includes statins. The current treatment provides mild improvement of lipids. Risk factors for coronary artery disease include dyslipidemia, post-menopausal and family history.   Diabetes  She presents for her follow-up diabetic visit. She has type 2 diabetes mellitus. Pertinent negatives for hypoglycemia include no dizziness, headaches, nervousness/anxiousness or sweats. Pertinent negatives for diabetes include no chest pain and no fatigue. Risk factors for coronary artery disease include diabetes mellitus, dyslipidemia and post-menopausal. She is following a diabetic diet. When asked about meal planning, she reported none. She participates in exercise intermittently. (Does not check at home ) An ACE inhibitor/angiotensin II receptor blocker is not being taken. She does not see a podiatrist. Eye exam is current.             Physical Exam:  Review of Systems   Constitutional:  Negative for activity change, fatigue and fever.   HENT:  Negative for ear pain, rhinorrhea, sinus pressure and voice change.    Eyes:  Negative for visual disturbance.   Respiratory:  Negative for cough and shortness of breath.    Cardiovascular:  Negative for chest pain.   Gastrointestinal:  Negative for abdominal pain, diarrhea, nausea and vomiting.   Endocrine: Negative for cold intolerance and heat intolerance.   Genitourinary:  Negative for frequency and urgency.   Musculoskeletal:  Negative for arthralgias.   Skin:  Negative for rash.   Neurological:  Negative for dizziness and syncope.   Hematological:  Does not bruise/bleed easily.   Psychiatric/Behavioral:  Negative for depressed mood. The patient is not nervous/anxious.         Vitals:    10/21/24 1349   BP: 130/68   Pulse: 90   Resp: 18   Temp: 97.6 °F (36.4 °C)   SpO2: 99%       Physical Exam  Vitals and nursing note reviewed.   Constitutional:       General: She is not in acute distress.     Appearance: She is  well-developed. She is not diaphoretic.   HENT:      Head: Normocephalic and atraumatic.      Right Ear: Tympanic membrane and external ear normal.      Left Ear: Tympanic membrane and external ear normal.      Nose: Nose normal.      Mouth/Throat:      Pharynx: No oropharyngeal exudate.   Eyes:      General: No scleral icterus.        Right eye: No discharge.         Left eye: No discharge.      Conjunctiva/sclera: Conjunctivae normal.      Pupils: Pupils are equal, round, and reactive to light.   Neck:      Thyroid: No thyromegaly.      Trachea: No tracheal deviation.   Cardiovascular:      Rate and Rhythm: Normal rate and regular rhythm.      Heart sounds: Normal heart sounds. No murmur heard.     No friction rub. No gallop.   Pulmonary:      Effort: Pulmonary effort is normal. No respiratory distress.      Breath sounds: Normal breath sounds. No stridor. No wheezing or rales.   Abdominal:      General: Bowel sounds are normal. There is no distension.      Palpations: Abdomen is soft. There is no mass.      Tenderness: There is no abdominal tenderness. There is no guarding or rebound.   Musculoskeletal:         General: No tenderness or deformity. Normal range of motion.      Cervical back: Normal range of motion and neck supple.   Lymphadenopathy:      Cervical: No cervical adenopathy.   Skin:     General: Skin is warm and dry.      Capillary Refill: Capillary refill takes less than 2 seconds.      Coloration: Skin is not pale.      Findings: No erythema or rash.   Neurological:      Mental Status: She is alert and oriented to person, place, and time.      Cranial Nerves: No cranial nerve deficit.      Sensory: No sensory deficit.      Motor: No tremor, atrophy or abnormal muscle tone.      Coordination: Coordination normal.      Gait: Gait normal.      Deep Tendon Reflexes: Reflexes are normal and symmetric. Reflexes normal.   Psychiatric:         Behavior: Behavior normal.         Thought Content: Thought  content normal.         Cognition and Memory: Memory is not impaired. She does not exhibit impaired recent memory or impaired remote memory.         Judgment: Judgment normal.         Result Review :   The following data was reviewed by: Alexa Shah MD on 10/21/2024:  CMP          1/23/2024    08:26 7/22/2024    09:42 10/16/2024    09:01   CMP   Glucose 114  104  96    BUN 11  15  18    Creatinine 1.04  1.09  1.12    Sodium 142  141  142    Potassium 4.4  4.4  4.7    Chloride 106  105  103    Calcium 9.5  9.7  9.5    Total Protein 6.6  7.1  6.7    Albumin 4.2  4.3  4.2    Globulin 2.4  2.8  2.5    Total Bilirubin 0.5  0.4  0.3    Alkaline Phosphatase 69  82  85    AST (SGOT) 20  25  29    ALT (SGPT) 13  18  49    BUN/Creatinine Ratio 11  14  16      CBC w/diff          10/16/2024    09:01   CBC w/Diff   WBC 9.4    RBC 4.63    Hemoglobin 13.9    Hematocrit 43.0    MCV 93    MCH 30.0    MCHC 32.3    RDW 12.7    Platelets 232    Neutrophil Rel % 59    Lymphocyte Rel % 29    Monocyte Rel % 9    Eosinophil Rel % 2    Basophil Rel % 1      Lipid Panel          1/23/2024    08:26 7/22/2024    09:42 10/16/2024    09:01   Lipid Panel   Total Cholesterol 228  150  171    Triglycerides 173  113  68    HDL Cholesterol 52  59  78    VLDL Cholesterol 31  20  13    LDL Cholesterol  145  71  80      TSH          7/22/2024    09:42 9/3/2024    08:07 10/16/2024    09:01   TSH   TSH 4.600  3.220  3.490           Lab Results   Component Value Date    HGBA1C 6.3 (H) 10/16/2024      Brief Urine Lab Results  (Last result in the past 365 days)        Color   Clarity   Blood   Leuk Est   Nitrite   Protein   CREAT   Urine HCG        10/28/24 1419 Yellow   Clear   Trace   Negative   Negative   Negative                     Assessment and Plan:  Problem List Items Addressed This Visit          Advance Directives and General Issues    Mammogram declined       Cardiac and Vasculature    Coronary artery disease    Overview     Cath by   Manchi showed 80% LAD, 90% in circumflex with normal LV. Drug eluding stents placed in both.          Current Assessment & Plan     Coronary artery disease is unchanged.  Continue current treatment regimen.  Cardiac status will be reassessed in 1 month.  Risk factors modified.          Mixed hyperlipidemia    Current Assessment & Plan      Lipid abnormalities are stable    Plan:  Continue same medication/s without change.      Discussed medication dosage, use, side effects, and goals of treatment in detail.    Counseled patient on lifestyle modifications to help control hyperlipidemia.     Patient Treatment Goals:   LDL goal is under 70    Followup in 3 months.            Endocrine and Metabolic    Type 2 diabetes mellitus without complication, without long-term current use of insulin    Current Assessment & Plan     Diabetes is worsening.   Continue current treatment regimen.  Diabetes will be reassessed in 3 months            Health Encounters    Medicare annual wellness visit, subsequent - Primary    Relevant Orders    POCT urinalysis dipstick, manual (Completed)       Mental Health    Depression, major, recurrent, mild    Overview     Stable  She is doing well without medication         Current Assessment & Plan     Stable  She is doing well without medication            Other    Overweight with body mass index (BMI) of 25 to 25.9 in adult     Other Visit Diagnoses       Stage 3a chronic kidney disease                                Assessment and Plan Additional age appropriate preventative wellness advice topics were discussed during today's preventative wellness exam(some topics already addressed during AWV portion of the note above):    Physical Activity: Advised cardiovascular activity 150 minutes per week as tolerated. (example brisk walk for 30 minutes, 5 days a week).   Nutrition: Discussed nutrition plan with patient. Information shared in after visit summary. Goal is for a well balanced diet to enhance  overall health.   Healthy Weight: Discussed current and goal BMI with patient. Steps to attain this goal discussed. Information shared in after visit summary.           Follow Up   No follow-ups on file.  Patient was given instructions and counseling regarding his condition or for health maintenance advice. Please see specific information pulled into the AVS if appropriate.

## 2024-10-21 ENCOUNTER — OFFICE VISIT (OUTPATIENT)
Dept: FAMILY MEDICINE CLINIC | Facility: CLINIC | Age: 79
End: 2024-10-21
Payer: MEDICARE

## 2024-10-21 VITALS
SYSTOLIC BLOOD PRESSURE: 130 MMHG | WEIGHT: 131.8 LBS | HEART RATE: 90 BPM | RESPIRATION RATE: 18 BRPM | DIASTOLIC BLOOD PRESSURE: 68 MMHG | TEMPERATURE: 97.6 F | HEIGHT: 60 IN | OXYGEN SATURATION: 99 % | BODY MASS INDEX: 25.87 KG/M2

## 2024-10-21 DIAGNOSIS — F33.0 DEPRESSION, MAJOR, RECURRENT, MILD: ICD-10-CM

## 2024-10-21 DIAGNOSIS — I25.10 CORONARY ARTERY DISEASE INVOLVING NATIVE CORONARY ARTERY OF NATIVE HEART WITHOUT ANGINA PECTORIS: ICD-10-CM

## 2024-10-21 DIAGNOSIS — E11.9 TYPE 2 DIABETES MELLITUS WITHOUT COMPLICATION, WITHOUT LONG-TERM CURRENT USE OF INSULIN: ICD-10-CM

## 2024-10-21 DIAGNOSIS — E78.2 MIXED HYPERLIPIDEMIA: ICD-10-CM

## 2024-10-21 DIAGNOSIS — N18.31 STAGE 3A CHRONIC KIDNEY DISEASE: ICD-10-CM

## 2024-10-21 DIAGNOSIS — Z00.00 MEDICARE ANNUAL WELLNESS VISIT, SUBSEQUENT: Primary | ICD-10-CM

## 2024-10-21 DIAGNOSIS — Z53.20 MAMMOGRAM DECLINED: ICD-10-CM

## 2024-10-21 DIAGNOSIS — E66.3 OVERWEIGHT WITH BODY MASS INDEX (BMI) OF 25 TO 25.9 IN ADULT: ICD-10-CM

## 2024-10-21 LAB
BILIRUB BLD-MCNC: NEGATIVE MG/DL
CLARITY, POC: CLEAR
COLOR UR: YELLOW
GLUCOSE UR STRIP-MCNC: NEGATIVE MG/DL
KETONES UR QL: NEGATIVE
LEUKOCYTE EST, POC: NEGATIVE
NITRITE UR-MCNC: NEGATIVE MG/ML
PH UR: 5 [PH] (ref 5–8)
PROT UR STRIP-MCNC: NEGATIVE MG/DL
RBC # UR STRIP: ABNORMAL /UL
SP GR UR: 1.02 (ref 1–1.03)
UROBILINOGEN UR QL: NORMAL

## 2024-10-22 ENCOUNTER — OFFICE VISIT (OUTPATIENT)
Dept: CARDIOLOGY | Facility: CLINIC | Age: 79
End: 2024-10-22
Payer: MEDICARE

## 2024-10-22 VITALS
BODY MASS INDEX: 25.52 KG/M2 | HEIGHT: 60 IN | WEIGHT: 130 LBS | DIASTOLIC BLOOD PRESSURE: 78 MMHG | HEART RATE: 56 BPM | OXYGEN SATURATION: 100 % | SYSTOLIC BLOOD PRESSURE: 132 MMHG

## 2024-10-22 DIAGNOSIS — I25.10 CORONARY ARTERY DISEASE INVOLVING NATIVE CORONARY ARTERY OF NATIVE HEART WITHOUT ANGINA PECTORIS: Primary | ICD-10-CM

## 2024-10-22 DIAGNOSIS — I10 PRIMARY HYPERTENSION: ICD-10-CM

## 2024-10-22 DIAGNOSIS — E78.00 PURE HYPERCHOLESTEROLEMIA: ICD-10-CM

## 2024-10-22 PROCEDURE — 1159F MED LIST DOCD IN RCRD: CPT | Performed by: INTERNAL MEDICINE

## 2024-10-22 PROCEDURE — 1160F RVW MEDS BY RX/DR IN RCRD: CPT | Performed by: INTERNAL MEDICINE

## 2024-10-22 PROCEDURE — 99214 OFFICE O/P EST MOD 30 MIN: CPT | Performed by: INTERNAL MEDICINE

## 2024-10-22 NOTE — PROGRESS NOTES
Subjective:     Encounter Date:10/22/2024      Patient ID: Rachael Quevedo is a 78 y.o. female.    Chief Complaint:  Follow-upPertinent negatives include no chest pain, no dizziness, no nausea, no palpitations, no shortness of breath, no headaches, no focal weakness, no abdominal pain and no cough.     78-year-old white female with history of coronary artery disease history of hypertension hyperlipidemia presents to my office for a follow-up.  Patient is currently stable without any symptoms of chest pain or shortness of breath at rest or exertion.  No complaints any PND or orthopnea.  No palpitations dizziness syncope or swelling of the feet.  Patient is taking all the medicines regularly.  Patient does not smoke.  The following portions of the patient's history were reviewed and updated as appropriate: allergies, current medications, past family history, past medical history, past social history, past surgical history, and problem list.  Past Medical History:   Diagnosis Date    Allergic     Ankle fracture, left     Comments: 2016    Arthritis     Cataract, acquired     Coronary artery disease     Cough     Impression: Most likely reactive airway prescription as below Start antihistamine at home    Dense breast     Depression, major, recurrent, mild     GERD without esophagitis     Comments: Dr Duncan- protonix    Hematuria, microscopic     History of chicken pox     History of loop recorder     Hyperglycemia     Hyperlipidemia     Hypertension 01/04/2020    Injury of back     Injury of neck     Left bundle branch block (LBBB)     Malaise and fatigue     Medicare annual wellness visit, initial     with abnormal findings    Osteopenia 2022    Other specified menopausal and perimenopausal disorders     Other specified menopausal and postmenopausal disorders    Pap smear, low-risk     Renal insufficiency     S/P right rotator cuff repair     Screening for alcoholism     10 or more drinks per week    Screening  "for depression     Negative Depression Screening ( 9 or less) ()    Screening mammogram, encounter for     Stroke     1/5/2020 no residual effects    Vasovagal near-syncope     Impression: from illness and cough Discussed if there is loss of vision in an eye, confusion, weakness or numbness in an extremity, drooping of a side of the face, intractible pain, intractible vomiting, to go to the ER.     Past Surgical History:   Procedure Laterality Date    CARDIAC CATHETERIZATION N/A 01/27/2020    Procedure: Left Heart Cath with angiogram;  Surgeon: Jermaine Delong MD;  Location: [facility] CATH INVASIVE LOCATION;  Service: Cardiovascular    CARDIAC CATHETERIZATION N/A 01/27/2020    Procedure: Stent BOBBY coronary;  Surgeon: Jermaine Delong MD;  Location: [facility] CATH INVASIVE LOCATION;  Service: Cardiovascular    CARDIAC CATHETERIZATION N/A 01/27/2020    Procedure: Left ventriculography;  Surgeon: Jermaine Delong MD;  Location: [facility] CATH INVASIVE LOCATION;  Service: Cardiovascular    CARDIAC CATHETERIZATION N/A 01/27/2020    Procedure: Coronary angiography;  Surgeon: Jermaine Delong MD;  Location: [facility] CATH INVASIVE LOCATION;  Service: Cardiovascular    CATARACT EXTRACTION, BILATERAL Bilateral 1999    CHOLECYSTECTOMY  1993??    COLONOSCOPY  Last one 2023    CORONARY STENT PLACEMENT  1/28/2020    GALLBLADDER SURGERY  1996    LAPAROSCOPIC TUBAL LIGATION  1980    RENAL ARTERY STENT  01/26/2020    ROTATOR CUFF REPAIR Right 2002    TUBAL ABDOMINAL LIGATION  1979??     /78   Pulse 56   Ht 152.4 cm (60\")   Wt 59 kg (130 lb)   LMP  (LMP Unknown)   SpO2 100%   BMI 25.39 kg/m²   Family History   Problem Relation Age of Onset    Hyperlipidemia Mother         Elevated cholesterol    Heart disease Mother     Heart attack Mother     Hypertension Father     Colon cancer Father     Hyperlipidemia Father         Elevated cholesterol    Heart disease Father     Cancer Father     Diabetes Brother     Heart disease Brother     " Hyperlipidemia Brother     Hypertension Brother     Diabetes Paternal Aunt     Colon cancer Paternal Aunt     Colon cancer Paternal Uncle     Diabetes Paternal Grandmother     Heart disease Paternal Grandmother     Arthritis Paternal Grandmother     Heart disease Maternal Uncle     Heart disease Maternal Uncle     Cancer Maternal Aunt     Cancer Paternal Uncle     Breast cancer Neg Hx     Ovarian cancer Neg Hx        Current Outpatient Medications:     aspirin 81 MG EC tablet, TAKE 1 TABLET BY MOUTH EVERY DAY, Disp: 90 tablet, Rfl: 3    Biotin 5 MG capsule, Take  by mouth., Disp: , Rfl:     calcium carbonate (OS-SHYLA) 600 MG tablet, Take 1 tablet by mouth 2 (Two) Times a Day With Meals., Disp: , Rfl:     carvedilol (COREG) 3.125 MG tablet, TAKE 1 TABLET BY MOUTH TWICE A DAY WITH MEALS, Disp: 180 tablet, Rfl: 3    Cholecalciferol (Vitamin D3) 50 MCG (2000 UT) tablet, Take  by mouth., Disp: , Rfl:     fexofenadine (ALLEGRA) 180 MG tablet, Take 1 tablet by mouth Daily. Pt taking 1/2 tablet daily, Disp: , Rfl:     fluticasone (FLONASE) 50 MCG/ACT nasal spray, 2 sprays into the nostril(s) as directed by provider Daily. As directed by the provider, Disp: 48 mL, Rfl: 3    magnesium gluconate (MAGONATE) 500 MG tablet, Take 1 tablet by mouth Daily., Disp: , Rfl:     Multiple Vitamins-Minerals (WOMENS MULTIVITAMIN + COLLAGEN PO), , Disp: , Rfl:     pantoprazole (PROTONIX) 40 MG EC tablet, TAKE 1 TABLET BY MOUTH EVERY DAY IN THE MORNING BEFORE BREAKFAST, Disp: , Rfl:     Probiotic Product (PROBIOTIC DAILY PO), Take 1 capsule by mouth Daily., Disp: , Rfl:     rosuvastatin (CRESTOR) 20 MG tablet, Take 1 tablet by mouth Daily., Disp: 30 tablet, Rfl: 12    vitamin B-12 (CYANOCOBALAMIN) 1000 MCG tablet, Take 1 tablet by mouth Daily., Disp: , Rfl:   No Known Allergies  Social History     Socioeconomic History    Marital status:    Tobacco Use    Smoking status: Never    Smokeless tobacco: Never    Tobacco comments:     Many  family members smoked around me when I was a child   Vaping Use    Vaping status: Never Used   Substance and Sexual Activity    Alcohol use: Yes     Alcohol/week: 3.0 standard drinks of alcohol     Types: 2 Glasses of wine, 1 Drinks containing 0.5 oz of alcohol per week     Comment: Both not weekly but occassionally    Drug use: No    Sexual activity: Yes     Partners: Male     Birth control/protection: None     Comment: Very occasional     Review of Systems   Constitutional: Negative for malaise/fatigue.   Cardiovascular:  Negative for chest pain, dyspnea on exertion, leg swelling and palpitations.   Respiratory:  Negative for cough and shortness of breath.    Gastrointestinal:  Negative for abdominal pain, nausea and vomiting.   Neurological:  Negative for dizziness, focal weakness, headaches, light-headedness and numbness.   All other systems reviewed and are negative.             Objective:     Constitutional:       Appearance: Well-developed.   Eyes:      General: No scleral icterus.     Conjunctiva/sclera: Conjunctivae normal.   HENT:      Head: Normocephalic and atraumatic.   Neck:      Vascular: No carotid bruit or JVD.   Pulmonary:      Effort: Pulmonary effort is normal.      Breath sounds: Normal breath sounds. No wheezing. No rales.   Cardiovascular:      Normal rate. Regular rhythm.   Pulses:     Intact distal pulses.   Abdominal:      General: Bowel sounds are normal.      Palpations: Abdomen is soft.   Musculoskeletal:      Cervical back: Normal range of motion and neck supple. Skin:     General: Skin is warm and dry.      Findings: No rash.   Neurological:      Mental Status: Alert.       Procedures    Lab Review:         MDM    #1 coronary disease  Patient has nonobstructive disease in the past with normal V systolic function is currently stable without any angina  2.  Hypertension  Patient blood pressure currently stable on carvedilol  3.  Hyperlipidemia  Patient on Crestor and the lipid levels are  well within normal limits.    Patient's previous medical records, labs, and EKG were reviewed and discussed with the patient at today's visit.

## 2024-10-28 ENCOUNTER — CLINICAL SUPPORT (OUTPATIENT)
Dept: FAMILY MEDICINE CLINIC | Facility: CLINIC | Age: 79
End: 2024-10-28
Payer: MEDICARE

## 2024-10-28 DIAGNOSIS — R31.9 HEMATURIA, UNSPECIFIED TYPE: Primary | ICD-10-CM

## 2024-10-28 LAB
BILIRUB BLD-MCNC: NEGATIVE MG/DL
CLARITY, POC: CLEAR
COLOR UR: YELLOW
GLUCOSE UR STRIP-MCNC: NEGATIVE MG/DL
KETONES UR QL: NEGATIVE
LEUKOCYTE EST, POC: NEGATIVE
NITRITE UR-MCNC: NEGATIVE MG/ML
PH UR: 5 [PH] (ref 5–8)
PROT UR STRIP-MCNC: NEGATIVE MG/DL
RBC # UR STRIP: ABNORMAL /UL
SP GR UR: 1.01 (ref 1–1.03)
UROBILINOGEN UR QL: NORMAL

## 2024-10-28 PROCEDURE — 81002 URINALYSIS NONAUTO W/O SCOPE: CPT | Performed by: FAMILY MEDICINE

## 2024-11-01 NOTE — ASSESSMENT & PLAN NOTE
Lipid abnormalities are stable    Plan:  Continue same medication/s without change.      Discussed medication dosage, use, side effects, and goals of treatment in detail.    Counseled patient on lifestyle modifications to help control hyperlipidemia.     Patient Treatment Goals:   LDL goal is under 70    Followup in 3 months.

## 2024-11-01 NOTE — ASSESSMENT & PLAN NOTE
Coronary artery disease is unchanged.  Continue current treatment regimen.  Cardiac status will be reassessed in 1 month.  Risk factors modified.

## 2024-11-01 NOTE — ASSESSMENT & PLAN NOTE
Diabetes is worsening.   Continue current treatment regimen.  Diabetes will be reassessed in 3 months

## 2024-12-19 RX ORDER — ROSUVASTATIN CALCIUM 20 MG/1
20 TABLET, COATED ORAL DAILY
Qty: 90 TABLET | Refills: 3 | Status: SHIPPED | OUTPATIENT
Start: 2024-12-19

## 2025-01-22 ENCOUNTER — HOSPITAL ENCOUNTER (INPATIENT)
Facility: HOSPITAL | Age: 80
LOS: 2 days | Discharge: HOME | End: 2025-01-24
Attending: INTERNAL MEDICINE | Admitting: INTERNAL MEDICINE
Payer: MEDICARE

## 2025-01-22 ENCOUNTER — TELEPHONE (OUTPATIENT)
Dept: FAMILY MEDICINE CLINIC | Facility: CLINIC | Age: 80
End: 2025-01-22
Payer: MEDICARE

## 2025-01-22 ENCOUNTER — APPOINTMENT (OUTPATIENT)
Dept: CARDIOLOGY | Facility: HOSPITAL | Age: 80
End: 2025-01-22
Payer: MEDICARE

## 2025-01-22 DIAGNOSIS — R55 SYNCOPE AND COLLAPSE: ICD-10-CM

## 2025-01-22 LAB
BH CV XLRA MEAS LEFT CAROTID BULB EDV: -42 CM/SEC
BH CV XLRA MEAS LEFT CAROTID BULB PSV: -93.9 CM/SEC
BH CV XLRA MEAS LEFT DIST CCA EDV: -27.7 CM/SEC
BH CV XLRA MEAS LEFT DIST CCA PSV: -91.8 CM/SEC
BH CV XLRA MEAS LEFT DIST ICA EDV: -26 CM/SEC
BH CV XLRA MEAS LEFT DIST ICA PSV: -78.6 CM/SEC
BH CV XLRA MEAS LEFT ICA/CCA RATIO: -1.12
BH CV XLRA MEAS LEFT PROX CCA EDV: 20 CM/SEC
BH CV XLRA MEAS LEFT PROX CCA PSV: 101 CM/SEC
BH CV XLRA MEAS LEFT PROX ECA PSV: -93.3 CM/SEC
BH CV XLRA MEAS LEFT PROX ICA EDV: -36.2 CM/SEC
BH CV XLRA MEAS LEFT PROX ICA PSV: -113 CM/SEC
BH CV XLRA MEAS LEFT PROX SCLA PSV: 146 CM/SEC
BH CV XLRA MEAS LEFT VERTEBRAL A EDV: -26.1 CM/SEC
BH CV XLRA MEAS LEFT VERTEBRAL A PSV: -102 CM/SEC
BH CV XLRA MEAS RIGHT CAROTID BULB EDV: -21.7 CM/SEC
BH CV XLRA MEAS RIGHT CAROTID BULB PSV: -49.1 CM/SEC
BH CV XLRA MEAS RIGHT DIST CCA EDV: 19.3 CM/SEC
BH CV XLRA MEAS RIGHT DIST CCA PSV: 66.5 CM/SEC
BH CV XLRA MEAS RIGHT DIST ICA EDV: -31.1 CM/SEC
BH CV XLRA MEAS RIGHT DIST ICA PSV: -98.8 CM/SEC
BH CV XLRA MEAS RIGHT ICA/CCA RATIO: -1.82
BH CV XLRA MEAS RIGHT PROX CCA EDV: 23 CM/SEC
BH CV XLRA MEAS RIGHT PROX CCA PSV: 91.3 CM/SEC
BH CV XLRA MEAS RIGHT PROX ECA PSV: -98.8 CM/SEC
BH CV XLRA MEAS RIGHT PROX ICA EDV: -52.5 CM/SEC
BH CV XLRA MEAS RIGHT PROX ICA PSV: -166 CM/SEC
BH CV XLRA MEAS RIGHT PROX SCLA PSV: 130 CM/SEC
BH CV XLRA MEAS RIGHT VERTEBRAL A EDV: -13 CM/SEC
BH CV XLRA MEAS RIGHT VERTEBRAL A PSV: -79.5 CM/SEC
QT INTERVAL: 454 MS
QTC INTERVAL: 465 MS

## 2025-01-22 PROCEDURE — 93005 ELECTROCARDIOGRAM TRACING: CPT | Performed by: INTERNAL MEDICINE

## 2025-01-22 PROCEDURE — 93880 EXTRACRANIAL BILAT STUDY: CPT

## 2025-01-22 PROCEDURE — 93880 EXTRACRANIAL BILAT STUDY: CPT | Performed by: SURGERY

## 2025-01-22 RX ORDER — ACETAMINOPHEN 650 MG/1
650 SUPPOSITORY RECTAL EVERY 4 HOURS PRN
Status: DISCONTINUED | OUTPATIENT
Start: 2025-01-22 | End: 2025-01-24 | Stop reason: HOSPADM

## 2025-01-22 RX ORDER — SODIUM CHLORIDE 0.9 % (FLUSH) 0.9 %
10 SYRINGE (ML) INJECTION AS NEEDED
Status: DISCONTINUED | OUTPATIENT
Start: 2025-01-22 | End: 2025-01-24 | Stop reason: HOSPADM

## 2025-01-22 RX ORDER — BISACODYL 10 MG
10 SUPPOSITORY, RECTAL RECTAL DAILY PRN
Status: DISCONTINUED | OUTPATIENT
Start: 2025-01-22 | End: 2025-01-24 | Stop reason: HOSPADM

## 2025-01-22 RX ORDER — NITROGLYCERIN 0.4 MG/1
0.4 TABLET SUBLINGUAL
Status: DISCONTINUED | OUTPATIENT
Start: 2025-01-22 | End: 2025-01-24 | Stop reason: HOSPADM

## 2025-01-22 RX ORDER — ACETAMINOPHEN 160 MG/5ML
650 SOLUTION ORAL EVERY 4 HOURS PRN
Status: DISCONTINUED | OUTPATIENT
Start: 2025-01-22 | End: 2025-01-24 | Stop reason: HOSPADM

## 2025-01-22 RX ORDER — SODIUM CHLORIDE 0.9 % (FLUSH) 0.9 %
10 SYRINGE (ML) INJECTION EVERY 12 HOURS SCHEDULED
Status: DISCONTINUED | OUTPATIENT
Start: 2025-01-22 | End: 2025-01-24 | Stop reason: HOSPADM

## 2025-01-22 RX ORDER — ALUMINA, MAGNESIA, AND SIMETHICONE 2400; 2400; 240 MG/30ML; MG/30ML; MG/30ML
15 SUSPENSION ORAL EVERY 6 HOURS PRN
Status: DISCONTINUED | OUTPATIENT
Start: 2025-01-22 | End: 2025-01-24 | Stop reason: HOSPADM

## 2025-01-22 RX ORDER — ACETAMINOPHEN 325 MG/1
650 TABLET ORAL EVERY 4 HOURS PRN
Status: DISCONTINUED | OUTPATIENT
Start: 2025-01-22 | End: 2025-01-24 | Stop reason: HOSPADM

## 2025-01-22 RX ORDER — ONDANSETRON 4 MG/1
4 TABLET, ORALLY DISINTEGRATING ORAL EVERY 6 HOURS PRN
Status: DISCONTINUED | OUTPATIENT
Start: 2025-01-22 | End: 2025-01-24 | Stop reason: HOSPADM

## 2025-01-22 RX ORDER — AMOXICILLIN 250 MG
2 CAPSULE ORAL 2 TIMES DAILY PRN
Status: DISCONTINUED | OUTPATIENT
Start: 2025-01-22 | End: 2025-01-24 | Stop reason: HOSPADM

## 2025-01-22 RX ORDER — SODIUM CHLORIDE 9 MG/ML
40 INJECTION, SOLUTION INTRAVENOUS AS NEEDED
Status: DISCONTINUED | OUTPATIENT
Start: 2025-01-22 | End: 2025-01-24 | Stop reason: HOSPADM

## 2025-01-22 RX ORDER — ONDANSETRON 2 MG/ML
4 INJECTION INTRAMUSCULAR; INTRAVENOUS EVERY 6 HOURS PRN
Status: DISCONTINUED | OUTPATIENT
Start: 2025-01-22 | End: 2025-01-24 | Stop reason: HOSPADM

## 2025-01-22 RX ORDER — BISACODYL 5 MG/1
5 TABLET, DELAYED RELEASE ORAL DAILY PRN
Status: DISCONTINUED | OUTPATIENT
Start: 2025-01-22 | End: 2025-01-24 | Stop reason: HOSPADM

## 2025-01-22 RX ORDER — POLYETHYLENE GLYCOL 3350 17 G/17G
17 POWDER, FOR SOLUTION ORAL DAILY PRN
Status: DISCONTINUED | OUTPATIENT
Start: 2025-01-22 | End: 2025-01-24 | Stop reason: HOSPADM

## 2025-01-22 RX ADMIN — Medication 10 ML: at 21:58

## 2025-01-22 RX ADMIN — ACETAMINOPHEN 650 MG: 325 TABLET, FILM COATED ORAL at 19:30
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
## 2025-01-22 NOTE — H&P
History and Physical   Rachael Quevedo : 1945 MRN:[id number] LOS:0     Reason for admission: Syncope     Assessment / Plan     #Syncope   -Syncope episode in the bathroom today.   -Labs essentially stable.  -EKG with LBBB  -Chest x-ray no acute issue   -CT head CT C spine CT angio chest with no issue   -Echocardiogram carotid Doppler to be done.  -Cardiology to see.  -tele to continue.  Fall precaution.    Code Status (Patient has no pulse and is not breathing): CPR (Attempt to Resuscitate)  Medical Interventions (Patient has pulse or is breathing): Full Support       Nutrition: Diet: Cardiac; Healthy Heart (2-3 Na+); Fluid Consistency: Thin (IDDSI 0)     DVT Prophylaxis: Active VTE Prophylaxis  Mechanical:        Start        25  Maintain Sequential Compression Device  Continuous                          Select Pharmacologic VTE Prophylaxis if Desired & Appropriate      History of Present illness     A 79 y.o. old female patient with PMH of hypertension hyperlipidemia left bundle branch block CKD CVA presents to the hospital with complaints of syncope episode in the bathroom. She has pre syncope in past few years even though she is sitting in the chair. Has follow up with Dr Delong before and loop recorder before. [facility] ED,  Labs essentially stable in the outside facility ED.  CT head CT C spine CT angio chest with no issue. EKG with LBBB> Was transferred to [facility] for further workup for syncope.  Echocardiogram and carotid Doppler cardiology to see.    Admitted for syncope workup.    Subjective / Review of systems     Review of Systems   No pain  No dizziness    Past Medical/Surgical/Social/Family History & Allergies     Past Medical History:   Diagnosis Date    Allergic     Ankle fracture, left     Comments: 2016    Arthritis     Cataract, acquired     Coronary artery disease     Cough     Impression: Most likely reactive airway prescription as below Start antihistamine at home    Dense breast      Depression, major, recurrent, mild     GERD without esophagitis     Comments: Dr Duncan- protonix    Hematuria, microscopic     History of chicken pox     History of loop recorder     Hyperglycemia     Hyperlipidemia     Hypertension 01/04/2020    Injury of back     Injury of neck     Left bundle branch block (LBBB)     Malaise and fatigue     Medicare annual wellness visit, initial     with abnormal findings    Osteopenia 2022    Other specified menopausal and perimenopausal disorders     Other specified menopausal and postmenopausal disorders    Pap smear, low-risk     Renal insufficiency     S/P right rotator cuff repair     Screening for alcoholism     10 or more drinks per week    Screening for depression     Negative Depression Screening ( 9 or less) ()    Screening mammogram, encounter for     Stroke     1/5/2020 no residual effects    Vasovagal near-syncope     Impression: from illness and cough Discussed if there is loss of vision in an eye, confusion, weakness or numbness in an extremity, drooping of a side of the face, intractible pain, intractible vomiting, to go to the ER.      Past Surgical History:   Procedure Laterality Date    CARDIAC CATHETERIZATION N/A 01/27/2020    Procedure: Left Heart Cath with angiogram;  Surgeon: Jermaine Delong MD;  Location: [facility] CATH INVASIVE LOCATION;  Service: Cardiovascular    CARDIAC CATHETERIZATION N/A 01/27/2020    Procedure: Stent BOBBY coronary;  Surgeon: Jermaine Delong MD;  Location: [facility] CATH INVASIVE LOCATION;  Service: Cardiovascular    CARDIAC CATHETERIZATION N/A 01/27/2020    Procedure: Left ventriculography;  Surgeon: Jermaine Delong MD;  Location: [facility] CATH INVASIVE LOCATION;  Service: Cardiovascular    CARDIAC CATHETERIZATION N/A 01/27/2020    Procedure: Coronary angiography;  Surgeon: Jermaine Delong MD;  Location: [facility] CATH INVASIVE LOCATION;  Service: Cardiovascular    CATARACT EXTRACTION, BILATERAL Bilateral 1999    CHOLECYSTECTOMY  1993??     COLONOSCOPY  Last one 2023    CORONARY STENT PLACEMENT  1/28/2020    GALLBLADDER SURGERY  1996    LAPAROSCOPIC TUBAL LIGATION  1980    RENAL ARTERY STENT  01/26/2020    ROTATOR CUFF REPAIR Right 2002    TUBAL ABDOMINAL LIGATION  1979??      Social History     Socioeconomic History    Marital status:    Tobacco Use    Smoking status: Never    Smokeless tobacco: Never    Tobacco comments:     Many family members smoked around me when I was a child   Vaping Use    Vaping status: Never Used   Substance and Sexual Activity    Alcohol use: Yes     Alcohol/week: 3.0 standard drinks of alcohol     Types: 2 Glasses of wine, 1 Drinks containing 0.5 oz of alcohol per week     Comment: Both not weekly but occassionally    Drug use: No    Sexual activity: Yes     Partners: Male     Birth control/protection: None     Comment: Very occasional      Family History   Problem Relation Age of Onset    Hyperlipidemia Mother         Elevated cholesterol    Heart disease Mother     Heart attack Mother     Hypertension Father     Colon cancer Father     Hyperlipidemia Father         Elevated cholesterol    Heart disease Father     Cancer Father     Diabetes Brother     Heart disease Brother     Hyperlipidemia Brother     Hypertension Brother     Diabetes Paternal Aunt     Colon cancer Paternal Aunt     Colon cancer Paternal Uncle     Diabetes Paternal Grandmother     Heart disease Paternal Grandmother     Arthritis Paternal Grandmother     Heart disease Maternal Uncle     Heart disease Maternal Uncle     Cancer Maternal Aunt     Cancer Paternal Uncle     Breast cancer Neg Hx     Ovarian cancer Neg Hx       No Known Allergies     Home Medications     Prior to Admission medications    Medication Sig Start Date End Date Taking? Authorizing Provider   aspirin 81 MG EC tablet TAKE 1 TABLET BY MOUTH EVERY DAY 4/13/21   Alexa Shah MD   Biotin 5 MG capsule Take  by mouth Every Night.    Provider, MD Jewel   calcium carbonate  (OS-SHYLA) 600 MG tablet Take 1 tablet by mouth 2 (Two) Times a Day With Meals.    Jewel Briceno MD   carvedilol (COREG) 3.125 MG tablet TAKE 1 TABLET BY MOUTH TWICE A DAY WITH MEALS 3/6/24   Jermaine Delong MD   Cholecalciferol (Vitamin D3) 50 MCG (2000 UT) tablet Take  by mouth.    Jewel Briceno MD   fexofenadine (ALLEGRA) 180 MG tablet Take 1 tablet by mouth Daily. Pt taking 1/2 tablet daily    Jewel Briceno MD   fluticasone (FLONASE) 50 MCG/ACT nasal spray 2 sprays into the nostril(s) as directed by provider Daily. As directed by the provider 7/15/24   Alexa Shah MD   magnesium gluconate (MAGONATE) 500 MG tablet Take 1 tablet by mouth Daily.    Jewel Briceno MD   Multiple Vitamins-Minerals (WOMENS MULTIVITAMIN + COLLAGEN PO)  12/1/22   Jewel Briceno MD   pantoprazole (PROTONIX) 40 MG EC tablet TAKE 1 TABLET BY MOUTH EVERY DAY IN THE MORNING BEFORE BREAKFAST 1/11/24   Jewel Briceno MD   Probiotic Product (PROBIOTIC DAILY PO) Take 1 capsule by mouth Daily.    Jewel Briceno MD   rosuvastatin (CRESTOR) 20 MG tablet TAKE 1 TABLET BY MOUTH EVERY DAY 12/19/24   Alexa Shah MD   vitamin B-12 (CYANOCOBALAMIN) 1000 MCG tablet Take 1 tablet by mouth Daily.    Jewel Briceno MD      Objective / Physical Exam   Vital signs:  Temp: 98 °F (36.7 °C)  BP: 133/62  Heart Rate: 67  Resp: 17  SpO2: 99 %  Weight: 60.1 kg (132 lb 7.9 oz)    Admission Weight: Weight: 60.1 kg (132 lb 7.9 oz)    Physical Exam   Physical Exam  HENT:      Head: Normocephalic and atraumatic.      Nose: Nose normal.   Eyes:      Extraocular Movements: Extraocular movements intact.      Conjunctivae/sclerae: Conjunctivae normal.      Pupils: Pupils are equal, round, and reactive to light.   Cardiovascular:      Rate and Rhythm: Normal       Pulses: Normal pulses.      Heart sounds: Normal heart sounds.   Pulmonary:      Effort: normal      Breath sounds: normal   Abdominal:      General:  "Abdomen is flat. Bowel sounds are normal.      Palpations: Abdomen is soft.   Musculoskeletal:         General: Normal range of motion.      Cervical back: Normal range of motion and neck supple.   Skin:     General: Skin is dry.   Neurological:      General: No focal deficit present.      Mental Status: alert.   Psychiatric:         Mood and Affect: Mood normal.        Labs         Invalid input(s): \"HBG\"          Current Medications   Scheduled Meds:sodium chloride, 10 mL, Intravenous, Q12H         Continuous Infusions:      Timothy Sanchez MD  [facility]   01/22/25   17:05 EST       "

## 2025-01-23 ENCOUNTER — APPOINTMENT (OUTPATIENT)
Dept: MRI IMAGING | Facility: HOSPITAL | Age: 80
End: 2025-01-23
Payer: MEDICARE

## 2025-01-23 ENCOUNTER — APPOINTMENT (OUTPATIENT)
Dept: CARDIOLOGY | Facility: HOSPITAL | Age: 80
End: 2025-01-23
Payer: MEDICARE

## 2025-01-23 LAB
ANION GAP SERPL CALCULATED.3IONS-SCNC: 12.4 MMOL/L (ref 5–15)
AORTIC DIMENSIONLESS INDEX: 0.67 (DI)
AV MEAN PRESS GRAD SYS DOP V1V2: 3 MMHG
AV VMAX SYS DOP: 123 CM/SEC
BASOPHILS # BLD AUTO: 0.04 10*3/MM3 (ref 0–0.2)
BASOPHILS # BLD AUTO: 0.05 10*3/MM3 (ref 0–0.2)
BASOPHILS NFR BLD AUTO: 0.4 % (ref 0–1.5)
BASOPHILS NFR BLD AUTO: 0.4 % (ref 0–1.5)
BH CV ECHO MEAS - AO MAX PG: 6.1 MMHG
BH CV ECHO MEAS - AO V2 VTI: 29.1 CM
BH CV ECHO MEAS - AVA(I,D): 2.15 CM2
BH CV ECHO MEAS - EDV(CUBED): 46.7 ML
BH CV ECHO MEAS - EDV(MOD-SP4): 45.6 ML
BH CV ECHO MEAS - EF(MOD-SP4): 62.3 %
BH CV ECHO MEAS - ESV(CUBED): 19.7 ML
BH CV ECHO MEAS - ESV(MOD-SP4): 17.2 ML
BH CV ECHO MEAS - FS: 25 %
BH CV ECHO MEAS - IVS/LVPW: 1 CM
BH CV ECHO MEAS - IVSD: 1 CM
BH CV ECHO MEAS - LA DIMENSION: 2.9 CM
BH CV ECHO MEAS - LAT PEAK E' VEL: 8.2 CM/SEC
BH CV ECHO MEAS - LV DIASTOLIC VOL/BSA (35-75): 29.1 CM2
BH CV ECHO MEAS - LV MASS(C)D: 107.9 GRAMS
BH CV ECHO MEAS - LV MAX PG: 2.7 MMHG
BH CV ECHO MEAS - LV MEAN PG: 1 MMHG
BH CV ECHO MEAS - LV SYSTOLIC VOL/BSA (12-30): 11 CM2
BH CV ECHO MEAS - LV V1 MAX: 82.9 CM/SEC
BH CV ECHO MEAS - LV V1 VTI: 19.9 CM
BH CV ECHO MEAS - LVIDD: 3.6 CM
BH CV ECHO MEAS - LVIDS: 2.7 CM
BH CV ECHO MEAS - LVOT AREA: 3.1 CM2
BH CV ECHO MEAS - LVOT DIAM: 2 CM
BH CV ECHO MEAS - LVPWD: 1 CM
BH CV ECHO MEAS - MED PEAK E' VEL: 5.1 CM/SEC
BH CV ECHO MEAS - MV A DUR: 0.12 SEC
BH CV ECHO MEAS - MV A MAX VEL: 82.3 CM/SEC
BH CV ECHO MEAS - MV DEC SLOPE: 477 CM/SEC2
BH CV ECHO MEAS - MV DEC TIME: 0.17 SEC
BH CV ECHO MEAS - MV E MAX VEL: 62.9 CM/SEC
BH CV ECHO MEAS - MV E/A: 0.76
BH CV ECHO MEAS - MV MAX PG: 2.9 MMHG
BH CV ECHO MEAS - MV MEAN PG: 1 MMHG
BH CV ECHO MEAS - MV P1/2T: 43.4 MSEC
BH CV ECHO MEAS - MV V2 VTI: 22.2 CM
BH CV ECHO MEAS - MVA(P1/2T): 5.1 CM2
BH CV ECHO MEAS - MVA(VTI): 2.8 CM2
BH CV ECHO MEAS - PA ACC TIME: 0.12 SEC
BH CV ECHO MEAS - PA V2 MAX: 96.4 CM/SEC
BH CV ECHO MEAS - RV MAX PG: 2.11 MMHG
BH CV ECHO MEAS - RV V1 MAX: 72.7 CM/SEC
BH CV ECHO MEAS - RV V1 VTI: 17 CM
BH CV ECHO MEAS - SV(LVOT): 62.5 ML
BH CV ECHO MEAS - SV(MOD-SP4): 28.4 ML
BH CV ECHO MEAS - SVI(LVOT): 40 ML/M2
BH CV ECHO MEAS - SVI(MOD-SP4): 18.2 ML/M2
BH CV ECHO MEAS - TAPSE (>1.6): 1.41 CM
BH CV ECHO MEAS - TR MAX PG: 16.2 MMHG
BH CV ECHO MEAS - TR MAX VEL: 201 CM/SEC
BH CV ECHO MEASUREMENTS AVERAGE E/E' RATIO: 9.46
BH CV XLRA - TDI S': 12.3 CM/SEC
BUN SERPL-MCNC: 23 MG/DL (ref 8–23)
BUN/CREAT SERPL: 19 (ref 7–25)
CALCIUM SPEC-SCNC: 9.5 MG/DL (ref 8.6–10.5)
CHLORIDE SERPL-SCNC: 106 MMOL/L (ref 98–107)
CO2 SERPL-SCNC: 21.6 MMOL/L (ref 22–29)
CREAT SERPL-MCNC: 1.21 MG/DL (ref 0.57–1)
DEPRECATED RDW RBC AUTO: 46.2 FL (ref 37–54)
DEPRECATED RDW RBC AUTO: 47.8 FL (ref 37–54)
EGFRCR SERPLBLD CKD-EPI 2021: 46 ML/MIN/1.73
EOSINOPHIL # BLD AUTO: 0.18 10*3/MM3 (ref 0–0.4)
EOSINOPHIL # BLD AUTO: 0.19 10*3/MM3 (ref 0–0.4)
EOSINOPHIL NFR BLD AUTO: 1.7 % (ref 0.3–6.2)
EOSINOPHIL NFR BLD AUTO: 1.8 % (ref 0.3–6.2)
ERYTHROCYTE [DISTWIDTH] IN BLOOD BY AUTOMATED COUNT: 13.2 % (ref 12.3–15.4)
ERYTHROCYTE [DISTWIDTH] IN BLOOD BY AUTOMATED COUNT: 13.4 % (ref 12.3–15.4)
GLUCOSE BLDC GLUCOMTR-MCNC: 169 MG/DL (ref 70–105)
GLUCOSE SERPL-MCNC: 109 MG/DL (ref 65–99)
HCT VFR BLD AUTO: 40.2 % (ref 34–46.6)
HCT VFR BLD AUTO: 42.2 % (ref 34–46.6)
HGB BLD-MCNC: 12.9 G/DL (ref 12–15.9)
HGB BLD-MCNC: 13.4 G/DL (ref 12–15.9)
IMM GRANULOCYTES # BLD AUTO: 0.04 10*3/MM3 (ref 0–0.05)
IMM GRANULOCYTES # BLD AUTO: 0.06 10*3/MM3 (ref 0–0.05)
IMM GRANULOCYTES NFR BLD AUTO: 0.4 % (ref 0–0.5)
IMM GRANULOCYTES NFR BLD AUTO: 0.5 % (ref 0–0.5)
LEFT ATRIUM VOLUME INDEX: 26.5 ML/M2
LYMPHOCYTES # BLD AUTO: 3.46 10*3/MM3 (ref 0.7–3.1)
LYMPHOCYTES # BLD AUTO: 3.72 10*3/MM3 (ref 0.7–3.1)
LYMPHOCYTES NFR BLD AUTO: 33.2 % (ref 19.6–45.3)
LYMPHOCYTES NFR BLD AUTO: 35.3 % (ref 19.6–45.3)
MAGNESIUM SERPL-MCNC: 2.4 MG/DL (ref 1.6–2.4)
MCH RBC QN AUTO: 30.6 PG (ref 26.6–33)
MCH RBC QN AUTO: 30.7 PG (ref 26.6–33)
MCHC RBC AUTO-ENTMCNC: 31.8 G/DL (ref 31.5–35.7)
MCHC RBC AUTO-ENTMCNC: 32.1 G/DL (ref 31.5–35.7)
MCV RBC AUTO: 95.5 FL (ref 79–97)
MCV RBC AUTO: 96.6 FL (ref 79–97)
MONOCYTES # BLD AUTO: 0.81 10*3/MM3 (ref 0.1–0.9)
MONOCYTES # BLD AUTO: 0.88 10*3/MM3 (ref 0.1–0.9)
MONOCYTES NFR BLD AUTO: 7.8 % (ref 5–12)
MONOCYTES NFR BLD AUTO: 8.3 % (ref 5–12)
NEUTROPHILS NFR BLD AUTO: 5.26 10*3/MM3 (ref 1.7–7)
NEUTROPHILS NFR BLD AUTO: 53.8 % (ref 42.7–76)
NEUTROPHILS NFR BLD AUTO: 56.4 % (ref 42.7–76)
NEUTROPHILS NFR BLD AUTO: 6.32 10*3/MM3 (ref 1.7–7)
NRBC BLD AUTO-RTO: 0 /100 WBC (ref 0–0.2)
NRBC BLD AUTO-RTO: 0 /100 WBC (ref 0–0.2)
PHOSPHATE SERPL-MCNC: 4 MG/DL (ref 2.5–4.5)
PLATELET # BLD AUTO: 219 10*3/MM3 (ref 140–450)
PLATELET # BLD AUTO: 233 10*3/MM3 (ref 140–450)
PMV BLD AUTO: 11.8 FL (ref 6–12)
PMV BLD AUTO: 11.9 FL (ref 6–12)
POTASSIUM SERPL-SCNC: 4.1 MMOL/L (ref 3.5–5.2)
RBC # BLD AUTO: 4.21 10*6/MM3 (ref 3.77–5.28)
RBC # BLD AUTO: 4.37 10*6/MM3 (ref 3.77–5.28)
SINUS: 2.4 CM
SODIUM SERPL-SCNC: 140 MMOL/L (ref 136–145)
STJ: 2.3 CM
TROPONIN T SERPL HS-MCNC: 12 NG/L
WBC NRBC COR # BLD AUTO: 11.22 10*3/MM3 (ref 3.4–10.8)
WBC NRBC COR # BLD AUTO: 9.79 10*3/MM3 (ref 3.4–10.8)

## 2025-01-23 PROCEDURE — 84100 ASSAY OF PHOSPHORUS: CPT | Performed by: INTERNAL MEDICINE

## 2025-01-23 PROCEDURE — 82948 REAGENT STRIP/BLOOD GLUCOSE: CPT

## 2025-01-23 PROCEDURE — 80048 BASIC METABOLIC PNL TOTAL CA: CPT | Performed by: INTERNAL MEDICINE

## 2025-01-23 PROCEDURE — 85025 COMPLETE CBC W/AUTO DIFF WBC: CPT | Performed by: INTERNAL MEDICINE

## 2025-01-23 PROCEDURE — 84484 ASSAY OF TROPONIN QUANT: CPT | Performed by: INTERNAL MEDICINE

## 2025-01-23 PROCEDURE — 97165 OT EVAL LOW COMPLEX 30 MIN: CPT

## 2025-01-23 PROCEDURE — 93306 TTE W/DOPPLER COMPLETE: CPT | Performed by: INTERNAL MEDICINE

## 2025-01-23 PROCEDURE — 99222 1ST HOSP IP/OBS MODERATE 55: CPT | Performed by: INTERNAL MEDICINE

## 2025-01-23 PROCEDURE — 83735 ASSAY OF MAGNESIUM: CPT | Performed by: INTERNAL MEDICINE

## 2025-01-23 PROCEDURE — 97161 PT EVAL LOW COMPLEX 20 MIN: CPT

## 2025-01-23 PROCEDURE — 93306 TTE W/DOPPLER COMPLETE: CPT

## 2025-01-23 PROCEDURE — 70551 MRI BRAIN STEM W/O DYE: CPT

## 2025-01-23 RX ORDER — MULTIVITAMIN WITH IRON
1000 TABLET ORAL DAILY
Status: DISCONTINUED | OUTPATIENT
Start: 2025-01-23 | End: 2025-01-24 | Stop reason: HOSPADM

## 2025-01-23 RX ORDER — ASPIRIN 81 MG/1
81 TABLET ORAL DAILY
Status: DISCONTINUED | OUTPATIENT
Start: 2025-01-23 | End: 2025-01-24 | Stop reason: HOSPADM

## 2025-01-23 RX ORDER — CARVEDILOL 3.12 MG/1
3.12 TABLET ORAL ONCE
Status: COMPLETED | OUTPATIENT
Start: 2025-01-23 | End: 2025-01-23

## 2025-01-23 RX ORDER — ROSUVASTATIN CALCIUM 10 MG/1
20 TABLET, COATED ORAL NIGHTLY
Status: DISCONTINUED | OUTPATIENT
Start: 2025-01-23 | End: 2025-01-24 | Stop reason: HOSPADM

## 2025-01-23 RX ORDER — CARVEDILOL 3.12 MG/1
3.12 TABLET ORAL 2 TIMES DAILY WITH MEALS
Status: DISCONTINUED | OUTPATIENT
Start: 2025-01-23 | End: 2025-01-24 | Stop reason: HOSPADM

## 2025-01-23 RX ORDER — PANTOPRAZOLE SODIUM 40 MG/1
40 TABLET, DELAYED RELEASE ORAL
Status: DISCONTINUED | OUTPATIENT
Start: 2025-01-24 | End: 2025-01-24 | Stop reason: HOSPADM

## 2025-01-23 RX ADMIN — Medication 1000 MCG: at 14:36

## 2025-01-23 RX ADMIN — Medication 1 TABLET: at 20:02

## 2025-01-23 RX ADMIN — ASPIRIN 81 MG: 81 TABLET, COATED ORAL at 14:36

## 2025-01-23 RX ADMIN — Medication 1 TABLET: at 14:36

## 2025-01-23 RX ADMIN — ROSUVASTATIN CALCIUM 20 MG: 10 TABLET, FILM COATED ORAL at 20:02

## 2025-01-23 RX ADMIN — CARVEDILOL 3.12 MG: 3.12 TABLET, FILM COATED ORAL at 18:31

## 2025-01-23 RX ADMIN — Medication 10 ML: at 20:02

## 2025-01-23 RX ADMIN — CARVEDILOL 3.12 MG: 3.12 TABLET, FILM COATED ORAL at 09:26

## 2025-01-23 RX ADMIN — Medication 10 ML: at 09:33

## 2025-01-23 NOTE — PLAN OF CARE
Goal Outcome Evaluation:         Pt condition ongoing. Pt to have stress test in am, NPO after midnight.

## 2025-01-23 NOTE — THERAPY EVALUATION
Patient Name: Rachael Quevedo  : 1945    MRN: [id number]                              Today's Date: 2025       Admit Date: 2025    Visit Dx: No diagnosis found.  Patient Active Problem List   Diagnosis    Coronary artery disease    Degeneration of intervertebral disc of lumbosacral region    Dense breast    Depression, major, recurrent, mild    GERD without esophagitis    History of chicken pox    Presence of other cardiac implants and grafts    Mixed hyperlipidemia    Left bundle branch block (LBBB)    Lumbar disc herniation with radiculopathy    Other specified menopausal and postmenopausal disorders    CVA (cerebral vascular accident)    Vasovagal syncope    Cervical radiculopathy    Long-term current use of opiate analgesic    DDD (degenerative disc disease), cervical    Medicare annual wellness visit, subsequent    Screening mammogram for breast cancer    Seasonal allergic rhinitis due to pollen    Overweight with body mass index (BMI) of 25 to 25.9 in adult    Type 2 diabetes mellitus without complication, without long-term current use of insulin    Neck mass    Thyroiditis    Lymphadenopathy    Oral phase dysphagia    Dysuria    Skin lesion    Mammogram declined    Syncope     Past Medical History:   Diagnosis Date    Allergic     Ankle fracture, left     Comments: 2016    Arthritis     Cataract, acquired     Coronary artery disease     Cough     Impression: Most likely reactive airway prescription as below Start antihistamine at home    Dense breast     Depression, major, recurrent, mild     GERD without esophagitis     Comments: Dr Duncan- protonix    Hematuria, microscopic     History of chicken pox     History of loop recorder     Hyperglycemia     Hyperlipidemia     Hypertension 2020    Injury of back     Injury of neck     Left bundle branch block (LBBB)     Malaise and fatigue     Medicare annual wellness visit, initial     with abnormal findings    Osteopenia     Other  specified menopausal and perimenopausal disorders     Other specified menopausal and postmenopausal disorders    Pap smear, low-risk     Renal insufficiency     S/P right rotator cuff repair     Screening for alcoholism     10 or more drinks per week    Screening for depression     Negative Depression Screening ( 9 or less) ()    Screening mammogram, encounter for     Stroke     1/5/2020 no residual effects    Vasovagal near-syncope     Impression: from illness and cough Discussed if there is loss of vision in an eye, confusion, weakness or numbness in an extremity, drooping of a side of the face, intractible pain, intractible vomiting, to go to the ER.     Past Surgical History:   Procedure Laterality Date    CARDIAC CATHETERIZATION N/A 01/27/2020    Procedure: Left Heart Cath with angiogram;  Surgeon: Jermaine Delong MD;  Location: [facility] CATH INVASIVE LOCATION;  Service: Cardiovascular    CARDIAC CATHETERIZATION N/A 01/27/2020    Procedure: Stent BOBBY coronary;  Surgeon: Jermaine Delong MD;  Location: [facility] CATH INVASIVE LOCATION;  Service: Cardiovascular    CARDIAC CATHETERIZATION N/A 01/27/2020    Procedure: Left ventriculography;  Surgeon: Jermaine Delong MD;  Location: [facility] CATH INVASIVE LOCATION;  Service: Cardiovascular    CARDIAC CATHETERIZATION N/A 01/27/2020    Procedure: Coronary angiography;  Surgeon: Jermaine Delong MD;  Location: [facility] CATH INVASIVE LOCATION;  Service: Cardiovascular    CATARACT EXTRACTION, BILATERAL Bilateral 1999    CHOLECYSTECTOMY  1993??    COLONOSCOPY  Last one 2023    CORONARY STENT PLACEMENT  1/28/2020    GALLBLADDER SURGERY  1996    LAPAROSCOPIC TUBAL LIGATION  1980    RENAL ARTERY STENT  01/26/2020    ROTATOR CUFF REPAIR Right 2002    TUBAL ABDOMINAL LIGATION  1979??      General Information       Row Name 01/23/25 1112          OT Time and Intention    Subjective Information no complaints  -SP     Document Type evaluation  -SP     Mode of Treatment individual  therapy;occupational therapy  -SP     Patient Effort excellent  -SP       Row Name 01/23/25 1112          General Information    Patient Profile Reviewed yes  -SP     Prior Level of Function independent:;all household mobility;home management;community mobility;cooking;ADL's;driving  -SP     Barriers to Rehab none identified  -SP       Row Name 01/23/25 1112          Living Environment    People in Home spouse  -SP       Row Name 01/23/25 1112          Home Main Entrance    Number of Stairs, Main Entrance two  -SP       Row Name 01/23/25 1112          Stairs Within Home, Primary    Stairs, Within Home, Primary SSH, flight of stairs to basement though pt does not have to utilize  -SP       Row Name 01/23/25 1112          Cognition    Orientation Status (Cognition) oriented x 4  -SP               User Key  (r) = Recorded By, (t) = Taken By, (c) = Cosigned By      Initials Name Provider Type    SP Pedro Manriquez OT Occupational Therapist                     Mobility/ADL's       Row Name 01/23/25 1113          Bed Mobility    Bed Mobility bed mobility (all) activities  -SP     All Activities, [place] (Bed Mobility) independent  -SP       Row Name 01/23/25 1113          Transfers    Transfers sit-stand transfer  -SP       Row Name 01/23/25 1113          Sit-Stand Transfer    Sit-Stand [place] (Transfers) independent  -SP       Row Name 01/23/25 1113          Functional Mobility    Functional Mobility- Ind. Level standby assist  -SP     Patient was able to Ambulate yes  -SP       Row Name 01/23/25 1113          Activities of Daily Living    BADL Assessment/Intervention lower body dressing  -SP       Row Name 01/23/25 1113          Lower Body Dressing Assessment/Training    [place] Level (Lower Body Dressing) don;shoes/slippers;independent  -SP     Position (Lower Body Dressing) edge of bed sitting  -SP               User Key  (r) = Recorded By, (t) = Taken By, (c) = Cosigned By      Initials Name Provider  Type    SP Pedro Manriquez OT Occupational Therapist                   Obj/Interventions       Row Name 01/23/25 1117          Sensory Assessment (Somatosensory)    Sensory Assessment (Somatosensory) UE sensation intact  -SP       Row Name 01/23/25 1117          Vision Assessment/Intervention    Visual Impairment/Limitations WNL  -SP     Vision Assessment Comment Pt does not report any recent changes in vision.  -SP       Row Name 01/23/25 1117          Range of Motion Comprehensive    General Range of Motion bilateral upper extremity ROM WNL  -SP       Row Name 01/23/25 1117          Strength Comprehensive (MMT)    Comment, General Manual Muscle Testing (MMT) Assessment BUE shoulders 4-/5,  4+/5  -SP       Row Name 01/23/25 1117          Balance    Balance Assessment sitting dynamic balance;sit to stand dynamic balance;standing dynamic balance  -SP     Dynamic Sitting Balance supervision  -SP     Position, Sitting Balance unsupported;sitting edge of bed  -SP     Sit to Stand Dynamic Balance standby assist  -SP     Dynamic Standing Balance standby assist  -SP     Position/Device Used, Standing Balance unsupported  -SP               User Key  (r) = Recorded By, (t) = Taken By, (c) = Cosigned By      Initials Name Provider Type    SP Pedro Manriquez OT Occupational Therapist                   Goals/Plan    No documentation.                  Clinical Impression       Row Name 01/23/25 1119          Pain Assessment    Pretreatment Pain Rating 0/10 - no pain  -SP     Posttreatment Pain Rating 0/10 - no pain  -SP       Row Name 01/23/25 1119          Plan of Care Review    Plan of Care Reviewed With patient;spouse  -SP     Outcome Evaluation Pt is a 78 y/o female admitted to [facility] on 1/22/25 presenting with syncope. Cardiology consulted. PMHx significant for HTN, HLD, CKD, and CVA. At baseline, pt resides with spouse in a [facility] with basement, x2 [place] and reports she is IND with I/ADLs, and actively driving. Pt does not  utilize any DME at this time, shoe horn intermittently. Upon assessment, pt A&O x4 with no reports of pain. Pt completes bed mobility independently and sat EOB where she IND donned and tied her shoes, exhibiting good fine motors skills and dynamic siting balance. Pt comes with standing with SBA where she was able to t/f from bed<>chair. Pt hypertensive this date, therefore further mobility deferred at this time. BP as follows: /76, Standing 183/83, t/f 175/90, RN notified. Pt noted to be at baseline and demonstrated safe functional transfers and ADLs. Based off OT assessment, pt would be appropriate for discharge home with assistance from spouse as needed. No further skilled OT needs identified. OT will sign-off and complete orders. Please re-consult if pt has a change in status.  -SP       Row Name 01/23/25 1119          Therapy Assessment/Plan (OT)    Criteria for Skilled Therapeutic Interventions Met (OT) no  -SP     Therapy Frequency (OT) evaluation only  -SP       Row Name 01/23/25 1119          Therapy Plan Review/Discharge Plan (OT)    Anticipated Discharge Disposition (OT) home with assist  -SP       Row Name 01/23/25 1119          Vital Signs    Pre Systolic BP Rehab 199  -SP     Pre Treatment Diastolic BP 76  -SP     Intra Systolic BP Rehab 183  -SP     Intra Treatment Diastolic BP 83  -SP     Post Systolic BP Rehab 175  -SP     Post Treatment Diastolic BP 90  -SP     Pre SpO2 (%) 100  -SP     O2 Delivery Pre Treatment room air  -SP     O2 Delivery Intra Treatment room air  -SP     O2 Delivery Post Treatment room air  -SP     Pre Patient Position Supine  -SP     Intra Patient Position Standing  -SP     Post Patient Position Sitting  -SP       Row Name 01/23/25 1119          Positioning and Restraints    Pre-Treatment Position in bed  -SP     Post Treatment Position chair  -SP     In Chair notified nsg;sitting;call light within reach;with family/caregiver  -SP               User Key  (r) = Recorded  By, (t) = Taken By, (c) = Cosigned By      Initials Name Provider Type    SP Pedro Manriquez OT Occupational Therapist                   Outcome Measures       Row Name 01/23/25 1120          How much help from another is currently needed...    Putting on and taking off regular lower body clothing? 4  -SP     Bathing (including washing, rinsing, and drying) 4  -SP     Toileting (which includes using toilet bed pan or urinal) 4  -SP     Putting on and taking off regular upper body clothing 4  -SP     Taking care of personal grooming (such as brushing teeth) 4  -SP     Eating meals 4  -SP     AM-PAC 6 Clicks Score (OT) 24  -SP       Row Name 01/23/25 0800          How much help from another person do you currently need...    Turning from your back to your side while in flat bed without using bedrails? 4  -SS     Moving from lying on back to sitting on the side of a flat bed without bedrails? 4  -SS     Moving to and from a bed to a chair (including a wheelchair)? 4  -SS     Standing up from a chair using your arms (e.g., wheelchair, bedside chair)? 4  -SS     Climbing 3-5 steps with a railing? 4  -SS     To walk in hospital room? 4  -SS     AM-PAC 6 Clicks Score (PT) 24  -SS       Row Name 01/23/25 1120          Functional Assessment    Outcome Measure Options AM-PAC 6 Clicks Daily Activity (OT)  -SP               User Key  (r) = Recorded By, (t) = Taken By, (c) = Cosigned By      Initials Name Provider Type    Pedro Smith OT Occupational Therapist    Rossi Tellez RN Registered Nurse                    Occupational Therapy Education       Title: PT OT SLP Therapies (In Progress)       Topic: Occupational Therapy (In Progress)       Point: ADL training (Done)       Description:   Instruct learner(s) on proper safety adaptation and remediation techniques during self care or transfers.   Instruct in proper use of assistive devices.                  Learning Progress Summary            Patient Acceptance,  E,TB, VU by SP at 1/23/2025 1121                      Point: Home exercise program (Not Started)       Description:   Instruct learner(s) on appropriate technique for monitoring, assisting and/or progressing therapeutic exercises/activities.                  Learner Progress:  Not documented in this visit.              Point: Precautions (Done)       Description:   Instruct learner(s) on prescribed precautions during self-care and functional transfers.                  Learning Progress Summary            Patient Acceptance, E,TB, VU by SP at 1/23/2025 1121                      Point: Body mechanics (Done)       Description:   Instruct learner(s) on proper positioning and spine alignment during self-care, functional mobility activities and/or exercises.                  Learning Progress Summary            Patient Acceptance, E,TB, VU by SP at 1/23/2025 1121                                      User Key       Initials Effective Dates Name Provider Type Discipline    SP 11/15/23 -  Pedro Manriquez OT Occupational Therapist OT                  OT Recommendation and Plan  Therapy Frequency (OT): evaluation only  Plan of Care Review  Plan of Care Reviewed With: patient, spouse  Outcome Evaluation: Pt is a 80 y/o female admitted to [facility] on 1/22/25 presenting with syncope. Cardiology consulted. PMHx significant for HTN, HLD, CKD, and CVA. At baseline, pt resides with spouse in a H with basement, x2 [place] and reports she is IND with I/ADLs, and actively driving. Pt does not utilize any DME at this time, shoe horn intermittently. Upon assessment, pt A&O x4 with no reports of pain. Pt completes bed mobility independently and sat EOB where she IND donned and tied her shoes, exhibiting good fine motors skills and dynamic siting balance. Pt comes with standing with SBA where she was able to t/f from bed<>chair. Pt hypertensive this date, therefore further mobility deferred at this time. BP as follows: /76, Standing  183/83, t/f 175/90, RN notified. Pt noted to be at baseline and demonstrated safe functional transfers and ADLs. Based off OT assessment, pt would be appropriate for discharge home with assistance from spouse as needed. No further skilled OT needs identified. OT will sign-off and complete orders. Please re-consult if pt has a change in status.     Time Calculation:         Time Calculation- OT       Row Name 01/23/25 1121             Time Calculation- OT    OT Start Time 0815  -SP      OT Stop Time 0838  -SP      OT Time Calculation (min) 23 min  -SP      OT Received On 01/23/25  -SP                User Key  (r) = Recorded By, (t) = Taken By, (c) = Cosigned By      Initials Name Provider Type    SP Pedro Manriquez OT Occupational Therapist                  Therapy Charges for Today       Code Description Service Date Service Provider Modifiers Qty    [id number]  OT EVAL LOW COMPLEXITY 4 1/23/2025 Pedro Manriquez OT GO 1                 Pedro Manriquez OT  1/23/2025

## 2025-01-23 NOTE — THERAPY EVALUATION
Patient Name: Rachael Quevedo  : 1945    MRN: [id number]                              Today's Date: 2025       Admit Date: 2025    Visit Dx: No diagnosis found.  Patient Active Problem List   Diagnosis    Coronary artery disease    Degeneration of intervertebral disc of lumbosacral region    Dense breast    Depression, major, recurrent, mild    GERD without esophagitis    History of chicken pox    Presence of other cardiac implants and grafts    Mixed hyperlipidemia    Left bundle branch block (LBBB)    Lumbar disc herniation with radiculopathy    Other specified menopausal and postmenopausal disorders    CVA (cerebral vascular accident)    Vasovagal syncope    Cervical radiculopathy    Long-term current use of opiate analgesic    DDD (degenerative disc disease), cervical    Medicare annual wellness visit, subsequent    Screening mammogram for breast cancer    Seasonal allergic rhinitis due to pollen    Overweight with body mass index (BMI) of 25 to 25.9 in adult    Type 2 diabetes mellitus without complication, without long-term current use of insulin    Neck mass    Thyroiditis    Lymphadenopathy    Oral phase dysphagia    Dysuria    Skin lesion    Mammogram declined    Syncope     Past Medical History:   Diagnosis Date    Allergic     Ankle fracture, left     Comments: 2016    Arthritis     Cataract, acquired     Coronary artery disease     Cough     Impression: Most likely reactive airway prescription as below Start antihistamine at home    Dense breast     Depression, major, recurrent, mild     GERD without esophagitis     Comments: Dr Duncan- protonix    Hematuria, microscopic     History of chicken pox     History of loop recorder     Hyperglycemia     Hyperlipidemia     Hypertension 2020    Injury of back     Injury of neck     Left bundle branch block (LBBB)     Malaise and fatigue     Medicare annual wellness visit, initial     with abnormal findings    Osteopenia     Other  specified menopausal and perimenopausal disorders     Other specified menopausal and postmenopausal disorders    Pap smear, low-risk     Renal insufficiency     S/P right rotator cuff repair     Screening for alcoholism     10 or more drinks per week    Screening for depression     Negative Depression Screening ( 9 or less) ()    Screening mammogram, encounter for     Stroke     1/5/2020 no residual effects    Vasovagal near-syncope     Impression: from illness and cough Discussed if there is loss of vision in an eye, confusion, weakness or numbness in an extremity, drooping of a side of the face, intractible pain, intractible vomiting, to go to the ER.     Past Surgical History:   Procedure Laterality Date    CARDIAC CATHETERIZATION N/A 01/27/2020    Procedure: Left Heart Cath with angiogram;  Surgeon: Jermaine Delong MD;  Location: [facility] CATH INVASIVE LOCATION;  Service: Cardiovascular    CARDIAC CATHETERIZATION N/A 01/27/2020    Procedure: Stent BOBBY coronary;  Surgeon: Jermaine Delong MD;  Location: [facility] CATH INVASIVE LOCATION;  Service: Cardiovascular    CARDIAC CATHETERIZATION N/A 01/27/2020    Procedure: Left ventriculography;  Surgeon: Jermaine Delong MD;  Location: [facility] CATH INVASIVE LOCATION;  Service: Cardiovascular    CARDIAC CATHETERIZATION N/A 01/27/2020    Procedure: Coronary angiography;  Surgeon: Jermaine Delong MD;  Location: [facility] CATH INVASIVE LOCATION;  Service: Cardiovascular    CATARACT EXTRACTION, BILATERAL Bilateral 1999    CHOLECYSTECTOMY  1993??    COLONOSCOPY  Last one 2023    CORONARY STENT PLACEMENT  1/28/2020    GALLBLADDER SURGERY  1996    LAPAROSCOPIC TUBAL LIGATION  1980    RENAL ARTERY STENT  01/26/2020    ROTATOR CUFF REPAIR Right 2002    TUBAL ABDOMINAL LIGATION  1979??      General Information       Row Name 01/23/25 2060          Physical Therapy Time and Intention    Document Type evaluation  -BR     Mode of Treatment physical therapy  -BR       Row Name 01/23/25 4782           General Information    Patient Profile Reviewed yes  -BR     Prior Level of Function independent:;all household mobility;community mobility;gait;transfer  -BR     Barriers to Rehab none identified  -BR       Row Name 01/23/25 1307          Living Environment    People in Home spouse  -BR       Row Name 01/23/25 1307          Home Main Entrance    Number of Stairs, Main Entrance two  -BR       Row Name 01/23/25 1307          Stairs Within Home, Primary    Number of Stairs, Within Home, Primary none  -BR       Row Name 01/23/25 1307          Cognition    Orientation Status (Cognition) oriented x 4  -BR               User Key  (r) = Recorded By, (t) = Taken By, (c) = Cosigned By      Initials Name Provider Type    Carlene Rasmussen PT Physical Therapist                   Mobility       Row Name 01/23/25 1309          Bed Mobility    Bed Mobility bed mobility (all) activities  -BR     All Activities, [place] (Bed Mobility) independent  -BR       Row Name 01/23/25 1309          Sit-Stand Transfer    Sit-Stand [place] (Transfers) set up  -BR       Row Name 01/23/25 1309          Gait/Stairs (Locomotion)    [place] Level (Gait) standby assist  -BR     Patient was able to Ambulate yes  -BR     Distance in Feet (Gait) 120  -BR               User Key  (r) = Recorded By, (t) = Taken By, (c) = Cosigned By      Initials Name Provider Type    Carlene Rasmussen PT Physical Therapist                   Obj/Interventions       Row Name 01/23/25 1309          Range of Motion Comprehensive    General Range of Motion no range of motion deficits identified  -BR       Row Name 01/23/25 1309          Strength Comprehensive (MMT)    General Manual Muscle Testing (MMT) Assessment no strength deficits identified  -BR       Row Name 01/23/25 1309          Balance    Balance Assessment sitting static balance;standing static balance;standing dynamic balance  -BR     Static Sitting Balance independent  -BR     Static  Standing Balance independent  -BR     Dynamic Standing Balance independent  -BR       Row Name 01/23/25 1309          Sensory Assessment (Somatosensory)    Sensory Assessment (Somatosensory) sensation intact  -BR               User Key  (r) = Recorded By, (t) = Taken By, (c) = Cosigned By      Initials Name Provider Type    BR Carlene Jim, PT Physical Therapist                   Goals/Plan    No documentation.                  Clinical Impression       Row Name 01/23/25 1310          Pain    Pretreatment Pain Rating 0/10 - no pain  -BR     Posttreatment Pain Rating 0/10 - no pain  -BR       Row Name 01/23/25 1316 01/23/25 1310       Plan of Care Review    Plan of Care Reviewed With -- patient;spouse  -BR    Outcome Evaluation Pt presents as a 80 y/o F admitted to [facility] on 1/22/25 with syncope. . CT C-spine(-). CT head (-). Pt has hx DM, heart disease, depression, hx stroke, DDD. Pt A and O x 4 and on room air. Pt was hypertensive- Nsg aware. At baseline, pt lives with spouse in [facility] with 1 [address] and she is very active in community without AD. This date, pt demonstrated safe and independent gait and transfers. Gait tolerance limited to 120 feet d/t pt with BP of 174/82. Pt is at her baseline for mobility and she is OK to be up ad tr in hospital presently. PT rec is Home. Pt will sign off and complete PT orders.  -BR --      Row Name 01/23/25 1310          Therapy Assessment/Plan (PT)    Criteria for Skilled Interventions Met (PT) no;no problems identified which require skilled intervention  -BR     Therapy Frequency (PT) evaluation only  -BR       Row Name 01/23/25 1310          Vital Signs    Pre Systolic BP Rehab 151  -BR     Pre Treatment Diastolic BP 70  -BR     Intra Systolic BP Rehab 174  -BR     Intra Treatment Diastolic BP 82  -BR     Post Systolic BP Rehab 179  -BR     Post Treatment Diastolic BP 68  -BR     Pretreatment Heart Rate (beats/min) 71  -BR     Posttreatment Heart Rate (beats/min) 76  -BR      Pretreatment Resp Rate (breaths/min) 15  -BR     Pre SpO2 (%) 99  -BR     O2 Delivery Pre Treatment room air  -BR     Pre Patient Position Supine  -BR     Intra Patient Position Standing  -BR     Post Patient Position Supine  -BR       Row Name 01/23/25 1310          Positioning and Restraints    Pre-Treatment Position in bed  -BR     Post Treatment Position bed  -BR     In Bed notified nsg;fowlers;call light within reach;encouraged to call for assist;with family/caregiver  -BR               User Key  (r) = Recorded By, (t) = Taken By, (c) = Cosigned By      Initials Name Provider Type    Carlene Rasmussen, PT Physical Therapist                   Outcome Measures       Row Name 01/23/25 1316 01/23/25 0800       How much help from another person do you currently need...    Turning from your back to your side while in flat bed without using bedrails? 4  -BR 4  -SS    Moving from lying on back to sitting on the side of a flat bed without bedrails? 4  -BR 4  -SS    Moving to and from a bed to a chair (including a wheelchair)? 4  -BR 4  -SS    Standing up from a chair using your arms (e.g., wheelchair, bedside chair)? 4  -BR 4  -SS    Climbing 3-5 steps with a railing? 4  -BR 4  -SS    To walk in hospital room? 4  -BR 4  -SS    AM-PAC 6 Clicks Score (PT) 24  -BR 24  -SS    Highest Level of Mobility Goal 8 --> Walked 250 feet or more  -BR 8 --> Walked 250 feet or more  -SS      Row Name 01/23/25 1316 01/23/25 1120       Functional Assessment    Outcome Measure Options AM-PAC 6 Clicks Basic Mobility (PT)  -BR AM-PAC 6 Clicks Daily Activity (OT)  -SP              User Key  (r) = Recorded By, (t) = Taken By, (c) = Cosigned By      Initials Name Provider Type    Carlene Rasmussen, PT Physical Therapist    Pedro Smith, OT Occupational Therapist    Rossi Tellez, RN Registered Nurse                                 Physical Therapy Education       Title: PT OT SLP Therapies (In Progress)       Topic: Physical  Therapy (Done)       Point: Mobility training (Done)       Learning Progress Summary            Patient Eager, E,D, DU,VU by BR at 1/23/2025 1316   Family Eager, E,D, DU,VU by BR at 1/23/2025 1316                      Point: Body mechanics (Done)       Learning Progress Summary            Patient Eager, E,D, DU,VU by BR at 1/23/2025 1316   Family Eager, E,D, DU,VU by BR at 1/23/2025 1316                      Point: Precautions (Done)       Learning Progress Summary            Patient Eager, E,D, DU,VU by BR at 1/23/2025 1316   Family Eager, E,D, DU,VU by BR at 1/23/2025 1316                                      User Key       Initials Effective Dates Name Provider Type Discipline    BR 02/01/22 -  Carlene Jim PT Physical Therapist PT                  PT Recommendation and Plan     Outcome Evaluation: Pt presents as a 80 y/o F admitted to [facility] on 1/22/25 with syncope. . CT C-spine(-). CT head (-). Pt has hx DM, heart disease, depression, hx stroke, DDD. Pt A and O x 4 and on room air. Pt was hypertensive- Nsg aware. At baseline, pt lives with spouse in [facility] with 1 [address] and she is very active in community without AD. This date, pt demonstrated safe and independent gait and transfers. Gait tolerance limited to 120 feet d/t pt with BP of 174/82. Pt is at her baseline for mobility and she is OK to be up ad tr in hospital presently. PT rec is Home. Pt will sign off and complete PT orders.     Time Calculation:         PT Charges       Row Name 01/23/25 1316             Time Calculation    Start Time 0855  -BR      Stop Time 0915  -BR      Time Calculation (min) 20 min  -BR      PT Received On 01/23/25  -BR         Time Calculation- PT    Total Timed Code Minutes- PT 0 minute(s)  -BR                User Key  (r) = Recorded By, (t) = Taken By, (c) = Cosigned By      Initials Name Provider Type    BR Carlene Jim PT Physical Therapist                  Therapy Charges for Today       Code Description Service Date  Service Provider Modifiers Qty    72172543678 HC PT EVAL LOW COMPLEXITY 3 1/23/2025 Carlene Jim, PT GP 1            PT G-Codes  Outcome Measure Options: AM-PAC 6 Clicks Basic Mobility (PT)  AM-PAC 6 Clicks Score (PT): 24  AM-PAC 6 Clicks Score (OT): 24       Carlene Jim, PT  1/23/2025

## 2025-01-23 NOTE — CONSULTS
"    Cardiology Consult Note    Patient Identification:  Name: Rachael Quevedo  Age: 79 y.o.  Sex: female  :  1945  MRN: [id number]             Requesting Physician :  Dr. Sanchez, Hospitalist     Reason for Consultation / Chief Complaint :   Syncope     History of Present Illness:      Mrs. Rachael Quevedo \"Carley\" has a PMH of    - CAD (cardiac cath 2020 with BOBBY to proximal LAD and first marginal branch)  - Chronic LBBB   - hypertension   - dyslipidemia   -Previous history of loop recorder  -Previous CVA  - recurrent pre-syncope   -GERD  -Depression  -Previous history of bilateral cataract extraction cholecystectomy rotator cuff repair tubal ligation ?  Renal artery stent  -Non-smoker, EtOH occasionally      Presented to the ED at [facility] with syncopal episode.  Reports that she was in the shower and passed out.  Her  helped her out she got dressed in went to the hospital due to hitting her head.  She reports that she continues to have dizziness and now has a headache which is similar to when she had her previous stroke.  She also reports that she has recently had some dyspnea when walking on her treadmill.  She reports that she usually is able to walk about 10 minutes but has only been able to walk about 3 minutes for getting short of breath.  She denies any chest pain.    During examination, patient did have mild episode of forgetfulness and dizziness while sitting in bed that she and daughter where concerned about, as this was signs of her previous CVA.  Discussed with nurse to discuss with hospital regarding hospital MRI.        Labs at outlying ED were unremarkable including high-sensitivity troponin.  Labs here showed BMP unremarkable except creatinine 1.21 EGFR 46 WBC 11.22  EKG normal sinus rhythm with left bundle branch block unchanged    Orthostatic VS negative   Echocardiogram is pending  Carotid Dopplers showed less than 50% bilaterally    Continue coreg 3.125 " Continue aspirin and statin for CAD   Add amlodipine to help with blood pressure control     Plan for stress test in am     Further assessment and plan per Dr. Wong   Electronically signed by BINA Mathews, 01/23/25, 12:44 PM EST.    Cardiology attending addendum :    I have personally performed a face-to-face diagnostic evaluation, physical exam and reviewed data on this patient.  I have reviewed documentation done by me and nurse practitioner  and corrected as needed.  And agree with the different components of documentation.Greater than 50% of the time spent in the care of this patient was provided by attending consultant/me.          Assessment:  :    Syncope  Possibly vasovagal syncope  Dyspnea on exertion  Chronic left bundle branch block  CAD history of PCI  Hypertension  Dyslipidemia  History of CVA      Recommendations / Plan:        Patient presented with complaint of syncope which appears to be vasovagal.  He is not orthostatic.  Creatinine is mildly elevated.  MRI of the head is negative for acute stroke.  EKG done  1/22/2025 reviewed/interpreted by me reveals sinus rhythm with a rate of 63 bpm with left bundle branch block, no new change compared to old EKG.  Will check an echocardiogram.  Patient had cardiac cath 1/27/2020 which revealed severe two-vessel disease and underwent stent to proximal LAD and OM1 branch.  Will check troponin.  Patient has been complaining of dyspnea on exertion on treadmill recently  Will check stress test in AM.  Since patient has left bundle we will do Lexiscan Cardiolite              No diagnosis found.               Cardiographics  ECG: EKG tracing was  personally reviewed/interpreted by me  ECG 12 Lead QT Measurement   Preliminary Result   HEART RATE=63  bpm   RR Jpmputff=233  ms   AL Ssrhodor=379  ms   P Horizontal Axis=10  deg   P Front Axis=53  deg   QRSD Dqivoesq=898  ms   QT Tcfigpup=057  ms   SRmZ=115  ms   QRS Axis=-15  deg   T Wave Axis=142  deg   -  ABNORMAL ECG -   Sinus rhythm   Left bundle branch block   Date and Time of Study:2025-01-22 18:07:44      Telemetry Scan   Final Result      Telemetry Scan   Final Result      Telemetry Scan   Final Result      Telemetry Scan   Final Result      Telemetry Scan   Final Result      Telemetry Scan   Final Result          Telemetry: Sinus rhythm with LBBB       Past Medical History:  Past Medical History:   Diagnosis Date    Allergic     Ankle fracture, left     Comments: 2016    Arthritis     Cataract, acquired     Coronary artery disease     Cough     Impression: Most likely reactive airway prescription as below Start antihistamine at home    Dense breast     Depression, major, recurrent, mild     GERD without esophagitis     Comments: Dr Duncan- protonix    Hematuria, microscopic     History of chicken pox     History of loop recorder     Hyperglycemia     Hyperlipidemia     Hypertension 01/04/2020    Injury of back     Injury of neck     Left bundle branch block (LBBB)     Malaise and fatigue     Medicare annual wellness visit, initial     with abnormal findings    Osteopenia 2022    Other specified menopausal and perimenopausal disorders     Other specified menopausal and postmenopausal disorders    Pap smear, low-risk     Renal insufficiency     S/P right rotator cuff repair     Screening for alcoholism     10 or more drinks per week    Screening for depression     Negative Depression Screening ( 9 or less) ()    Screening mammogram, encounter for     Stroke     1/5/2020 no residual effects    Vasovagal near-syncope     Impression: from illness and cough Discussed if there is loss of vision in an eye, confusion, weakness or numbness in an extremity, drooping of a side of the face, intractible pain, intractible vomiting, to go to the ER.     Past Surgical History:  Past Surgical History:   Procedure Laterality Date    CARDIAC CATHETERIZATION N/A 01/27/2020    Procedure: Left Heart Cath with angiogram;   Surgeon: Jermaine Delong MD;  Location: [facility] CATH INVASIVE LOCATION;  Service: Cardiovascular    CARDIAC CATHETERIZATION N/A 01/27/2020    Procedure: Stent BOBBY coronary;  Surgeon: Jermaine Delong MD;  Location:  [facility] CATH INVASIVE LOCATION;  Service: Cardiovascular    CARDIAC CATHETERIZATION N/A 01/27/2020    Procedure: Left ventriculography;  Surgeon: Jermaine Delong MD;  Location:  [facility] CATH INVASIVE LOCATION;  Service: Cardiovascular    CARDIAC CATHETERIZATION N/A 01/27/2020    Procedure: Coronary angiography;  Surgeon: Jermaine Delong MD;  Location: [facility] CATH INVASIVE LOCATION;  Service: Cardiovascular    CATARACT EXTRACTION, BILATERAL Bilateral 1999    CHOLECYSTECTOMY  1993??    COLONOSCOPY  Last one 2023    CORONARY STENT PLACEMENT  1/28/2020    GALLBLADDER SURGERY  1996    LAPAROSCOPIC TUBAL LIGATION  1980    RENAL ARTERY STENT  01/26/2020    ROTATOR CUFF REPAIR Right 2002    TUBAL ABDOMINAL LIGATION  1979??      Allergies:  No Known Allergies  Home Meds:  Medications Prior to Admission   Medication Sig Dispense Refill Last Dose/Taking    aspirin 81 MG EC tablet TAKE 1 TABLET BY MOUTH EVERY DAY 90 tablet 3 Unknown    Biotin 5 MG capsule Take  by mouth Every Night.   Unknown    calcium carbonate (OS-SHYLA) 600 MG tablet Take 1 tablet by mouth 2 (Two) Times a Day With Meals.   Unknown    carvedilol (COREG) 3.125 MG tablet TAKE 1 TABLET BY MOUTH TWICE A DAY WITH MEALS 180 tablet 3 Unknown    Cholecalciferol (Vitamin D3) 50 MCG (2000 UT) tablet Take  by mouth.   Unknown    fexofenadine (ALLEGRA) 180 MG tablet Take 1 tablet by mouth Daily. Pt taking 1/2 tablet daily   Unknown    fluticasone (FLONASE) 50 MCG/ACT nasal spray 2 sprays into the nostril(s) as directed by provider Daily. As directed by the provider 48 mL 3 Unknown    magnesium gluconate (MAGONATE) 500 MG tablet Take 1 tablet by mouth Daily.   Unknown    Multiple Vitamins-Minerals (WOMENS MULTIVITAMIN + COLLAGEN PO)    Unknown    pantoprazole (PROTONIX)  40 MG EC tablet TAKE 1 TABLET BY MOUTH EVERY DAY IN THE MORNING BEFORE BREAKFAST   Unknown    Probiotic Product (PROBIOTIC DAILY PO) Take 1 capsule by mouth Daily.       rosuvastatin (CRESTOR) 20 MG tablet TAKE 1 TABLET BY MOUTH EVERY DAY 90 tablet 3 Unknown    vitamin B-12 (CYANOCOBALAMIN) 1000 MCG tablet Take 1 tablet by mouth Daily.   Unknown     Current Meds:     Current Facility-Administered Medications:     acetaminophen (TYLENOL) tablet 650 mg, 650 mg, Oral, Q4H PRN, 650 mg at 01/22/25 1930 **OR** acetaminophen (TYLENOL) 160 MG/5ML oral solution 650 mg, 650 mg, Oral, Q4H PRN **OR** acetaminophen (TYLENOL) suppository 650 mg, 650 mg, Rectal, Q4H PRN, Timothy Sanchez MD    aluminum-magnesium hydroxide-simethicone (MAALOX MAX) 400-400-40 MG/5ML suspension 15 mL, 15 mL, Oral, Q6H PRN, Timothy Sanchez MD    aspirin EC tablet 81 mg, 81 mg, Oral, Daily, Dayna Farrell APRN, 81 mg at 01/23/25 1436    sennosides-docusate (PERICOLACE) 8.6-50 MG per tablet 2 tablet, 2 tablet, Oral, BID PRN **AND** polyethylene glycol (MIRALAX) packet 17 g, 17 g, Oral, Daily PRN **AND** bisacodyl (DULCOLAX) EC tablet 5 mg, 5 mg, Oral, Daily PRN **AND** bisacodyl (DULCOLAX) suppository 10 mg, 10 mg, Rectal, Daily PRN, Timothy Sanchez MD    calcium 500 mg vitamin D 5 mcg (200 UT) per tablet 1 tablet, 1 tablet, Oral, BID, Dayna Farrell APRN, 1 tablet at 01/23/25 1436    Calcium Replacement - Follow Nurse / BPA Driven Protocol, , Not Applicable, Daniel LOBO Yadana, MD    carvedilol (COREG) tablet 3.125 mg, 3.125 mg, Oral, BID With Meals, Dayna Farrell APRN, 3.125 mg at 01/23/25 1831    Magnesium Cardiology Dose Replacement - Follow Nurse / BPA Driven Protocol, , Not Applicable, PRN, Timothy Sanchez MD    melatonin tablet 5 mg, 5 mg, Oral, Nightly PRN, Timothy Sanchez MD    nitroglycerin (NITROSTAT) SL tablet 0.4 mg, 0.4 mg, Sublingual, Q5 Min PRN, Timothy Sanchez MD    ondansetron ODT (ZOFRAN-ODT) disintegrating tablet 4 mg, 4 mg, Oral, Q6H PRN **OR**  ondansetron (ZOFRAN) injection 4 mg, 4 mg, Intravenous, Q6H Daniel LOBO Yadana, MD    [START ON 1/24/2025] pantoprazole (PROTONIX) EC tablet 40 mg, 40 mg, Oral, Q AM, Dayna Farrell APRN    Phosphorus Replacement - Follow Nurse / BPA Driven Protocol, , Not Applicable, Daniel LOBO Yadana, MD    Potassium Replacement - Follow Nurse / BPA Driven Protocol, , Not Applicable, Daniel LOBO Yadana, MD    rosuvastatin (CRESTOR) tablet 20 mg, 20 mg, Oral, Nightly, Dayna Farrell APRN    sodium chloride 0.9 % flush 10 mL, 10 mL, Intravenous, Q12H, Timothy Sanchez MD, 10 mL at 01/23/25 0933    sodium chloride 0.9 % flush 10 mL, 10 mL, Intravenous, PRSENA, Timothy Sanchez MD    sodium chloride 0.9 % infusion 40 mL, 40 mL, Intravenous, Daniel LOBO Yadana, MD    vitamin B-12 (CYANOCOBALAMIN) tablet 1,000 mcg, 1,000 mcg, Oral, Daily, Dayna Farrell APRN, 1,000 mcg at 01/23/25 1436  Social History:   Social History     Tobacco Use    Smoking status: Never    Smokeless tobacco: Never    Tobacco comments:     Many family members smoked around me when I was a child   Substance Use Topics    Alcohol use: Yes     Alcohol/week: 3.0 standard drinks of alcohol     Types: 2 Glasses of wine, 1 Drinks containing 0.5 oz of alcohol per week     Comment: Both not weekly but occassionally      Family History:  Family History   Problem Relation Age of Onset    Hyperlipidemia Mother         Elevated cholesterol    Heart disease Mother     Heart attack Mother     Hypertension Father     Colon cancer Father     Hyperlipidemia Father         Elevated cholesterol    Heart disease Father     Cancer Father     Diabetes Brother     Heart disease Brother     Hyperlipidemia Brother     Hypertension Brother     Diabetes Paternal Aunt     Colon cancer Paternal Aunt     Colon cancer Paternal Uncle     Diabetes Paternal Grandmother     Heart disease Paternal Grandmother     Arthritis Paternal Grandmother     Heart disease Maternal Uncle     Heart disease Maternal Uncle   "   Cancer Maternal Aunt     Cancer Paternal Uncle     Breast cancer Neg Hx     Ovarian cancer Neg Hx         Review of Systems : Review of Systems   Cardiovascular:  Positive for dyspnea on exertion and syncope. Negative for chest pain, irregular heartbeat and leg swelling.   Respiratory:  Negative for cough.    Neurological:  Positive for dizziness and headaches.        Constitutional:  Temp:  [97.2 °F (36.2 °C)-98.2 °F (36.8 °C)] 97.2 °F (36.2 °C)  Heart Rate:  [64-77] 64  Resp:  [10-19] 12  BP: (136-179)/(55-90) 146/72    Physical Exam   /72 (BP Location: Right arm, Patient Position: Lying)   Pulse 64   Temp 97.2 °F (36.2 °C) (Tympanic)   Resp 12   Ht 152.4 cm (60\")   Wt 60.1 kg (132 lb 7.9 oz)   LMP  (LMP Unknown)   SpO2 97%   BMI 25.88 kg/m²   Physical Exam  General:  Appears in no acute distress  Eyes: Sclerae are anicteric,  conjunctivae are clear   HEENT:  No JVD. Thyroid not visibly enlarged. No mucosal pallor or cyanosis  Respiratory: Respirations regular and unlabored at rest.  Bilaterally good breath sounds with good air entry in all fields. No crackles, rubs or wheezes auscultated  Cardiovascular: S1,S2 Regular rate and rhythm. No murmur, rub or gallop auscultated. No pretibial pitting edema  Gastrointestinal: Abdomen nondistended.  Musculoskeletal:  No abnormal movements  Extremities: No digital clubbing or cyanosis  Skin: Color pink. Skin warm and dry to touch.   Neuro: Alert and awake, no lateralizing deficits appreciated        Echocardiogram:       Imaging  Chest X-ray:   Imaging Results (Last 24 Hours)       Procedure Component Value Units Date/Time    MRI Brain Without Contrast [[id number]] Collected: 01/23/25 1224     Updated: 01/23/25 1229    Narrative:      MRI BRAIN WO CONTRAST    Date of Exam: 1/23/2025 11:55 AM EST    Indication: ams.     Comparison: CT 1/22/2025.    Technique:  Routine multiplanar/multisequence sequence images of the brain were obtained without contrast " administration.      Findings:  No acute infarct is present on diffusion weighted sequences. Midline structures appear normal and the craniocervical junction is satisfactory. Age-related changes are present with mild generalized volume loss and mild typical T2 hyperintense subcortical   and pontine white matter changes, nonspecific but favored to reflect sequela of chronic microvascular ischemia. An area of prominent chronic lacunar infarct is also noted within the right basal ganglia, present on prior. There is otherwise no evidence of   intracranial hemorrhage, mass or mass effect. The ventricles are normal in size and configuration. The orbits are normal. The paranasal sinuses are clear.      Impression:      Impression:  Mild to moderate typical chronic and age-related findings are present as above. There is no evidence of recent infarct, hemorrhage or intracranial mass effect.        Electronically Signed: Jarrod Romano MD    1/23/2025 12:27 PM EST    Workstation ID: [id number]            Lab Review: I have reviewed the labs      Results from last 7 days   Lab Units 01/23/25  0226   MAGNESIUM mg/dL 2.4     Results from last 7 days   Lab Units 01/23/25  0226   SODIUM mmol/L 140   POTASSIUM mmol/L 4.1   BUN mg/dL 23   CREATININE mg/dL 1.21*   CALCIUM mg/dL 9.5             Results from last 7 days   Lab Units 01/23/25  0226   WBC 10*3/mm3 11.22*   HEMOGLOBIN g/dL 13.4   HEMATOCRIT % 42.2   PLATELETS 10*3/mm3 233                 Alec Wong MD  1/23/2025, 18:58 EST      EMR Dragon/Transcription:   Dictated utilizing Dragon dictation

## 2025-01-23 NOTE — CASE MANAGEMENT/SOCIAL WORK
Discharge Planning Assessment  [facility]     Patient Name: Rachael Quevedo  MRN: [id number]  Today's Date: 1/23/2025    Admit Date: 1/22/2025    Plan: DC PLAN: Routine home     Discharge Needs Assessment       Row Name 01/23/25 1304       Living Environment    People in Home spouse    Current Living Arrangements home    Potentially Unsafe Housing Conditions none    In the past 12 months has the electric, gas, oil, or water company threatened to shut off services in your home? No    Primary Care Provided by self    Provides Primary Care For no one    Family Caregiver if Needed spouse    Quality of Family Relationships helpful;involved;supportive    Able to Return to Prior Arrangements yes       Resource/Environmental Concerns    Resource/Environmental Concerns none    Transportation Concerns none       Transportation Needs    In the past 12 months, has lack of transportation kept you from medical appointments or from getting medications? no    In the past 12 months, has lack of transportation kept you from meetings, work, or from getting things needed for daily living? No       Food Insecurity    Within the past 12 months, you worried that your food would run out before you got the money to buy more. Never true    Within the past 12 months, the food you bought just didn't last and you didn't have money to get more. Never true       Transition Planning    Patient/Family Anticipates Transition to home with family    Patient/Family Anticipated Services at Transition none    Transportation Anticipated car, drives self;family or friend will provide       Discharge Needs Assessment    Readmission Within the Last 30 Days no previous admission in last 30 days    Equipment Currently Used at Home none    Anticipated Changes Related to Illness none    Equipment Needed After Discharge none                   Discharge Plan       Row Name 01/23/25 1304       Plan    Plan DC PLAN: Routine home    Patient/Family in Agreement with  Plan yes    Plan Comments CM Met with patient at bedside, from routine home with spouse. Independent with ADL's no DME. PCP is Pablo, Pharmacy is [facility]. Able to afford to meds, Denies any issues with food or utilities. Denies any transportation issues. Denies any HHC or SNF needs. Denies any concerns about return home.  DC Barriers: Pending Echo, Pending MRI, syncope episodes, possible Neuro consult.                    Continued Care and Services - Admitted Since 1/22/2025    No active coordination exists for this encounter.       Expected Discharge Date and Time       Expected Discharge Date Expected Discharge Time    Jan 24, 2025            Demographic Summary       Row Name 01/23/25 1304       General Information    Admission Type inpatient    Arrived From emergency department    Required Notices Provided Important Message from Medicare    Referral Source admission list    Reason for Consult discharge planning    Preferred Language English       Contact Information    Permission Granted to Share Info With     Contact Information Obtained for                    Functional Status       Row Name 01/23/25 1304       Functional Status    Usual Activity Tolerance excellent    Current Activity Tolerance excellent       Physical Activity    On average, how many days per week do you engage in moderate to strenuous exercise (like a brisk walk)? 0 days    On average, how many minutes do you engage in exercise at this level? 0 min    Number of minutes of exercise per week 0       Assessment of Health Literacy    How often do you have someone help you read hospital materials? Sometimes    How often do you have problems learning about your medical condition because of difficulty understanding written information? Sometimes    How often do you have a problem understanding what is told to you about your medical condition? Sometimes    How confident are you filling out medical forms by yourself? Somewhat     Health Literacy Moderate       Functional Status, IADL    Medications independent    Meal Preparation independent    Housekeeping independent    Laundry independent    Shopping independent       Mental Status    General Appearance WDL WDL       Mental Status Summary    Recent Changes in Mental Status/Cognitive Functioning no changes                       Patient Forms       Row Name 01/23/25 1303       Patient Forms    Important Message from Medicare (IMM) Delivered  IMM 1/22/25 per registration    Delivered to Patient    Method of delivery In person                  Kaia Hurley RN   Case Management  [phone]

## 2025-01-23 NOTE — PLAN OF CARE
Goal Outcome Evaluation:  Plan of Care Reviewed With: patient, spouse    Outcome Evaluation: Pt is a 80 y/o female admitted to [facility] on 1/22/25 presenting with syncope. Cardiology consulted. PMHx significant for HTN, HLD, CKD, and CVA. At baseline, pt resides with spouse in a SSH with basement, x2 CANDIS and reports she is IND with I/ADLs, and actively driving. Pt does not utilize any DME at this time, shoe horn intermittently. Upon assessment, pt A&O x4 with no reports of pain. Pt completes bed mobility independently and sat EOB where she IND donned and tied her shoes, exhibiting good fine motors skills and dynamic siting balance. Pt comes with standing with SBA where she was able to t/f from bed<>chair. Pt hypertensive this date, therefore further mobility deferred at this time. BP as follows: /76, Standing 183/83, t/f 175/90, RN notified. Pt noted to be at baseline and demonstrated safe functional transfers and ADLs. Based off OT assessment, pt would be appropriate for discharge home with assistance from spouse as needed. No further skilled OT needs identified. OT will sign-off and complete orders. Please re-consult if pt has a change in status.    Anticipated Discharge Disposition (OT): home with assist

## 2025-01-23 NOTE — PLAN OF CARE
Goal Outcome Evaluation:  Plan of Care Reviewed With: patient, spouse   Pt presents as a 80 y/o F admitted to [facility] on 1/22/25 with syncope. . CT C-spine(-). CT head (-). Pt has hx DM, heart disease, depression, hx stroke, DDD. Pt A and O x 4 and on room air. Pt was hypertensive- Nsg aware. At baseline, pt lives with spouse in [facility] with 1 CANDIS and she is very active in community without AD. This date, pt demonstrated safe and independent gait and transfers. Gait tolerance limited to 120 feet d/t pt with BP of 174/82. Pt is at her baseline for mobility and she is OK to be up ad tr in hospital presently. PT rec is Home. Pt will sign off and complete PT orders.

## 2025-01-23 NOTE — PROGRESS NOTES
[facility] MEDICINE SERVICE  DAILY PROGRESS NOTE    NAME: Rachael Quevedo  : 1945  MRN: [id number]      LOS: 1 day     PROVIDER OF SERVICE: BINA Retana    Chief Complaint: Syncope    Subjective:     Interval History:  History taken from: patient chart    Patient seen at bedside patient denies any dizziness at this time.  All questions and concerns addressed    Review of Systems:   Please see above, review of systems otherwise negative     Objective:     Vital Signs  Temp:  [97.2 °F (36.2 °C)-98.2 °F (36.8 °C)] 97.2 °F (36.2 °C)  Heart Rate:  [64-77] 77  Resp:  [10-19] 12  BP: (133-179)/(55-90) 179/68   Body mass index is 25.88 kg/m².    Physical Exam    Physical Exam  HENT:      Head: Normocephalic and atraumatic.      Nose: Nose normal.   Eyes:      Extraocular Movements: Extraocular movements intact.      Conjunctivae/sclerae: Conjunctivae normal.      Pupils: Pupils are equal, round, and reactive to light.   Cardiovascular:      Rate and Rhythm: Normal       Pulses: Normal pulses.      Heart sounds: Normal heart sounds.   Pulmonary:      Effort: normal      Breath sounds: normal   Abdominal:      General: Abdomen is flat. Bowel sounds are normal.      Palpations: Abdomen is soft.   Musculoskeletal:         General: Normal range of motion.      Cervical back: Normal range of motion and neck supple.   Skin:     General: Skin is dry.   Neurological:      General: No focal deficit present.      Mental Status: alert.   Psychiatric:         Mood and Affect: Mood normal.      Diagnostic Data    Results from last 7 days   Lab Units 25  0226   WBC 10*3/mm3 11.22*   HEMOGLOBIN g/dL 13.4   HEMATOCRIT % 42.2   PLATELETS 10*3/mm3 233   GLUCOSE mg/dL 109*   CREATININE mg/dL 1.21*   BUN mg/dL 23   SODIUM mmol/L 140   POTASSIUM mmol/L 4.1   ANION GAP mmol/L 12.4       No radiology results for the last day      I reviewed the patient's new clinical results.    Assessment/Plan:     Active and  Resolved Problems  Active Hospital Problems    Diagnosis  POA    **Syncope [R55]  Yes      Resolved Hospital Problems   No resolved problems to display.       Syncope   -Syncope episode while in hot shower  -Labs essentially stable.  -EKG with LBBB  -Chest x-ray no acute issue   -CT head CT C spine CT angio chest with no issue   -Echocardiogram  -Carotid duplex: Atherosclerotic plaque proximal right and left internal carotid arteries with less than 50% stenosis bilaterally.  Vertebral arteries patent with antegrade flow bilaterally.  -Cardiology to see.  -tele to continue.  Fall precaution.  -MRI of the brain pending  -PT consult    HLD  -Continue home medications    Patient seen and examined at bedside today.  Patient was hypertensive, with some mild lightheadedness.  Daughter at bedside states that this is how patient presented during last CVA.  Will order MRI of the brain to evaluate.    VTE Prophylaxis:  Mechanical VTE prophylaxis orders are present.             Disposition Planning:     Barriers to Discharge: Medical status, consults,   Anticipated Date of Discharge: 01/24/2025  Place of Discharge: Undetermined      Time: 30 minutes     Code Status and Medical Interventions: CPR (Attempt to Resuscitate); Full Support   Ordered at: 01/22/25 1656     Code Status (Patient has no pulse and is not breathing):    CPR (Attempt to Resuscitate)     Medical Interventions (Patient has pulse or is breathing):    Full Support       Signature: Electronically signed by BINA Retana, 01/23/25, 11:12 EST.  [facility] Hospitalist [facility]

## 2025-01-23 NOTE — PLAN OF CARE
Problem: Adult Inpatient Plan of Care  Goal: Absence of Hospital-Acquired Illness or Injury  Intervention: Identify and Manage Fall Risk  Recent Flowsheet Documentation  Taken 1/23/2025 0000 by Denver Yang RN  Safety Promotion/Fall Prevention:   safety round/check completed   room organization consistent   nonskid shoes/slippers when out of bed   fall prevention program maintained   clutter free environment maintained   assistive device/personal items within reach  Taken 1/22/2025 2200 by Denver Yang RN  Safety Promotion/Fall Prevention:   safety round/check completed   room organization consistent   nonskid shoes/slippers when out of bed   fall prevention program maintained   assistive device/personal items within reach   clutter free environment maintained  Taken 1/22/2025 1930 by Denver Yang RN  Safety Promotion/Fall Prevention:   safety round/check completed   room organization consistent   nonskid shoes/slippers when out of bed   fall prevention program maintained   clutter free environment maintained   assistive device/personal items within reach  Intervention: Prevent Skin Injury  Recent Flowsheet Documentation  Taken 1/23/2025 0000 by Denver Yang RN  Skin Protection: incontinence pads utilized  Taken 1/22/2025 1930 by Denver Yang RN  Body Position: position changed independently  Skin Protection: incontinence pads utilized  Intervention: Prevent Infection  Recent Flowsheet Documentation  Taken 1/23/2025 0000 by Denver Yang RN  Infection Prevention:   environmental surveillance performed   hand hygiene promoted   personal protective equipment utilized   rest/sleep promoted   single patient room provided  Taken 1/22/2025 1930 by Denver Yang RN  Infection Prevention:   environmental surveillance performed   hand hygiene promoted   rest/sleep promoted   personal protective equipment utilized   single patient room provided  Goal: Optimal Comfort and  Wellbeing  Intervention: Monitor Pain and Promote Comfort  Recent Flowsheet Documentation  Taken 1/22/2025 1930 by Denver Yang, RN  Pain Management Interventions: pain medication given  Intervention: Provide Person-Centered Care  Recent Flowsheet Documentation  Taken 1/22/2025 1930 by Denver Yang, RN  Trust Relationship/Rapport: care explained   Goal Outcome Evaluation:      Patient has been complaining of headaches. Tylenol was given as needed. Patient has been pleasant, calm, and cooperative with care. Patient has been pleasant, calm, and cooperative with care.

## 2025-01-24 ENCOUNTER — APPOINTMENT (OUTPATIENT)
Dept: NUCLEAR MEDICINE | Facility: HOSPITAL | Age: 80
End: 2025-01-24
Payer: MEDICARE

## 2025-01-24 ENCOUNTER — READMISSION MANAGEMENT (OUTPATIENT)
Dept: CALL CENTER | Facility: HOSPITAL | Age: 80
End: 2025-01-24
Payer: MEDICARE

## 2025-01-24 ENCOUNTER — APPOINTMENT (OUTPATIENT)
Dept: RESPIRATORY THERAPY | Facility: HOSPITAL | Age: 80
End: 2025-01-24
Payer: MEDICARE

## 2025-01-24 VITALS
HEIGHT: 60 IN | RESPIRATION RATE: 14 BRPM | SYSTOLIC BLOOD PRESSURE: 161 MMHG | HEART RATE: 65 BPM | BODY MASS INDEX: 26.01 KG/M2 | TEMPERATURE: 98.2 F | OXYGEN SATURATION: 99 % | DIASTOLIC BLOOD PRESSURE: 81 MMHG | WEIGHT: 132.5 LBS

## 2025-01-24 LAB
ANION GAP SERPL CALCULATED.3IONS-SCNC: 12 MMOL/L (ref 5–15)
BH CV REST NUCLEAR ISOTOPE DOSE: 11 MCI
BH CV STRESS BP STAGE 1: NORMAL
BH CV STRESS BP STAGE 2: NORMAL
BH CV STRESS COMMENTS STAGE 1: NORMAL
BH CV STRESS COMMENTS STAGE 2: NORMAL
BH CV STRESS DOSE REGADENOSON STAGE 1: 0.4
BH CV STRESS DURATION MIN STAGE 1: 0
BH CV STRESS DURATION MIN STAGE 2: 4
BH CV STRESS DURATION SEC STAGE 1: 10
BH CV STRESS DURATION SEC STAGE 2: 0
BH CV STRESS HR STAGE 1: 61
BH CV STRESS HR STAGE 2: 89
BH CV STRESS NUCLEAR ISOTOPE DOSE: 33 MCI
BH CV STRESS PROTOCOL 1: NORMAL
BH CV STRESS RECOVERY BP: NORMAL MMHG
BH CV STRESS RECOVERY HR: 81 BPM
BH CV STRESS STAGE 1: 1
BH CV STRESS STAGE 2: 2
BUN SERPL-MCNC: 21 MG/DL (ref 8–23)
BUN/CREAT SERPL: 17.8 (ref 7–25)
CALCIUM SPEC-SCNC: 9.8 MG/DL (ref 8.6–10.5)
CHLORIDE SERPL-SCNC: 105 MMOL/L (ref 98–107)
CO2 SERPL-SCNC: 22 MMOL/L (ref 22–29)
CREAT SERPL-MCNC: 1.18 MG/DL (ref 0.57–1)
EGFRCR SERPLBLD CKD-EPI 2021: 47.4 ML/MIN/1.73
GEN 5 1HR TROPONIN T REFLEX: 12 NG/L
GLUCOSE SERPL-MCNC: 157 MG/DL (ref 65–99)
MAGNESIUM SERPL-MCNC: 2.3 MG/DL (ref 1.6–2.4)
MAXIMAL PREDICTED HEART RATE: 142 BPM
PERCENT MAX PREDICTED HR: 67.61 %
PHOSPHATE SERPL-MCNC: 4 MG/DL (ref 2.5–4.5)
POTASSIUM SERPL-SCNC: 4.3 MMOL/L (ref 3.5–5.2)
SODIUM SERPL-SCNC: 139 MMOL/L (ref 136–145)
SPECT HRT GATED+EF W RNC IV: 71 %
STRESS BASELINE BP: NORMAL MMHG
STRESS BASELINE HR: 61 BPM
STRESS PERCENT HR: 80 %
STRESS POST PEAK BP: NORMAL MMHG
STRESS POST PEAK HR: 96 BPM
STRESS TARGET HR: 121 BPM
TROPONIN T NUMERIC DELTA: 0 NG/L

## 2025-01-24 PROCEDURE — A9502 TC99M TETROFOSMIN: HCPCS | Performed by: FAMILY MEDICINE

## 2025-01-24 PROCEDURE — 93017 CV STRESS TEST TRACING ONLY: CPT

## 2025-01-24 PROCEDURE — 93018 CV STRESS TEST I&R ONLY: CPT | Performed by: INTERNAL MEDICINE

## 2025-01-24 PROCEDURE — 78452 HT MUSCLE IMAGE SPECT MULT: CPT | Performed by: INTERNAL MEDICINE

## 2025-01-24 PROCEDURE — 34310000005 TECHNETIUM TETROFOSMIN KIT: Performed by: FAMILY MEDICINE

## 2025-01-24 PROCEDURE — 99232 SBSQ HOSP IP/OBS MODERATE 35: CPT | Performed by: INTERNAL MEDICINE

## 2025-01-24 PROCEDURE — 78452 HT MUSCLE IMAGE SPECT MULT: CPT

## 2025-01-24 PROCEDURE — 25010000002 REGADENOSON 0.4 MG/5ML SOLUTION: Performed by: FAMILY MEDICINE

## 2025-01-24 RX ORDER — REGADENOSON 0.08 MG/ML
0.4 INJECTION, SOLUTION INTRAVENOUS
Status: COMPLETED | OUTPATIENT
Start: 2025-01-24 | End: 2025-01-24

## 2025-01-24 RX ADMIN — TETROFOSMIN 1 DOSE: 1.38 INJECTION, POWDER, LYOPHILIZED, FOR SOLUTION INTRAVENOUS at 07:29

## 2025-01-24 RX ADMIN — REGADENOSON 0.4 MG: 0.08 INJECTION, SOLUTION INTRAVENOUS at 09:19

## 2025-01-24 RX ADMIN — CARVEDILOL 3.12 MG: 3.12 TABLET, FILM COATED ORAL at 10:06

## 2025-01-24 RX ADMIN — PANTOPRAZOLE SODIUM 40 MG: 40 TABLET, DELAYED RELEASE ORAL at 05:22

## 2025-01-24 RX ADMIN — Medication 1000 MCG: at 10:06

## 2025-01-24 RX ADMIN — TETROFOSMIN 1 DOSE: 1.38 INJECTION, POWDER, LYOPHILIZED, FOR SOLUTION INTRAVENOUS at 09:19

## 2025-01-24 RX ADMIN — Medication 1 TABLET: at 10:06

## 2025-01-24 RX ADMIN — ASPIRIN 81 MG: 81 TABLET, COATED ORAL at 10:06

## 2025-01-24 RX ADMIN — Medication 10 ML: at 10:06

## 2025-01-24 NOTE — OUTREACH NOTE
Prep Survey      Flowsheet Row Responses   [facility] [place] patient discharged from? [place]   Is LACE score < 7 ? Yes   Eligibility [facility]   Date of Admission 01/22/25   Date of Discharge 01/24/25   Discharge diagnosis Syncope   Does the patient have one of the following disease processes/diagnoses(primary or secondary)? Other   Prep survey completed? Yes            Angely VALENCIA - Registered Nurse

## 2025-01-24 NOTE — DISCHARGE SUMMARY
"             [facility] Medicine Services  Discharge Summary    Date of Service: 2025  Patient Name: Rachael Qeuvedo  : 1945  MRN: [id number]    Date of Admission: 2025  Discharge Diagnosis: Syncope  Date of Discharge: 2025  Primary Care Physician: Alexa Shah MD      Presenting Problem:   Syncope [R55]    Active and Resolved Hospital Problems:  Active Hospital Problems    Diagnosis POA    **Syncope [R55] Yes      Resolved Hospital Problems   No resolved problems to display.         Hospital Course     HPI:    \"A 79 y.o. old female patient with PMH of hypertension hyperlipidemia left bundle branch block CKD CVA presents to the hospital with complaints of syncope episode in the bathroom. She has pre syncope in past few years even though she is sitting in the chair. Has follow up with Dr Delong before and loop recorder before. [facility] ED,  Labs essentially stable in the outside facility ED.  CT head CT C spine CT angio chest with no issue. EKG with LBBB> Was transferred to [facility] for further workup for syncope.  Echocardiogram and carotid Doppler cardiology to see.     Admitted for syncope workup.\"    Hospital Course:  Cardiology evaluated patient echocardiogram noted LVEF to be 61 to 65%.  With LV diastolic function consistent with grade 1 impaired relaxation.  Patient underwent Cardiolite stress test, which did not show any significant abnormalities.  Patient remained stable, without any new or worsening signs and symptoms during admission.  Patient is ready to be discharged home.  M cot will be placed prior to discharge.  Patient is to wear this for 2 weeks.  Follow-up with cardiology in 4 weeks.        DISCHARGE Follow Up Recommendations for labs and diagnostics: [facility]OT        Day of Discharge     Vital Signs:  Temp:  [98.2 °F (36.8 °C)-98.3 °F (36.8 °C)] 98.2 °F (36.8 °C)  Heart Rate:  [59-68] 65  Resp:  [12-14] 14  BP: (119-161)/(61-81) 161/81    Physical Exam:  Physical " Exam  HENT:      Head: Normocephalic and atraumatic.      Nose: Nose normal.   Eyes:      Extraocular Movements: Extraocular movements intact.      Conjunctivae/sclerae: Conjunctivae normal.      Pupils: Pupils are equal, round, and reactive to light.   Cardiovascular:      Rate and Rhythm: Normal       Pulses: Normal pulses.      Heart sounds: Normal heart sounds.   Pulmonary:      Effort: normal      Breath sounds: normal   Abdominal:      General: Abdomen is flat. Bowel sounds are normal.      Palpations: Abdomen is soft.   Musculoskeletal:         General: Normal range of motion.      Cervical back: Normal range of motion and neck supple.   Skin:     General: Skin is dry.   Neurological:      General: No focal deficit present.      Mental Status: alert.   Psychiatric:         Mood and Affect: Mood normal.            Pertinent  and/or Most Recent Results     LAB RESULTS:      Lab 01/23/25  2334 01/23/25 0226   WBC 9.79 11.22*   HEMOGLOBIN 12.9 13.4   HEMATOCRIT 40.2 42.2   PLATELETS 219 233   NEUTROS ABS 5.26 6.32   IMMATURE GRANS (ABS) 0.04 0.06*   LYMPHS ABS 3.46* 3.72*   MONOS ABS 0.81 0.88   EOS ABS 0.18 0.19   MCV 95.5 96.6         Lab 01/23/25  2334 01/23/25 0226   SODIUM 139 140   POTASSIUM 4.3 4.1   CHLORIDE 105 106   CO2 22.0 21.6*   ANION GAP 12.0 12.4   BUN 21 23   CREATININE 1.18* 1.21*   EGFR 47.4* 46.0*   GLUCOSE 157* 109*   CALCIUM 9.8 9.5   MAGNESIUM 2.3 2.4   PHOSPHORUS 4.0 4.0             Lab 01/23/25  2334 01/23/25 0226   HSTROP T 12 12                 Brief Urine Lab Results  (Last result in the past 365 days)        Color   Clarity   Blood   Leuk Est   Nitrite   Protein   CREAT   Urine HCG        10/28/24 1419 Yellow   Clear   Trace   Negative   Negative   Negative                 Microbiology Results (last 10 days)       ** No results found for the last 240 hours. **            MRI Brain Without Contrast    Result Date: 1/23/2025  Impression: Impression: Mild to moderate typical chronic  and age-related findings are present as above. There is no evidence of recent infarct, hemorrhage or intracranial mass effect. Electronically Signed: Jarrod Romano MD  1/23/2025 12:27 PM EST  Workstation ID: [id number]     Results for orders placed during the hospital encounter of 01/22/25    Duplex Carotid Ultrasound CAR    Interpretation Summary    Atherosclerotic plaque proximal right and left internal carotid arteries with less than 50% stenosis bilaterally.    Vertebral arteries patent with antegrade flow bilaterally.      Results for orders placed during the hospital encounter of 01/22/25    Duplex Carotid Ultrasound CAR    Interpretation Summary    Atherosclerotic plaque proximal right and left internal carotid arteries with less than 50% stenosis bilaterally.    Vertebral arteries patent with antegrade flow bilaterally.      Results for orders placed during the hospital encounter of 01/22/25    Adult Transthoracic Echo Complete w/ Color, Spectral and Contrast if Necessary Per Protocol    Interpretation Summary    Left ventricular ejection fraction appears to be 61 - 65%.    Left ventricular diastolic function is consistent with (grade I) impaired relaxation.    Estimated right ventricular systolic pressure from tricuspid regurgitation is normal (<35 mmHg).      Labs Pending at Discharge:  Pending Results       Procedure [Order ID] Specimen - Date/Time    Cardiac Event Monitor (JUAN) or Mobile Cardiac Outpatient Telemetry (MCT) [[id number]]             Procedures Performed           Consults:   Consults       Date and Time Order Name Status Description    1/22/2025  4:55 PM Inpatient Cardiology Consult                Discharge Details        Discharge Medications        Continue These Medications        Instructions Start Date   aspirin 81 MG EC tablet   TAKE 1 TABLET BY MOUTH EVERY DAY      Biotin 5 MG capsule   Oral, Nightly      calcium carbonate 600 MG tablet  Commonly known as: OS-SHYLA   600 mg, 2 Times  Daily With Meals      carvedilol 3.125 MG tablet  Commonly known as: COREG   TAKE 1 TABLET BY MOUTH TWICE A DAY WITH MEALS      fexofenadine 180 MG tablet  Commonly known as: ALLEGRA   180 mg, Daily      fluticasone 50 MCG/ACT nasal spray  Commonly known as: FLONASE   2 sprays, Nasal, Daily, As directed by the provider      magnesium gluconate 500 MG tablet  Commonly known as: MAGONATE   27 mg, Daily      pantoprazole 40 MG EC tablet  Commonly known as: PROTONIX   TAKE 1 TABLET BY MOUTH EVERY DAY IN THE MORNING BEFORE BREAKFAST      PROBIOTIC DAILY PO   1 capsule, Daily      rosuvastatin 20 MG tablet  Commonly known as: CRESTOR   20 mg, Oral, Daily      vitamin B-12 1000 MCG tablet  Commonly known as: CYANOCOBALAMIN   1,000 mcg, Daily      Vitamin D3 50 MCG (2000 UT) tablet   Take  by mouth.      WOMENS MULTIVITAMIN + COLLAGEN PO   No dose, route, or frequency recorded.               No Known Allergies      Discharge Disposition: Home, follow-up with cardiology in 4 weeks  Home or Self Care    Diet:  Hospital:  Diet Order   Procedures    Diet: Cardiac; Healthy Heart (2-3 Na+); Fluid Consistency: Thin (IDDSI 0)         Discharge Activity:         CODE STATUS:  Code Status and Medical Interventions: CPR (Attempt to Resuscitate); Full Support   Ordered at: 01/22/25 1656     Code Status (Patient has no pulse and is not breathing):    CPR (Attempt to Resuscitate)     Medical Interventions (Patient has pulse or is breathing):    Full Support         Future Appointments   Date Time Provider Department Center   2/28/2025  9:00 AM Alexa Shah MD [facility]   4/24/2025  4:00 PM Jermaine Delong MD [facility]           Time spent on Discharge including face to face service:  30 minutes    Signature: Electronically signed by BINA Retana, 01/24/25, 13:16 ESTKassy  [facility] Hospitalist Team

## 2025-01-24 NOTE — PLAN OF CARE
Goal Outcome Evaluation:  Plan of Care Reviewed With: patient        Progress: improving  Outcome Evaluation: Rested well during the shift. No c/o discomfort. NPO for stress test today. Will continue to monitor.

## 2025-01-24 NOTE — PROGRESS NOTES
[facility] MEDICINE SERVICE  DAILY PROGRESS NOTE    NAME: Rachael Quevedo  : 1945  MRN: [id number]      LOS: 2 days     PROVIDER OF SERVICE: BINA Retana    Chief Complaint: Syncope    Subjective:     Interval History:  History taken from: patient chart    Patient seen at bedside patient denies any dizziness at this time.  All questions and concerns addressed    Review of Systems:   Please see above, review of systems otherwise negative     Objective:     Vital Signs  Temp:  [98.2 °F (36.8 °C)-98.3 °F (36.8 °C)] 98.2 °F (36.8 °C)  Heart Rate:  [59-68] 65  Resp:  [12-14] 14  BP: (119-161)/(61-81) 161/81   Body mass index is 25.88 kg/m².    Physical Exam    Physical Exam  HENT:      Head: Normocephalic and atraumatic.      Nose: Nose normal.   Eyes:      Extraocular Movements: Extraocular movements intact.      Conjunctivae/sclerae: Conjunctivae normal.      Pupils: Pupils are equal, round, and reactive to light.   Cardiovascular:      Rate and Rhythm: Normal       Pulses: Normal pulses.      Heart sounds: Normal heart sounds.   Pulmonary:      Effort: normal      Breath sounds: normal   Abdominal:      General: Abdomen is flat. Bowel sounds are normal.      Palpations: Abdomen is soft.   Musculoskeletal:         General: Normal range of motion.      Cervical back: Normal range of motion and neck supple.   Skin:     General: Skin is dry.   Neurological:      General: No focal deficit present.      Mental Status: alert.   Psychiatric:         Mood and Affect: Mood normal.      Diagnostic Data    Results from last 7 days   Lab Units 25  2334   WBC 10*3/mm3 9.79   HEMOGLOBIN g/dL 12.9   HEMATOCRIT % 40.2   PLATELETS 10*3/mm3 219   GLUCOSE mg/dL 157*   CREATININE mg/dL 1.18*   BUN mg/dL 21   SODIUM mmol/L 139   POTASSIUM mmol/L 4.3   ANION GAP mmol/L 12.0       MRI Brain Without Contrast    Result Date: 2025  Impression: Mild to moderate typical chronic and age-related findings are  present as above. There is no evidence of recent infarct, hemorrhage or intracranial mass effect. Electronically Signed: Jarrod Romano MD  1/23/2025 12:27 PM EST  Workstation ID: [id number]       I reviewed the patient's new clinical results.    Assessment/Plan:     Active and Resolved Problems  Active Hospital Problems    Diagnosis  POA    **Syncope [R55]  Yes      Resolved Hospital Problems   No resolved problems to display.       Syncope   -Syncope episode while in hot shower  -Labs essentially stable.  -EKG with LBBB  -Chest x-ray no acute issue   -CT head CT C spine CT angio chest with no issue   -Echocardiogram  -Carotid duplex: Atherosclerotic plaque proximal right and left internal carotid arteries with less than 50% stenosis bilaterally.  Vertebral arteries patent with antegrade flow bilaterally.  -Cardiology to see.  -tele to continue.  Fall precaution.  -MRI of the brain pending  -PT consult    HLD  -Continue home medications    Patient seen and examined at bedside today.  Patient was hypertensive, with some mild lightheadedness.  Daughter at bedside states that this is how patient presented during last CVA.  MRI of the brain showed no acute intracranial abnormalities.  Patient to undergo stress test on 1/25/2025    VTE Prophylaxis:  Mechanical VTE prophylaxis orders are present.             Disposition Planning:     Barriers to Discharge: Medical status, consults,   Anticipated Date of Discharge: 01/26/2025  Place of Discharge: Undetermined      Time: 30 minutes     Code Status and Medical Interventions: CPR (Attempt to Resuscitate); Full Support   Ordered at: 01/22/25 1656     Code Status (Patient has no pulse and is not breathing):    CPR (Attempt to Resuscitate)     Medical Interventions (Patient has pulse or is breathing):    Full Support       Signature: Electronically signed by BINA Retana, 01/24/25, 10:54 EST.  [facility] Hospitalist Team

## 2025-01-24 NOTE — PROGRESS NOTES
"  Cardiology Progress Note    Patient Identification:  Name: Rachael Quevedo  Age: 79 y.o.  Sex: female  :  1945  MRN: [id number]                 Follow Up / Chief Complaint:     Interval History: Patient presented with a syncopal episode.  Orthostatic pressures negative.  Echocardiogram with LVEF of 61 to 65%, grade 1 diastolic dysfunction. Myoview study without evidence of ischemia       Subjective:  Patient seen and examined.  Labs reviewed.  Chart reviewed.  Discussed with RN taking care of patient.     Objective:  Labs  2025: Troponin 12, BUN/creatinine ., glucose 109 WBC 11.22  2025: Troponin 12, BUN/creatinine ., glucose 157, CBC unremarkable    History of present illness:      Mrs. Rachael Quevedo \"Carley\" has a PMH of     - CAD (cardiac cath 2020 with BOBBY to proximal LAD and first marginal branch)  - Chronic LBBB   - hypertension   - dyslipidemia   -Previous history of loop recorder  -Previous CVA  - recurrent pre-syncope   -GERD  -Depression  -Previous history of bilateral cataract extraction cholecystectomy rotator cuff repair tubal ligation ?  Renal artery stent  -Non-smoker, EtOH occasionally        Presented to the ED at [facility] with syncopal episode.  Reports that she was in the shower and passed out.  Her  helped her out she got dressed in went to the hospital due to hitting her head.  She reports that she continues to have dizziness and now has a headache which is similar to when she had her previous stroke.  She also reports that she has recently had some dyspnea when walking on her treadmill.  She reports that she usually is able to walk about 10 minutes but has only been able to walk about 3 minutes for getting short of breath.  She denies any chest pain.     During examination, patient did have mild episode of forgetfulness and dizziness while sitting in bed that she and daughter where concerned about, as this was signs of her " previous CVA.  Discussed with nurse to discuss with hospital regarding hospital MRI.          Labs at outlying ED were unremarkable including high-sensitivity troponin.  Labs here showed BMP unremarkable except creatinine 1.21 EGFR 46 WBC 11.22  EKG normal sinus rhythm with left bundle branch block unchanged     Orthostatic VS negative   Echocardiogram is pending  Carotid Dopplers showed less than 50% bilaterally     Continue coreg 3.125 Continue aspirin and statin for CAD   Add amlodipine to help with blood pressure control            Assessment:  :     Syncope  Possibly vasovagal syncope  Dyspnea on exertion  Chronic left bundle branch block  CAD history of PCI  Hypertension  Dyslipidemia  History of CVA        Recommendations / Plan:        Presented with complaint of syncope  Patient is not orthostatic  Creatinine is mildly elevated  Neuroimaging including MRI brain negative for acute stroke.  EKG is revealing left bundle branch block which is unchanged from before.  Patient had previous cardiac cath 1/27/2020 which revealed two-vessel disease and underwent stent to proximal LAD and OM1.  Troponin x 2 are normal at 12 and 12.  Echocardiogram 1/23/2025 reviewed/interpreted by me reveals normal LV systolic function EF of 61 to 65% with grade 1 diastolic dysfunction.  Patient underwent Lexiscan Cardiolite 1/24/2025 which revealed fixed septal defects probably due to left bundle branch block with normal LV systolic function.  Will plan on treating CAD with medical management.  Patient is not having chest pain.  Will do 2-week M cot and follow-up in office.  Discussed with patient and family members by bedside.  Discussed with RN taking care of patient to coordinate care           Copied text in this portion of the note has been reviewed and is accurate as of 1/24/2025    Past Medical History:  Past Medical History:   Diagnosis Date    Allergic     Ankle fracture, left     Comments: 2016    Arthritis     Cataract,  acquired     Coronary artery disease     Cough     Impression: Most likely reactive airway prescription as below Start antihistamine at home    Dense breast     Depression, major, recurrent, mild     GERD without esophagitis     Comments: Dr Duncan- protonix    Hematuria, microscopic     History of chicken pox     History of loop recorder     Hyperglycemia     Hyperlipidemia     Hypertension 01/04/2020    Injury of back     Injury of neck     Left bundle branch block (LBBB)     Malaise and fatigue     Medicare annual wellness visit, initial     with abnormal findings    Osteopenia 2022    Other specified menopausal and perimenopausal disorders     Other specified menopausal and postmenopausal disorders    Pap smear, low-risk     Renal insufficiency     S/P right rotator cuff repair     Screening for alcoholism     10 or more drinks per week    Screening for depression     Negative Depression Screening ( 9 or less) ()    Screening mammogram, encounter for     Stroke     1/5/2020 no residual effects    Vasovagal near-syncope     Impression: from illness and cough Discussed if there is loss of vision in an eye, confusion, weakness or numbness in an extremity, drooping of a side of the face, intractible pain, intractible vomiting, to go to the ER.     Past Surgical History:  Past Surgical History:   Procedure Laterality Date    CARDIAC CATHETERIZATION N/A 01/27/2020    Procedure: Left Heart Cath with angiogram;  Surgeon: Jermaine Delong MD;  Location: [facility] CATH INVASIVE LOCATION;  Service: Cardiovascular    CARDIAC CATHETERIZATION N/A 01/27/2020    Procedure: Stent BOBBY coronary;  Surgeon: Jermaine Delong MD;  Location: [facility] CATH INVASIVE LOCATION;  Service: Cardiovascular    CARDIAC CATHETERIZATION N/A 01/27/2020    Procedure: Left ventriculography;  Surgeon: Jermaine Delong MD;  Location: [facility] CATH INVASIVE LOCATION;  Service: Cardiovascular    CARDIAC CATHETERIZATION N/A 01/27/2020    Procedure: Coronary  angiography;  Surgeon: Jermaine Delong MD;  Location: [facility] INVASIVE LOCATION;  Service: Cardiovascular    CATARACT EXTRACTION, BILATERAL Bilateral 1999    CHOLECYSTECTOMY  1993??    COLONOSCOPY  Last one 2023    CORONARY STENT PLACEMENT  1/28/2020    GALLBLADDER SURGERY  1996    LAPAROSCOPIC TUBAL LIGATION  1980    RENAL ARTERY STENT  01/26/2020    ROTATOR CUFF REPAIR Right 2002    TUBAL ABDOMINAL LIGATION  1979??        Social History:   Social History     Tobacco Use    Smoking status: Never    Smokeless tobacco: Never    Tobacco comments:     Many family members smoked around me when I was a child   Substance Use Topics    Alcohol use: Yes     Alcohol/week: 3.0 standard drinks of alcohol     Types: 2 Glasses of wine, 1 Drinks containing 0.5 oz of alcohol per week     Comment: Both not weekly but occassionally      Family History:  Family History   Problem Relation Age of Onset    Hyperlipidemia Mother         Elevated cholesterol    Heart disease Mother     Heart attack Mother     Hypertension Father     Colon cancer Father     Hyperlipidemia Father         Elevated cholesterol    Heart disease Father     Cancer Father     Diabetes Brother     Heart disease Brother     Hyperlipidemia Brother     Hypertension Brother     Diabetes Paternal Aunt     Colon cancer Paternal Aunt     Colon cancer Paternal Uncle     Diabetes Paternal Grandmother     Heart disease Paternal Grandmother     Arthritis Paternal Grandmother     Heart disease Maternal Uncle     Heart disease Maternal Uncle     Cancer Maternal Aunt     Cancer Paternal Uncle     Breast cancer Neg Hx     Ovarian cancer Neg Hx           Allergies:  No Known Allergies  Scheduled Meds:          Review of Systems:   ROS  Review of Systems   Constitution: Negative for chills and fever.   Cardiovascular: Negative for chest pain and palpitations.   Respiratory: Negative for cough and hemoptysis.    Gastrointestinal: Negative for nausea.   "      Constitutional:  Temp:  [98.2 °F (36.8 °C)-98.3 °F (36.8 °C)] 98.2 °F (36.8 °C)  Heart Rate:  [59-68] 65  Resp:  [12-14] 14  BP: (119-161)/(61-81) 161/81    Physical Exam   /81   Pulse 65   Temp 98.2 °F (36.8 °C) (Oral)   Resp 14   Ht 152.4 cm (60\")   Wt 60.1 kg (132 lb 7.9 oz)   LMP  (LMP Unknown)   SpO2 99%   BMI 25.88 kg/m²   General:  Appears in no acute distress  Eyes: Sclera is anicteric,  conjunctiva is clear   HEENT:  No JVD. Thyroid not visibly enlarged. No mucosal pallor or cyanosis  Respiratory: Respirations regular and unlabored at rest.  Clear to auscultation  Cardiovascular: S1,S2 Regular rate and rhythm. No murmur, rub or gallop auscultated.  . No pretibial pitting edema  Gastrointestinal: Abdomen nondistended.  Musculoskeletal:  No abnormal movements  Extremities: No digital clubbing or cyanosis  Skin: Color pink.   Neuro: Alert and awake.    INTAKE AND OUTPUT:    Intake/Output Summary (Last 24 hours) at 1/24/2025 1731  Last data filed at 1/24/2025 1300  Gross per 24 hour   Intake 720 ml   Output --   Net 720 ml       Cardiographics  Telemetry: Reviewed and interpreted by me reveals sinus rhythm with IVCD    ECG:   ECG 12 Lead QT Measurement   Preliminary Result   HEART RATE=63  bpm   RR Swtmihbo=706  ms   ID Sxkytutk=085  ms   P Horizontal Axis=10  deg   P Front Axis=53  deg   QRSD Mmvyhfwg=045  ms   QT Ckhckbpa=690  ms   XPmF=980  ms   QRS Axis=-15  deg   T Wave Axis=142  deg   - ABNORMAL ECG -   Sinus rhythm   Left bundle branch block   Date and Time of Study:2025-01-22 18:07:44      Telemetry Scan   Final Result      Telemetry Scan   Final Result      Telemetry Scan   Final Result      Telemetry Scan   Final Result      Telemetry Scan   Final Result      Telemetry Scan   Final Result      Telemetry Scan   Final Result      Telemetry Scan   Final Result        I have personally reviewed EKG    Echocardiogram: Results for orders placed during the hospital encounter of " "01/22/25    Adult Transthoracic Echo Complete w/ Color, Spectral and Contrast if Necessary Per Protocol    Interpretation Summary    Left ventricular ejection fraction appears to be 61 - 65%.    Left ventricular diastolic function is consistent with (grade I) impaired relaxation.    Estimated right ventricular systolic pressure from tricuspid regurgitation is normal (<35 mmHg).      Lab Review   I have reviewed the labs  Results from last 7 days   Lab Units 01/23/25  2334 01/23/25  0226   HSTROP T ng/L 12 12     Results from last 7 days   Lab Units 01/23/25  2334   MAGNESIUM mg/dL 2.3     Results from last 7 days   Lab Units 01/23/25  2334   SODIUM mmol/L 139   POTASSIUM mmol/L 4.3   BUN mg/dL 21   CREATININE mg/dL 1.18*   CALCIUM mg/dL 9.8         Results from last 7 days   Lab Units 01/23/25  2334 01/23/25 0226   WBC 10*3/mm3 9.79 11.22*   HEMOGLOBIN g/dL 12.9 13.4   HEMATOCRIT % 40.2 42.2   PLATELETS 10*3/mm3 219 233           RADIOLOGY:  Imaging Results (Last 24 Hours)       ** No results found for the last 24 hours. **                  )1/24/2025  MD ISABELLE De Leon/Transcription:   \"Dictated utilizing Dragon dictation\".   "

## 2025-01-27 ENCOUNTER — TRANSITIONAL CARE MANAGEMENT TELEPHONE ENCOUNTER (OUTPATIENT)
Dept: CALL CENTER | Facility: HOSPITAL | Age: 80
End: 2025-01-27
Payer: MEDICARE

## 2025-01-27 NOTE — CASE MANAGEMENT/SOCIAL WORK
Case Management Discharge Note      Final Note: Routine home         Selected Continued Care - Discharged on 1/24/2025 Admission date: 1/22/2025 - Discharge disposition: Home or Self Care         Transportation Services  Private: Car    Final Discharge Disposition Code: 01 - home or self-care

## 2025-01-27 NOTE — OUTREACH NOTE
Call Center TCM Note      Flowsheet Row Responses   [facility] patient discharged from? [facility]   Does the patient have one of the following disease processes/diagnoses(primary or secondary)? Other   TCM attempt successful? Yes   Call start time 1454   Call end time 1456   Discharge diagnosis Syncope   Meds reviewed with patient/caregiver? Yes   Does the patient have all medications ordered at discharge? N/A   Prescription comments No changes to pt current medications at this hospital discharge   Is the patient taking all medications as directed (includes completed medication regime)? Yes   Does the patient have an appointment with their PCP within 7-14 days of discharge? No appointments available   Nursing Interventions Patient desires to follow up with specialty only, Routed TCM call to PCP office, Patient declined scheduling/rescheduling appointment at this time   Has home health visited the patient within 72 hours of discharge? N/A   Psychosocial issues? No   Did the patient receive a copy of their discharge instructions? Yes   Nursing interventions Reviewed instructions with patient   What is the patient's perception of their health status since discharge? Improving   Is the patient/caregiver able to teach back signs and symptoms related to disease process for when to call PCP? Yes   Is the patient/caregiver able to teach back signs and symptoms related to disease process for when to call 911? Yes   Is the patient/caregiver able to teach back the hierarchy of who to call/visit for symptoms/problems? PCP, Specialist, Home health nurse, Urgent Care, ED, 911 Yes   If the patient is a current smoker, are they able to teach back resources for cessation? Not a smoker   TCM call completed? Yes   Wrap up additional comments D/C DX: Syncope - Pt feels well today. MCOT in place. No questions. [facility] follow up is 02/21/2025. Pt will see PCP Dr Alexa Shah at scheduled reg ov 02/28/2025.   Call end time [id number]             Haylee Wright MA    1/27/2025, 14:58 EST

## 2025-02-02 LAB
QT INTERVAL: 454 MS
QTC INTERVAL: 465 MS

## 2025-02-03 ENCOUNTER — APPOINTMENT (OUTPATIENT)
Dept: GENERAL RADIOLOGY | Facility: HOSPITAL | Age: 80
End: 2025-02-03
Payer: MEDICARE

## 2025-02-03 ENCOUNTER — HOSPITAL ENCOUNTER (INPATIENT)
Facility: HOSPITAL | Age: 80
LOS: 3 days | Discharge: HOME | End: 2025-02-06
Attending: STUDENT IN AN ORGANIZED HEALTH CARE EDUCATION/TRAINING PROGRAM | Admitting: STUDENT IN AN ORGANIZED HEALTH CARE EDUCATION/TRAINING PROGRAM
Payer: MEDICARE

## 2025-02-03 ENCOUNTER — TELEPHONE (OUTPATIENT)
Dept: CARDIOLOGY | Facility: CLINIC | Age: 80
End: 2025-02-03
Payer: MEDICARE

## 2025-02-03 DIAGNOSIS — I44.7 LEFT BUNDLE BRANCH BLOCK (LBBB): ICD-10-CM

## 2025-02-03 DIAGNOSIS — R55 SYNCOPE, UNSPECIFIED SYNCOPE TYPE: ICD-10-CM

## 2025-02-03 DIAGNOSIS — I49.5 SICK SINUS SYNDROME: ICD-10-CM

## 2025-02-03 DIAGNOSIS — I49.9 CARDIAC ARRHYTHMIA, UNSPECIFIED CARDIAC ARRHYTHMIA TYPE: Primary | ICD-10-CM

## 2025-02-03 DIAGNOSIS — I45.5 SINUS PAUSE: ICD-10-CM

## 2025-02-03 LAB
ALBUMIN SERPL-MCNC: 4.3 G/DL (ref 3.5–5.2)
ALBUMIN/GLOB SERPL: 1.6 G/DL
ALP SERPL-CCNC: 65 U/L (ref 39–117)
ALT SERPL W P-5'-P-CCNC: 32 U/L (ref 1–33)
ANION GAP SERPL CALCULATED.3IONS-SCNC: 11.4 MMOL/L (ref 5–15)
AST SERPL-CCNC: 25 U/L (ref 1–32)
BASOPHILS # BLD AUTO: 0.04 10*3/MM3 (ref 0–0.2)
BASOPHILS NFR BLD AUTO: 0.4 % (ref 0–1.5)
BILIRUB SERPL-MCNC: 0.4 MG/DL (ref 0–1.2)
BUN SERPL-MCNC: 20 MG/DL (ref 8–23)
BUN/CREAT SERPL: 21.1 (ref 7–25)
CALCIUM SPEC-SCNC: 9.6 MG/DL (ref 8.6–10.5)
CHLORIDE SERPL-SCNC: 104 MMOL/L (ref 98–107)
CO2 SERPL-SCNC: 23.6 MMOL/L (ref 22–29)
CREAT SERPL-MCNC: 0.95 MG/DL (ref 0.57–1)
DEPRECATED RDW RBC AUTO: 46.4 FL (ref 37–54)
EGFRCR SERPLBLD CKD-EPI 2021: 61.5 ML/MIN/1.73
EOSINOPHIL # BLD AUTO: 0.1 10*3/MM3 (ref 0–0.4)
EOSINOPHIL NFR BLD AUTO: 1.1 % (ref 0.3–6.2)
ERYTHROCYTE [DISTWIDTH] IN BLOOD BY AUTOMATED COUNT: 13 % (ref 12.3–15.4)
GEN 5 1HR TROPONIN T REFLEX: 12 NG/L
GLOBULIN UR ELPH-MCNC: 2.7 GM/DL
GLUCOSE SERPL-MCNC: 112 MG/DL (ref 65–99)
HCT VFR BLD AUTO: 40.7 % (ref 34–46.6)
HGB BLD-MCNC: 12.9 G/DL (ref 12–15.9)
IMM GRANULOCYTES # BLD AUTO: 0.03 10*3/MM3 (ref 0–0.05)
IMM GRANULOCYTES NFR BLD AUTO: 0.3 % (ref 0–0.5)
LYMPHOCYTES # BLD AUTO: 2.41 10*3/MM3 (ref 0.7–3.1)
LYMPHOCYTES NFR BLD AUTO: 26.3 % (ref 19.6–45.3)
MAGNESIUM SERPL-MCNC: 2.5 MG/DL (ref 1.6–2.4)
MCH RBC QN AUTO: 30.7 PG (ref 26.6–33)
MCHC RBC AUTO-ENTMCNC: 31.7 G/DL (ref 31.5–35.7)
MCV RBC AUTO: 96.9 FL (ref 79–97)
MONOCYTES # BLD AUTO: 0.62 10*3/MM3 (ref 0.1–0.9)
MONOCYTES NFR BLD AUTO: 6.8 % (ref 5–12)
NEUTROPHILS NFR BLD AUTO: 5.98 10*3/MM3 (ref 1.7–7)
NEUTROPHILS NFR BLD AUTO: 65.1 % (ref 42.7–76)
NRBC BLD AUTO-RTO: 0 /100 WBC (ref 0–0.2)
PLATELET # BLD AUTO: 257 10*3/MM3 (ref 140–450)
PMV BLD AUTO: 11.6 FL (ref 6–12)
POTASSIUM SERPL-SCNC: 4.4 MMOL/L (ref 3.5–5.2)
PROT SERPL-MCNC: 7 G/DL (ref 6–8.5)
RBC # BLD AUTO: 4.2 10*6/MM3 (ref 3.77–5.28)
SODIUM SERPL-SCNC: 139 MMOL/L (ref 136–145)
TROPONIN T NUMERIC DELTA: 0 NG/L
TROPONIN T SERPL HS-MCNC: 12 NG/L
WBC NRBC COR # BLD AUTO: 9.18 10*3/MM3 (ref 3.4–10.8)

## 2025-02-03 PROCEDURE — 80053 COMPREHEN METABOLIC PANEL: CPT

## 2025-02-03 PROCEDURE — 84484 ASSAY OF TROPONIN QUANT: CPT

## 2025-02-03 PROCEDURE — 85025 COMPLETE CBC W/AUTO DIFF WBC: CPT

## 2025-02-03 PROCEDURE — 99285 EMERGENCY DEPT VISIT HI MDM: CPT

## 2025-02-03 PROCEDURE — 36415 COLL VENOUS BLD VENIPUNCTURE: CPT

## 2025-02-03 PROCEDURE — 83735 ASSAY OF MAGNESIUM: CPT

## 2025-02-03 PROCEDURE — 71045 X-RAY EXAM CHEST 1 VIEW: CPT

## 2025-02-03 PROCEDURE — 93005 ELECTROCARDIOGRAM TRACING: CPT

## 2025-02-03 RX ORDER — SODIUM CHLORIDE 0.9 % (FLUSH) 0.9 %
10 SYRINGE (ML) INJECTION AS NEEDED
Status: DISCONTINUED | OUTPATIENT
Start: 2025-02-03 | End: 2025-02-06 | Stop reason: HOSPADM

## 2025-02-03 RX ORDER — ACETAMINOPHEN 500 MG
1000 TABLET ORAL ONCE
Status: DISCONTINUED | OUTPATIENT
Start: 2025-02-03 | End: 2025-02-03

## 2025-02-03 RX ORDER — SODIUM CHLORIDE 0.9 % (FLUSH) 0.9 %
10 SYRINGE (ML) INJECTION EVERY 12 HOURS SCHEDULED
Status: DISCONTINUED | OUTPATIENT
Start: 2025-02-03 | End: 2025-02-06 | Stop reason: HOSPADM

## 2025-02-03 RX ORDER — ROSUVASTATIN CALCIUM 10 MG/1
20 TABLET, COATED ORAL DAILY
Status: DISCONTINUED | OUTPATIENT
Start: 2025-02-04 | End: 2025-02-06 | Stop reason: HOSPADM

## 2025-02-03 RX ORDER — AMOXICILLIN 250 MG
2 CAPSULE ORAL 2 TIMES DAILY PRN
Status: DISCONTINUED | OUTPATIENT
Start: 2025-02-03 | End: 2025-02-06 | Stop reason: HOSPADM

## 2025-02-03 RX ORDER — ASPIRIN 81 MG/1
81 TABLET ORAL DAILY
Status: DISCONTINUED | OUTPATIENT
Start: 2025-02-04 | End: 2025-02-06 | Stop reason: HOSPADM

## 2025-02-03 RX ORDER — BISACODYL 10 MG
10 SUPPOSITORY, RECTAL RECTAL DAILY PRN
Status: DISCONTINUED | OUTPATIENT
Start: 2025-02-03 | End: 2025-02-06 | Stop reason: HOSPADM

## 2025-02-03 RX ORDER — POLYETHYLENE GLYCOL 3350 17 G/17G
17 POWDER, FOR SOLUTION ORAL DAILY
COMMUNITY

## 2025-02-03 RX ORDER — POLYETHYLENE GLYCOL 3350 17 G/17G
17 POWDER, FOR SOLUTION ORAL DAILY PRN
Status: DISCONTINUED | OUTPATIENT
Start: 2025-02-03 | End: 2025-02-06 | Stop reason: HOSPADM

## 2025-02-03 RX ORDER — SODIUM CHLORIDE 9 MG/ML
40 INJECTION, SOLUTION INTRAVENOUS AS NEEDED
Status: DISCONTINUED | OUTPATIENT
Start: 2025-02-03 | End: 2025-02-06 | Stop reason: HOSPADM

## 2025-02-03 RX ORDER — BISACODYL 5 MG/1
5 TABLET, DELAYED RELEASE ORAL DAILY PRN
Status: DISCONTINUED | OUTPATIENT
Start: 2025-02-03 | End: 2025-02-06 | Stop reason: HOSPADM

## 2025-02-03 RX ORDER — FLUTICASONE PROPIONATE 50 MCG
2 SPRAY, SUSPENSION (ML) NASAL DAILY PRN
COMMUNITY

## 2025-02-03 RX ORDER — ENOXAPARIN SODIUM 100 MG/ML
40 INJECTION SUBCUTANEOUS EVERY 24 HOURS
Status: DISCONTINUED | OUTPATIENT
Start: 2025-02-04 | End: 2025-02-05

## 2025-02-03 RX ORDER — ASPIRIN 81 MG/1
81 TABLET ORAL NIGHTLY
COMMUNITY

## 2025-02-03 RX ORDER — NITROGLYCERIN 0.4 MG/1
0.4 TABLET SUBLINGUAL
Status: DISCONTINUED | OUTPATIENT
Start: 2025-02-03 | End: 2025-02-06 | Stop reason: HOSPADM

## 2025-02-03 RX ORDER — ACETAMINOPHEN 500 MG
1000 TABLET ORAL ONCE
Status: COMPLETED | OUTPATIENT
Start: 2025-02-03 | End: 2025-02-03

## 2025-02-03 RX ORDER — PANTOPRAZOLE SODIUM 40 MG/1
40 TABLET, DELAYED RELEASE ORAL DAILY
Status: DISCONTINUED | OUTPATIENT
Start: 2025-02-04 | End: 2025-02-06 | Stop reason: HOSPADM

## 2025-02-03 RX ADMIN — ACETAMINOPHEN 1000 MG: 500 TABLET, FILM COATED ORAL at 19:07

## 2025-02-03 RX ADMIN — Medication 10 ML: at 21:24

## 2025-02-03 NOTE — ED PROVIDER NOTES
Subjective     Provider in Triage Note  Due to significant overcrowding in the emergency department patient was initially seen and evaluated in triage.  Provider in triage recommended patient placement in the treatment area to initiate therapy and movement to an ER bed as soon as possible.    Patient is a 79-year-old female who presents by private vehicle with complaints of lightheaded, dizzy and headache she currently has a Holter monitor on and advises that it send her an alert stating that there was a change in her rhythm she has been evaluated by Dr. Wong and Dr. Delong.       History of Present Illness  Patient was seen by [facility] provider.  I have read and reviewed [facility] providers note and agree with Providers HPI, with the addition of patient is unsure if she actually had a syncopal episode or not.        Review of Systems   Constitutional:  Negative for fever.       Past Medical History:   Diagnosis Date    Allergic     Ankle fracture, left     Comments: 2016    Arthritis     Cataract, acquired     Coronary artery disease     Cough     Impression: Most likely reactive airway prescription as below Start antihistamine at home    Dense breast     Depression, major, recurrent, mild     GERD without esophagitis     Comments: Dr Duncan- protonix    Hematuria, microscopic     History of chicken pox     History of loop recorder     Hyperglycemia     Hyperlipidemia     Hypertension 01/04/2020    Injury of back     Injury of neck     Left bundle branch block (LBBB)     Malaise and fatigue     Medicare annual wellness visit, initial     with abnormal findings    Osteopenia 2022    Other specified menopausal and perimenopausal disorders     Other specified menopausal and postmenopausal disorders    Pap smear, low-risk     Renal insufficiency     S/P right rotator cuff repair     Screening for alcoholism     10 or more drinks per week    Screening for depression     Negative Depression Screening ( 9 or less) ()     Screening mammogram, encounter for     Stroke     1/5/2020 no residual effects    Vasovagal near-syncope     Impression: from illness and cough Discussed if there is loss of vision in an eye, confusion, weakness or numbness in an extremity, drooping of a side of the face, intractible pain, intractible vomiting, to go to the ER.       No Known Allergies    Past Surgical History:   Procedure Laterality Date    CARDIAC CATHETERIZATION N/A 01/27/2020    Procedure: Left Heart Cath with angiogram;  Surgeon: Jermaine Delong MD;  Location: [facility] CATH INVASIVE LOCATION;  Service: Cardiovascular    CARDIAC CATHETERIZATION N/A 01/27/2020    Procedure: Stent BOBBY coronary;  Surgeon: Jermaine Delong MD;  Location: [facility] CATH INVASIVE LOCATION;  Service: Cardiovascular    CARDIAC CATHETERIZATION N/A 01/27/2020    Procedure: Left ventriculography;  Surgeon: Jermaine Delong MD;  Location: [facility] CATH INVASIVE LOCATION;  Service: Cardiovascular    CARDIAC CATHETERIZATION N/A 01/27/2020    Procedure: Coronary angiography;  Surgeon: Jermaine Delong MD;  Location: [facility] CATH INVASIVE LOCATION;  Service: Cardiovascular    CATARACT EXTRACTION, BILATERAL Bilateral 1999    CHOLECYSTECTOMY  1993??    COLONOSCOPY  Last one 2023    CORONARY STENT PLACEMENT  1/28/2020    GALLBLADDER SURGERY  1996    LAPAROSCOPIC TUBAL LIGATION  1980    RENAL ARTERY STENT  01/26/2020    ROTATOR CUFF REPAIR Right 2002    TUBAL ABDOMINAL LIGATION  1979??       Family History   Problem Relation Age of Onset    Hyperlipidemia Mother         Elevated cholesterol    Heart disease Mother     Heart attack Mother     Hypertension Father     Colon cancer Father     Hyperlipidemia Father         Elevated cholesterol    Heart disease Father     Cancer Father     Diabetes Brother     Heart disease Brother     Hyperlipidemia Brother     Hypertension Brother     Diabetes Paternal Aunt     Colon cancer Paternal Aunt     Colon cancer Paternal Uncle     Diabetes Paternal  Grandmother     Heart disease Paternal Grandmother     Arthritis Paternal Grandmother     Heart disease Maternal Uncle     Heart disease Maternal Uncle     Cancer Maternal Aunt     Cancer Paternal Uncle     Breast cancer Neg Hx     Ovarian cancer Neg Hx        Social History     Socioeconomic History    Marital status:    Tobacco Use    Smoking status: Never    Smokeless tobacco: Never    Tobacco comments:     Many family members smoked around me when I was a child   Vaping Use    Vaping status: Never Used   Substance and Sexual Activity    Alcohol use: Yes     Alcohol/week: 3.0 standard drinks of alcohol     Types: 2 Glasses of wine, 1 Drinks containing 0.5 oz of alcohol per week     Comment: Both not weekly but occassionally    Drug use: No    Sexual activity: Yes     Partners: Male     Birth control/protection: None     Comment: Very occasional           Objective   Physical Exam  Vitals and nursing note reviewed.   Constitutional:       Appearance: Normal appearance.   HENT:      Head: Normocephalic.      Nose: Nose normal.      Mouth/Throat:      Mouth: Mucous membranes are moist.   Eyes:      Extraocular Movements: Extraocular movements intact.      Pupils: Pupils are equal, round, and reactive to light.   Cardiovascular:      Rate and Rhythm: Normal rate and regular rhythm.      Pulses: Normal pulses.      Heart sounds: Normal heart sounds.   Pulmonary:      Effort: Pulmonary effort is normal.      Breath sounds: Normal breath sounds. No wheezing.   Abdominal:      Palpations: Abdomen is soft.      Tenderness: There is no abdominal tenderness.   Musculoskeletal:         General: Normal range of motion.      Cervical back: Normal range of motion.   Skin:     General: Skin is warm and dry.      Capillary Refill: Capillary refill takes less than 2 seconds.   Neurological:      General: No focal deficit present.      Mental Status: She is alert and oriented to person, place, and time.   Psychiatric:         " Mood and Affect: Mood normal.         Behavior: Behavior normal.         Procedures           ED Course  ED Course as of 02/03/25 2126   Mon Feb 03, 2025 2010 Consulted Hospitalist. [LT]   2046 A second call was placed to hospitalist for consultation. [LT]   2048 Spoke with Dr. Herrera who agreed to admit patient and assume care and consult cardiology. [LT]      ED Course User Index  [LT] Magdalena Gómez, APRN           /81   Pulse 64   Temp 98.2 °F (36.8 °C) (Oral)   Resp 17   Ht 152.4 cm (60\")   Wt 60.1 kg (132 lb 7.9 oz)   LMP  (LMP Unknown)   SpO2 98%   BMI 25.88 kg/m²   Labs Reviewed   COMPREHENSIVE METABOLIC PANEL - Abnormal; Notable for the following components:       Result Value    Glucose 112 (*)     All other components within normal limits    Narrative:     GFR Categories in Chronic Kidney Disease (CKD)      GFR Category          GFR (mL/min/1.73)    Interpretation  G1                     90 or greater         Normal or high (1)  G2                      60-89                Mild decrease (1)  G3a                   45-59                Mild to moderate decrease  G3b                   30-44                Moderate to severe decrease  G4                    15-29                Severe decrease  G5                    14 or less           Kidney failure          (1)In the absence of evidence of kidney disease, neither GFR category G1 or G2 fulfill the criteria for CKD.    eGFR calculation 2021 CKD-EPI creatinine equation, which does not include race as a factor   MAGNESIUM - Abnormal; Notable for the following components:    Magnesium 2.5 (*)     All other components within normal limits   CBC WITH AUTO DIFFERENTIAL - Normal   TROPONIN - Normal    Narrative:     High Sensitive Troponin T Reference Range:  <14.0 ng/L- Negative Female for AMI  <22.0 ng/L- Negative Male for AMI  >=14 - Abnormal Female indicating possible myocardial injury.  >=22 - Abnormal Male indicating possible myocardial " injury.   Clinicians would have to utilize clinical acumen, EKG, Troponin, and serial changes to determine if it is an Acute Myocardial Infarction or myocardial injury due to an underlying chronic condition.        HIGH SENSITIVITIY TROPONIN T 1HR - Normal    Narrative:     High Sensitive Troponin T Reference Range:  <14.0 ng/L- Negative Female for AMI  <22.0 ng/L- Negative Male for AMI  >=14 - Abnormal Female indicating possible myocardial injury.  >=22 - Abnormal Male indicating possible myocardial injury.   Clinicians would have to utilize clinical acumen, EKG, Troponin, and serial changes to determine if it is an Acute Myocardial Infarction or myocardial injury due to an underlying chronic condition.        CBC AND DIFFERENTIAL    Narrative:     The following orders were created for panel order CBC & Differential.  Procedure                               Abnormality         Status                     ---------                               -----------         ------                     CBC Auto Differential[[id number]]        Normal              Final result                 Please view results for these tests on the individual orders.     Medications   sodium chloride 0.9 % flush 10 mL (has no administration in time range)   sodium chloride 0.9 % flush 10 mL (10 mL Intravenous Given 2/3/25 2124)   sodium chloride 0.9 % flush 10 mL (has no administration in time range)   sodium chloride 0.9 % infusion 40 mL (has no administration in time range)   sennosides-docusate (PERICOLACE) 8.6-50 MG per tablet 2 tablet (has no administration in time range)     And   polyethylene glycol (MIRALAX) packet 17 g (has no administration in time range)     And   bisacodyl (DULCOLAX) EC tablet 5 mg (has no administration in time range)     And   bisacodyl (DULCOLAX) suppository 10 mg (has no administration in time range)   Potassium Replacement - Follow Nurse / BPA Driven Protocol (has no administration in time range)   Magnesium  Standard Dose Replacement - Follow Nurse / BPA Driven Protocol (has no administration in time range)   Phosphorus Replacement - Follow Nurse / BPA Driven Protocol (has no administration in time range)   Calcium Replacement - Follow Nurse / BPA Driven Protocol (has no administration in time range)   nitroglycerin (NITROSTAT) SL tablet 0.4 mg (has no administration in time range)   aspirin EC tablet 81 mg (has no administration in time range)   rosuvastatin (CRESTOR) tablet 20 mg (has no administration in time range)   pantoprazole (PROTONIX) EC tablet 40 mg (has no administration in time range)   acetaminophen (TYLENOL) tablet 1,000 mg (1,000 mg Oral Given 2/3/25 1907)     XR Chest 1 View    Result Date: 2/3/2025  Impression: No active disease. Electronically Signed: Jitendra Watkins MD  2/3/2025 6:57 PM EST  Workstation ID: [id number]                                               Medical Decision Making  Chart review: Complete Transthoracic Echocardiogram with Complete Doppler and Color Flow  Date of Study: 1/23/25   Left ventricular ejection fraction appears to be 61 - 65%.  ·  Left ventricular diastolic function is consistent with (grade I) impaired relaxation.  ·  Estimated right ventricular systolic pressure from tricuspid regurgitation is normal (<35 mmHg).    Regadenoson Stress Test with Myocardial Perfusion SPECT (Multi Study)  Date of Study: 1/24/25  Impression:   Lexiscan Cardiolite with fixed septal defect due to left bundle branch block, negative for ischemia.  2.  Normal wall motion.  LVEF of 71%.    Radiology was considered but was not emergently warranted at this time.    Lab interpretation:  Labs all viewed by me and significant for: BUN 20, creatinine 0.95, potassium 4.4, ALT 32, AST 25, white count 9.18, hemoglobin 12.9, magnesium 2.5, troponin 12, 1 hour troponin 12.    EKG viewed and interpreted by Dr. Arreola: Sinus rhythm, left bundle branch block, rate 60, QTc 438.  comparison: Dated 1/22/2025.  Sinus  rhythm, left bundle branch block, rate 63, QTc 465.    Due to significant overcrowding in the emergency department patient was evaluated by myself in a hallway bed.  Explained to the patient our limitations due to overcrowding and they were in agreement to continue exam and treatment at this time.       IV was established and labs and scans were obtained to evaluate for infection, electrolyte abnormality, arrhythmias.  Patient is a 79-year-old female who presents to the ER for above complaint.  Patient was observed walking back from triage to room patient to room in the hallway.  Considered CT of head, but due to patient's recent hospitalization and CT of the head and MRI of brain that were both negative, and no falls/injury/trauma present, no CTs were completed at this time.  Was able to obtain phone number and reference # for [employer], the heart monitoring company and request holter report.  Copy of the report was uploaded to chart review.  RN reports patient is complaining of headache, patient was given PO Tylenol.  ZIO report showed a pause that occurred lasting 19.4 seconds at 1351 today, which coincides with patient's near syncopal episode in the car.  On reexamination patient was resting comfortably in the bed, nontoxic in appearance with no signs and symptoms of distress with spouse at bedside.  Patient has been hemodynamically stable and well-appearing during this ER visit.  Consulted hospitalist.  Spoke with Dr. Herrera who agreed to admit and assume care.  Discussed findings with patient and decision to admit.  Patient and spouse were agreeable to admission and plan of care.          Appropriate PPE worn during exam.  Discussed care with: Dr. Santana.  Dr. Herrera with hospitalist group.       Based on the clinical findings at this time I anticipate the patient will require a 2 midnight stay      i discussed findings with patient who voices understanding of discharge instructions, signs and symptoms requiring  return to ED; discharged improved and in stable condition with follow up for re-evaluation.  This document is intended for medical expert use only. Reading of this document by patients and/or patient's family without participating medical staff guidance may result in misinterpretation and unintended morbidity.  Any interpretation of such data is the responsibility of the patient and/or family member responsible for the patient in concert with their primary or specialist providers, not to be left for sources of online searches such as [employer] - [employer] Life, [employer] or similar queries. Relying on these approaches to knowledge may result in misinterpretation, misguided goals of care and even death should patients or family members try recommendations outside of the realm of professional medical care in a supervised inpatient environment.         Problems Addressed:  Cardiac arrhythmia, unspecified cardiac arrhythmia type: acute illness or injury    Amount and/or Complexity of Data Reviewed  Labs: ordered.  Radiology: ordered.  ECG/medicine tests: ordered.    Risk  OTC drugs.  Prescription drug management.  Decision regarding hospitalization.        Final diagnoses:   Cardiac arrhythmia, unspecified cardiac arrhythmia type       ED Disposition  ED Disposition       ED Disposition   Decision to Admit    Condition   --    Comment   Level of Care: Telemetry [5]   Diagnosis: Syncope [186175]   Admitting Physician: MARGARET THOMPSON [[id number]]   Certification: I Certify That Inpatient Hospital Services Are Medically Necessary For Greater Than 2 Midnights                 No follow-up provider specified.       Medication List      No changes were made to your prescriptions during this visit.            Magdalena Gómez, APRN  02/03/25 [id number]

## 2025-02-03 NOTE — Clinical Note
Level of Care: Telemetry [5]   Admitting Physician: MARGARET THOMPSON [[id number]]   Attending Physician: MARGARET THOMPSON [[id number]]

## 2025-02-03 NOTE — TELEPHONE ENCOUNTER
Patient called and has an heart monitor on and she was about to pass out and the monitor company called and told her to call the office. Per faisal we did not place the monitor the hospital did and the office doesn't have access to the monitor to advice her to call the hospital that placed the monitor.I gave her the hospital number.

## 2025-02-04 PROBLEM — I45.5 SINUS PAUSE: Status: ACTIVE | Noted: 2025-02-03

## 2025-02-04 LAB
ALBUMIN SERPL-MCNC: 4.4 G/DL (ref 3.5–5.2)
ALBUMIN/GLOB SERPL: 1.6 G/DL
ALP SERPL-CCNC: 61 U/L (ref 39–117)
ALT SERPL W P-5'-P-CCNC: 28 U/L (ref 1–33)
ANION GAP SERPL CALCULATED.3IONS-SCNC: 9.8 MMOL/L (ref 5–15)
AST SERPL-CCNC: 22 U/L (ref 1–32)
BASOPHILS # BLD AUTO: 0.04 10*3/MM3 (ref 0–0.2)
BASOPHILS NFR BLD AUTO: 0.5 % (ref 0–1.5)
BILIRUB SERPL-MCNC: 0.5 MG/DL (ref 0–1.2)
BUN SERPL-MCNC: 17 MG/DL (ref 8–23)
BUN/CREAT SERPL: 18.9 (ref 7–25)
CALCIUM SPEC-SCNC: 9.8 MG/DL (ref 8.6–10.5)
CHLORIDE SERPL-SCNC: 106 MMOL/L (ref 98–107)
CO2 SERPL-SCNC: 26.2 MMOL/L (ref 22–29)
CREAT SERPL-MCNC: 0.9 MG/DL (ref 0.57–1)
DEPRECATED RDW RBC AUTO: 46.4 FL (ref 37–54)
EGFRCR SERPLBLD CKD-EPI 2021: 65.6 ML/MIN/1.73
EOSINOPHIL # BLD AUTO: 0.11 10*3/MM3 (ref 0–0.4)
EOSINOPHIL NFR BLD AUTO: 1.3 % (ref 0.3–6.2)
ERYTHROCYTE [DISTWIDTH] IN BLOOD BY AUTOMATED COUNT: 12.9 % (ref 12.3–15.4)
FLUAV RNA RESP QL NAA+PROBE: NOT DETECTED
FLUBV RNA RESP QL NAA+PROBE: NOT DETECTED
GLOBULIN UR ELPH-MCNC: 2.8 GM/DL
GLUCOSE SERPL-MCNC: 106 MG/DL (ref 65–99)
HCT VFR BLD AUTO: 42.3 % (ref 34–46.6)
HGB BLD-MCNC: 13.3 G/DL (ref 12–15.9)
IMM GRANULOCYTES # BLD AUTO: 0.03 10*3/MM3 (ref 0–0.05)
IMM GRANULOCYTES NFR BLD AUTO: 0.4 % (ref 0–0.5)
LYMPHOCYTES # BLD AUTO: 2.85 10*3/MM3 (ref 0.7–3.1)
LYMPHOCYTES NFR BLD AUTO: 33.9 % (ref 19.6–45.3)
MAGNESIUM SERPL-MCNC: 2.6 MG/DL (ref 1.6–2.4)
MCH RBC QN AUTO: 30.6 PG (ref 26.6–33)
MCHC RBC AUTO-ENTMCNC: 31.4 G/DL (ref 31.5–35.7)
MCV RBC AUTO: 97.5 FL (ref 79–97)
MONOCYTES # BLD AUTO: 0.63 10*3/MM3 (ref 0.1–0.9)
MONOCYTES NFR BLD AUTO: 7.5 % (ref 5–12)
NEUTROPHILS NFR BLD AUTO: 4.74 10*3/MM3 (ref 1.7–7)
NEUTROPHILS NFR BLD AUTO: 56.4 % (ref 42.7–76)
NRBC BLD AUTO-RTO: 0 /100 WBC (ref 0–0.2)
PHOSPHATE SERPL-MCNC: 4.2 MG/DL (ref 2.5–4.5)
PLATELET # BLD AUTO: 259 10*3/MM3 (ref 140–450)
PMV BLD AUTO: 11.5 FL (ref 6–12)
POTASSIUM SERPL-SCNC: 4.3 MMOL/L (ref 3.5–5.2)
PROT SERPL-MCNC: 7.2 G/DL (ref 6–8.5)
QT INTERVAL: 438 MS
QTC INTERVAL: 438 MS
RBC # BLD AUTO: 4.34 10*6/MM3 (ref 3.77–5.28)
RSV RNA RESP QL NAA+PROBE: NOT DETECTED
SARS-COV-2 RNA RESP QL NAA+PROBE: NOT DETECTED
SODIUM SERPL-SCNC: 142 MMOL/L (ref 136–145)
WBC NRBC COR # BLD AUTO: 8.4 10*3/MM3 (ref 3.4–10.8)

## 2025-02-04 PROCEDURE — 85025 COMPLETE CBC W/AUTO DIFF WBC: CPT | Performed by: STUDENT IN AN ORGANIZED HEALTH CARE EDUCATION/TRAINING PROGRAM

## 2025-02-04 PROCEDURE — 87637 SARSCOV2&INF A&B&RSV AMP PRB: CPT | Performed by: INTERNAL MEDICINE

## 2025-02-04 PROCEDURE — 84100 ASSAY OF PHOSPHORUS: CPT | Performed by: STUDENT IN AN ORGANIZED HEALTH CARE EDUCATION/TRAINING PROGRAM

## 2025-02-04 PROCEDURE — 80053 COMPREHEN METABOLIC PANEL: CPT | Performed by: STUDENT IN AN ORGANIZED HEALTH CARE EDUCATION/TRAINING PROGRAM

## 2025-02-04 PROCEDURE — 99222 1ST HOSP IP/OBS MODERATE 55: CPT | Performed by: INTERNAL MEDICINE

## 2025-02-04 PROCEDURE — 83735 ASSAY OF MAGNESIUM: CPT | Performed by: STUDENT IN AN ORGANIZED HEALTH CARE EDUCATION/TRAINING PROGRAM

## 2025-02-04 PROCEDURE — 25010000002 ENOXAPARIN PER 10 MG: Performed by: STUDENT IN AN ORGANIZED HEALTH CARE EDUCATION/TRAINING PROGRAM

## 2025-02-04 RX ORDER — ACETAMINOPHEN 325 MG/1
650 TABLET ORAL EVERY 6 HOURS PRN
Status: DISCONTINUED | OUTPATIENT
Start: 2025-02-04 | End: 2025-02-06 | Stop reason: HOSPADM

## 2025-02-04 RX ADMIN — ROSUVASTATIN CALCIUM 20 MG: 10 TABLET, FILM COATED ORAL at 09:46

## 2025-02-04 RX ADMIN — Medication 10 ML: at 20:09

## 2025-02-04 RX ADMIN — Medication 10 ML: at 09:48

## 2025-02-04 RX ADMIN — ACETAMINOPHEN 650 MG: 325 TABLET, FILM COATED ORAL at 10:52

## 2025-02-04 RX ADMIN — ENOXAPARIN SODIUM 40 MG: 100 INJECTION SUBCUTANEOUS at 15:11

## 2025-02-04 RX ADMIN — ACETAMINOPHEN 650 MG: 325 TABLET, FILM COATED ORAL at 17:18

## 2025-02-04 RX ADMIN — ASPIRIN 81 MG: 81 TABLET, COATED ORAL at 09:46

## 2025-02-04 RX ADMIN — PANTOPRAZOLE SODIUM 40 MG: 40 TABLET, DELAYED RELEASE ORAL at 09:46

## 2025-02-04 NOTE — ED NOTES
Pt states she was riding in car this afternoon & had episode where she became hot, felt unwell, & may have had syncopal episode. Pt states she was notified by heart monitor company that she had an event. Provider recommended she come in. Pt on monitor & in gown.  at bedside.

## 2025-02-04 NOTE — H&P
[facility] Medicine Services  History & Physical    Patient Name: Rachael Quevedo  : 1945  MRN: [id number]  Primary Care Physician:  Alexa Shah MD  Date of admission: 2/3/2025  Date and Time of Service: 2/3/2025 at 9:30 pm    Subjective      Chief Complaint: syncope    History of Present Illness: Rachael Quevedo is a 79 y.o. female w/ PMHX of CAD status post PCI, syncopal episode was recently admitted and discharged on 2025, CVA, CKD, hypertension, hyperlipidemia presented to [facility] on 2/3/2025 with chief complaint of dizziness and lightheadedness.    Patient was a passenger in the vehicle with her  driving when she suddenly felt lightheaded, dizzy, and began sweating this afternoon. The episode lasted only a few seconds. She denies complete loss of consciousness but was concerned that it resembled her prior syncopal episodes. Upon arriving home, the patient received a call from cardiac monitor support services, who notified her via voicemail of a critical event detected on the monitor, though she was unsure of the specific event. She contacted her cardiologist, Dr. Delong, who was unable to review the data at the time. The patient was then brought to the hospital by her  for further evaluation. Currently, the patient denies chest pain, shortness of breath, fever, chills, or dysuria.    Of note patient was recently admitted on 2025 with a syncopal event she was discharged on 2025 with cardiac monitor with outpatient follow-up with Dr. Delong.    In ED, patient hemodynamic stable, heart rate ranging in 60s to 70s, within normal limits, EKG showed normal sinus rhythm with left bundle branch block which is not new also noted are previous EKG on 2025, chest x-ray unremarkable, patient to medicine team for further workup and management.      Review of Systems negative unless mentioned above    Personal History     Past Medical History:   Diagnosis  Date    Allergic     Ankle fracture, left     Comments: 2016    Arthritis     Cataract, acquired     Coronary artery disease     Cough     Impression: Most likely reactive airway prescription as below Start antihistamine at home    Dense breast     Depression, major, recurrent, mild     GERD without esophagitis     Comments: Dr Duncan- protonix    Hematuria, microscopic     History of chicken pox     History of loop recorder     Hyperglycemia     Hyperlipidemia     Hypertension 01/04/2020    Injury of back     Injury of neck     Left bundle branch block (LBBB)     Malaise and fatigue     Medicare annual wellness visit, initial     with abnormal findings    Osteopenia 2022    Other specified menopausal and perimenopausal disorders     Other specified menopausal and postmenopausal disorders    Pap smear, low-risk     Renal insufficiency     S/P right rotator cuff repair     Screening for alcoholism     10 or more drinks per week    Screening for depression     Negative Depression Screening ( 9 or less) ()    Screening mammogram, encounter for     Stroke     1/5/2020 no residual effects    Vasovagal near-syncope     Impression: from illness and cough Discussed if there is loss of vision in an eye, confusion, weakness or numbness in an extremity, drooping of a side of the face, intractible pain, intractible vomiting, to go to the ER.       Past Surgical History:   Procedure Laterality Date    CARDIAC CATHETERIZATION N/A 01/27/2020    Procedure: Left Heart Cath with angiogram;  Surgeon: Jermaine Delong MD;  Location: [facility] CATH INVASIVE LOCATION;  Service: Cardiovascular    CARDIAC CATHETERIZATION N/A 01/27/2020    Procedure: Stent BOBBY coronary;  Surgeon: Jermaine Delong MD;  Location: [facility] CATH INVASIVE LOCATION;  Service: Cardiovascular    CARDIAC CATHETERIZATION N/A 01/27/2020    Procedure: Left ventriculography;  Surgeon: Jermaine Delong MD;  Location: [facility] CATH INVASIVE LOCATION;  Service: Cardiovascular     CARDIAC CATHETERIZATION N/A 01/27/2020    Procedure: Coronary angiography;  Surgeon: Jermaine Delong MD;  Location: [facility] INVASIVE LOCATION;  Service: Cardiovascular    CATARACT EXTRACTION, BILATERAL Bilateral 1999    CHOLECYSTECTOMY  1993??    COLONOSCOPY  Last one 2023    CORONARY STENT PLACEMENT  1/28/2020    GALLBLADDER SURGERY  1996    LAPAROSCOPIC TUBAL LIGATION  1980    RENAL ARTERY STENT  01/26/2020    ROTATOR CUFF REPAIR Right 2002    TUBAL ABDOMINAL LIGATION  1979??       Family History: family history includes Arthritis in her paternal grandmother; Cancer in her father, maternal aunt, and paternal uncle; Colon cancer in her father, paternal aunt, and paternal uncle; Diabetes in her brother, paternal aunt, and paternal grandmother; Heart attack in her mother; Heart disease in her brother, father, maternal uncle, maternal uncle, mother, and paternal grandmother; Hyperlipidemia in her brother, father, and mother; Hypertension in her brother and father. Otherwise pertinent FHx was reviewed and not pertinent to current issue.    Social History:  reports that she has never smoked. She has never used smokeless tobacco. She reports current alcohol use of about 3.0 standard drinks of alcohol per week. She reports that she does not use drugs.    Home Medications:  Prior to Admission Medications       Prescriptions Last Dose Informant Patient Reported? Taking?    aspirin 81 MG EC tablet   No No    TAKE 1 TABLET BY MOUTH EVERY DAY    Biotin 5 MG capsule   Yes No    Take  by mouth Every Night.    calcium carbonate (OS-SHYLA) 600 MG tablet   Yes No    Take 1 tablet by mouth 2 (Two) Times a Day With Meals.    carvedilol (COREG) 3.125 MG tablet   No No    TAKE 1 TABLET BY MOUTH TWICE A DAY WITH MEALS    Cholecalciferol (Vitamin D3) 50 MCG (2000 UT) tablet   Yes No    Take  by mouth.    fexofenadine (ALLEGRA) 180 MG tablet   Yes No    Take 1 tablet by mouth Daily. Pt taking 1/2 tablet daily    fluticasone (FLONASE) 50  MCG/ACT nasal spray   No No    2 sprays into the nostril(s) as directed by provider Daily. As directed by the provider    magnesium gluconate (MAGONATE) 500 MG tablet   Yes No    Take 1 tablet by mouth Daily.    Multiple Vitamins-Minerals (WOMENS MULTIVITAMIN + COLLAGEN PO)   Yes No    pantoprazole (PROTONIX) 40 MG EC tablet   Yes No    TAKE 1 TABLET BY MOUTH EVERY DAY IN THE MORNING BEFORE BREAKFAST    Probiotic Product (PROBIOTIC DAILY PO)   Yes No    Take 1 capsule by mouth Daily.    rosuvastatin (CRESTOR) 20 MG tablet   No No    TAKE 1 TABLET BY MOUTH EVERY DAY    vitamin B-12 (CYANOCOBALAMIN) 1000 MCG tablet  Self Yes No    Take 1 tablet by mouth Daily.              Allergies:  No Known Allergies    Objective      Vitals:   Temp:  [98.2 °F (36.8 °C)] 98.2 °F (36.8 °C)  Heart Rate:  [64-70] 64  Resp:  [11-20] 17  BP: (140-175)/(72-82) 152/81  Body mass index is 25.88 kg/m².  Physical Exam  General: Awake alert oriented x 3, pleasant  HEENT: Atraumatic normocephalic  Respiratory: Clear to auscultation no wheezes, nonlabored breathing  Cardio: Heart rate normal, rhythm regular  Abdomen: Soft, nontender, no rebound or guarding  Extremities: No remarkable edema the bilateral lower extremities  Neuro: Awake alert oriented x 3, no focal neurological deficit on my exam    Diagnostic Data:  Lab Results (last 24 hours)       Procedure Component Value Units Date/Time    High Sensitivity Troponin T 1Hr [[id number]]  (Normal) Collected: 02/03/25 1856    Specimen: Blood Updated: 02/03/25 1921     HS Troponin T 12 ng/L      Troponin T Numeric Delta 0 ng/L     Narrative:      High Sensitive Troponin T Reference Range:  <14.0 ng/L- Negative Female for AMI  <22.0 ng/L- Negative Male for AMI  >=14 - Abnormal Female indicating possible myocardial injury.  >=22 - Abnormal Male indicating possible myocardial injury.   Clinicians would have to utilize clinical acumen, EKG, Troponin, and serial changes to determine if it is an Acute  Myocardial Infarction or myocardial injury due to an underlying chronic condition.         Comprehensive Metabolic Panel [[id number]]  (Abnormal) Collected: 02/03/25 1745    Specimen: Blood Updated: 02/03/25 1814     Glucose 112 mg/dL      BUN 20 mg/dL      Creatinine 0.95 mg/dL      Sodium 139 mmol/L      Potassium 4.4 mmol/L      Chloride 104 mmol/L      CO2 23.6 mmol/L      Calcium 9.6 mg/dL      Total Protein 7.0 g/dL      Albumin 4.3 g/dL      ALT (SGPT) 32 U/L      AST (SGOT) 25 U/L      Alkaline Phosphatase 65 U/L      Total Bilirubin 0.4 mg/dL      Globulin 2.7 gm/dL      A/G Ratio 1.6 g/dL      BUN/Creatinine Ratio 21.1     Anion Gap 11.4 mmol/L      eGFR 61.5 mL/min/1.73     Narrative:      GFR Categories in Chronic Kidney Disease (CKD)      GFR Category          GFR (mL/min/1.73)    Interpretation  G1                     90 or greater         Normal or high (1)  G2                      60-89                Mild decrease (1)  G3a                   45-59                Mild to moderate decrease  G3b                   30-44                Moderate to severe decrease  G4                    15-29                Severe decrease  G5                    14 or less           Kidney failure          (1)In the absence of evidence of kidney disease, neither GFR category G1 or G2 fulfill the criteria for CKD.    eGFR calculation 2021 CKD-EPI creatinine equation, which does not include race as a factor    Magnesium [[id number]]  (Abnormal) Collected: 02/03/25 1745    Specimen: Blood Updated: 02/03/25 1814     Magnesium 2.5 mg/dL     High Sensitivity Troponin T [[id number]]  (Normal) Collected: 02/03/25 1745    Specimen: Blood Updated: 02/03/25 1814     HS Troponin T 12 ng/L     Narrative:      High Sensitive Troponin T Reference Range:  <14.0 ng/L- Negative Female for AMI  <22.0 ng/L- Negative Male for AMI  >=14 - Abnormal Female indicating possible myocardial injury.  >=22 - Abnormal Male indicating possible myocardial  injury.   Clinicians would have to utilize clinical acumen, EKG, Troponin, and serial changes to determine if it is an Acute Myocardial Infarction or myocardial injury due to an underlying chronic condition.         CBC & Differential [[id number]]  (Normal) Collected: 02/03/25 1745    Specimen: Blood Updated: 02/03/25 1752    Narrative:      The following orders were created for panel order CBC & Differential.  Procedure                               Abnormality         Status                     ---------                               -----------         ------                     CBC Auto Differential[[id number]]        Normal              Final result                 Please view results for these tests on the individual orders.    CBC Auto Differential [[id number]]  (Normal) Collected: 02/03/25 1745    Specimen: Blood Updated: 02/03/25 1752     WBC 9.18 10*3/mm3      RBC 4.20 10*6/mm3      Hemoglobin 12.9 g/dL      Hematocrit 40.7 %      MCV 96.9 fL      MCH 30.7 pg      MCHC 31.7 g/dL      RDW 13.0 %      RDW-SD 46.4 fl      MPV 11.6 fL      Platelets 257 10*3/mm3      Neutrophil % 65.1 %      Lymphocyte % 26.3 %      Monocyte % 6.8 %      Eosinophil % 1.1 %      Basophil % 0.4 %      Immature Grans % 0.3 %      Neutrophils, Absolute 5.98 10*3/mm3      Lymphocytes, Absolute 2.41 10*3/mm3      Monocytes, Absolute 0.62 10*3/mm3      Eosinophils, Absolute 0.10 10*3/mm3      Basophils, Absolute 0.04 10*3/mm3      Immature Grans, Absolute 0.03 10*3/mm3      nRBC 0.0 /100 WBC              Imaging Results (Last 24 Hours)       Procedure Component Value Units Date/Time    XR Chest 1 View [[id number]] Collected: 02/03/25 1856     Updated: 02/03/25 1859    Narrative:      XR CHEST 1 VW    Date of Exam: 2/3/2025 6:22 PM EST    Indication: Lightheadedness.    Comparison: 4/24/2020.    Findings:  The heart size is normal. The pulmonary vascular markings are normal. The lungs and pleural spaces are clear. There is an electronic  module obscuring the left lateral upper chest. There are degenerative changes within the bony thorax.      Impression:      Impression:  No active disease.        Electronically Signed: Jitendra Watkins MD    2/3/2025 6:57 PM EST    Workstation ID: [id number]              Assessment & Plan    Rachael Quevedo is a 79 y.o. female w/ PMHX of CAD status post PCI, syncopal episode was recently admitted and discharged on 1/26/2025, CVA, CKD, hypertension, hyperlipidemia presented to [facility] on 2/3/2025 with chief complaint of dizziness and lightheadedness.    Assessments  #Presyncopal event, possibly cardiac etiology arrhythmias  # History of syncope, has cardiac monitor in place  # CAD status post PCI LAD stent 2 years ago, currently on aspirin monotherapy  #Hypertension  #Hyperlipidemia  # History of CVA  #CKD    Plan  -Continue telemetry, monitor for arrhythmias, hold beta-blockers for now   -EKG normal sinus, left bundle branch block not new, troponins normal  -Echo obtained last week showed normal LVEF of 60 to 65%, G1 DD, RVSP less than 35  -Continue aspirin, statins, on PPI  -Cardiology consult in the morning, further cardiology recs to follow  -Lovenox for DVT prophylaxis  -AM labs      VTE Prophylaxis:  Pharmacologic VTE prophylaxis orders are present.        The patient desires to be as follows:    CODE STATUS: DNR/DNI (discussed with the patient, patient is awake alert oriented x 3, spouse present at bedside)      Expected Length of Stay: 2    PDMP and Medication Dispenses via Sidebar reviewed and consistent with patient reported medications.    I discussed the patient's findings and my recommendations with patient.      Signature:     This document has been electronically signed by Tristan Herrera MD on February 3, 2025 21:58 EST   [facility] Hospitalist Team

## 2025-02-04 NOTE — CONSULTS
CARDIOLOGY CONSULT:    Rachael Quevedo  1945  female  [id number]      Referring Provider: Hospitalist  Reason for Consultation: Syncope and cardiac pause    Patient Care Team:  Alexa Shah MD as PCP - General (Family Medicine)  Alexa Shah MD as PCP - Family Medicine  Jermaine Delong MD as Consulting Physician (Cardiology)  Pallavi Valdes APRN as Nurse Practitioner (Cardiology)    Chief complaint syncope    Subjective .     History of present illness:  Rachael Quevedo is a 79 y.o. female with history of coronary disease status post and placement in the past history of hypertension hyperlipidemia presented to the hospital after syncopal episode.  Patient states that she was driving with her  in the car when she had an episode of dizziness and passed out only for few seconds when she woke up she told her  who called our office and hospital and then came to the ER.  She had a monitor placed at that time which showed evidence of significant pauses up to 18 seconds and hence she was admitted to the hospital.  Patient was in the hospital few weeks ago with a similar symptoms and hence was placed on the monitor.  She does not have any chest pain shortness of breath.  No palpitations swelling of the feet.  She is taking her medicines regularly.    Review of Systems   Constitutional: Negative for fever and malaise/fatigue.   HENT:  Negative for ear pain and nosebleeds.    Eyes:  Negative for blurred vision and double vision.   Cardiovascular:  Positive for syncope. Negative for chest pain, dyspnea on exertion and palpitations.   Respiratory:  Negative for cough and shortness of breath.    Skin:  Negative for rash.   Musculoskeletal:  Negative for joint pain.   Gastrointestinal:  Negative for abdominal pain, nausea and vomiting.   Neurological:  Negative for focal weakness and headaches.   Psychiatric/Behavioral:  Negative for depression. The patient is not nervous/anxious.    All other  systems reviewed and are negative.      History  Past Medical History:   Diagnosis Date    Allergic     Ankle fracture, left     Comments: 2016    Arthritis     Cataract, acquired     Coronary artery disease     Cough     Impression: Most likely reactive airway prescription as below Start antihistamine at home    Dense breast     Depression, major, recurrent, mild     GERD without esophagitis     Comments: Dr Duncan- protonix    Hematuria, microscopic     History of chicken pox     History of loop recorder     Hyperglycemia     Hyperlipidemia     Hypertension 01/04/2020    Injury of back     Injury of neck     Left bundle branch block (LBBB)     Malaise and fatigue     Medicare annual wellness visit, initial     with abnormal findings    Osteopenia 2022    Other specified menopausal and perimenopausal disorders     Other specified menopausal and postmenopausal disorders    Pap smear, low-risk     Renal insufficiency     S/P right rotator cuff repair     Screening for alcoholism     10 or more drinks per week    Screening for depression     Negative Depression Screening ( 9 or less) ()    Screening mammogram, encounter for     Stroke     1/5/2020 no residual effects    Vasovagal near-syncope     Impression: from illness and cough Discussed if there is loss of vision in an eye, confusion, weakness or numbness in an extremity, drooping of a side of the face, intractible pain, intractible vomiting, to go to the ER.       Past Surgical History:   Procedure Laterality Date    CARDIAC CATHETERIZATION N/A 01/27/2020    Procedure: Left Heart Cath with angiogram;  Surgeon: Jermaine Delong MD;  Location: [facility] CATH INVASIVE LOCATION;  Service: Cardiovascular    CARDIAC CATHETERIZATION N/A 01/27/2020    Procedure: Stent BOBBY coronary;  Surgeon: Jermaine Delong MD;  Location: [facility] CATH INVASIVE LOCATION;  Service: Cardiovascular    CARDIAC CATHETERIZATION N/A 01/27/2020    Procedure: Left ventriculography;  Surgeon:  Jermaine Delong MD;  Location: [facility] CATH INVASIVE LOCATION;  Service: Cardiovascular    CARDIAC CATHETERIZATION N/A 01/27/2020    Procedure: Coronary angiography;  Surgeon: Jermaine Delong MD;  Location: [facility] CATH INVASIVE LOCATION;  Service: Cardiovascular    CATARACT EXTRACTION, BILATERAL Bilateral 1999    CHOLECYSTECTOMY  1993??    COLONOSCOPY  Last one 2023    CORONARY STENT PLACEMENT  1/28/2020    GALLBLADDER SURGERY  1996    LAPAROSCOPIC TUBAL LIGATION  1980    RENAL ARTERY STENT  01/26/2020    ROTATOR CUFF REPAIR Right 2002    TUBAL ABDOMINAL LIGATION  1979??       Family History   Problem Relation Age of Onset    Hyperlipidemia Mother         Elevated cholesterol    Heart disease Mother     Heart attack Mother     Hypertension Father     Colon cancer Father     Hyperlipidemia Father         Elevated cholesterol    Heart disease Father     Cancer Father     Diabetes Brother     Heart disease Brother     Hyperlipidemia Brother     Hypertension Brother     Diabetes Paternal Aunt     Colon cancer Paternal Aunt     Colon cancer Paternal Uncle     Diabetes Paternal Grandmother     Heart disease Paternal Grandmother     Arthritis Paternal Grandmother     Heart disease Maternal Uncle     Heart disease Maternal Uncle     Cancer Maternal Aunt     Cancer Paternal Uncle     Breast cancer Neg Hx     Ovarian cancer Neg Hx        Social History     Tobacco Use    Smoking status: Never    Smokeless tobacco: Never    Tobacco comments:     Many family members smoked around me when I was a child   Vaping Use    Vaping status: Never Used   Substance Use Topics    Alcohol use: Yes     Alcohol/week: 3.0 standard drinks of alcohol     Types: 2 Glasses of wine, 1 Drinks containing 0.5 oz of alcohol per week     Comment: Both not weekly but occassionally    Drug use: No        (Not in a hospital admission)      Allergies: Patient has no known allergies.    Scheduled Meds:aspirin, 81 mg, Oral, Daily  enoxaparin, 40 mg,  "Subcutaneous, Q24H  pantoprazole, 40 mg, Oral, Daily  rosuvastatin, 20 mg, Oral, Daily  sodium chloride, 10 mL, Intravenous, Q12H      Continuous Infusions:   PRN Meds:.  acetaminophen    senna-docusate sodium **AND** polyethylene glycol **AND** bisacodyl **AND** bisacodyl    Calcium Replacement - Follow Nurse / BPA Driven Protocol    Magnesium Standard Dose Replacement - Follow Nurse / BPA Driven Protocol    nitroglycerin    Phosphorus Replacement - Follow Nurse / BPA Driven Protocol    Potassium Replacement - Follow Nurse / BPA Driven Protocol    [COMPLETED] Insert Peripheral IV **AND** sodium chloride    sodium chloride    sodium chloride    Objective     VITAL SIGNS  Vitals:    02/03/25 2149 02/03/25 2300 02/04/25 0400 02/04/25 0900   BP: 172/62 127/59 143/63 147/54   BP Location:    Left arm   Patient Position:    Sitting   Pulse: 63 63 59 68   Resp:    15   Temp:   97.9 °F (36.6 °C) 98.3 °F (36.8 °C)   TempSrc:    Oral   SpO2: 99% 95% 98% 98%   Weight:       Height:           Flowsheet Rows      Flowsheet Row First Filed Value   Admission Height 152.4 cm (60\") Documented at 02/03/2025 1731   Admission Weight 60.1 kg (132 lb 7.9 oz) Documented at 02/03/2025 1731             TELEMETRY: Sinus rhythm    Physical Exam:  Constitutional:       Appearance: Well-developed.   Eyes:      General: No scleral icterus.     Conjunctiva/sclera: Conjunctivae normal.      Pupils: Pupils are equal, round, and reactive to light.   HENT:      Head: Normocephalic and atraumatic.   Neck:      Vascular: No carotid bruit or JVD.   Pulmonary:      Effort: Pulmonary effort is normal.      Breath sounds: Normal breath sounds. No wheezing. No rales.   Cardiovascular:      Normal rate. Regular rhythm.   Pulses:     Intact distal pulses.   Abdominal:      General: Bowel sounds are normal.      Palpations: Abdomen is soft.   Musculoskeletal: Normal range of motion.      Cervical back: Normal range of motion and neck supple. Skin:     " General: Skin is warm and dry.      Findings: No rash.   Neurological:      Mental Status: Alert.      Comments: No focal deficits          Results Review:   I reviewed the patient's new clinical results.  Lab Results (last 24 hours)       Procedure Component Value Units Date/Time    Comprehensive Metabolic Panel [[id number]]  (Abnormal) Collected: 02/04/25 0452    Specimen: Blood Updated: 02/04/25 0519     Glucose 106 mg/dL      BUN 17 mg/dL      Creatinine 0.90 mg/dL      Sodium 142 mmol/L      Potassium 4.3 mmol/L      Chloride 106 mmol/L      CO2 26.2 mmol/L      Calcium 9.8 mg/dL      Total Protein 7.2 g/dL      Albumin 4.4 g/dL      ALT (SGPT) 28 U/L      AST (SGOT) 22 U/L      Alkaline Phosphatase 61 U/L      Total Bilirubin 0.5 mg/dL      Globulin 2.8 gm/dL      A/G Ratio 1.6 g/dL      BUN/Creatinine Ratio 18.9     Anion Gap 9.8 mmol/L      eGFR 65.6 mL/min/1.73     Narrative:      GFR Categories in Chronic Kidney Disease (CKD)      GFR Category          GFR (mL/min/1.73)    Interpretation  G1                     90 or greater         Normal or high (1)  G2                      60-89                Mild decrease (1)  G3a                   45-59                Mild to moderate decrease  G3b                   30-44                Moderate to severe decrease  G4                    15-29                Severe decrease  G5                    14 or less           Kidney failure          (1)In the absence of evidence of kidney disease, neither GFR category G1 or G2 fulfill the criteria for CKD.    eGFR calculation 2021 CKD-EPI creatinine equation, which does not include race as a factor    Magnesium [[id number]]  (Abnormal) Collected: 02/04/25 0452    Specimen: Blood Updated: 02/04/25 0519     Magnesium 2.6 mg/dL     Phosphorus [[id number]]  (Normal) Collected: 02/04/25 0452    Specimen: Blood Updated: 02/04/25 0519     Phosphorus 4.2 mg/dL     CBC & Differential [[id number]]  (Abnormal) Collected: 02/04/25 0452     Specimen: Blood Updated: 02/04/25 0458    Narrative:      The following orders were created for panel order CBC & Differential.  Procedure                               Abnormality         Status                     ---------                               -----------         ------                     CBC Auto Differential[[id number]]        Abnormal            Final result                 Please view results for these tests on the individual orders.    CBC Auto Differential [[id number]]  (Abnormal) Collected: 02/04/25 0452    Specimen: Blood Updated: 02/04/25 0458     WBC 8.40 10*3/mm3      RBC 4.34 10*6/mm3      Hemoglobin 13.3 g/dL      Hematocrit 42.3 %      MCV 97.5 fL      MCH 30.6 pg      MCHC 31.4 g/dL      RDW 12.9 %      RDW-SD 46.4 fl      MPV 11.5 fL      Platelets 259 10*3/mm3      Neutrophil % 56.4 %      Lymphocyte % 33.9 %      Monocyte % 7.5 %      Eosinophil % 1.3 %      Basophil % 0.5 %      Immature Grans % 0.4 %      Neutrophils, Absolute 4.74 10*3/mm3      Lymphocytes, Absolute 2.85 10*3/mm3      Monocytes, Absolute 0.63 10*3/mm3      Eosinophils, Absolute 0.11 10*3/mm3      Basophils, Absolute 0.04 10*3/mm3      Immature Grans, Absolute 0.03 10*3/mm3      nRBC 0.0 /100 WBC     High Sensitivity Troponin T 1Hr [[id number]]  (Normal) Collected: 02/03/25 1856    Specimen: Blood Updated: 02/03/25 1921     HS Troponin T 12 ng/L      Troponin T Numeric Delta 0 ng/L     Narrative:      High Sensitive Troponin T Reference Range:  <14.0 ng/L- Negative Female for AMI  <22.0 ng/L- Negative Male for AMI  >=14 - Abnormal Female indicating possible myocardial injury.  >=22 - Abnormal Male indicating possible myocardial injury.   Clinicians would have to utilize clinical acumen, EKG, Troponin, and serial changes to determine if it is an Acute Myocardial Infarction or myocardial injury due to an underlying chronic condition.         Comprehensive Metabolic Panel [[id number]]  (Abnormal) Collected: 02/03/25 8875     Specimen: Blood Updated: 02/03/25 1814     Glucose 112 mg/dL      BUN 20 mg/dL      Creatinine 0.95 mg/dL      Sodium 139 mmol/L      Potassium 4.4 mmol/L      Chloride 104 mmol/L      CO2 23.6 mmol/L      Calcium 9.6 mg/dL      Total Protein 7.0 g/dL      Albumin 4.3 g/dL      ALT (SGPT) 32 U/L      AST (SGOT) 25 U/L      Alkaline Phosphatase 65 U/L      Total Bilirubin 0.4 mg/dL      Globulin 2.7 gm/dL      A/G Ratio 1.6 g/dL      BUN/Creatinine Ratio 21.1     Anion Gap 11.4 mmol/L      eGFR 61.5 mL/min/1.73     Narrative:      GFR Categories in Chronic Kidney Disease (CKD)      GFR Category          GFR (mL/min/1.73)    Interpretation  G1                     90 or greater         Normal or high (1)  G2                      60-89                Mild decrease (1)  G3a                   45-59                Mild to moderate decrease  G3b                   30-44                Moderate to severe decrease  G4                    15-29                Severe decrease  G5                    14 or less           Kidney failure          (1)In the absence of evidence of kidney disease, neither GFR category G1 or G2 fulfill the criteria for CKD.    eGFR calculation 2021 CKD-EPI creatinine equation, which does not include race as a factor    Magnesium [[id number]]  (Abnormal) Collected: 02/03/25 1745    Specimen: Blood Updated: 02/03/25 1814     Magnesium 2.5 mg/dL     High Sensitivity Troponin T [[id number]]  (Normal) Collected: 02/03/25 1745    Specimen: Blood Updated: 02/03/25 1814     HS Troponin T 12 ng/L     Narrative:      High Sensitive Troponin T Reference Range:  <14.0 ng/L- Negative Female for AMI  <22.0 ng/L- Negative Male for AMI  >=14 - Abnormal Female indicating possible myocardial injury.  >=22 - Abnormal Male indicating possible myocardial injury.   Clinicians would have to utilize clinical acumen, EKG, Troponin, and serial changes to determine if it is an Acute Myocardial Infarction or myocardial injury due to  an underlying chronic condition.         CBC & Differential [[id number]]  (Normal) Collected: 02/03/25 1745    Specimen: Blood Updated: 02/03/25 1752    Narrative:      The following orders were created for panel order CBC & Differential.  Procedure                               Abnormality         Status                     ---------                               -----------         ------                     CBC Auto Differential[[id number]]        Normal              Final result                 Please view results for these tests on the individual orders.    CBC Auto Differential [[id number]]  (Normal) Collected: 02/03/25 1745    Specimen: Blood Updated: 02/03/25 1752     WBC 9.18 10*3/mm3      RBC 4.20 10*6/mm3      Hemoglobin 12.9 g/dL      Hematocrit 40.7 %      MCV 96.9 fL      MCH 30.7 pg      MCHC 31.7 g/dL      RDW 13.0 %      RDW-SD 46.4 fl      MPV 11.6 fL      Platelets 257 10*3/mm3      Neutrophil % 65.1 %      Lymphocyte % 26.3 %      Monocyte % 6.8 %      Eosinophil % 1.1 %      Basophil % 0.4 %      Immature Grans % 0.3 %      Neutrophils, Absolute 5.98 10*3/mm3      Lymphocytes, Absolute 2.41 10*3/mm3      Monocytes, Absolute 0.62 10*3/mm3      Eosinophils, Absolute 0.10 10*3/mm3      Basophils, Absolute 0.04 10*3/mm3      Immature Grans, Absolute 0.03 10*3/mm3      nRBC 0.0 /100 WBC             Imaging Results (Last 24 Hours)       Procedure Component Value Units Date/Time    XR Chest 1 View [[id number]] Collected: 02/03/25 1856     Updated: 02/03/25 1859    Narrative:      XR CHEST 1 VW    Date of Exam: 2/3/2025 6:22 PM EST    Indication: Lightheadedness.    Comparison: 4/24/2020.    Findings:  The heart size is normal. The pulmonary vascular markings are normal. The lungs and pleural spaces are clear. There is an electronic module obscuring the left lateral upper chest. There are degenerative changes within the bony thorax.      Impression:      Impression:  No active disease.        Electronically  Signed: Jitendra Watkins MD    2/3/2025 6:57 PM EST    Workstation ID: [id number]            EKG      I personally viewed and interpreted the patient's EKG/Telemetry data:    ECHOCARDIOGRAM:  Results for orders placed during the hospital encounter of 25    Adult Transthoracic Echo Complete w/ Color, Spectral and Contrast if Necessary Per Protocol    Interpretation Summary    Left ventricular ejection fraction appears to be 61 - 65%.    Left ventricular diastolic function is consistent with (grade I) impaired relaxation.    Estimated right ventricular systolic pressure from tricuspid regurgitation is normal (<35 mmHg).         STRESS MYOVIEW:  Results for orders placed during the hospital encounter of 25    Stress Test With Myocardial Perfusion One Day    Interpretation Summary    Left ventricular ejection fraction is hyperdynamic (Calculated EF > 70%).    LEXISCAN CARDIOLITE REPORT    DATE OF PROCEDURE:  25    INDICATION FOR PROCEDURE: Syncope, left bundle branch block, CAD, PCI    PROCEDURE PERFORMED: Lexiscan Cardiolite    PROCEDURE COMMENTS:    After informed consent was obtained.  Patient's resting heart rate was 61 bpm, resting blood pressure was 155/80, resting EKG revealed sinus rhythm with left bundle branch block.  Patient was given 0.4 mg of regadenosine for stress testing.  There was no significant change in heart rate, blood pressure, symptoms with regadenoson injection.  Patient tolerated procedure well.  Complications were none.    NUCLEAR IMAGIN.  There was moderate fixed septal defect probably due to left bundle branch block, no ischemia seen.  2.  Gated images reveal normal LV size and contractility, LVEF of 71%.    CONCLUSION:  1.  Lexiscan Cardiolite with fixed septal defect due to left bundle branch block, negative for ischemia.  2.  Normal wall motion.  LVEF of 71%.    RECOMMENDATIONS:    Clinical correlation recommended.      Alec Wong MD  25  12:33 EST        CARDIAC CATHETERIZATION:    Results for orders placed during the hospital encounter of 20    Cardiac Catheterization/Vascular Study    Narrative  Heart Cath PCI Report    NAME:              Rachael Quevedo  :                1945  AGE/SEX:        74 y.o. female  MRN:                [id number]  ADM DATE:      @@  DOS:  ADM MD:          [unfilled]  ATT MD:           [unfilled]  REF MD:          [unfilled]      Pre-Procedure Notes  H&P Performed  []  Yes []  No       []  N/A    Indications:  []  ACS <= 24 HRS  []  ACS >24 HRS  [x]  New Onset Angina <= 2 mos  [x]  Worsening Angina  []  Resuscitated Cardiac Arrest  []  Angina on Exertion:  []  Suspected CAD  []  Valvular Disease  []  Pericardial Disease  []  Cardiac Arrythmia  []  Cardiomyopathy  []  LV Dysfunction  []  Syncope  []  Post Cardiac Transplant  []  Eval. For Exercise Clearance  []  Other  []  Pre-Operative Evaluation  If Pre-Op Eval:  Evaluation for Surgery Type:  []  Cardiac Surgery   []  Non-Cardiac Surgery  Functional Capacity:  []  <4 METS  []  >=4 METS w/o symptoms  []  >= 4 METS with symptoms  []  Unknown  Surgical Risk:  []  Low  []  Intermediate  []  High Risk: Vascular  []  High Risk Non-Vascular    Risks, Benefits, & Complications Discussed:  [x]  Yes  []  No  []  N/A    Questions Answered:  [x]  Yes  []  No  []  N/A    Consent Obtained:  [x]  Yes  []  No  []  N/A    CHF: []  Yes  [x]  No  If Yes:  Newly Diagnosed?  []  Yes  []  No  If Yes:  HF Type:  []  Diastolic  []  Systolic  []  Unknown      Procedure Description  The patient underwent successful [x]  Left  []  Right  []  Left & Right  Heart catheterization and coronary angiography via the  [x]  Femoral approach  []  Radial approach  []  Brachial approach    Procedure Narrative:  Informed consent was obtained from the patient after explaining risks and benefits.  Patient was brought to the cardiac interventional artery and placed on the table in the usual fashion.  Right  groin was shaved and prepped in sterile fashion.  2% again was used with midsternal anesthesia to the right groin.  A total of 20 cc was used.  A 6 Jordanian sheath was placed in the right femoral artery.  A 6 Jordanian pigtail catheter, 6 Jordanian JR4 catheter and a 6 Jordanian JL4 catheter was used for the procedure.  After the cardiac catheterization is complete, patient underwent a percutaneous coronary dimension.  After the cardiac intervention was complete patient was given aspirin Plavix and heparin per weight-based protocol.  A 6 Jordanian JL4 guide was used and left coronary artery was engaged.  A 190 cm wire was used and placed in the distal portion of the circumflex artery.  A 2.25/12 mm Xience stent was used and placed across the lesion and inflated at 12 james for 10 seconds.  Thereafter the balloon was taken out.  The wire was then placed in the LAD and a 2.5/12 mm Xience stent was used and placed across the lesion in the LAD and inflated at 12 james for 10 seconds.  Thereafter the balloon was taken out.  Reviewing angiogram showed no dissection or perforation.  Patient tolerated the procedure very well.  No complications noted.  Hemodynamic    LVEDP: 8  Estimated EF %: 60    Initial Aortic Pressure: 120/70    AV Gradient: No gradient    Rt. Heart Pressure:    Wall Motion:  Dominance:  [x]  Left  []  Right  []  Co-Dominant    Coronary Arteriography: (Please Code highest degree of stenosis)    Left Main %: 0  Proximal LAD %: 80%  Mid/Distal LAD %: 0  LCX %: OM1 with 90% ramus:  RCA %: 0  Lima %:  SVG(s) %:      PCI Procedure Notes    Segment #OM1  Culprit Lesion:  [x]  Yes  []  No  % Pre-Stenosis: 90%  pre-Proc TIMIFlow: NAOMI-3 flow  % Post-Stenosis: 0  Post-Proc TIMIFlow: NAOMI-3  Non-High/Non-C:     yes                     High/C:  Lesion Length (mm): 12 mm  Primary Device Used: Stent    Segment #proximal LAD  Culprit Lesion:  [x]  Yes  []  No  % Pre-Stenosis: 80  Pre-Proc TIMIFlow: NAOMI-3 flow  % Post-Stenosis:  0%  Post-Proc TIMIFlow: NAOMI-3 flow  Non-High/Non-C:                          High/C:yes  Lesion Length (mm): 12 mm  Primary Device Used: Stent    Segment #  Culprit Lesion:  []  Yes  []  No  % Pre-Stenosis:  Pre-Proc TIMIFlow:  % Post-Stenosis:  Post-Proc TIMIFlow:  Non-High/Non-C:                          High/C:  Lesion Length (mm):  Primary Device Used:        Conscious Sedation:   [x]  Yes   []  No  No Flow Phenom:       []  Yes  [x]  No  Dissection:  []  Yes  [x]  No  Acute Closure: []  Yes  [x]  No  Perforation:  []  Yes  [x]  No    Complications: No complication    Estimated Blood Loss:  None      Impression and Recommendation: Severe two-vessel coronary artery disease  Normal LV function  Status post successful stent implantation of drug-eluting stents in the proximal LAD and the first marginal branch without any complications.    Electronically signed by Jermaine Delong MD, 01/27/20, 12:27 PM.       OTHER:         MDM      Syncope/significant pause  Patient presented with syncopal episode and had a monitor on at that time and is ruled out for MI by EKG and enzymes  Patient had a 18 to 19 seconds pause  Patient will be seen by electrophysiologist and will have a pacemaker placed  Patient is ruled out for MI by EKG and enzymes  Patient had recent echocardiogram which showed normal LV function also had a stress Myoview study which did not show any ischemia.  Patient not on any beta-blockers at this time or any other rate limiting agents.  Patient electrolytes are normal and she is having her thyroid function test also checked.    Coronary disease  Patient had stent placement to the LAD and marginal branch in the past and is ruled out for MI by EKG and enzymes  Patient has normal LV function by echocardiogram and also a recent normal stress Myoview study  Will continue medical therapy only    Hypertension  Patient's blood pressure currently stable on medicines    Hyperlipidemia  Patient is on statins and the  lipid levels are followed by the primary care doctor.  I discussed the patients findings and my recommendations with patient and nurse    Jermaine Delong MD  02/04/25  10:48 EST

## 2025-02-04 NOTE — SIGNIFICANT NOTE
02/04/25 1331   Readmission Indications   Is the patient and/or family able to complete the readmission assessment questions? Yes   Is this hospitalization related to the prior hospital diagnosis? No   Recommendation for rehospitalization   Did you speak with your physician prior to coming to the hospital Yes   If yes, what physician did you speak with? Dr. Delong   Follow-up Appointments   Do you have a PCP? Yes   Did you have an appointment with PCP after your hospitalization? No   Did you have an appointment with a Specialist? No   Are you current with the Pulmonary Clinic? No   Are you current with the CHF Clinic? No   Medications   Did you have newly prescribed medications at discharge? No  (No new meds)   Did you understand the reasons for your medications at discharge and how to take them?   (N/A)   Did you understand the side effects of your medications?   (N/A)   Are you taking all of you prescribed medications?   (N/A)   What pharmacy was used to fill prescription(s)? N/A   Were medications picked up?   (N/A)   Discharge Instructions   Did you understand your discharge instructions? Yes   Did your family/caregiver hear your instructions? Yes   Were you told to eat a special diet? No   Did you adhere to the diet?   (N/A)   Were you given a number of someone to call if you had questions or concerns? Yes   Index discharge location/services   Where did you go upon discharge? Home   Do you have supportive family or friends in the home? Yes   Discharge Readiness   On a scale of 1-5 (5 being well prepared), how ready were you for discharge 5   Palliative Care/Hospice   Are you current with Palliative Care? No   Are you current with Hospice Care? No   Readmission Assessment Final Comments   Final Comments Previous: syncopal episode at home. CT head WNL. EKG-LBBB, EF-61-65%, ST-WNL. Discharge home with [facility] for 2 weeks, follow up with Cardiology in 4 weeks. Current: lightheadedness, dizziness, alert from [facility]  stating change in rhythm. Pending further Cardiology workup.

## 2025-02-04 NOTE — PROGRESS NOTES
[facility] MEDICINE SERVICE  DAILY PROGRESS NOTE    NAME: Rachael Quevedo  : 1945  MRN: [id number]      LOS: 1 day     PROVIDER OF SERVICE: Bob Baker MD    Chief Complaint: Syncope    Subjective:     History of Present Illness: Rachael Quevedo is a 79 y.o. female w/ PMHX of CAD status post PCI, syncopal episode was recently admitted and discharged on 2025, CVA, CKD, hypertension, hyperlipidemia presented to [facility] on 2/3/2025 with chief complaint of dizziness and lightheadedness.     Patient was a passenger in the vehicle with her  driving when she suddenly felt lightheaded, dizzy, and began sweating this afternoon. The episode lasted only a few seconds. She denies complete loss of consciousness but was concerned that it resembled her prior syncopal episodes. Upon arriving home, the patient received a call from cardiac monitor support services, who notified her via voicemail of a critical event detected on the monitor, though she was unsure of the specific event. She contacted her cardiologist, Dr. Delong, who was unable to review the data at the time. The patient was then brought to the hospital by her  for further evaluation. Currently, the patient denies chest pain, shortness of breath, fever, chills, or dysuria.     Of note patient was recently admitted on 2025 with a syncopal event she was discharged on 2025 with cardiac monitor with outpatient follow-up with Dr. Delong.     In ED, patient hemodynamic stable, heart rate ranging in 60s to 70s, within normal limits, EKG showed normal sinus rhythm with left bundle branch block which is not new also noted are previous EKG on 2025, chest x-ray unremarkable, patient to medicine team for further workup and management.        Interval History:  History taken from: patient    2/4 seen in bed NAD, no new complaints, DW cardiology, VSS, Patient will be seen by electrophysiologist and will have a  pacemaker placed        Review of Systems   Constitutional:  Negative for chills and fever.   HENT:  Negative for ear pain and hearing loss.    Respiratory:  Negative for apnea and shortness of breath.    Cardiovascular:  Negative for chest pain and palpitations.   Gastrointestinal:  Negative for diarrhea, nausea and vomiting.   Genitourinary:  Negative for dysuria.   Musculoskeletal:  Negative for back pain and joint swelling.   Neurological:  Negative for seizures, light-headedness and headaches.   Psychiatric/Behavioral:  Negative for confusion and hallucinations.        Objective:     Vital Signs  Temp:  [97.9 °F (36.6 °C)-98.4 °F (36.9 °C)] 98.3 °F (36.8 °C)  Heart Rate:  [59-69] 62  Resp:  [11-17] 17  BP: (127-172)/(54-81) 138/59   Body mass index is 25.88 kg/m².    Physical Exam  Physical Exam  Vitals and nursing note reviewed.   Constitutional:       General: She is not in acute distress.     Appearance: Normal appearance. She is well-developed. She is not ill-appearing, toxic-appearing or diaphoretic.   HENT:      Head: Normocephalic and atraumatic.      Nose: Nose normal. No congestion or rhinorrhea.      Mouth/Throat:      Mouth: Mucous membranes are moist.      Pharynx: No oropharyngeal exudate.   Eyes:      General: No scleral icterus.        Right eye: No discharge.         Left eye: No discharge.      Extraocular Movements: Extraocular movements intact.      Conjunctiva/sclera: Conjunctivae normal.      Pupils: Pupils are equal, round, and reactive to light.   Neck:      Thyroid: No thyromegaly.      Vascular: No carotid bruit or JVD.      Trachea: No tracheal deviation.   Cardiovascular:      Rate and Rhythm: Normal rate and regular rhythm.      Pulses: Normal pulses.      Heart sounds: Normal heart sounds. No murmur heard.     No friction rub. No gallop.   Pulmonary:      Effort: Pulmonary effort is normal. No respiratory distress.      Breath sounds: Normal breath sounds. No stridor. No wheezing,  rhonchi or rales.   Chest:      Chest wall: No tenderness.   Abdominal:      General: Bowel sounds are normal. There is no distension.      Palpations: Abdomen is soft. There is no mass.      Tenderness: There is no abdominal tenderness. There is no guarding or rebound.      Hernia: No hernia is present.   Musculoskeletal:         General: No swelling, tenderness, deformity or signs of injury. Normal range of motion.      Cervical back: Normal range of motion and neck supple. No rigidity. No muscular tenderness.      Right lower leg: No edema.      Left lower leg: No edema.   Lymphadenopathy:      Cervical: No cervical adenopathy.   Skin:     General: Skin is warm and dry.      Coloration: Skin is not jaundiced or pale.      Findings: No bruising, erythema or rash.   Neurological:      General: No focal deficit present.      Mental Status: She is alert and oriented to person, place, and time. Mental status is at baseline.      Cranial Nerves: No cranial nerve deficit.      Sensory: No sensory deficit.      Motor: No weakness or abnormal muscle tone.      Coordination: Coordination normal.   Psychiatric:         Mood and Affect: Mood normal.         Behavior: Behavior normal.         Thought Content: Thought content normal.         Judgment: Judgment normal.            Diagnostic Data    Results from last 7 days   Lab Units 02/04/25  0452   WBC 10*3/mm3 8.40   HEMOGLOBIN g/dL 13.3   HEMATOCRIT % 42.3   PLATELETS 10*3/mm3 259   GLUCOSE mg/dL 106*   CREATININE mg/dL 0.90   BUN mg/dL 17   SODIUM mmol/L 142   POTASSIUM mmol/L 4.3   AST (SGOT) U/L 22   ALT (SGPT) U/L 28   ALK PHOS U/L 61   BILIRUBIN mg/dL 0.5   ANION GAP mmol/L 9.8       XR Chest 1 View    Result Date: 2/3/2025  Impression: No active disease. Electronically Signed: Jitendra Watkins MD  2/3/2025 6:57 PM EST  Workstation ID: [id number]       I reviewed the patient's new clinical results.    Assessment/Plan:     Active and Resolved Problems  Active Hospital  Problems    Diagnosis  POA    **Syncope [R55]  Yes    Sinus pause [I45.5]  Unknown    Left bundle branch block (LBBB) [I44.7]  Unknown      Resolved Hospital Problems   No resolved problems to display.       Rachael Quevedo is a 79 y.o. female w/ PMHX of CAD status post PCI, syncopal episode was recently admitted and discharged on 1/26/2025, CVA, CKD, hypertension, hyperlipidemia presented to [facility] on 2/3/2025 with chief complaint of dizziness and lightheadedness.     Assessments  #Presyncopal event, possibly cardiac etiology arrhythmias  # History of syncope, has cardiac monitor in place  # CAD status post PCI LAD stent 2 years ago, currently on aspirin monotherapy  #Hypertension  #Hyperlipidemia  # History of CVA  #CKD     Plan  -Continue telemetry, monitor for arrhythmias, hold beta-blockers for now   -EKG normal sinus, left bundle branch block not new, troponins normal  -Echo obtained last week showed normal LVEF of 60 to 65%, G1 DD, RVSP less than 35  -Continue aspirin, statins, on PPI  -Cardiology consult in the morning,   Patient will be seen by electrophysiologist and will have a pacemaker placed   -Lovenox for DVT prophylaxis  -AM labs        VTE Prophylaxis:  Pharmacologic VTE prophylaxis orders are present.       VTE Prophylaxis:  Pharmacologic VTE prophylaxis orders are present.             Disposition Planning:     Barriers to Discharge:syncope  Anticipated Date of Discharge: 2/6  Place of Discharge: home      Time: 34 minutes     Code Status and Medical Interventions: No CPR (Do Not Attempt to Resuscitate); Full Support; DNR/DNI   Ordered at: 02/03/25 2114     Level Of Support Discussed With:    Patient     Code Status (Patient has no pulse and is not breathing):    No CPR (Do Not Attempt to Resuscitate)     Medical Interventions (Patient has pulse or is breathing):    Full Support     Comments:    DNR/DNI       Signature: Electronically signed by Bob Baker MD, 02/04/25, 17:46  EST.  [facility]ist Team

## 2025-02-04 NOTE — PLAN OF CARE
Goal Outcome Evaluation:            No pain or sob reported. Hr mid 80s w activity, mid 50s-60 at rest. Denies dizziness. Up standby to bathroom. Holter monitor on at admission. Vss, no arrhythmias noted on monitor. Cardiac consult called, spoke with krystyna. Regularly sees dr choi.

## 2025-02-04 NOTE — CASE MANAGEMENT/SOCIAL WORK
Discharge Planning Assessment   [facility]     Patient Name: Rachael Quevedo  MRN: [id number]  Today's Date: 2/4/2025    Admit Date: 2/3/2025    Plan: Home with spouse.   Discharge Needs Assessment       Row Name 02/04/25 1328       Living Environment    People in Home spouse    Current Living Arrangements home    Potentially Unsafe Housing Conditions none    In the past 12 months has the electric, gas, oil, or water company threatened to shut off services in your home? No    Primary Care Provided by self    Provides Primary Care For no one    Family Caregiver if Needed spouse    Family Caregiver Names Spouse-Gene    Quality of Family Relationships helpful;involved;supportive    Able to Return to Prior Arrangements yes       Resource/Environmental Concerns    Resource/Environmental Concerns none    Transportation Concerns none       Transportation Needs    In the past 12 months, has lack of transportation kept you from medical appointments or from getting medications? no    In the past 12 months, has lack of transportation kept you from meetings, work, or from getting things needed for daily living? No       Food Insecurity    Within the past 12 months, you worried that your food would run out before you got the money to buy more. Never true    Within the past 12 months, the food you bought just didn't last and you didn't have money to get more. Never true       Transition Planning    Patient/Family Anticipates Transition to home with family    Patient/Family Anticipated Services at Transition none    Transportation Anticipated family or friend will provide       Discharge Needs Assessment    Readmission Within the Last 30 Days no previous admission in last 30 days    Equipment Currently Used at Home none    Concerns to be Addressed discharge planning    Do you want help finding or keeping work or a job? I do not need or want help    Do you want help with school or training? For example, starting or completing job  training or getting a high school diploma, GED or equivalent No    Anticipated Changes Related to Illness none    Equipment Needed After Discharge none                   Discharge Plan       Row Name 02/04/25 1329       Plan    Plan Home with spouse.    Patient/Family in Agreement with Plan yes    Plan Comments CM met with patient and spouse at bedside. Patient A/O x3. Patient lives at home with spouse. Patient drives, spouse will transport at discharge. Patient performs ADLs independently. PCP and pharmacy confirmed, agrees to [id number] updated pharmacy. Denies current DME use. Denies current HH and OPPT services. Denies financial assistance needs for medication, food, or utilities. D/C barriers: pending cardiology plan.                Demographic Summary       Row Name 02/04/25 1328       General Information    Admission Type inpatient    Arrived From emergency department    Required Notices Provided Important Message from Medicare    Referral Source admission list    Reason for Consult discharge planning    Preferred Language English                   Functional Status       Row Name 02/04/25 1328       Functional Status    Usual Activity Tolerance good    Current Activity Tolerance good       Functional Status, IADL    Medications independent    Meal Preparation independent    Housekeeping independent    Laundry independent    Shopping independent    If for any reason you need help with day-to-day activities such as bathing, preparing meals, shopping, managing finances, etc., do you get the help you need? I get all the help I need             LU Wright, RN, [facility]  Care Coordination Supervisor   [address] [address]  Phone: [phone]  Fax: [phone]

## 2025-02-04 NOTE — PLAN OF CARE
Goal Outcome Evaluation:  Plan of Care Reviewed With: patient        Progress: no change  Outcome Evaluation: pt. admitted for syncope. Reports long pause on holter monitor. To have pacemaker placed tomorrow

## 2025-02-05 ENCOUNTER — APPOINTMENT (OUTPATIENT)
Dept: GENERAL RADIOLOGY | Facility: HOSPITAL | Age: 80
End: 2025-02-05
Payer: MEDICARE

## 2025-02-05 PROBLEM — I45.5 SINUS PAUSE: Chronic | Status: ACTIVE | Noted: 2025-02-03

## 2025-02-05 PROBLEM — I49.5 SSS (SICK SINUS SYNDROME): Status: ACTIVE | Noted: 2025-02-05

## 2025-02-05 PROBLEM — I49.5 SSS (SICK SINUS SYNDROME): Chronic | Status: ACTIVE | Noted: 2025-02-05

## 2025-02-05 PROBLEM — R55 SYNCOPE: Chronic | Status: ACTIVE | Noted: 2025-01-22

## 2025-02-05 PROCEDURE — 33208 INSRT HEART PM ATRIAL & VENT: CPT | Performed by: INTERNAL MEDICINE

## 2025-02-05 PROCEDURE — C1894 INTRO/SHEATH, NON-LASER: HCPCS | Performed by: INTERNAL MEDICINE

## 2025-02-05 PROCEDURE — 02HK3JZ INSERTION OF PACEMAKER LEAD INTO RIGHT VENTRICLE, PERCUTANEOUS APPROACH: ICD-10-PCS | Performed by: INTERNAL MEDICINE

## 2025-02-05 PROCEDURE — 25010000002 LIDOCAINE-EPINEPHRINE 2 %-1:100000 SOLUTION: Performed by: INTERNAL MEDICINE

## 2025-02-05 PROCEDURE — C1898 LEAD, PMKR, OTHER THAN TRANS: HCPCS | Performed by: INTERNAL MEDICINE

## 2025-02-05 PROCEDURE — 0JH606Z INSERTION OF PACEMAKER, DUAL CHAMBER INTO CHEST SUBCUTANEOUS TISSUE AND FASCIA, OPEN APPROACH: ICD-10-PCS | Performed by: INTERNAL MEDICINE

## 2025-02-05 PROCEDURE — 25010000002 VANCOMYCIN 1 G RECONSTITUTED SOLUTION 1 EACH VIAL

## 2025-02-05 PROCEDURE — 25010000002 HYDROMORPHONE PER 4 MG: Performed by: INTERNAL MEDICINE

## 2025-02-05 PROCEDURE — 93010 ELECTROCARDIOGRAM REPORT: CPT | Performed by: INTERNAL MEDICINE

## 2025-02-05 PROCEDURE — 25010000002 MIDAZOLAM PER 1 MG: Performed by: INTERNAL MEDICINE

## 2025-02-05 PROCEDURE — 25010000002 FENTANYL CITRATE (PF) 100 MCG/2ML SOLUTION: Performed by: INTERNAL MEDICINE

## 2025-02-05 PROCEDURE — 71045 X-RAY EXAM CHEST 1 VIEW: CPT

## 2025-02-05 PROCEDURE — 25810000003 SODIUM CHLORIDE 0.9 % SOLUTION 250 ML FLEX CONT

## 2025-02-05 PROCEDURE — 02H63JZ INSERTION OF PACEMAKER LEAD INTO RIGHT ATRIUM, PERCUTANEOUS APPROACH: ICD-10-PCS | Performed by: INTERNAL MEDICINE

## 2025-02-05 PROCEDURE — 25510000001 IOPAMIDOL PER 1 ML: Performed by: INTERNAL MEDICINE

## 2025-02-05 PROCEDURE — 93005 ELECTROCARDIOGRAM TRACING: CPT | Performed by: INTERNAL MEDICINE

## 2025-02-05 PROCEDURE — C1785 PMKR, DUAL, RATE-RESP: HCPCS | Performed by: INTERNAL MEDICINE

## 2025-02-05 PROCEDURE — 99232 SBSQ HOSP IP/OBS MODERATE 35: CPT | Performed by: INTERNAL MEDICINE

## 2025-02-05 DEVICE — GEN PM ASSURITY MRI DR RF PM2272: Type: IMPLANTABLE DEVICE | Site: CHEST WALL | Status: FUNCTIONAL

## 2025-02-05 DEVICE — LD PM TENDRIL STS 6F46CM 2088TC46: Type: IMPLANTABLE DEVICE | Site: HEART | Status: FUNCTIONAL

## 2025-02-05 DEVICE — LD PM TENDRIL STS 6F52CM 2088TC52: Type: IMPLANTABLE DEVICE | Site: HEART | Status: FUNCTIONAL

## 2025-02-05 RX ORDER — FENTANYL CITRATE 50 UG/ML
INJECTION, SOLUTION INTRAMUSCULAR; INTRAVENOUS
Status: DISCONTINUED | OUTPATIENT
Start: 2025-02-05 | End: 2025-02-05 | Stop reason: HOSPADM

## 2025-02-05 RX ORDER — LIDOCAINE HYDROCHLORIDE AND EPINEPHRINE BITARTRATE 20; .01 MG/ML; MG/ML
INJECTION, SOLUTION SUBCUTANEOUS
Status: DISCONTINUED | OUTPATIENT
Start: 2025-02-05 | End: 2025-02-05 | Stop reason: HOSPADM

## 2025-02-05 RX ORDER — MIDAZOLAM HYDROCHLORIDE 1 MG/ML
INJECTION, SOLUTION INTRAMUSCULAR; INTRAVENOUS
Status: DISCONTINUED | OUTPATIENT
Start: 2025-02-05 | End: 2025-02-05 | Stop reason: HOSPADM

## 2025-02-05 RX ORDER — SODIUM CHLORIDE 0.9 % (FLUSH) 0.9 %
3 SYRINGE (ML) INJECTION EVERY 12 HOURS SCHEDULED
Status: DISCONTINUED | OUTPATIENT
Start: 2025-02-05 | End: 2025-02-06 | Stop reason: HOSPADM

## 2025-02-05 RX ORDER — ACETAMINOPHEN 160 MG/5ML
650 SOLUTION ORAL EVERY 4 HOURS PRN
Status: DISCONTINUED | OUTPATIENT
Start: 2025-02-05 | End: 2025-02-06 | Stop reason: HOSPADM

## 2025-02-05 RX ORDER — ACETAMINOPHEN 650 MG/1
650 SUPPOSITORY RECTAL EVERY 4 HOURS PRN
Status: DISCONTINUED | OUTPATIENT
Start: 2025-02-05 | End: 2025-02-06 | Stop reason: HOSPADM

## 2025-02-05 RX ORDER — SODIUM CHLORIDE 9 MG/ML
40 INJECTION, SOLUTION INTRAVENOUS AS NEEDED
Status: DISCONTINUED | OUTPATIENT
Start: 2025-02-05 | End: 2025-02-06 | Stop reason: HOSPADM

## 2025-02-05 RX ORDER — IOPAMIDOL 755 MG/ML
INJECTION, SOLUTION INTRAVASCULAR
Status: DISCONTINUED | OUTPATIENT
Start: 2025-02-05 | End: 2025-02-05 | Stop reason: HOSPADM

## 2025-02-05 RX ORDER — ACETAMINOPHEN 325 MG/1
650 TABLET ORAL EVERY 4 HOURS PRN
Status: DISCONTINUED | OUTPATIENT
Start: 2025-02-05 | End: 2025-02-06 | Stop reason: HOSPADM

## 2025-02-05 RX ORDER — NALOXONE HCL 0.4 MG/ML
0.4 VIAL (ML) INJECTION
Status: DISCONTINUED | OUTPATIENT
Start: 2025-02-05 | End: 2025-02-06 | Stop reason: HOSPADM

## 2025-02-05 RX ORDER — HYDROMORPHONE HYDROCHLORIDE 1 MG/ML
0.25 INJECTION, SOLUTION INTRAMUSCULAR; INTRAVENOUS; SUBCUTANEOUS
Status: DISCONTINUED | OUTPATIENT
Start: 2025-02-05 | End: 2025-02-06 | Stop reason: HOSPADM

## 2025-02-05 RX ORDER — SODIUM CHLORIDE 0.9 % (FLUSH) 0.9 %
3-10 SYRINGE (ML) INJECTION AS NEEDED
Status: DISCONTINUED | OUTPATIENT
Start: 2025-02-05 | End: 2025-02-06 | Stop reason: HOSPADM

## 2025-02-05 RX ADMIN — ACETAMINOPHEN 650 MG: 325 TABLET, FILM COATED ORAL at 07:48

## 2025-02-05 RX ADMIN — Medication 3 ML: at 20:21

## 2025-02-05 RX ADMIN — SODIUM CHLORIDE 1000 MG: 9 INJECTION, SOLUTION INTRAVENOUS at 14:42

## 2025-02-05 RX ADMIN — HYDROMORPHONE HYDROCHLORIDE 0.25 MG: 1 INJECTION, SOLUTION INTRAMUSCULAR; INTRAVENOUS; SUBCUTANEOUS at 20:18

## 2025-02-05 RX ADMIN — Medication 10 ML: at 06:02

## 2025-02-05 RX ADMIN — Medication 10 ML: at 20:21

## 2025-02-05 NOTE — PLAN OF CARE
Problem: Adult Inpatient Plan of Care  Goal: Plan of Care Review  Outcome: Progressing  Flowsheets (Taken 2/5/2025 4697)  Plan of Care Reviewed With:   patient   spouse  Goal: Patient-Specific Goal (Individualized)  Outcome: Progressing  Goal: Absence of Hospital-Acquired Illness or Injury  Outcome: Progressing  Intervention: Identify and Manage Fall Risk  Recent Flowsheet Documentation  Taken 2/5/2025 0521 by Yeimy Benjamin RN  Safety Promotion/Fall Prevention: patient off unit  Goal: Optimal Comfort and Wellbeing  Outcome: Progressing  Goal: Readiness for Transition of Care  Outcome: Progressing     Problem: Comorbidity Management  Goal: Blood Pressure in Desired Range  Outcome: Progressing   Goal Outcome Evaluation:  Plan of Care Reviewed With: patient, spouse

## 2025-02-05 NOTE — PROGRESS NOTES
CARDIOLOGY PROGRESS NOTE:    Rachael Quevedo  79 y.o.  female  1945  [id number]      Referring Provider: Hospitalist    Reason for follow-up: Syncope and cardiac pause     Patient Care Team:  Alexa Shah MD as PCP - General (Family Medicine)  Alexa Shah MD as PCP - Family Medicine  Jermaine Delong MD as Consulting Physician (Cardiology)  Pallavi Valdes APRN as Nurse Practitioner (Cardiology)    Subjective .  Patient doing well without any symptoms today    Objective lying in bed comfortably.  Waiting for pacemaker placement     Review of Systems   Constitutional: Negative for malaise/fatigue.   Cardiovascular:  Negative for chest pain, dyspnea on exertion, leg swelling and palpitations.   Respiratory:  Negative for cough and shortness of breath.    Gastrointestinal:  Negative for abdominal pain, nausea and vomiting.   Neurological:  Negative for dizziness, focal weakness, headaches, light-headedness and numbness.   All other systems reviewed and are negative.      Allergies: Patient has no known allergies.    Scheduled Meds:aspirin, 81 mg, Oral, Daily  enoxaparin, 40 mg, Subcutaneous, Q24H  pantoprazole, 40 mg, Oral, Daily  rosuvastatin, 20 mg, Oral, Daily  sodium chloride, 10 mL, Intravenous, Q12H  vancomycin, 15 mg/kg, Intravenous, Once      Continuous Infusions:   PRN Meds:.  acetaminophen    senna-docusate sodium **AND** polyethylene glycol **AND** bisacodyl **AND** bisacodyl    Calcium Replacement - Follow Nurse / BPA Driven Protocol    Magnesium Standard Dose Replacement - Follow Nurse / BPA Driven Protocol    nitroglycerin    Phosphorus Replacement - Follow Nurse / BPA Driven Protocol    Potassium Replacement - Follow Nurse / BPA Driven Protocol    [COMPLETED] Insert Peripheral IV **AND** sodium chloride    sodium chloride    sodium chloride        VITAL SIGNS  Vitals:    02/05/25 0740 02/05/25 0750 02/05/25 0800 02/05/25 0810   BP:  149/71     BP Location:  Left arm     Patient Position:   "Lying     Pulse: 61 62 61 61   Resp:  15     Temp:  98.1 °F (36.7 °C)     TempSrc:  Oral     SpO2: 97% 98% 98% 98%   Weight:       Height:           Flowsheet Rows      Flowsheet Row First Filed Value   Admission Height 152.4 cm (60\") Documented at 02/03/2025 1731   Admission Weight 60.1 kg (132 lb 7.9 oz) Documented at 02/03/2025 1731             TELEMETRY: Sinus rhythm    Physical Exam:  Constitutional:       Appearance: Well-developed.   Eyes:      General: No scleral icterus.     Conjunctiva/sclera: Conjunctivae normal.   HENT:      Head: Normocephalic and atraumatic.   Neck:      Vascular: No carotid bruit or JVD.   Pulmonary:      Effort: Pulmonary effort is normal.      Breath sounds: Normal breath sounds. No wheezing. No rales.   Cardiovascular:      Normal rate. Regular rhythm.   Pulses:     Intact distal pulses.   Abdominal:      General: Bowel sounds are normal.      Palpations: Abdomen is soft.   Musculoskeletal:      Cervical back: Normal range of motion and neck supple. Skin:     General: Skin is warm and dry.      Findings: No rash.   Neurological:      Mental Status: Alert.          Results Review:   I reviewed the patient's new clinical results.  Lab Results (last 24 hours)       Procedure Component Value Units Date/Time    COVID-19, FLU A/B, RSV PCR 1 HR TAT - Swab, Nasopharynx [[id number]]  (Normal) Collected: 02/04/25 1250    Specimen: Swab from Nasopharynx Updated: 02/04/25 1336     COVID19 Not Detected     Influenza A PCR Not Detected     Influenza B PCR Not Detected     RSV, PCR Not Detected    Narrative:      Fact sheet for providers: https://www.fda.gov/media/863861/download    Fact sheet for patients: https://www.fda.gov/media/013688/download    Test performed by PCR.            Imaging Results (Last 24 Hours)       ** No results found for the last 24 hours. **            EKG      I personally viewed and interpreted the patient's EKG/Telemetry data:    ECHOCARDIOGRAM:  Results for orders " placed during the hospital encounter of 25    Adult Transthoracic Echo Complete w/ Color, Spectral and Contrast if Necessary Per Protocol    Interpretation Summary    Left ventricular ejection fraction appears to be 61 - 65%.    Left ventricular diastolic function is consistent with (grade I) impaired relaxation.    Estimated right ventricular systolic pressure from tricuspid regurgitation is normal (<35 mmHg).       STRESS MYOVIEW:  Results for orders placed during the hospital encounter of 25    Stress Test With Myocardial Perfusion One Day    Interpretation Summary    Left ventricular ejection fraction is hyperdynamic (Calculated EF > 70%).    LEXISCAN CARDIOLITE REPORT    DATE OF PROCEDURE:  25    INDICATION FOR PROCEDURE: Syncope, left bundle branch block, CAD, PCI    PROCEDURE PERFORMED: Lexiscan Cardiolite    PROCEDURE COMMENTS:    After informed consent was obtained.  Patient's resting heart rate was 61 bpm, resting blood pressure was 155/80, resting EKG revealed sinus rhythm with left bundle branch block.  Patient was given 0.4 mg of regadenosine for stress testing.  There was no significant change in heart rate, blood pressure, symptoms with regadenoson injection.  Patient tolerated procedure well.  Complications were none.    NUCLEAR IMAGIN.  There was moderate fixed septal defect probably due to left bundle branch block, no ischemia seen.  2.  Gated images reveal normal LV size and contractility, LVEF of 71%.    CONCLUSION:  1.  Lexiscan Cardiolite with fixed septal defect due to left bundle branch block, negative for ischemia.  2.  Normal wall motion.  LVEF of 71%.    RECOMMENDATIONS:    Clinical correlation recommended.      Alec Wong MD  25  12:33 EST       CARDIAC CATHETERIZATION:  Results for orders placed during the hospital encounter of 20    Cardiac Catheterization/Vascular Study    Narrative  Heart Cath PCI Report    NAME:              Rachael  PAVITHRA Quevedo  :                1945  AGE/SEX:        74 y.o. female  MRN:                [id number]  ADM DATE:      @@  DOS:  ADM MD:          [unfilled]  ATT MD:           [unfilled]  REF MD:          [unfilled]      Pre-Procedure Notes  H&P Performed  []  Yes []  No       []  N/A    Indications:  []  ACS <= 24 HRS  []  ACS >24 HRS  [x]  New Onset Angina <= 2 mos  [x]  Worsening Angina  []  Resuscitated Cardiac Arrest  []  Angina on Exertion:  []  Suspected CAD  []  Valvular Disease  []  Pericardial Disease  []  Cardiac Arrythmia  []  Cardiomyopathy  []  LV Dysfunction  []  Syncope  []  Post Cardiac Transplant  []  Eval. For Exercise Clearance  []  Other  []  Pre-Operative Evaluation  If Pre-Op Eval:  Evaluation for Surgery Type:  []  Cardiac Surgery   []  Non-Cardiac Surgery  Functional Capacity:  []  <4 METS  []  >=4 METS w/o symptoms  []  >= 4 METS with symptoms  []  Unknown  Surgical Risk:  []  Low  []  Intermediate  []  High Risk: Vascular  []  High Risk Non-Vascular    Risks, Benefits, & Complications Discussed:  [x]  Yes  []  No  []  N/A    Questions Answered:  [x]  Yes  []  No  []  N/A    Consent Obtained:  [x]  Yes  []  No  []  N/A    CHF: []  Yes  [x]  No  If Yes:  Newly Diagnosed?  []  Yes  []  No  If Yes:  HF Type:  []  Diastolic  []  Systolic  []  Unknown      Procedure Description  The patient underwent successful [x]  Left  []  Right  []  Left & Right  Heart catheterization and coronary angiography via the  [x]  Femoral approach  []  Radial approach  []  Brachial approach    Procedure Narrative:  Informed consent was obtained from the patient after explaining risks and benefits.  Patient was brought to the cardiac interventional artery and placed on the table in the usual fashion.  Right groin was shaved and prepped in sterile fashion.  2% again was used with midsternal anesthesia to the right groin.  A total of 20 cc was used.  A 6 Japanese sheath was placed in the right femoral artery.  A 6  Latvian pigtail catheter, 6 Latvian JR4 catheter and a 6 Latvian JL4 catheter was used for the procedure.  After the cardiac catheterization is complete, patient underwent a percutaneous coronary dimension.  After the cardiac intervention was complete patient was given aspirin Plavix and heparin per weight-based protocol.  A 6 Latvian JL4 guide was used and left coronary artery was engaged.  A 190 cm wire was used and placed in the distal portion of the circumflex artery.  A 2.25/12 mm Xience stent was used and placed across the lesion and inflated at 12 james for 10 seconds.  Thereafter the balloon was taken out.  The wire was then placed in the LAD and a 2.5/12 mm Xience stent was used and placed across the lesion in the LAD and inflated at 12 james for 10 seconds.  Thereafter the balloon was taken out.  Reviewing angiogram showed no dissection or perforation.  Patient tolerated the procedure very well.  No complications noted.  Hemodynamic    LVEDP: 8  Estimated EF %: 60    Initial Aortic Pressure: 120/70    AV Gradient: No gradient    Rt. Heart Pressure:    Wall Motion:  Dominance:  [x]  Left  []  Right  []  Co-Dominant    Coronary Arteriography: (Please Code highest degree of stenosis)    Left Main %: 0  Proximal LAD %: 80%  Mid/Distal LAD %: 0  LCX %: OM1 with 90% ramus:  RCA %: 0  Lima %:  SVG(s) %:      PCI Procedure Notes    Segment #OM1  Culprit Lesion:  [x]  Yes  []  No  % Pre-Stenosis: 90%  pre-Proc TIMIFlow: NAOMI-3 flow  % Post-Stenosis: 0  Post-Proc TIMIFlow: NAOMI-3  Non-High/Non-C:     yes                     High/C:  Lesion Length (mm): 12 mm  Primary Device Used: Stent    Segment #proximal LAD  Culprit Lesion:  [x]  Yes  []  No  % Pre-Stenosis: 80  Pre-Proc TIMIFlow: NAOMI-3 flow  % Post-Stenosis: 0%  Post-Proc TIMIFlow: NAOMI-3 flow  Non-High/Non-C:                          High/C:yes  Lesion Length (mm): 12 mm  Primary Device Used: Stent    Segment #  Culprit Lesion:  []  Yes  []  No  %  Pre-Stenosis:  Pre-Proc TIMIFlow:  % Post-Stenosis:  Post-Proc TIMIFlow:  Non-High/Non-C:                          High/C:  Lesion Length (mm):  Primary Device Used:        Conscious Sedation:   [x]  Yes   []  No  No Flow Phenom:       []  Yes  [x]  No  Dissection:  []  Yes  [x]  No  Acute Closure: []  Yes  [x]  No  Perforation:  []  Yes  [x]  No    Complications: No complication    Estimated Blood Loss:  None      Impression and Recommendation: Severe two-vessel coronary artery disease  Normal LV function  Status post successful stent implantation of drug-eluting stents in the proximal LAD and the first marginal branch without any complications.    Electronically signed by Jermaine Delong MD, 01/27/20, 12:27 PM.       OTHER:         Assessment & Plan     Syncope/Sick sinus syndrome  Patient presented after syncopal episode and had significant 18 to 9-second pause  Patient has sick sinus syndrome and hence will have a pacemaker placement done  Patient is seen by Dr. Benitez and is scheduled for a pacemaker today.    Coronary disease  Patient had stent placement to the LAD and marginal branch of the circumflex artery and is currently stable without any angina and has normal V function.    Hypertension  Patient blood pressure currently stable on medical therapy    Hyperlipidemia  Patient on statins and the lipid levels are followed by the primary care doctor.    I discussed the patients findings and my recommendations with patient and his    Jermaine Delong MD  02/05/25  10:19 EST

## 2025-02-05 NOTE — PROGRESS NOTES
[facility] MEDICINE SERVICE  DAILY PROGRESS NOTE    NAME: Rachael Quevedo  : 1945  MRN: [id number]      LOS: 2 days     PROVIDER OF SERVICE: Laura Foreman DO    Chief Complaint: Syncope    Subjective:     History of Present Illness: Rachael Quevedo is a 79 y.o. female w/ PMHX of CAD status post PCI, syncopal episode was recently admitted and discharged on 2025, CVA, CKD, hypertension, hyperlipidemia presented to [facility] on 2/3/2025 with chief complaint of dizziness and lightheadedness.     Patient was a passenger in the vehicle with her  driving when she suddenly felt lightheaded, dizzy, and began sweating this afternoon. The episode lasted only a few seconds. She denies complete loss of consciousness but was concerned that it resembled her prior syncopal episodes. Upon arriving home, the patient received a call from cardiac monitor support services, who notified her via voicemail of a critical event detected on the monitor, though she was unsure of the specific event. She contacted her cardiologist, Dr. Delong, who was unable to review the data at the time. The patient was then brought to the hospital by her  for further evaluation. Currently, the patient denies chest pain, shortness of breath, fever, chills, or dysuria.     Of note patient was recently admitted on 2025 with a syncopal event she was discharged on 2025 with cardiac monitor with outpatient follow-up with Dr. Delong.     In ED, patient hemodynamic stable, heart rate ranging in 60s to 70s, within normal limits, EKG showed normal sinus rhythm with left bundle branch block which is not new also noted are previous EKG on 2025, chest x-ray unremarkable, patient to medicine team for further workup and management.        Interval History:  History taken from: patient    Patient seen and examined this morning.  Doing well, no further dizziness noted.  Going for pacemaker placement later  today.  All questions answered for patient and family.    Pertinent positives as noted in HPI/subjective.  All other systems were reviewed and are negative.             Objective:     Vital Signs  Temp:  [97.8 °F (36.6 °C)-98.4 °F (36.9 °C)] 98.4 °F (36.9 °C)  Heart Rate:  [59-79] 59  Resp:  [13-18] 15  BP: (132-151)/(50-72) 151/72   Body mass index is 25.88 kg/m².    Physical Exam  General: Awake, alert, NAD  Eyes: PERRL, EOMI, conjunctivae are clear  Cardiovascular: Regular rate and rhythm, no murmurs  Respiratory: Clear to auscultation bilaterally, no wheezing or rales, unlabored breathing  Abdomen: Soft, nontender, positive bowel sounds, no guarding  Neurologic: A&O, CN grossly intact, moves all extremities spontaneously  Musculoskeletal: Normal range of motion, no other gross deformities  Skin: Warm, dry         Diagnostic Data    Results from last 7 days   Lab Units 02/04/25  0452   WBC 10*3/mm3 8.40   HEMOGLOBIN g/dL 13.3   HEMATOCRIT % 42.3   PLATELETS 10*3/mm3 259   GLUCOSE mg/dL 106*   CREATININE mg/dL 0.90   BUN mg/dL 17   SODIUM mmol/L 142   POTASSIUM mmol/L 4.3   AST (SGOT) U/L 22   ALT (SGPT) U/L 28   ALK PHOS U/L 61   BILIRUBIN mg/dL 0.5   ANION GAP mmol/L 9.8       XR Chest 1 View    Result Date: 2/3/2025  Impression: No active disease. Electronically Signed: Jitendra Watkins MD  2/3/2025 6:57 PM EST  Workstation ID: [id number]       I reviewed the patient's new clinical results.    Assessment/Plan:     Active and Resolved Problems  Active Hospital Problems    Diagnosis  POA    **Syncope [R55]  Yes    Sinus pause [I45.5]  Unknown    Left bundle branch block (LBBB) [I44.7]  Unknown      Resolved Hospital Problems   No resolved problems to display.       Rachael Quevedo is a 79 y.o. female w/ PMHX of CAD status post PCI, syncopal episode was recently admitted and discharged on 1/26/2025, CVA, CKD, hypertension, hyperlipidemia presented to [facility] on 2/3/2025 with chief complaint of dizziness and  lightheadedness.     Assessments  # SSS with presyncopal event prior to hospitalization  # History of syncope, has cardiac monitor in place  # CAD status post PCI LAD stent 2 years ago, currently on aspirin monotherapy  #Hypertension  #Hyperlipidemia  # History of CVA  #CKD     Plan  -Outpatient cardiac monitor showed significant sinus pauses  -Beta-blocker on hold for now  -Cardiology/EP following and plan for PPM today  -Echo obtained last week showed normal LVEF of 60 to 65%, G1 DD, RVSP less than 35  -Continue aspirin, statins, on PPI   -Continue supportive care            VTE Prophylaxis:  Pharmacologic VTE prophylaxis orders are present.             Disposition Planning:     Barriers to Discharge:syncope  Anticipated Date of Discharge: 2/6  Place of Discharge: home        Code Status and Medical Interventions: No CPR (Do Not Attempt to Resuscitate); Full Support; DNR/DNI   Ordered at: 02/03/25 1620     Level Of Support Discussed With:    Patient     Code Status (Patient has no pulse and is not breathing):    No CPR (Do Not Attempt to Resuscitate)     Medical Interventions (Patient has pulse or is breathing):    Full Support     Comments:    DNR/DNI       Signature: Electronically signed by Laura Foreman DO, 02/05/25, 11:51 EST.  [facility]    Part of this note may be an electronic transcription/translation of spoken language to printed text using the Dragon Dictation System.

## 2025-02-05 NOTE — PLAN OF CARE
Problem: Adult Inpatient Plan of Care  Goal: Absence of Hospital-Acquired Illness or Injury  Intervention: Identify and Manage Fall Risk  Recent Flowsheet Documentation  Taken 2/5/2025 0235 by Ilene Enriquez RN  Safety Promotion/Fall Prevention:   assistive device/personal items within reach   clutter free environment maintained   fall prevention program maintained   lighting adjusted   nonskid shoes/slippers when out of bed   room organization consistent   safety round/check completed  Intervention: Prevent Skin Injury  Recent Flowsheet Documentation  Taken 2/5/2025 0235 by Ilene Enriquez RN  Body Position: position changed independently  Intervention: Prevent Infection  Recent Flowsheet Documentation  Taken 2/5/2025 0235 by Ilene Enriquez RN  Infection Prevention:   hand hygiene promoted   personal protective equipment utilized     Problem: Comorbidity Management  Goal: Blood Pressure in Desired Range  Intervention: Maintain Blood Pressure Management  Recent Flowsheet Documentation  Taken 2/5/2025 0235 by Ilene Enriquez RN  Medication Review/Management: medications reviewed   Goal Outcome Evaluation:      Pt slept most of the shift. No complaints. NPO for pacemaker placement at 1400. Pt resting in bed, call light in reach. Plan of care to continue.

## 2025-02-06 ENCOUNTER — TELEPHONE (OUTPATIENT)
Dept: CARDIOLOGY | Facility: CLINIC | Age: 80
End: 2025-02-06
Payer: MEDICARE

## 2025-02-06 ENCOUNTER — READMISSION MANAGEMENT (OUTPATIENT)
Dept: CALL CENTER | Facility: HOSPITAL | Age: 80
End: 2025-02-06
Payer: MEDICARE

## 2025-02-06 VITALS
BODY MASS INDEX: 26.01 KG/M2 | TEMPERATURE: 97.3 F | DIASTOLIC BLOOD PRESSURE: 87 MMHG | OXYGEN SATURATION: 97 % | SYSTOLIC BLOOD PRESSURE: 148 MMHG | WEIGHT: 132.5 LBS | RESPIRATION RATE: 17 BRPM | HEIGHT: 60 IN | HEART RATE: 78 BPM

## 2025-02-06 PROCEDURE — 99232 SBSQ HOSP IP/OBS MODERATE 35: CPT | Performed by: INTERNAL MEDICINE

## 2025-02-06 RX ADMIN — PANTOPRAZOLE SODIUM 40 MG: 40 TABLET, DELAYED RELEASE ORAL at 07:42

## 2025-02-06 RX ADMIN — Medication 10 ML: at 07:45

## 2025-02-06 RX ADMIN — ROSUVASTATIN CALCIUM 20 MG: 10 TABLET, FILM COATED ORAL at 07:43

## 2025-02-06 RX ADMIN — ASPIRIN 81 MG: 81 TABLET, COATED ORAL at 07:42

## 2025-02-06 NOTE — OUTREACH NOTE
Prep Survey      Flowsheet Row Responses   [facility] facility [facility] discharged from? [facility]   Is LACE score < 7 ? No   Eligibility [facility]   Date of Admission 02/03/25   Date of Discharge 02/06/25   Discharge Disposition Home or Self Care   Discharge diagnosis sick sinus syndrome)  PACEMAKER   Does the patient have one of the following disease processes/diagnoses(primary or secondary)? General Surgery   Does the patient have Home health ordered? No   Is there a DME ordered? No   Prep survey completed? Yes            CALLY CHEATHAM - Registered Nurse

## 2025-02-06 NOTE — PROGRESS NOTES
"CARDIOLOGY PROGRESS NOTE:    Rachael Quevedo  79 y.o.  female  1945  [id number]      Referring Provider: Hospitalist    Reason for follow-up: Syncope and status post pacemaker placement     Patient Care Team:  Alexa Shah MD as PCP - General (Family Medicine)  Alexa Shah MD as PCP - Family Medicine  Jermaine Delong MD as Consulting Physician (Cardiology)  Pallavi Valdes APRN as Nurse Practitioner (Cardiology)    Subjective .  Patient is doing well without any symptoms    Objective lying in bed comfortably.  Had a pacemaker yesterday     Review of Systems   Constitutional: Negative for malaise/fatigue.   Cardiovascular:  Negative for chest pain, dyspnea on exertion, leg swelling and palpitations.   Respiratory:  Negative for cough and shortness of breath.    Gastrointestinal:  Negative for abdominal pain, nausea and vomiting.   Neurological:  Negative for dizziness, focal weakness, headaches, light-headedness and numbness.   All other systems reviewed and are negative.      Allergies: Patient has no known allergies.    Scheduled Meds:  Continuous Infusions:No current facility-administered medications for this encounter.    PRN Meds:.        VITAL SIGNS  Vitals:    02/05/25 2023 02/06/25 0100 02/06/25 0446 02/06/25 0741   BP: 173/62 133/67 151/71 148/87   BP Location: Right arm Right arm Right arm Right arm   Patient Position: Sitting Lying Lying Lying   Pulse:  62 71 78   Resp: 15 13 12 17   Temp: 98 °F (36.7 °C) 97.7 °F (36.5 °C) 98 °F (36.7 °C) 97.3 °F (36.3 °C)   TempSrc: Oral Oral Oral Temporal   SpO2:  95% 95% 97%   Weight:       Height:           Flowsheet Rows      Flowsheet Row First Filed Value   Admission Height 152.4 cm (60\") Documented at 02/03/2025 1731   Admission Weight 60.1 kg (132 lb 7.9 oz) Documented at 02/03/2025 1731             TELEMETRY: Ventricular pacemaker rhythm    Physical Exam:  Constitutional:       Appearance: Well-developed.   Eyes:      General: No scleral " icterus.     Conjunctiva/sclera: Conjunctivae normal.   HENT:      Head: Normocephalic and atraumatic.   Neck:      Vascular: No carotid bruit or JVD.   Pulmonary:      Effort: Pulmonary effort is normal.      Breath sounds: Normal breath sounds. No wheezing. No rales.   Cardiovascular:      Normal rate. Regular rhythm.   Pulses:     Intact distal pulses.   Abdominal:      General: Bowel sounds are normal.      Palpations: Abdomen is soft.   Musculoskeletal:      Cervical back: Normal range of motion and neck supple. Skin:     General: Skin is warm and dry.      Findings: No rash.   Neurological:      Mental Status: Alert.          Results Review:   I reviewed the patient's new clinical results.  Lab Results (last 24 hours)       ** No results found for the last 24 hours. **            Imaging Results (Last 24 Hours)       Procedure Component Value Units Date/Time    XR Chest 1 View [[id number]] Collected: 02/05/25 1651     Updated: 02/05/25 1702    Narrative:      XR CHEST 1 VW    Date of Exam: 2/5/2025 4:40 PM EST    Indication: post pacemaker    Comparison: 2/3/2025    Findings:  Interval placement of a left chest wall pacemaker with distal lead tips overlying the right atrium and the right ventricle. There is no visible complication. Unremarkable cardiomediastinal silhouette. No new focal airspace opacity. No pleural effusion or   pneumothorax. No acute osseous abnormality.      Impression:      Impression:  Interval placement of a left chest wall pacemaker without evidence of complication.      Electronically Signed: Vidal Kennedy MD    2/5/2025 5:00 PM EST    Workstation ID: [id number]            EKG      I personally viewed and interpreted the patient's EKG/Telemetry data:    ECHOCARDIOGRAM:  Results for orders placed during the hospital encounter of 01/22/25    Adult Transthoracic Echo Complete w/ Color, Spectral and Contrast if Necessary Per Protocol    Interpretation Summary    Left ventricular ejection  fraction appears to be 61 - 65%.    Left ventricular diastolic function is consistent with (grade I) impaired relaxation.    Estimated right ventricular systolic pressure from tricuspid regurgitation is normal (<35 mmHg).       STRESS MYOVIEW:  Results for orders placed during the hospital encounter of 25    Stress Test With Myocardial Perfusion One Day    Interpretation Summary    Left ventricular ejection fraction is hyperdynamic (Calculated EF > 70%).    LEXISCAN CARDIOLITE REPORT    DATE OF PROCEDURE:  25    INDICATION FOR PROCEDURE: Syncope, left bundle branch block, CAD, PCI    PROCEDURE PERFORMED: Lexiscan Cardiolite    PROCEDURE COMMENTS:    After informed consent was obtained.  Patient's resting heart rate was 61 bpm, resting blood pressure was 155/80, resting EKG revealed sinus rhythm with left bundle branch block.  Patient was given 0.4 mg of regadenosine for stress testing.  There was no significant change in heart rate, blood pressure, symptoms with regadenoson injection.  Patient tolerated procedure well.  Complications were none.    NUCLEAR IMAGIN.  There was moderate fixed septal defect probably due to left bundle branch block, no ischemia seen.  2.  Gated images reveal normal LV size and contractility, LVEF of 71%.    CONCLUSION:  1.  Lexiscan Cardiolite with fixed septal defect due to left bundle branch block, negative for ischemia.  2.  Normal wall motion.  LVEF of 71%.    RECOMMENDATIONS:    Clinical correlation recommended.      Alec Wong MD  25  12:33 EST       CARDIAC CATHETERIZATION:  Results for orders placed during the hospital encounter of 20    Cardiac Catheterization/Vascular Study    Narrative  Heart Cath PCI Report    NAME:              Rachael Quevedo  :                1945  AGE/SEX:        74 y.o. female  MRN:                [id number]  ADM DATE:      @@  DOS:  ADM MD:          [unfilled]  ATT MD:           [unfilled]  REF  MD:          [unfilled]      Pre-Procedure Notes  H&P Performed  []  Yes []  No       []  N/A    Indications:  []  ACS <= 24 HRS  []  ACS >24 HRS  [x]  New Onset Angina <= 2 mos  [x]  Worsening Angina  []  Resuscitated Cardiac Arrest  []  Angina on Exertion:  []  Suspected CAD  []  Valvular Disease  []  Pericardial Disease  []  Cardiac Arrythmia  []  Cardiomyopathy  []  LV Dysfunction  []  Syncope  []  Post Cardiac Transplant  []  Eval. For Exercise Clearance  []  Other  []  Pre-Operative Evaluation  If Pre-Op Eval:  Evaluation for Surgery Type:  []  Cardiac Surgery   []  Non-Cardiac Surgery  Functional Capacity:  []  <4 METS  []  >=4 METS w/o symptoms  []  >= 4 METS with symptoms  []  Unknown  Surgical Risk:  []  Low  []  Intermediate  []  High Risk: Vascular  []  High Risk Non-Vascular    Risks, Benefits, & Complications Discussed:  [x]  Yes  []  No  []  N/A    Questions Answered:  [x]  Yes  []  No  []  N/A    Consent Obtained:  [x]  Yes  []  No  []  N/A    CHF: []  Yes  [x]  No  If Yes:  Newly Diagnosed?  []  Yes  []  No  If Yes:  HF Type:  []  Diastolic  []  Systolic  []  Unknown      Procedure Description  The patient underwent successful [x]  Left  []  Right  []  Left & Right  Heart catheterization and coronary angiography via the  [x]  Femoral approach  []  Radial approach  []  Brachial approach    Procedure Narrative:  Informed consent was obtained from the patient after explaining risks and benefits.  Patient was brought to the cardiac interventional artery and placed on the table in the usual fashion.  Right groin was shaved and prepped in sterile fashion.  2% again was used with midsternal anesthesia to the right groin.  A total of 20 cc was used.  A 6 Slovak sheath was placed in the right femoral artery.  A 6 Slovak pigtail catheter, 6 Slovak JR4 catheter and a 6 Slovak JL4 catheter was used for the procedure.  After the cardiac catheterization is complete, patient underwent a percutaneous coronary  dimension.  After the cardiac intervention was complete patient was given aspirin Plavix and heparin per weight-based protocol.  A 6 Hebrew JL4 guide was used and left coronary artery was engaged.  A 190 cm wire was used and placed in the distal portion of the circumflex artery.  A 2.25/12 mm Xience stent was used and placed across the lesion and inflated at 12 james for 10 seconds.  Thereafter the balloon was taken out.  The wire was then placed in the LAD and a 2.5/12 mm Xience stent was used and placed across the lesion in the LAD and inflated at 12 james for 10 seconds.  Thereafter the balloon was taken out.  Reviewing angiogram showed no dissection or perforation.  Patient tolerated the procedure very well.  No complications noted.  Hemodynamic    LVEDP: 8  Estimated EF %: 60    Initial Aortic Pressure: 120/70    AV Gradient: No gradient    Rt. Heart Pressure:    Wall Motion:  Dominance:  [x]  Left  []  Right  []  Co-Dominant    Coronary Arteriography: (Please Code highest degree of stenosis)    Left Main %: 0  Proximal LAD %: 80%  Mid/Distal LAD %: 0  LCX %: OM1 with 90% ramus:  RCA %: 0  Lima %:  SVG(s) %:      PCI Procedure Notes    Segment #OM1  Culprit Lesion:  [x]  Yes  []  No  % Pre-Stenosis: 90%  pre-Proc TIMIFlow: NAOMI-3 flow  % Post-Stenosis: 0  Post-Proc TIMIFlow: NAOMI-3  Non-High/Non-C:     yes                     High/C:  Lesion Length (mm): 12 mm  Primary Device Used: Stent    Segment #proximal LAD  Culprit Lesion:  [x]  Yes  []  No  % Pre-Stenosis: 80  Pre-Proc TIMIFlow: NAOMI-3 flow  % Post-Stenosis: 0%  Post-Proc TIMIFlow: NAOMI-3 flow  Non-High/Non-C:                          High/C:yes  Lesion Length (mm): 12 mm  Primary Device Used: Stent    Segment #  Culprit Lesion:  []  Yes  []  No  % Pre-Stenosis:  Pre-Proc TIMIFlow:  % Post-Stenosis:  Post-Proc TIMIFlow:  Non-High/Non-C:                          High/C:  Lesion Length (mm):  Primary Device Used:        Conscious Sedation:   [x]  Yes   []   No  No Flow Phenom:       []  Yes  [x]  No  Dissection:  []  Yes  [x]  No  Acute Closure: []  Yes  [x]  No  Perforation:  []  Yes  [x]  No    Complications: No complication    Estimated Blood Loss:  None      Impression and Recommendation: Severe two-vessel coronary artery disease  Normal LV function  Status post successful stent implantation of drug-eluting stents in the proximal LAD and the first marginal branch without any complications.    Electronically signed by Jermaine Delong MD, 01/27/20, 12:27 PM.       OTHER:         Assessment & Plan     Syncope status post pacemaker placement  Patient presented with syncope and had sick sinus syndrome with significant pauses and hence she underwent a permanent pacemaker placement and the pacemaker is working very well.    Coronary disease  Patient had stent placement to the LAD and marginal branch of the circumflex artery in the past and is currently stable without any angina    Hypertension  Patient blood pressure currently stable on medicines and will add beta-blockers now if needed.    Hyperlipidemia  Patient is on statins and the lipid levels are well within normal limits.    I discussed the patients findings and my recommendations with patient and nurse    Jermaine Delong MD  02/06/25  13:10 EST

## 2025-02-06 NOTE — PLAN OF CARE
Problem: Adult Inpatient Plan of Care  Goal: Absence of Hospital-Acquired Illness or Injury  Intervention: Identify and Manage Fall Risk  Recent Flowsheet Documentation  Taken 2/6/2025 0217 by Ilene Enriquez RN  Safety Promotion/Fall Prevention:   assistive device/personal items within reach   clutter free environment maintained   fall prevention program maintained   lighting adjusted   nonskid shoes/slippers when out of bed   safety round/check completed   room organization consistent  Intervention: Prevent Skin Injury  Recent Flowsheet Documentation  Taken 2/6/2025 0217 by Ilene Enriquez RN  Body Position: position changed independently  Intervention: Prevent Infection  Recent Flowsheet Documentation  Taken 2/6/2025 0217 by Ilene Enriquez RN  Infection Prevention:   personal protective equipment utilized   hand hygiene promoted     Problem: Comorbidity Management  Goal: Blood Pressure in Desired Range  Intervention: Maintain Blood Pressure Management  Recent Flowsheet Documentation  Taken 2/6/2025 0217 by Ilene Enriquez RN  Medication Review/Management: medications reviewed   Goal Outcome Evaluation:         Pt up to the bathroom, following bed rest requirements. Noted mild discomfort at incision site. No other complaints. Pt resting in bed at this time, call light in reach. Possible DC today. Plan of care to continue.

## 2025-02-06 NOTE — PLAN OF CARE
Goal Outcome Evaluation:      Patient discharging home today after hopsitalist and cardiology saw patient. Patient will follow up with Dr. Delong in 2 weeks to look at her pacemaker.

## 2025-02-06 NOTE — TELEPHONE ENCOUNTER
"    Caller: Rachael Quevedo \"Carley\"    Relationship to patient: Self    Best call back number: [phone]     Patient is needing: PT WAS TOLD TO LET OFFICE KNOW THAT ON HER APPT ON 2.21.25 SHE IS SUPPOSED TO SEE DIAN AND DR ENGEL. PT IS SCHEDULED DOWN WITH [facility]. PT WAS IN THE HOSPITAL AGAIN AND PER THAT ED NOTE IT STATES TO FU IN 2 WEEKS WITH [facility] BUT HER APPT WITH [facility] IS SCHEDULED AROUND THE SAME TIME AS RECOMMENDED TIMEFRAME. I WAS NOT SURE IF I NEEDED TO MAKE ANOTHER HOSP FU APPT AND I WAS NOT SURE WHAT DEPARTMENT DIAN WORKED WITHIN. PLS CALL AND ADVISE ON SCHEDULING IF ORIGINAL APPT NEEDS TO BE CHANGED.     "

## 2025-02-06 NOTE — DISCHARGE SUMMARY
Hospitalist Service   DISCHARGE SUMMARY    Patient Name: Rachael Quevedo  : 1945  MRN: [id number]    Date of Admission: 2/3/2025  Date of Discharge:  25    Primary Care Physician: Alexa Shah MD      Presenting Problem:   Sick sinus syndrome [I49.5]  Syncope [R55]  Sinus pause [I45.5]  Left bundle branch block (LBBB) [I44.7]  Cardiac arrhythmia, unspecified cardiac arrhythmia type [I49.9]  Syncope, unspecified syncope type [R55]    Active and Resolved Hospital Problems:  Active Hospital Problems    Diagnosis POA    **SSS (sick sinus syndrome) [I49.5] Unknown    Sinus pause [I45.5] Unknown    Syncope [R55] Yes    Left bundle branch block (LBBB) [I44.7] Unknown      Resolved Hospital Problems   No resolved problems to display.         Hospital Course     Hospital Course:  Rachael Quevedo is a 79 y.o. female w/ PMHX of CAD status post PCI, syncopal episode was recently admitted and discharged on 2025, CVA, CKD, hypertension, hyperlipidemia presented [facility] on 2/3/2025 with chief complaint of dizziness and lightheadedness.  Patient had outpatient cardiac monitor which showed significant sinus pauses.  Beta-blocker placed on hold on admission.  Cardiology consulted and recommending pacemaker which was placed on .  Patient doing well postop, rate is controlled.  Overall improved and wants to go home today.  Okay to discharge per cardiology.  Patient is medically stable for discharge home with follow-up with PCP and cardiology as an outpatient.        DISCHARGE Follow Up Recommendations for labs and diagnostics:   Follow-up with PCP and cardiology    Day of Discharge     Vital Signs:  Temp:  [97.3 °F (36.3 °C)-98.4 °F (36.9 °C)] 97.3 °F (36.3 °C)  Heart Rate:  [59-78] 78  Resp:  [12-17] 17  BP: (122-173)/(56-87) 148/87    Physical Exam:  General: Awake, alert, NAD  Eyes: PERRL, EOMI, conjunctivae are clear  Cardiovascular: Regular rate and rhythm, no  murmurs  Respiratory: Clear to auscultation bilaterally, no wheezing or rales, unlabored breathing  Abdomen: Soft, nontender, positive bowel sounds, no guarding  Neurologic: A&O, CN grossly intact, moves all extremities spontaneously  Musculoskeletal: Normal range of motion, no other gross deformities  Skin: Warm, dry        Pertinent  and/or Most Recent Results     LAB RESULTS:      Lab 02/04/25  0452 02/03/25  1745   WBC 8.40 9.18   HEMOGLOBIN 13.3 12.9   HEMATOCRIT 42.3 40.7   PLATELETS 259 257   NEUTROS ABS 4.74 5.98   IMMATURE GRANS (ABS) 0.03 0.03   LYMPHS ABS 2.85 2.41   MONOS ABS 0.63 0.62   EOS ABS 0.11 0.10   MCV 97.5* 96.9         Lab 02/04/25  0452 02/03/25  1745   SODIUM 142 139   POTASSIUM 4.3 4.4   CHLORIDE 106 104   CO2 26.2 23.6   ANION GAP 9.8 11.4   BUN 17 20   CREATININE 0.90 0.95   EGFR 65.6 61.5   GLUCOSE 106* 112*   CALCIUM 9.8 9.6   MAGNESIUM 2.6* 2.5*   PHOSPHORUS 4.2  --          Lab 02/04/25  0452 02/03/25  1745   TOTAL PROTEIN 7.2 7.0   ALBUMIN 4.4 4.3   GLOBULIN 2.8 2.7   ALT (SGPT) 28 32   AST (SGOT) 22 25   BILIRUBIN 0.5 0.4   ALK PHOS 61 65         Lab 02/03/25  1856 02/03/25  1745   HSTROP T 12 12                 Brief Urine Lab Results  (Last result in the past 365 days)        Color   Clarity   Blood   Leuk Est   Nitrite   Protein   CREAT   Urine HCG        10/28/24 1419 Yellow   Clear   Trace   Negative   Negative   Negative                 Microbiology Results (last 10 days)       Procedure Component Value - Date/Time    COVID-19, FLU A/B, RSV PCR 1 HR TAT - Swab, Nasopharynx [[id number]]  (Normal) Collected: 02/04/25 1250    Lab Status: Final result Specimen: Swab from Nasopharynx Updated: 02/04/25 1336     COVID19 Not Detected     Influenza A PCR Not Detected     Influenza B PCR Not Detected     RSV, PCR Not Detected    Narrative:      Fact sheet for providers: https://www.fda.gov/media/914070/download    Fact sheet for patients: https://www.fda.gov/media/398809/download    Test  performed by PCR.            XR Chest 1 View    Result Date: 2/5/2025  Impression: Impression: Interval placement of a left chest wall pacemaker without evidence of complication. Electronically Signed: Vidal Kennedy MD  2/5/2025 5:00 PM EST  Workstation ID: [id number]    XR Chest 1 View    Result Date: 2/3/2025  Impression: Impression: No active disease. Electronically Signed: Jitendra Watkins MD  2/3/2025 6:57 PM EST  Workstation ID: [id number]    MRI Brain Without Contrast    Result Date: 1/23/2025  Impression: Impression: Mild to moderate typical chronic and age-related findings are present as above. There is no evidence of recent infarct, hemorrhage or intracranial mass effect. Electronically Signed: Jarrod Romano MD  1/23/2025 12:27 PM EST  Workstation ID: [id number]     Results for orders placed during the hospital encounter of 01/22/25    Duplex Carotid Ultrasound CAR    Interpretation Summary    Atherosclerotic plaque proximal right and left internal carotid arteries with less than 50% stenosis bilaterally.    Vertebral arteries patent with antegrade flow bilaterally.      Results for orders placed during the hospital encounter of 01/22/25    Duplex Carotid Ultrasound CAR    Interpretation Summary    Atherosclerotic plaque proximal right and left internal carotid arteries with less than 50% stenosis bilaterally.    Vertebral arteries patent with antegrade flow bilaterally.      Results for orders placed during the hospital encounter of 01/22/25    Adult Transthoracic Echo Complete w/ Color, Spectral and Contrast if Necessary Per Protocol    Interpretation Summary    Left ventricular ejection fraction appears to be 61 - 65%.    Left ventricular diastolic function is consistent with (grade I) impaired relaxation.    Estimated right ventricular systolic pressure from tricuspid regurgitation is normal (<35 mmHg).      Labs Pending at Discharge:      Procedures Performed  Procedure(s):  Pacemaker DC new Abbott aware          Consults:   Consults       Date and Time Order Name Status Description    2/3/2025  9:48 PM Inpatient Cardiology Consult Completed     2/3/2025  8:10 PM Hospitalist (on-call MD unless specified)                Discharge Details        Discharge Medications        Continue These Medications        Instructions Start Date   aspirin 81 MG EC tablet   81 mg, Nightly      calcium carbonate 600 MG tablet  Commonly known as: OS-SHYLA   600 mg, 2 Times Daily With Meals      fexofenadine 180 MG tablet  Commonly known as: ALLEGRA   90 mg, Daily      fluticasone 50 MCG/ACT nasal spray  Commonly known as: FLONASE   2 sprays, Daily PRN      magnesium gluconate 500 MG tablet  Commonly known as: MAGONATE   1,000 mg, Daily      pantoprazole 40 MG EC tablet  Commonly known as: PROTONIX   TAKE 1 TABLET BY MOUTH EVERY DAY IN THE MORNING BEFORE BREAKFAST      polyethylene glycol 17 GM/SCOOP powder  Commonly known as: MIRALAX   17 g, Daily      rosuvastatin 20 MG tablet  Commonly known as: CRESTOR   20 mg, Oral, Daily      vitamin B-12 1000 MCG tablet  Commonly known as: CYANOCOBALAMIN   1,000 mcg, Daily      Vitamin D3 50 MCG (2000 UT) tablet   2,000 Units, 2 Times Daily      WOMENS MULTIVITAMIN + COLLAGEN PO   1 tablet, Daily             Stop These Medications      carvedilol 3.125 MG tablet  Commonly known as: COREG              No Known Allergies      Discharge Disposition:  Home or Self Care    Diet:  Hospital:  Diet Order   Procedures    Diet: Cardiac; Healthy Heart (2-3 Na+); Fluid Consistency: Thin (IDDSI 0)         Discharge Activity:         CODE STATUS:  Code Status and Medical Interventions: No CPR (Do Not Attempt to Resuscitate); Full Support; DNR/DNI   Ordered at: 02/03/25 6645     Level Of Support Discussed With:    Patient     Code Status (Patient has no pulse and is not breathing):    No CPR (Do Not Attempt to Resuscitate)     Medical Interventions (Patient has pulse or is breathing):    Full Support     Comments:     DNR/DNI         Future Appointments   Date Time Provider Department Center   2/21/2025 11:00 AM Pallavi Valdes APRN [facility]   2/28/2025  9:00 AM Alexa Shah MD [facility]   4/24/2025  4:00 PM Jermaine Delong MD [facility]           Time spent on Discharge including face to face service:  44 minutes    Signature:    Electronically signed by Laura Foreman DO, 02/06/25, 10:25 AM EST.      Part of this note may be an electronic transcription/translation of spoken language to printed text using the Dragon Dictation System.

## 2025-02-07 ENCOUNTER — TRANSITIONAL CARE MANAGEMENT TELEPHONE ENCOUNTER (OUTPATIENT)
Dept: CALL CENTER | Facility: HOSPITAL | Age: 80
End: 2025-02-07
Payer: MEDICARE

## 2025-02-07 ENCOUNTER — TELEPHONE (OUTPATIENT)
Dept: CARDIOLOGY | Facility: CLINIC | Age: 80
End: 2025-02-07
Payer: MEDICARE

## 2025-02-07 NOTE — CASE MANAGEMENT/SOCIAL WORK
Case Management Discharge Note      Final Note: Routine home         Selected Continued Care - Discharged on 2/6/2025 Admission date: 2/3/2025 - Discharge disposition: Home or Self Care                  Transportation Services  Private: Car    Final Discharge Disposition Code: 01 - home or self-care

## 2025-02-07 NOTE — TELEPHONE ENCOUNTER
"Caller: Rachael Quevedo \"Carley\"    Relationship to patient: Self    Best call back number: [phone]    Patient is needing: PT CALLED WANTING TO KNOW WHEN SHE CAN HAVE A SHOWER AND HOW OFTEN SHE NEEDS TO CHANGE HER WOUND DRESSING. PLEASE CALL TO ADVISE.           "

## 2025-02-07 NOTE — OUTREACH NOTE
Call Center TCM Note      Flowsheet Row Responses   [facility] patient discharged from? [facility]   Does the patient have one of the following disease processes/diagnoses(primary or secondary)? General Surgery   TCM attempt successful? Yes  [vr for  [place] and dtrs [place] and [place]]   Call start time 1332   Call end time 1335   Discharge diagnosis sick sinus syndrome PACEMAKER   Meds reviewed with patient/caregiver? Yes   Does the patient have all medications related to this admission filled (includes all antibiotics, pain medications, etc.) N/A   Prescription comments pt has stopped coreg per AVS   Is the patient taking all medications as directed (includes completed medication regime)? Yes   Comments Pt will see cardio post PM on 2/21/25 and keep her regular PCP appt on 2/28/25 (denies any primary care needs at this time)   Does the patient have an appointment with their PCP within 7-14 days of discharge? No   Nursing Interventions Patient desires to follow up with specialty only, Routed TCM call to PCP office   Has home health visited the patient within 72 hours of discharge? N/A   Psychosocial issues? No   Did the patient receive a copy of their discharge instructions? Yes   Nursing interventions Reviewed instructions with patient   What is the patient's perception of their health status since discharge? Improving  [Pt is doing well, does have some expected soreness to site but denies any ongoing cardiac type s/s.]   Nursing interventions Nurse provided patient education  [reviewed post procedure instructions]   Is the patient /caregiver able to teach back basic post-op care? Lifting as instructed by MD in discharge instructions, Keep incision areas clean,dry and protected   Is the patient/caregiver able to teach back signs and symptoms of incisional infection? Increased redness, swelling or pain at the incisonal site, Increased drainage or bleeding, Incisional warmth, Pus or odor from incision, Fever   [Dressing removed, no bleeding at current time.  Reviewed site care and pt has also reached out to cardio for guidance.]   Is the patient/caregiver able to teach back steps to recovery at home? Rest and rebuild strength, gradually increase activity   If the patient is a current smoker, are they able to teach back resources for cessation? Not a smoker   Is the patient/caregiver able to teach back the hierarchy of who to call/visit for symptoms/problems? PCP, Specialist, Home health nurse, Urgent Care, ED, 911 Yes   TCM call completed? Yes   Call end time 1610            Janine Jacobs RN    2/7/2025, 13:38 EST

## 2025-02-07 NOTE — TELEPHONE ENCOUNTER
Called patient, per Fara RN patient should not change bandage until seen in office but patient can shower. Relayed information to patient she understood, no further questions or concerns at this time.

## 2025-02-07 NOTE — TELEPHONE ENCOUNTER
Pt showered and had bleeding while showering. Bleeding is now controlled but bandage is wet and not sticking in places. Needs advice on how to redress.

## 2025-02-07 NOTE — TELEPHONE ENCOUNTER
Dr Wong placed device, he uses staples. She may remove dressing, pat dry and replace with dry dressing. Please do not use any ointments or creams on incision. Thanks

## 2025-02-08 LAB
QT INTERVAL: 450 MS
QTC INTERVAL: 454 MS

## 2025-02-17 LAB
CV ZIO BASELINE AVG BPM: 68 BPM
CV ZIO BASELINE BPM HIGH: 135 BPM
CV ZIO BASELINE BPM LOW: 47 BPM
CV ZIO DEVICE ANALYSIS TIME: NORMAL
CV ZIO ECT SVE COUNT: 239 EPISODES
CV ZIO ECT SVE CPLT COUNT: 3 EPISODES
CV ZIO ECT SVE CPLT FREQ: NORMAL
CV ZIO ECT SVE FREQ: NORMAL
CV ZIO ECT SVE TPLT COUNT: 1 EPISODES
CV ZIO ECT SVE TPLT FREQ: NORMAL
CV ZIO ECT VE COUNT: 0 EPISODES
CV ZIO ECT VE CPLT COUNT: 0 EPISODES
CV ZIO ECT VE CPLT FREQ: 0
CV ZIO ECT VE FREQ: 0
CV ZIO ECT VE TPLT COUNT: 0 EPISODES
CV ZIO ECT VE TPLT FREQ: 0
CV ZIO ECTOPIC SVE COUPLET RAW PERCENT: 0 %
CV ZIO ECTOPIC SVE ISOLATED PERCENT: 0.02 %
CV ZIO ECTOPIC SVE TRIPLET RAW PERCENT: 0 %
CV ZIO ECTOPIC VE COUPLET RAW PERCENT: 0 %
CV ZIO ECTOPIC VE ISOLATED PERCENT: 0 %
CV ZIO ECTOPIC VE TRIPLET RAW PERCENT: 0 %
CV ZIO ENROLLMENT END: NORMAL
CV ZIO ENROLLMENT START: NORMAL
CV ZIO PATIENT EVENTS DIARIES: 0
CV ZIO PATIENT EVENTS TRIGGERS: 1
CV ZIO PAUSE COUNT: 1
CV ZIO PAUSE END: 19.4 SEC
CV ZIO PAUSE START: 19.4 SEC
CV ZIO PRESCRIPTION STATUS: NORMAL
CV ZIO SVT AVG BPM: 128 BPM
CV ZIO SVT BPM HIGH: 135 BPM
CV ZIO SVT BPM LOW: 121 BPM
CV ZIO SVT COUNT: 1
CV ZIO SVT F EPI AVG BPM: 128 BPM
CV ZIO SVT F EPI BEATS: 4 BEATS
CV ZIO SVT F EPI BPM HIGH: 135 BPM
CV ZIO SVT F EPI BPM LOW: 121 BPM
CV ZIO SVT F EPI DUR: 1.9 SEC
CV ZIO SVT F EPI END: NORMAL
CV ZIO SVT F EPI START: NORMAL
CV ZIO SVT L EPI AVG BPM: 128 BPM
CV ZIO SVT L EPI BEATS: 4 BEATS
CV ZIO SVT L EPI BPM HIGH: 135 BPM
CV ZIO SVT L EPI BPM LOW: 121 BPM
CV ZIO SVT L EPI DUR: 1.9 SEC
CV ZIO SVT L EPI END: NORMAL
CV ZIO SVT L EPI START: NORMAL
CV ZIO TOTAL  ENROLLMENT PERIOD: NORMAL
CV ZIO VT COUNT: 0

## 2025-02-19 NOTE — PROGRESS NOTES
Subjective:     Encounter Date:02/21/2025      Patient ID: Rachael Quevedo is a 79 y.o. female.    Chief Complaint:  History of Present Illness    Rachael Quevedo is a 79 y.o. female who presents to the office today for hospital discharge follow-up for which she was evaluated by cardiology for syncope.  Patient underwent echocardiogram showing normal LVEF with grade 1 diastolic dysfunction and Myoview study without evidence of ischemia.  Patient was discharged home with monitor study.  Patient then presented to [facility] a second time following a syncopal episode.  Monitor study revealed significant pauses of 6-second and 19-seconds.  Patient underwent dual-chamber pacemaker implantation with Dr. Wong on 2/5/2025.  She did well following procedure and was discharged home the next day.  Patient seen and examined, labs and chart reviewed. Patient states she is doing well from cardiology standpoint she denies chest pain, shortness of breath, fatigue, weakness, palpitations, numbness/tingling, nausea, vomiting, edema.  She notes intermittent episodes of lightheadedness/dizziness.  Denies further syncopal episodes following pacemaker implantation.  Her vital signs are stable today.  She takes all medications as prescribed.       The following portions of the patient's history were reviewed and updated as appropriate: allergies, current medications, past family history, past medical history, past social history, past surgical history, and problem list.    Past Medical History:   Diagnosis Date    Allergic     Ankle fracture, left     Comments: 2016    Arthritis     Cataract, acquired     Coronary artery disease     Cough     Impression: Most likely reactive airway prescription as below Start antihistamine at home    Dense breast     Depression, major, recurrent, mild     GERD without esophagitis     Comments: Dr Duncan- protonix    Hematuria, microscopic     History of chicken pox     History of loop recorder      Hyperglycemia     Hyperlipidemia     Hypertension 01/04/2020    Injury of back     Injury of neck     Left bundle branch block (LBBB)     Malaise and fatigue     Medicare annual wellness visit, initial     with abnormal findings    Osteopenia 2022    Other specified menopausal and perimenopausal disorders     Other specified menopausal and postmenopausal disorders    Pap smear, low-risk     Renal insufficiency     S/P right rotator cuff repair     Screening for alcoholism     10 or more drinks per week    Screening for depression     Negative Depression Screening ( 9 or less) ()    Screening mammogram, encounter for     Stroke     1/5/2020 no residual effects    Vasovagal near-syncope     Impression: from illness and cough Discussed if there is loss of vision in an eye, confusion, weakness or numbness in an extremity, drooping of a side of the face, intractible pain, intractible vomiting, to go to the ER.     Past Surgical History:   Procedure Laterality Date    CARDIAC CATHETERIZATION N/A 01/27/2020    Procedure: Left Heart Cath with angiogram;  Surgeon: Jermaine Delong MD;  Location: [facility] CATH INVASIVE LOCATION;  Service: Cardiovascular    CARDIAC CATHETERIZATION N/A 01/27/2020    Procedure: Stent BOBBY coronary;  Surgeon: Jermaine Delong MD;  Location: [facility] CATH INVASIVE LOCATION;  Service: Cardiovascular    CARDIAC CATHETERIZATION N/A 01/27/2020    Procedure: Left ventriculography;  Surgeon: Jermaine Delong MD;  Location: [facility] CATH INVASIVE LOCATION;  Service: Cardiovascular    CARDIAC CATHETERIZATION N/A 01/27/2020    Procedure: Coronary angiography;  Surgeon: Jermaine Delong MD;  Location: [facility] CATH INVASIVE LOCATION;  Service: Cardiovascular    CARDIAC ELECTROPHYSIOLOGY PROCEDURE N/A 2/5/2025    Procedure: Pacemaker DC new Abbott aware;  Surgeon: Alec Wong MD;  Location: [facility] CATH INVASIVE LOCATION;  Service: Cardiovascular;  Laterality: N/A;  [place]    CATARACT EXTRACTION,  "BILATERAL Bilateral 1999    CHOLECYSTECTOMY  1993??    COLONOSCOPY  Last one 2023    CORONARY STENT PLACEMENT  1/28/2020    GALLBLADDER SURGERY  1996    LAPAROSCOPIC TUBAL LIGATION  1980    RENAL ARTERY STENT  01/26/2020    ROTATOR CUFF REPAIR Right 2002    TUBAL ABDOMINAL LIGATION  1979??     /74   Pulse 70   Ht 152.4 cm (60\")   Wt 59.9 kg (132 lb)   LMP  (LMP Unknown)   SpO2 99%   BMI 25.78 kg/m²   Family History   Problem Relation Age of Onset    Hyperlipidemia Mother         Elevated cholesterol    Heart disease Mother     Heart attack Mother     Hypertension Father     Colon cancer Father     Hyperlipidemia Father         Elevated cholesterol    Heart disease Father     Cancer Father     Diabetes Brother     Heart disease Brother     Hyperlipidemia Brother     Hypertension Brother     Diabetes Paternal Aunt     Colon cancer Paternal Aunt     Colon cancer Paternal Uncle     Diabetes Paternal Grandmother     Heart disease Paternal Grandmother     Arthritis Paternal Grandmother     Heart disease Maternal Uncle     Heart disease Maternal Uncle     Cancer Maternal Aunt     Cancer Paternal Uncle     Breast cancer Neg Hx     Ovarian cancer Neg Hx        Current Outpatient Medications:     aspirin 81 MG EC tablet, Take 1 tablet by mouth Every Night., Disp: , Rfl:     calcium carbonate (OS-SHYLA) 600 MG tablet, Take 1 tablet by mouth 2 (Two) Times a Day With Meals., Disp: , Rfl:     Cholecalciferol (Vitamin D3) 50 MCG (2000 UT) tablet, Take 1 tablet by mouth 2 (Two) Times a Day., Disp: , Rfl:     fexofenadine (ALLEGRA) 180 MG tablet, Take 0.5 tablets by mouth Daily., Disp: , Rfl:     fluticasone (FLONASE) 50 MCG/ACT nasal spray, Administer 2 sprays into the nostril(s) as directed by provider Daily As Needed for Rhinitis., Disp: , Rfl:     magnesium gluconate (MAGONATE) 500 MG tablet, Take 2 tablets by mouth Daily., Disp: , Rfl:     Multiple Vitamins-Minerals (WOMENS MULTIVITAMIN + COLLAGEN PO), Take 1 tablet " by mouth Daily., Disp: , Rfl:     pantoprazole (PROTONIX) 40 MG EC tablet, TAKE 1 TABLET BY MOUTH EVERY DAY IN THE MORNING BEFORE BREAKFAST, Disp: , Rfl:     polyethylene glycol (MIRALAX) 17 GM/SCOOP powder, Take 17 g by mouth Daily., Disp: , Rfl:     rosuvastatin (CRESTOR) 20 MG tablet, TAKE 1 TABLET BY MOUTH EVERY DAY, Disp: 90 tablet, Rfl: 3    vitamin B-12 (CYANOCOBALAMIN) 1000 MCG tablet, Take 1 tablet by mouth Daily., Disp: , Rfl:     No Known Allergies    Social History     Socioeconomic History    Marital status:    Tobacco Use    Smoking status: Never    Smokeless tobacco: Never    Tobacco comments:     Many family members smoked around me when I was a child   Vaping Use    Vaping status: Never Used   Substance and Sexual Activity    Alcohol use: Yes     Alcohol/week: 3.0 standard drinks of alcohol     Types: 2 Glasses of wine, 1 Drinks containing 0.5 oz of alcohol per week     Comment: Both not weekly but occassionally    Drug use: No    Sexual activity: Yes     Partners: Male     Birth control/protection: None     Comment: Very occasional     Review of Systems   Constitutional: Negative for malaise/fatigue.   Cardiovascular:  Negative for chest pain, dyspnea on exertion, leg swelling and palpitations.   Respiratory:  Negative for cough and shortness of breath.    Gastrointestinal:  Negative for abdominal pain, nausea and vomiting.   Neurological:  Negative for dizziness, focal weakness, headaches, light-headedness and numbness.   All other systems reviewed and are negative.             Objective:     Vitals reviewed.   Constitutional:       Appearance: Well-developed and not in distress.   Eyes:      Pupils: Pupils are equal, round, and reactive to light.   HENT:      Nose: Nose normal.    Mouth/Throat:      Pharynx: Oropharynx is clear.   Pulmonary:      Effort: Pulmonary effort is normal.      Breath sounds: Normal breath sounds.   Cardiovascular:      Normal rate. Regular rhythm.   Pulses:      Intact distal pulses.   Edema:     Peripheral edema absent.   Abdominal:      Palpations: Abdomen is soft.   Musculoskeletal: Normal range of motion.      Cervical back: Normal range of motion. Skin:     General: Skin is warm and dry.   Neurological:      General: No focal deficit present.      Mental Status: Alert and oriented to person, place and time.         Procedures      LAB RESULTS (LAST 7 DAYS)  Lab Review:   Echocardiogram   Results for orders placed during the hospital encounter of 25    Adult Transthoracic Echo Complete w/ Color, Spectral and Contrast if Necessary Per Protocol    Interpretation Summary    Left ventricular ejection fraction appears to be 61 - 65%.    Left ventricular diastolic function is consistent with (grade I) impaired relaxation.    Estimated right ventricular systolic pressure from tricuspid regurgitation is normal (<35 mmHg).      Cardiac Catheterization   Results for orders placed during the hospital encounter of 20    Cardiac Catheterization/Vascular Study    Narrative  Heart Cath PCI Report    NAME:              Rachael Quevedo  :                1945  AGE/SEX:        74 y.o. female  MRN:                [id number]  ADM DATE:      [unfilled]  DOS:  ADM MD:          [unfilled]  ATT MD:           [unfilled]  REF MD:          [unfilled]      Pre-Procedure Notes  H&P Performed  []  Yes []  No       []  N/A    Indications:  []  ACS <= 24 HRS  []  ACS >24 HRS  [x]  New Onset Angina <= 2 mos  [x]  Worsening Angina  []  Resuscitated Cardiac Arrest  []  Angina on Exertion:  []  Suspected CAD  []  Valvular Disease  []  Pericardial Disease  []  Cardiac Arrythmia  []  Cardiomyopathy  []  LV Dysfunction  []  Syncope  []  Post Cardiac Transplant  []  Eval. For Exercise Clearance  []  Other  []  Pre-Operative Evaluation  If Pre-Op Eval:  Evaluation for Surgery Type:  []  Cardiac Surgery   []  Non-Cardiac Surgery  Functional Capacity:  []  <4 METS  []  >=4 METS w/o symptoms  []   >= 4 METS with symptoms  []  Unknown  Surgical Risk:  []  Low  []  Intermediate  []  High Risk: Vascular  []  High Risk Non-Vascular    Risks, Benefits, & Complications Discussed:  [x]  Yes  []  No  []  N/A    Questions Answered:  [x]  Yes  []  No  []  N/A    Consent Obtained:  [x]  Yes  []  No  []  N/A    CHF: []  Yes  [x]  No  If Yes:  Newly Diagnosed?  []  Yes  []  No  If Yes:  HF Type:  []  Diastolic  []  Systolic  []  Unknown      Procedure Description  The patient underwent successful [x]  Left  []  Right  []  Left & Right  Heart catheterization and coronary angiography via the  [x]  Femoral approach  []  Radial approach  []  Brachial approach    Procedure Narrative:  Informed consent was obtained from the patient after explaining risks and benefits.  Patient was brought to the cardiac interventional artery and placed on the table in the usual fashion.  Right groin was shaved and prepped in sterile fashion.  2% again was used with midsternal anesthesia to the right groin.  A total of 20 cc was used.  A 6 Grenadian sheath was placed in the right femoral artery.  A 6 Grenadian pigtail catheter, 6 Grenadian JR4 catheter and a 6 Grenadian JL4 catheter was used for the procedure.  After the cardiac catheterization is complete, patient underwent a percutaneous coronary dimension.  After the cardiac intervention was complete patient was given aspirin Plavix and heparin per weight-based protocol.  A 6 Grenadian JL4 guide was used and left coronary artery was engaged.  A 190 cm wire was used and placed in the distal portion of the circumflex artery.  A 2.25/12 mm Xience stent was used and placed across the lesion and inflated at 12 james for 10 seconds.  Thereafter the balloon was taken out.  The wire was then placed in the LAD and a 2.5/12 mm Xience stent was used and placed across the lesion in the LAD and inflated at 12 james for 10 seconds.  Thereafter the balloon was taken out.  Reviewing angiogram showed no dissection or  perforation.  Patient tolerated the procedure very well.  No complications noted.  Hemodynamic    LVEDP: 8  Estimated EF %: 60    Initial Aortic Pressure: 120/70    AV Gradient: No gradient    Rt. Heart Pressure:    Wall Motion:  Dominance:  [x]  Left  []  Right  []  Co-Dominant    Coronary Arteriography: (Please Code highest degree of stenosis)    Left Main %: 0  Proximal LAD %: 80%  Mid/Distal LAD %: 0  LCX %: OM1 with 90% ramus:  RCA %: 0  [place]ima %:  SVG(s) %:      PCI Procedure Notes    Segment #OM1  Culprit Lesion:  [x]  Yes  []  No  % Pre-Stenosis: 90%  pre-Proc TIMIFlow: NAOMI-3 flow  % Post-Stenosis: 0  Post-Proc TIMIFlow: NAOMI-3  Non-High/Non-C:     yes                     High/C:  Lesion Length (mm): 12 mm  Primary Device Used: Stent    Segment #proximal LAD  Culprit Lesion:  [x]  Yes  []  No  % Pre-Stenosis: 80  Pre-Proc TIMIFlow: NAOMI-3 flow  % Post-Stenosis: 0%  Post-Proc TIMIFlow: NAOMI-3 flow  Non-High/Non-C:                          High/C:yes  Lesion Length (mm): 12 mm  Primary Device Used: Stent    Segment #  Culprit Lesion:  []  Yes  []  No  % Pre-Stenosis:  Pre-Proc TIMIFlow:  % Post-Stenosis:  Post-Proc TIMIFlow:  Non-High/Non-C:                          High/C:  Lesion Length (mm):  Primary Device Used:        Conscious Sedation:   [x]  Yes   []  No  No Flow Phenom:       []  Yes  [x]  No  Dissection:  []  Yes  [x]  No  Acute Closure: []  Yes  [x]  No  Perforation:  []  Yes  [x]  No    Complications: No complication    Estimated Blood Loss:  None      Impression and Recommendation: Severe two-vessel coronary artery disease  Normal LV function  Status post successful stent implantation of drug-eluting stents in the proximal LAD and the first marginal branch without any complications.    Electronically signed by Jermaine Delong MD, 01/27/20, 12:27 PM.      CBC        BMP        CMP         BNP        TROPONIN        CoAg        Creatinine Clearance  Estimated Creatinine Clearance: 41.7 mL/min (by C-G  formula based on SCr of 0.9 mg/dL).    ABG        Radiology  No radiology results for the last day          Assessment    Diagnoses and all orders for this visit:    1. Hospital discharge follow-up (Primary)    2. Syncope, unspecified syncope type    3. Sinus pause    4. SSS (sick sinus syndrome)    5. Presence of cardiac pacemaker    6. Left bundle branch block (LBBB)  Overview:  Seeing Dr Delong      7. Coronary artery disease involving native coronary artery of native heart without angina pectoris  Overview:  Cath by Dr Delong showed 80% LAD, 90% in circumflex with normal LV. Drug eluding stents placed in both.       8. Mixed hyperlipidemia    9. Cerebrovascular accident (CVA) due to occlusion of small artery  Overview:  Right basal ganglia lacunar infarct. She is on aspirin and plavix                MDM    Hospital discharge follow-up  Syncope  Sick sinus syndrome  Sinus Pause   Status post permanent pacemaker (2/5/2025)   Recent hospitalization for recurrent syncopal episodes  Myoview study without evidence of ischemia  Echocardiogram with normal LVEF, grade 1 diastolic dysfunction  Monitor study revealed significant pauses, 6 seconds and 19-second asystolic pulse  Pacemaker interrogation shows it is working well with no events in about 11 to 12 years battery longevity  Pacemaker maker site clean/dry/intact no erythema or hematoma noted    Hypertension  Blood pressure 138/74   Not currently on any antihypertensive medication  Dyslipidemia  Statin therapy  LDL 80  Goal LDL less than 70  Diet lifestyle modifications discussed    CAD  History of PCI to proximal LAD and marginal branch (2020)   Denies chest pain  Recent Myoview without evidence of ischemia  Aspirin, statin    Patient's previous medical records, labs, and EKG were reviewed and discussed with the patient at today's visit.     Patient to be seen as needed or in 6 months with cardiology and device interrogation    Electronically signed by Pallavi Valdes  BINA, 02/21/25, 12:06 PM EST.

## 2025-02-21 ENCOUNTER — CLINICAL SUPPORT NO REQUIREMENTS (OUTPATIENT)
Dept: CARDIOLOGY | Facility: CLINIC | Age: 80
End: 2025-02-21
Payer: MEDICARE

## 2025-02-21 ENCOUNTER — OFFICE VISIT (OUTPATIENT)
Dept: CARDIOLOGY | Facility: CLINIC | Age: 80
End: 2025-02-21
Payer: MEDICARE

## 2025-02-21 VITALS
WEIGHT: 132 LBS | OXYGEN SATURATION: 99 % | DIASTOLIC BLOOD PRESSURE: 74 MMHG | HEART RATE: 70 BPM | BODY MASS INDEX: 25.91 KG/M2 | HEIGHT: 60 IN | SYSTOLIC BLOOD PRESSURE: 138 MMHG

## 2025-02-21 DIAGNOSIS — E78.2 MIXED HYPERLIPIDEMIA: ICD-10-CM

## 2025-02-21 DIAGNOSIS — I44.7 LEFT BUNDLE BRANCH BLOCK (LBBB): ICD-10-CM

## 2025-02-21 DIAGNOSIS — Z95.0 PRESENCE OF CARDIAC PACEMAKER: ICD-10-CM

## 2025-02-21 DIAGNOSIS — Z09 HOSPITAL DISCHARGE FOLLOW-UP: Primary | ICD-10-CM

## 2025-02-21 DIAGNOSIS — I49.5 SSS (SICK SINUS SYNDROME): Primary | Chronic | ICD-10-CM

## 2025-02-21 DIAGNOSIS — I45.5 SINUS PAUSE: Chronic | ICD-10-CM

## 2025-02-21 DIAGNOSIS — I25.10 CORONARY ARTERY DISEASE INVOLVING NATIVE CORONARY ARTERY OF NATIVE HEART WITHOUT ANGINA PECTORIS: ICD-10-CM

## 2025-02-21 DIAGNOSIS — Z95.0 PACEMAKER: ICD-10-CM

## 2025-02-21 DIAGNOSIS — I63.81 CEREBROVASCULAR ACCIDENT (CVA) DUE TO OCCLUSION OF SMALL ARTERY: ICD-10-CM

## 2025-02-21 DIAGNOSIS — I49.5 SSS (SICK SINUS SYNDROME): Chronic | ICD-10-CM

## 2025-02-21 DIAGNOSIS — R55 SYNCOPE, UNSPECIFIED SYNCOPE TYPE: Chronic | ICD-10-CM

## 2025-02-24 NOTE — PROGRESS NOTES
Subjective   Rachael Quevedo is a 79 y.o. female. Presents to [facility]    Chief Complaint   Patient presents with    Loss of Consciousness    Diabetes       History of Present Illness  Rachael was seen at [facility]  on 02/03/25. She was seen for syncope. Labs that were performed during the encounter included: CMP, CBC, and troponin. Diagnostic studies that were performed included: Chest x-ray, covid and flu swab. and pacemaker placed . Medication changes: took her off coreg.     Syncope:   Patient complains of syncope. History was given by the patient. Onset was  weeks ago, with stable course since that time. The syncopal episode occurred while the patient was standing.  The fainting episode followed none.  Patient describes the episode as a sudden loss of consciousness without warning. Patient also has associated symptoms of  none. There has been only two episodes. The patient denies abdominal pain, diarrhea, headache, and nausea. Taking culprit meds?: none.  Current treatment includes none.               Diabetes  She presents for her follow-up diabetic visit. She has type 2 diabetes mellitus. Pertinent negatives for hypoglycemia include no dizziness, headaches or sweats. Associated symptoms include fatigue. Pertinent negatives for diabetes include no chest pain and no weakness. She is following a generally healthy diet. Meal planning includes avoidance of concentrated sweets and low carbohydrate diet. She participates in exercise intermittently. (Does not check at home ) An ACE inhibitor/angiotensin II receptor blocker is not being taken. She does not see a podiatrist. Eye exam is current.      She has not had any further episodes.     I personally reviewed and updated the patient's allergies, medications, problem list, and past medical, surgical, social, and family history. I have reviewed and confirmed the accuracy of the History of Present Illness and Review of Symptoms as  documented by the MA/LPSENA/RN. Alexa Shah MD    Allergies:  No Known Allergies    Social History:  Social History     Socioeconomic History    Marital status:    Tobacco Use    Smoking status: Never    Smokeless tobacco: Never    Tobacco comments:     Many family members smoked around me when I was a child   Vaping Use    Vaping status: Never Used   Substance and Sexual Activity    Alcohol use: Yes     Alcohol/week: 3.0 standard drinks of alcohol     Types: 2 Glasses of wine, 1 Drinks containing 0.5 oz of alcohol per week     Comment: Both not weekly but occassionally    Drug use: No    Sexual activity: Yes     Partners: Male     Birth control/protection: None     Comment: Very occasional       Family History:  Family History   Problem Relation Age of Onset    Hyperlipidemia Mother         Elevated cholesterol    Heart disease Mother     Heart attack Mother     Hypertension Father     Colon cancer Father     Hyperlipidemia Father         Elevated cholesterol    Heart disease Father     Cancer Father     Diabetes Brother     Heart disease Brother     Hyperlipidemia Brother     Hypertension Brother     Diabetes Paternal Aunt     Colon cancer Paternal Aunt     Colon cancer Paternal Uncle     Diabetes Paternal Grandmother     Heart disease Paternal Grandmother     Arthritis Paternal Grandmother     Heart disease Maternal Uncle     Heart disease Maternal Uncle     Cancer Maternal Aunt     Cancer Paternal Uncle     Breast cancer Neg Hx     Ovarian cancer Neg Hx        Past Medical History :  Patient Active Problem List   Diagnosis    Coronary artery disease    Degeneration of intervertebral disc of lumbosacral region    Dense breast    Depression, major, recurrent, mild    GERD without esophagitis    History of chicken pox    Presence of other cardiac implants and grafts    Mixed hyperlipidemia    Left bundle branch block (LBBB)    Lumbar disc herniation with radiculopathy    Other specified menopausal and  postmenopausal disorders    CVA (cerebral vascular accident)    Cervical radiculopathy    Long-term current use of opiate analgesic    DDD (degenerative disc disease), cervical    Medicare annual wellness visit, subsequent    Screening mammogram for breast cancer    Seasonal allergic rhinitis due to pollen    Overweight with body mass index (BMI) of 25 to 25.9 in adult    Type 2 diabetes mellitus without complication, without long-term current use of insulin    Neck mass    Thyroiditis    Lymphadenopathy    Oral phase dysphagia    Dysuria    Skin lesion    Mammogram declined    Syncope    Sinus pause    SSS (sick sinus syndrome)    Pacemaker    H/O cold sores    Elevated MCV    Hypermagnesemia       Medication List:    Current Outpatient Medications:     aspirin 81 MG EC tablet, Take 1 tablet by mouth Every Night., Disp: , Rfl:     calcium carbonate (OS-SHYLA) 600 MG tablet, Take 1 tablet by mouth 2 (Two) Times a Day With Meals., Disp: , Rfl:     fexofenadine (ALLEGRA) 180 MG tablet, Take 0.5 tablets by mouth Daily., Disp: , Rfl:     fluticasone (FLONASE) 50 MCG/ACT nasal spray, Administer 2 sprays into the nostril(s) as directed by provider Daily As Needed for Rhinitis., Disp: , Rfl:     Multiple Vitamins-Minerals (WOMENS MULTIVITAMIN + COLLAGEN PO), Take 1 tablet by mouth Daily., Disp: , Rfl:     pantoprazole (PROTONIX) 40 MG EC tablet, TAKE 1 TABLET BY MOUTH EVERY DAY IN THE MORNING BEFORE BREAKFAST, Disp: , Rfl:     polyethylene glycol (MIRALAX) 17 GM/SCOOP powder, Take 17 g by mouth Daily., Disp: , Rfl:     rosuvastatin (CRESTOR) 20 MG tablet, TAKE 1 TABLET BY MOUTH EVERY DAY, Disp: 90 tablet, Rfl: 3    valACYclovir (VALTREX) 500 MG tablet, Take 1 tablet by mouth Every 12 (Twelve) Hours., Disp: 6 tablet, Rfl: 12    Past Surgical History:  Past Surgical History:   Procedure Laterality Date    CARDIAC CATHETERIZATION N/A 01/27/2020    Procedure: Left Heart Cath with angiogram;  Surgeon: Jermaine Delong MD;   "Location: [facility] CATH INVASIVE LOCATION;  Service: Cardiovascular    CARDIAC CATHETERIZATION N/A 01/27/2020    Procedure: Stent BOBBY coronary;  Surgeon: Jermaine Delong MD;  Location: [facility] CATH INVASIVE LOCATION;  Service: Cardiovascular    CARDIAC CATHETERIZATION N/A 01/27/2020    Procedure: Left ventriculography;  Surgeon: Jermaine Delong MD;  Location:  [facility] CATH INVASIVE LOCATION;  Service: Cardiovascular    CARDIAC CATHETERIZATION N/A 01/27/2020    Procedure: Coronary angiography;  Surgeon: Jermaine Delong MD;  Location: [facility] CATH INVASIVE LOCATION;  Service: Cardiovascular    CARDIAC ELECTROPHYSIOLOGY PROCEDURE N/A 2/5/2025    Procedure: Pacemaker DC new [employer] aware;  Surgeon: Alec Wong MD;  Location: [facility] CATH INVASIVE LOCATION;  Service: Cardiovascular;  Laterality: N/A;  [place]    CATARACT EXTRACTION, BILATERAL Bilateral 1999    CHOLECYSTECTOMY  1993??    COLONOSCOPY  Last one 2023    CORONARY STENT PLACEMENT  1/28/2020    GALLBLADDER SURGERY  1996    LAPAROSCOPIC TUBAL LIGATION  1980    RENAL ARTERY STENT  01/26/2020    ROTATOR CUFF REPAIR Right 2002    TUBAL ABDOMINAL LIGATION  1979??         Physical Exam:      Vital Signs:    Vitals:    02/28/25 0928   BP: 124/82   Pulse:    Resp:    Temp:    SpO2:         /82   Pulse 84   Temp 96.9 °F (36.1 °C) (Temporal)   Resp 18   Ht 152.4 cm (60\")   Wt 58.6 kg (129 lb 3.2 oz)   LMP  (LMP Unknown)   SpO2 98% Comment:   BMI 25.23 kg/m²     Wt Readings from Last 3 Encounters:   02/28/25 58.6 kg (129 lb 3.2 oz)   02/21/25 59.9 kg (132 lb)   02/03/25 60.1 kg (132 lb 7.9 oz)       Result Review :   The following data was reviewed by: Alexa Shah MD on 02/28/2025:  A1C Last 3 Results          7/15/2024    13:58 10/16/2024    09:01 2/28/2025    09:03   HGBA1C Last 3 Results   Hemoglobin A1C 5.9  6.3  5.7               Physical Exam  Vitals reviewed.   Constitutional:       Appearance: Normal appearance. She is well-developed. "   HENT:      Head: Normocephalic and atraumatic.   Eyes:      General:         Right eye: No discharge.         Left eye: No discharge.   Cardiovascular:      Rate and Rhythm: Normal rate and regular rhythm.      Heart sounds: Normal heart sounds. No murmur heard.     No friction rub. No gallop.   Pulmonary:      Effort: Pulmonary effort is normal. No respiratory distress.      Breath sounds: Normal breath sounds. No wheezing or rales.   Skin:     General: Skin is warm and dry.      Findings: No rash.   Neurological:      Mental Status: She is alert and oriented to person, place, and time.      Coordination: Coordination normal.      Gait: Gait normal.   Psychiatric:         Behavior: Behavior is cooperative.         Assessment and Plan:  Problems Addressed this Visit          Cardiac and Vasculature    SSS (sick sinus syndrome) (Chronic)    Pace maker in place         Coronary artery disease    Cath by Dr Delong showed 80% LAD, 90% in circumflex with normal LV. Drug eluding stents placed in both     Coronary artery disease is unchanged.  Continue current treatment regimen.  Cardiac status will be reassessed in 1 month.           Mixed hyperlipidemia     She is on crestor  Continue current treatment  Check labs         Relevant Orders    Comprehensive Metabolic Panel (Completed)    Lipid Panel With / Chol / HDL Ratio (Completed)       Endocrine and Metabolic    Type 2 diabetes mellitus without complication, without long-term current use of insulin - Primary    Diabetes is stable.   Continue current treatment regimen.  Diabetes will be reassessed in 3 months         Relevant Orders    POC Glycosylated Hemoglobin (Hb A1C) (Completed)    Thyroiditis    Relevant Orders    TSH (Completed)       Genitourinary and Reproductive     Hypermagnesemia    recheck         Relevant Orders    Magnesium (Completed)       Hematology and Neoplasia    Elevated MCV    Relevant Orders    CBC & Differential (Completed)    Vitamin B12  (Completed)    Folate (Completed)       Infectious Diseases    H/O cold sores    Relevant Medications    valACYclovir (VALTREX) 500 MG tablet       Symptoms and Signs    Syncope (Chronic)    Resolved         Relevant Orders    TSH (Completed)       Other    Overweight with body mass index (BMI) of 25 to 25.9 in adult     Other Visit Diagnoses         Type 2 diabetes mellitus with stage 3a chronic kidney disease, without long-term current use of insulin          Stage 3a chronic kidney disease          Vitamin D deficiency        Relevant Orders    Vitamin D,25-Hydroxy (Completed)          Diagnoses         Codes Comments      Type 2 diabetes mellitus without complication, without long-term current use of insulin    -  Primary ICD-10-CM: E11.9  ICD-9-CM: 250.00       Overweight with body mass index (BMI) of 25 to 25.9 in adult     ICD-10-CM: E66.3, Z68.25  ICD-9-CM: 278.02, V85.21       SSS (sick sinus syndrome)     ICD-10-CM: I49.5  ICD-9-CM: 427.81       Type 2 diabetes mellitus with stage 3a chronic kidney disease, without long-term current use of insulin     ICD-10-CM: E11.22, N18.31  ICD-9-CM: 250.40, 585.3       Stage 3a chronic kidney disease     ICD-10-CM: N18.31  ICD-9-CM: 585.3       Syncope, unspecified syncope type     ICD-10-CM: R55  ICD-9-CM: 780.2       H/O cold sores     ICD-10-CM: Z86.19  ICD-9-CM: V12.09       Mixed hyperlipidemia     ICD-10-CM: E78.2  ICD-9-CM: 272.2       Coronary artery disease involving native coronary artery of native heart without angina pectoris     ICD-10-CM: I25.10  ICD-9-CM: 414.01       Elevated MCV     ICD-10-CM: R71.8  ICD-9-CM: 790.09       Hypermagnesemia     ICD-10-CM: E83.41  ICD-9-CM: 275.2       Vitamin D deficiency     ICD-10-CM: E55.9  ICD-9-CM: 268.9       Thyroiditis     ICD-10-CM: E06.9  ICD-9-CM: 245.9                          An After Visit Summary and PPPS were given to the patient.       This document is intended for medical expert use only. Reading of  this document by patients and/or patient's family without participating medical staff guidance may result in misinterpretation and unintended morbidity. Any interpretation of such data is the responsibility of the patient and/or family member responsible for the patient in concert with their primary or specialist providers, not to be left for sources of online searches such as [employer] or similar queries. Relying on these approaches to knowledge may result in misinterpretation, misguided goals of care and even death should patients or family members try recommendations outside of the realm of professional medical care.

## 2025-02-28 ENCOUNTER — OFFICE VISIT (OUTPATIENT)
Dept: FAMILY MEDICINE CLINIC | Facility: CLINIC | Age: 80
End: 2025-02-28
Payer: MEDICARE

## 2025-02-28 VITALS
BODY MASS INDEX: 25.36 KG/M2 | RESPIRATION RATE: 18 BRPM | OXYGEN SATURATION: 98 % | TEMPERATURE: 96.9 F | DIASTOLIC BLOOD PRESSURE: 82 MMHG | SYSTOLIC BLOOD PRESSURE: 124 MMHG | HEIGHT: 60 IN | WEIGHT: 129.2 LBS | HEART RATE: 84 BPM

## 2025-02-28 DIAGNOSIS — R55 SYNCOPE, UNSPECIFIED SYNCOPE TYPE: Chronic | ICD-10-CM

## 2025-02-28 DIAGNOSIS — E78.2 MIXED HYPERLIPIDEMIA: ICD-10-CM

## 2025-02-28 DIAGNOSIS — Z86.19 H/O COLD SORES: ICD-10-CM

## 2025-02-28 DIAGNOSIS — N18.31 TYPE 2 DIABETES MELLITUS WITH STAGE 3A CHRONIC KIDNEY DISEASE, WITHOUT LONG-TERM CURRENT USE OF INSULIN: ICD-10-CM

## 2025-02-28 DIAGNOSIS — E11.9 TYPE 2 DIABETES MELLITUS WITHOUT COMPLICATION, WITHOUT LONG-TERM CURRENT USE OF INSULIN: Primary | ICD-10-CM

## 2025-02-28 DIAGNOSIS — E06.9 THYROIDITIS: ICD-10-CM

## 2025-02-28 DIAGNOSIS — I25.10 CORONARY ARTERY DISEASE INVOLVING NATIVE CORONARY ARTERY OF NATIVE HEART WITHOUT ANGINA PECTORIS: ICD-10-CM

## 2025-02-28 DIAGNOSIS — E66.3 OVERWEIGHT WITH BODY MASS INDEX (BMI) OF 25 TO 25.9 IN ADULT: ICD-10-CM

## 2025-02-28 DIAGNOSIS — N18.31 STAGE 3A CHRONIC KIDNEY DISEASE: ICD-10-CM

## 2025-02-28 DIAGNOSIS — R71.8 ELEVATED MCV: ICD-10-CM

## 2025-02-28 DIAGNOSIS — E11.22 TYPE 2 DIABETES MELLITUS WITH STAGE 3A CHRONIC KIDNEY DISEASE, WITHOUT LONG-TERM CURRENT USE OF INSULIN: ICD-10-CM

## 2025-02-28 DIAGNOSIS — E55.9 VITAMIN D DEFICIENCY: ICD-10-CM

## 2025-02-28 DIAGNOSIS — I49.5 SSS (SICK SINUS SYNDROME): ICD-10-CM

## 2025-02-28 DIAGNOSIS — E83.41 HYPERMAGNESEMIA: ICD-10-CM

## 2025-02-28 PROBLEM — Z95.0 PACEMAKER: Chronic | Status: ACTIVE | Noted: 2025-02-21

## 2025-02-28 PROBLEM — Z95.818 PRESENCE OF OTHER CARDIAC IMPLANTS AND GRAFTS: Chronic | Status: ACTIVE | Noted: 2018-06-25

## 2025-02-28 LAB
EXPIRATION DATE: NORMAL
HBA1C MFR BLD: 5.7 % (ref 4.5–5.7)
Lab: NORMAL

## 2025-02-28 RX ORDER — VALACYCLOVIR HYDROCHLORIDE 500 MG/1
500 TABLET, FILM COATED ORAL EVERY 12 HOURS
Qty: 6 TABLET | Refills: 12 | Status: SHIPPED | OUTPATIENT
Start: 2025-02-28

## 2025-03-01 DIAGNOSIS — E55.9 VITAMIN D DEFICIENCY: ICD-10-CM

## 2025-03-01 DIAGNOSIS — R79.89 HIGH SERUM VITAMIN D: Primary | ICD-10-CM

## 2025-03-01 DIAGNOSIS — I63.81 CEREBROVASCULAR ACCIDENT (CVA) DUE TO OCCLUSION OF SMALL ARTERY: ICD-10-CM

## 2025-03-01 LAB
25(OH)D3+25(OH)D2 SERPL-MCNC: 140 NG/ML (ref 30–100)
ALBUMIN SERPL-MCNC: 4.6 G/DL (ref 3.8–4.8)
ALP SERPL-CCNC: 69 IU/L (ref 44–121)
ALT SERPL-CCNC: 22 IU/L (ref 0–32)
AST SERPL-CCNC: 26 IU/L (ref 0–40)
BASOPHILS # BLD AUTO: 0.1 X10E3/UL (ref 0–0.2)
BASOPHILS NFR BLD AUTO: 1 %
BILIRUB SERPL-MCNC: 0.6 MG/DL (ref 0–1.2)
BUN SERPL-MCNC: 13 MG/DL (ref 8–27)
BUN/CREAT SERPL: 12 (ref 12–28)
CALCIUM SERPL-MCNC: 10.2 MG/DL (ref 8.7–10.3)
CHLORIDE SERPL-SCNC: 103 MMOL/L (ref 96–106)
CHOLEST SERPL-MCNC: 177 MG/DL (ref 100–199)
CHOLEST/HDLC SERPL: 2.1 RATIO (ref 0–4.4)
CO2 SERPL-SCNC: 22 MMOL/L (ref 20–29)
CREAT SERPL-MCNC: 1.08 MG/DL (ref 0.57–1)
EGFRCR SERPLBLD CKD-EPI 2021: 53 ML/MIN/1.73
EOSINOPHIL # BLD AUTO: 0.2 X10E3/UL (ref 0–0.4)
EOSINOPHIL NFR BLD AUTO: 2 %
ERYTHROCYTE [DISTWIDTH] IN BLOOD BY AUTOMATED COUNT: 12.3 % (ref 11.7–15.4)
FOLATE SERPL-MCNC: >20 NG/ML
GLOBULIN SER CALC-MCNC: 2.7 G/DL (ref 1.5–4.5)
GLUCOSE SERPL-MCNC: 97 MG/DL (ref 70–99)
HCT VFR BLD AUTO: 44 % (ref 34–46.6)
HDLC SERPL-MCNC: 85 MG/DL
HGB BLD-MCNC: 14.5 G/DL (ref 11.1–15.9)
IMM GRANULOCYTES # BLD AUTO: 0 X10E3/UL (ref 0–0.1)
IMM GRANULOCYTES NFR BLD AUTO: 0 %
LDLC SERPL CALC-MCNC: 71 MG/DL (ref 0–99)
LYMPHOCYTES # BLD AUTO: 2.8 X10E3/UL (ref 0.7–3.1)
LYMPHOCYTES NFR BLD AUTO: 35 %
MAGNESIUM SERPL-MCNC: 2.5 MG/DL (ref 1.6–2.3)
MCH RBC QN AUTO: 31.1 PG (ref 26.6–33)
MCHC RBC AUTO-ENTMCNC: 33 G/DL (ref 31.5–35.7)
MCV RBC AUTO: 94 FL (ref 79–97)
MONOCYTES # BLD AUTO: 0.8 X10E3/UL (ref 0.1–0.9)
MONOCYTES NFR BLD AUTO: 10 %
NEUTROPHILS # BLD AUTO: 4.2 X10E3/UL (ref 1.4–7)
NEUTROPHILS NFR BLD AUTO: 52 %
PLATELET # BLD AUTO: 243 X10E3/UL (ref 150–450)
POTASSIUM SERPL-SCNC: 4.2 MMOL/L (ref 3.5–5.2)
PROT SERPL-MCNC: 7.3 G/DL (ref 6–8.5)
RBC # BLD AUTO: 4.66 X10E6/UL (ref 3.77–5.28)
SODIUM SERPL-SCNC: 141 MMOL/L (ref 134–144)
TRIGL SERPL-MCNC: 121 MG/DL (ref 0–149)
TSH SERPL DL<=0.005 MIU/L-ACNC: 1.53 UIU/ML (ref 0.45–4.5)
VIT B12 SERPL-MCNC: >2000 PG/ML (ref 232–1245)
VLDLC SERPL CALC-MCNC: 21 MG/DL (ref 5–40)
WBC # BLD AUTO: 8 X10E3/UL (ref 3.4–10.8)

## 2025-03-04 ENCOUNTER — TELEPHONE (OUTPATIENT)
Dept: FAMILY MEDICINE CLINIC | Facility: CLINIC | Age: 80
End: 2025-03-04

## 2025-03-04 DIAGNOSIS — E55.9 VITAMIN D DEFICIENCY: ICD-10-CM

## 2025-03-04 DIAGNOSIS — R79.89 HIGH SERUM VITAMIN D: Primary | ICD-10-CM

## 2025-03-04 LAB — 25(OH)D3+25(OH)D2 SERPL-MCNC: 125 NG/ML (ref 30–100)

## 2025-03-04 NOTE — TELEPHONE ENCOUNTER
"    Caller: Santiago Quevedo \"Carley\"    Relationship: Self    Best call back number: [phone]     Caller requesting test results: SANTIAGO    What test was performed: LABS- VITAMIN D    When was the test performed: 3/3/25    Where was the test performed: [facility]    Additional notes:         "

## 2025-03-04 NOTE — TELEPHONE ENCOUNTER
Called pt and advised her Vitamin D lab is improving Dr. Shah wants it to have checked next week she will be coming in Monday. She also cancelled her appt for Thursday she said since Dr. Shah already spoke to her about her labs results on Saturday she didn't think she needed the appt.

## 2025-03-10 ENCOUNTER — CLINICAL SUPPORT (OUTPATIENT)
Dept: FAMILY MEDICINE CLINIC | Facility: CLINIC | Age: 80
End: 2025-03-10
Payer: MEDICARE

## 2025-03-10 DIAGNOSIS — E55.9 VITAMIN D DEFICIENCY: ICD-10-CM

## 2025-03-10 DIAGNOSIS — R79.89 HIGH SERUM VITAMIN D: Primary | ICD-10-CM

## 2025-03-10 PROCEDURE — 36415 COLL VENOUS BLD VENIPUNCTURE: CPT | Performed by: FAMILY MEDICINE

## 2025-03-10 NOTE — PROGRESS NOTES
Site care done- cleaned with alcohol swab, procedure tolerated well, dressing applied. Venipuncture was obtained after 1 time(s). 1 tubes were drawn. Left arm

## 2025-03-10 NOTE — ASSESSMENT & PLAN NOTE
Cath by Dr Delong showed 80% LAD, 90% in circumflex with normal LV. Drug eluding stents placed in both     Coronary artery disease is unchanged.  Continue current treatment regimen.  Cardiac status will be reassessed in 1 month.

## 2025-03-11 LAB — 25(OH)D3+25(OH)D2 SERPL-MCNC: 121 NG/ML (ref 30–100)

## 2025-03-18 ENCOUNTER — TELEPHONE (OUTPATIENT)
Dept: FAMILY MEDICINE CLINIC | Facility: CLINIC | Age: 80
End: 2025-03-18
Payer: MEDICARE

## 2025-03-18 NOTE — TELEPHONE ENCOUNTER
"Caller: Rachael Quevedo \"Carley\"    Relationship: Self    Best call back number: [phone]    What medication are you requesting: Z PACK    What are your current symptoms: SEVERE COLD    How long have you been experiencing symptoms: 5 DAYS    If a prescription is needed, what is your preferred pharmacy and phone number:    [facility]/pharmacy #[id number] - [place], [place] - [address] [facility] [phone] [facility] [phone]      Additional notes:  PATIENT STATES SHE HAS A SEVERE COLD AND WAS WANTING KNOW IF SOMETHING (POSSIBLY A Z PACK) COULD BE CALLED IN FOR HER.    PLEASE CALL TO DISCUSS.  "

## 2025-03-27 ENCOUNTER — TELEPHONE (OUTPATIENT)
Dept: CARDIOLOGY | Facility: CLINIC | Age: 80
End: 2025-03-27
Payer: MEDICARE

## 2025-03-27 NOTE — TELEPHONE ENCOUNTER
"Caller: Rachael Quevedo \"Carley\"     Relationship: SELF    Best call back number: [phone]    What is your medical concern? PACEMAKER INCISION TENDERNESS, STICHES COMING OUT    How long has this issue been going on? WEEK OR SO    Is your provider already aware of this issue? NO    Have you been treated for this issue? PLEASE CALL AND ADVISE.  "
Called patient back and verbally explained there is a number to call for [employer] help desk for the mychart.    I gave patient the number to call to get help on the mychart through [employer] support.  
Pt returning Baljeet DARNELL phone call.     Tried to walk pt through attaching pics in [employer]. She would like a call back.   
Returned patiens call. [place] removed by Liya 2/21. Patient reports sutures coming through site. She is going to send a picture of PPM site through [employer] for review.   Further instructions to come based on picture   
Spoke with Pallavi CURRAN per Dr. Wong that is normal no need to be concerned unless area is red and swollen.   
Spoke with patient, she understood.  
(0) independent

## 2025-04-07 ENCOUNTER — LAB (OUTPATIENT)
Dept: FAMILY MEDICINE CLINIC | Facility: CLINIC | Age: 80
End: 2025-04-07
Payer: MEDICARE

## 2025-04-07 ENCOUNTER — TELEPHONE (OUTPATIENT)
Dept: FAMILY MEDICINE CLINIC | Facility: CLINIC | Age: 80
End: 2025-04-07
Payer: MEDICARE

## 2025-04-07 DIAGNOSIS — M21.612 BILATERAL BUNIONS: ICD-10-CM

## 2025-04-07 DIAGNOSIS — M79.674 PAIN IN RIGHT TOE(S): ICD-10-CM

## 2025-04-07 DIAGNOSIS — M79.675 GREAT TOE PAIN, LEFT: Primary | ICD-10-CM

## 2025-04-07 DIAGNOSIS — E55.9 VITAMIN D DEFICIENCY: Primary | ICD-10-CM

## 2025-04-07 DIAGNOSIS — M21.611 BILATERAL BUNIONS: ICD-10-CM

## 2025-04-07 DIAGNOSIS — L84 CORN OF FOOT: ICD-10-CM

## 2025-04-07 NOTE — TELEPHONE ENCOUNTER
While patient was in today for her blood work she asked who Dr. Shah suggests for podiatry. She said she seen someone at [facility] but cannot remember who it was or if Dr. Shah recommends them I suggested Dr. Rankin here in [place]. Is referral okay to place for patient? If so what dx do I use?     It looks like she used to see Dr. Kendrick.

## 2025-04-08 LAB — 25(OH)D3+25(OH)D2 SERPL-MCNC: 90.6 NG/ML (ref 30–100)

## 2025-04-18 RX ORDER — CARVEDILOL 3.12 MG/1
3.12 TABLET ORAL 2 TIMES DAILY WITH MEALS
Qty: 180 TABLET | OUTPATIENT
Start: 2025-04-18

## 2025-04-18 NOTE — TELEPHONE ENCOUNTER
Rx Refill Note  Requested Prescriptions     Refused Prescriptions Disp Refills    carvedilol (COREG) 3.125 MG tablet [Pharmacy Med Name: CARVEDILOL 3.125 MG TABLET] 180 tablet      Sig: TAKE 1 TABLET BY MOUTH TWICE A DAY WITH FOOD      Last office visit with prescribing clinician: 10/22/2024   Last telemedicine visit with prescribing clinician: Visit date not found   Next office visit with prescribing clinician: 9/3/2025                         Would you like a call back once the refill request has been completed: [] Yes [] No    If the office needs to give you a call back, can they leave a voicemail: [] Yes [] No    Pranav Valerio MA  04/18/25, 10:02 EDT

## 2025-05-30 ENCOUNTER — TELEPHONE (OUTPATIENT)
Dept: FAMILY MEDICINE CLINIC | Facility: CLINIC | Age: 80
End: 2025-05-30

## 2025-05-30 ENCOUNTER — TELEPHONE (OUTPATIENT)
Dept: CARDIOLOGY | Facility: CLINIC | Age: 80
End: 2025-05-30
Payer: MEDICARE

## 2025-05-30 NOTE — TELEPHONE ENCOUNTER
"  Caller: Rachael Quevedo \"Carley\"    Relationship: Self    Best call back number: [phone]      What was the call regarding: PATIENT WANTS TO SPEAK WITH CLINICAL HAS FELT UNWELL SINCE HAVING PACEMAKER PLACED. PLEASE CALL AND ADVISE.      "

## 2025-05-30 NOTE — TELEPHONE ENCOUNTER
Called and spoke Baljeet's office spoke to Hub who then connected me to the front office staff they said they are going to get a message back to clinical team now and contact the patient on what to do. Called and spoke to patient and she verbalized understanding

## 2025-05-30 NOTE — TELEPHONE ENCOUNTER
Called and spoke to patient.  She reports symptoms of fatigue have been ongoing for the last few months.  She is still able to do all tasks including a days worth of yard work but has noticed she has been napping more.  Reviewed recent Myoview study and echocardiogram with patient.    Will forward this to Liya to see if remote monitor shows anything abnormal     Please schedule patient to be seen at next available with me or Dr. Delong to discuss further workup for symptoms.

## 2025-05-30 NOTE — TELEPHONE ENCOUNTER
Merlene from Dr. Shah' office called for status on pt and to see if she had been contacted. Advised her I would note chart that their office called to express concern for pt.

## 2025-05-30 NOTE — TELEPHONE ENCOUNTER
I called and spoke to the patient offered for me to call Dr. Delong she said she would call him and see what he says to do. She said that she will call us back with what he says. I advised she needs to have this addressed today either with Dr. Delong or with at the ER. Advised Dr. Shah is out as well. She understood.

## 2025-05-30 NOTE — TELEPHONE ENCOUNTER
Spoke to Pallavi and patient, device function normal, pacing less than 1%, no events recorded. Will have scheduling contact patient for an appt.

## 2025-06-04 NOTE — H&P (VIEW-ONLY)
Subjective:     Encounter Date:06/06/2025      Patient ID: Rachael Quevedo is a 79 y.o. female.    Chief Complaint:  History of Present Illness    Rachael Quevedo is a pleasant 79 y.o. female with history of CAD status post PCI, dyslipidemia, hypertension, sinus pauses/sick sinus syndrome status post pacemaker who presents to the office today accompanied by her  for evaluation of progressively worsening fatigue and dyspnea. Patient reports symptoms have been ongoing since January when she received her permanent pacemaker for sick sinus syndrome/ significant sinus pauses causing syncopal episodes.  Remote device check completed showing device is working well and patient is being paced less than 1% of the time, no abnormality.  She reports she is able to do all tasks needed, but now has to stop and rest at times prior to completing which is a change in her baseline.  For example, she usually does a stationary bike/treadmill for exercise about 3-4 times a week.  She used to be able to utilize machine for at least 10 to 20 minutes, however, recently she is only able to complete about 3 minutes before becoming too short of breath. Symptoms improve with rest. She denies chest pain, palpitations, syncope, orthopnea, recent illness, fever, nausea, vomiting.   Patient states she has been following closely with her PCP due to elevated magnesium and vitamin D levels.  However, her vitamin D has been back to normal since April and her symptoms have not improved.  She reports her symptoms are similar to when previously requiring cardiac intervention in 2020.  Recent cardiac workup includes echocardiogram from January showing normal LVEF with grade 1 diastolic dysfunction and no significant valvular abnormalities.  Myoview study at that time revealing no evidence of ischemia, overall low risk study.  Patient's vital signs are stable today, she takes all medications as prescribed.  She has never been a  smoker.      The following portions of the patient's history were reviewed and updated as appropriate: allergies, current medications, past family history, past medical history, past social history, past surgical history, and problem list.    Past Medical History:   Diagnosis Date    Allergic     Ankle fracture, left     Comments: 2016    Arthritis     Bursitis     Cataract, acquired     Coronary artery disease     Cough     Impression: Most likely reactive airway prescription as below Start antihistamine at home    Dense breast     Depression, major, recurrent, mild     Diverticulosis     GERD without esophagitis     Comments: Dr Duncan- protonix    Hematuria, microscopic     History of chicken pox     History of loop recorder     Hyperglycemia     Hyperlipidemia     Hypertension 01/04/2020    Injury of back     Injury of neck     Left bundle branch block (LBBB)     Malaise and fatigue     Medicare annual wellness visit, initial     with abnormal findings    Osteopenia 2022    Other specified menopausal and perimenopausal disorders     Other specified menopausal and postmenopausal disorders    Pap smear, low-risk     Renal insufficiency     S/P right rotator cuff repair     Screening for alcoholism     10 or more drinks per week    Screening for depression     Negative Depression Screening ( 9 or less) ()    Screening mammogram, encounter for     Shortness of breath     Stroke     1/5/2020 no residual effects    Syncope 01/22/2025    Vasovagal near-syncope     Impression: from illness and cough Discussed if there is loss of vision in an eye, confusion, weakness or numbness in an extremity, drooping of a side of the face, intractible pain, intractible vomiting, to go to the ER.    Vasovagal syncope 08/31/2020     Past Surgical History:   Procedure Laterality Date    ABLATION OF DYSRHYTHMIC FOCUS  8/2/2023    Ablation in upper spinal area    CARDIAC CATHETERIZATION N/A 01/27/2020    Procedure: Left Heart Cath  with angiogram;  Surgeon: Jermaine Delong MD;  Location:  [facility] CATH INVASIVE LOCATION;  Service: Cardiovascular    CARDIAC CATHETERIZATION N/A 01/27/2020    Procedure: Stent BOBBY coronary;  Surgeon: Jermaine Delong MD;  Location:  [facility] CATH INVASIVE LOCATION;  Service: Cardiovascular    CARDIAC CATHETERIZATION N/A 01/27/2020    Procedure: Left ventriculography;  Surgeon: Jermaine Delong MD;  Location:  [facility] CATH INVASIVE LOCATION;  Service: Cardiovascular    CARDIAC CATHETERIZATION N/A 01/27/2020    Procedure: Coronary angiography;  Surgeon: Jermaine Delong MD;  Location:  [facility] CATH INVASIVE LOCATION;  Service: Cardiovascular    CARDIAC ELECTROPHYSIOLOGY PROCEDURE N/A 02/05/2025    Procedure: Pacemaker DC new [employer] aware;  Surgeon: Alec Wong MD;  Location: [facility] CATH INVASIVE LOC[facility];  Service: Cardiovascular;  Laterality: N/A;  [place]    CARDIAC SURGERY  2/5/2025    Pacemaker Placement    CATARACT EXTRACTION, BILATERAL Bilateral 1999    CHOLECYSTECTOMY  1993??    COLONOSCOPY  Last one 2023    CORONARY STENT PLACEMENT  1/28/2020    GALLBLADDER SURGERY  1996    INSERT / REPLACE / REMOVE PACEMAKER  2/5/25    LAPAROSCOPIC TUBAL LIGATION  1980    RENAL ARTERY STENT  01/26/2020    ROTATOR CUFF REPAIR Right 2002    TUBAL ABDOMINAL LIGATION  1979??     /71 (BP Location: Left arm, Patient Position: Sitting, Cuff Size: Adult) Comment: manual retake  Pulse 70   Wt 58.5 kg (129 lb)   LMP  (LMP Unknown)   SpO2 98%   BMI 25.19 kg/m²   Family History   Problem Relation Age of Onset    Hyperlipidemia Mother         Elevated cholesterol    Heart disease Mother     Heart attack Mother     Hypertension Father     Colon cancer Father     Hyperlipidemia Father         Elevated cholesterol    Heart disease Father     Cancer Father         Colon Cancer    Diabetes Brother     Heart disease Brother     Hyperlipidemia Brother     Hypertension Brother     Diabetes Paternal Aunt     Colon cancer Paternal Aunt      Cancer Paternal Aunt         Cant remember  name    Colon cancer Paternal Uncle     Diabetes Paternal Uncle     Diabetes Paternal Grandmother     Heart disease Paternal Grandmother     Arthritis Paternal Grandmother     Heart disease Maternal Uncle     Heart disease Maternal Uncle     Cancer Maternal Aunt     Cancer Paternal Uncle     Heart disease Maternal Uncle     Heart disease Maternal Uncle     Cancer Paternal Uncle     Cancer Maternal Aunt         Colon    Cancer Paternal Uncle         Colon    Breast cancer Neg Hx     Ovarian cancer Neg Hx        Current Outpatient Medications:     aspirin 81 MG EC tablet, Take 1 tablet by mouth Every Night., Disp: , Rfl:     calcium carbonate (OS-SHYLA) 600 MG tablet, Take 1 tablet by mouth 2 (Two) Times a Day With Meals., Disp: , Rfl:     fexofenadine (ALLEGRA) 180 MG tablet, Take 0.5 tablets by mouth Daily., Disp: , Rfl:     fluticasone (FLONASE) 50 MCG/ACT nasal spray, Administer 2 sprays into the nostril(s) as directed by provider Daily As Needed for Rhinitis., Disp: , Rfl:     Multiple Vitamins-Minerals (WOMENS MULTIVITAMIN + COLLAGEN PO), Take 1 tablet by mouth Daily., Disp: , Rfl:     pantoprazole (PROTONIX) 40 MG EC tablet, TAKE 1 TABLET BY MOUTH EVERY DAY IN THE MORNING BEFORE BREAKFAST, Disp: , Rfl:     polyethylene glycol (MIRALAX) 17 GM/SCOOP powder, Take 17 g by mouth Daily., Disp: , Rfl:     rosuvastatin (CRESTOR) 20 MG tablet, TAKE 1 TABLET BY MOUTH EVERY DAY, Disp: 90 tablet, Rfl: 3    valACYclovir (VALTREX) 500 MG tablet, Take 1 tablet by mouth Every 12 (Twelve) Hours. (Patient not taking: Reported on 6/6/2025), Disp: 6 tablet, Rfl: 12  No Known Allergies  Social History     Socioeconomic History    Marital status:    Tobacco Use    Smoking status: Never     Passive exposure: Past    Smokeless tobacco: Never    Tobacco comments:     Many family members smoked around me when I was a child   Vaping Use    Vaping status: Never Used   Substance  and Sexual Activity    Alcohol use: Yes     Alcohol/week: 2.0 standard drinks of alcohol     Types: 1 Glasses of wine, 1 Drinks containing 0.5 oz of alcohol per week     Comment: Both not weekly but only occassionally    Drug use: No    Sexual activity: Not Currently     Partners: Male     Birth control/protection: None     Comment: Very occasional     Review of Systems   Constitutional: Positive for malaise/fatigue.   Cardiovascular:  Positive for dyspnea on exertion. Negative for chest pain, leg swelling and palpitations.   Respiratory:  Negative for cough and shortness of breath.    Gastrointestinal:  Negative for abdominal pain, nausea and vomiting.   Neurological:  Negative for dizziness, headaches, light-headedness, numbness and weakness.   All other systems reviewed and are negative.         Objective:     Vitals reviewed.   Constitutional:       Appearance: Well-developed and not in distress.   Eyes:      Pupils: Pupils are equal, round, and reactive to light.   HENT:      Nose: Nose normal.   Pulmonary:      Effort: Pulmonary effort is normal.      Breath sounds: Normal breath sounds.   Cardiovascular:      Normal rate. Regular rhythm.   Pulses:     Intact distal pulses.   Edema:     Peripheral edema absent.   Abdominal:      Palpations: Abdomen is soft.   Musculoskeletal: Normal range of motion.      Cervical back: Normal range of motion and neck supple. Skin:     General: Skin is warm and dry.   Neurological:      General: No focal deficit present.      Mental Status: Alert and oriented to person, place and time.         Procedures      LAB RESULTS (LAST 7 DAYS)  Lab Review:   Echocardiogram   Results for orders placed during the hospital encounter of 01/22/25    Adult Transthoracic Echo Complete w/ Color, Spectral and Contrast if Necessary Per Protocol    Interpretation Summary    Left ventricular ejection fraction appears to be 61 - 65%.    Left ventricular diastolic function is consistent with (grade  I) impaired relaxation.    Estimated right ventricular systolic pressure from tricuspid regurgitation is normal (<35 mmHg).      Cardiac Catheterization   Results for orders placed during the hospital encounter of 20    Cardiac Catheterization/Vascular Study    Narrative  Heart Cath PCI Report    NAME:              Rachael Quevedo  :                1945  AGE/SEX:        74 y.o. female  MRN:                [id number]  ADM DATE:      [unfilled]  DOS:  ADM MD:          [unfilled]  ATT MD:           [unfilled]  REF MD:          [unfilled]      Pre-Procedure Notes  H&P Performed  []  Yes []  No       []  N/A    Indications:  []  ACS <= 24 HRS  []  ACS >24 HRS  [x]  New Onset Angina <= 2 mos  [x]  Worsening Angina  []  Resuscitated Cardiac Arrest  []  Angina on Exertion:  []  Suspected CAD  []  Valvular Disease  []  Pericardial Disease  []  Cardiac Arrythmia  []  Cardiomyopathy  []  LV Dysfunction  []  Syncope  []  Post Cardiac Transplant  []  Eval. For Exercise Clearance  []  Other  []  Pre-Operative Evaluation  If Pre-Op Eval:  Evaluation for Surgery Type:  []  Cardiac Surgery   []  Non-Cardiac Surgery  Functional Capacity:  []  <4 METS  []  >=4 METS w/o symptoms  []  >= 4 METS with symptoms  []  Unknown  Surgical Risk:  []  Low  []  Intermediate  []  High Risk: Vascular  []  High Risk Non-Vascular    Risks, Benefits, & Complications Discussed:  [x]  Yes  []  No  []  N/A    Questions Answered:  [x]  Yes  []  No  []  N/A    Consent Obtained:  [x]  Yes  []  No  []  N/A    CHF: []  Yes  [x]  No  If Yes:  Newly Diagnosed?  []  Yes  []  No  If Yes:  HF Type:  []  Diastolic  []  Systolic  []  Unknown      Procedure Description  The patient underwent successful [x]  Left  []  Right  []  Left & Right  Heart catheterization and coronary angiography via the  [x]  Femoral approach  []  Radial approach  []  Brachial approach    Procedure Narrative:  Informed consent was obtained from the patient after explaining risks  and benefits.  Patient was brought to the cardiac interventional artery and placed on the table in the usual fashion.  Right groin was shaved and prepped in sterile fashion.  2% again was used with midsternal anesthesia to the right groin.  A total of 20 cc was used.  A 6 Georgian sheath was placed in the right femoral artery.  A 6 Georgian pigtail catheter, 6 Georgian JR4 catheter and a 6 Georgian JL4 catheter was used for the procedure.  After the cardiac catheterization is complete, patient underwent a percutaneous coronary dimension.  After the cardiac intervention was complete patient was given aspirin Plavix and heparin per weight-based protocol.  A 6 Georgian JL4 guide was used and left coronary artery was engaged.  A 190 cm wire was used and placed in the distal portion of the circumflex artery.  A 2.25/12 mm Xience stent was used and placed across the lesion and inflated at 12 james for 10 seconds.  Thereafter the balloon was taken out.  The wire was then placed in the LAD and a 2.5/12 mm Xience stent was used and placed across the lesion in the LAD and inflated at 12 james for 10 seconds.  Thereafter the balloon was taken out.  Reviewing angiogram showed no dissection or perforation.  Patient tolerated the procedure very well.  No complications noted.  Hemodynamic    LVEDP: 8  Estimated EF %: 60    Initial Aortic Pressure: 120/70    AV Gradient: No gradient    Rt. Heart Pressure:    Wall Motion:  Dominance:  [x]  Left  []  Right  []  Co-Dominant    Coronary Arteriography: (Please Code highest degree of stenosis)    Left Main %: 0  Proximal LAD %: 80%  Mid/Distal LAD %: 0  LCX %: OM1 with 90% ramus:  RCA %: 0  [place] %:  SVG(s) %:      PCI Procedure Notes    Segment #OM1  Culprit Lesion:  [x]  Yes  []  No  % Pre-Stenosis: 90%  pre-Proc TIMIFlow: NAOMI-3 flow  % Post-Stenosis: 0  Post-Proc TIMIFlow: NAOMI-3  Non-High/Non-C:     yes                     High/C:  Lesion Length (mm): 12 mm  Primary Device Used: Stent    Segment  #proximal LAD  Culprit Lesion:  [x]  Yes  []  No  % Pre-Stenosis: 80  Pre-Proc TIMIFlow: NAOMI-3 flow  % Post-Stenosis: 0%  Post-Proc TIMIFlow: NAOMI-3 flow  Non-High/Non-C:                          High/C:yes  Lesion Length (mm): 12 mm  Primary Device Used: Stent    Segment #  Culprit Lesion:  []  Yes  []  No  % Pre-Stenosis:  Pre-Proc TIMIFlow:  % Post-Stenosis:  Post-Proc TIMIFlow:  Non-High/Non-C:                          High/C:  Lesion Length (mm):  Primary Device Used:        Conscious Sedation:   [x]  Yes   []  No  No Flow Phenom:       []  Yes  [x]  No  Dissection:  []  Yes  [x]  No  Acute Closure: []  Yes  [x]  No  Perforation:  []  Yes  [x]  No    Complications: No complication    Estimated Blood Loss:  None      Impression and Recommendation: Severe two-vessel coronary artery disease  Normal LV function  Status post successful stent implantation of drug-eluting stents in the proximal LAD and the first marginal branch without any complications.    Electronically signed by Jermaine Delong MD, 01/27/20, 12:27 PM.      CBC        BMP        CMP         BNP        TROPONIN        CoAg        Creatinine Clearance  CrCl cannot be calculated (Patient's most recent lab result is older than the maximum 30 days allowed.).    ABG        Radiology  No radiology results for the last day            Assessment    Diagnoses and all orders for this visit:    1. Other fatigue (Primary)  -     Case Request Cath Lab: Left Heart Cath with angiogram  -     CBC & Differential; Future  -     Protime-INR; Future    2. SSS (sick sinus syndrome)    3. Sinus pause    4. Left bundle branch block (LBBB)  Overview:  Seeing Dr Delong      5. Pacemaker    6. Coronary artery disease involving native coronary artery of native heart without angina pectoris  Overview:  Cath by Dr Delong showed 80% LAD, 90% in circumflex with normal LV. Drug eluding stents placed in both.     Orders:  -     Case Request Cath Lab: Left Heart Cath with angiogram  -      CBC & Differential; Future  -     Basic Metabolic Panel; Future  -     Protime-INR; Future    7. Mixed hyperlipidemia                  MDM    Fatigue   LUNA   Progressively worsening since January  CBC, vitamin D, TSH unremarkable  Symptoms similar to when previously requiring cardiac intervention  Myoview study without evidence of ischemia  Echocardiogram with LVEF of 61 to 65%, grade 1 diastolic dysfunction  Remote device check shows device is working well, pacing less than 1%, no events recorded  Plan for outpatient cardiac catheterization   Risk/benefits discussed with patient and spouse     Coronary artery disease   History of PCI to proximal LAD and marginal branch (2020)   Denies chest pain  Recent Myoview without evidence of ischemia  Aspirin, statin    Sick sinus syndrome  Sinus Pause   Status post permanent pacemaker (2/5/2025)   Echocardiogram with normal LVEF, grade 1 diastolic dysfunction  Monitor study revealed significant pauses, 6 seconds and 19-second asystolic pulse  Pacemaker interrogation shows it is working well with no events in about 11 to 12 years battery longevity  Pacemaker maker site clean/dry/intact no erythema or hematoma noted     Hypertension  Blood pressure 150/73  Not currently on any antihypertensive medication  Monitor home pressure call office with readings for further medication titration  Goal BP less than 140/90    Dyslipidemia  Statin therapy  LDL 80  Goal LDL less than 70  Diet lifestyle modifications discussed    Ischemic CVA  Aspirin, statin  Previously followed with Dr Seipel   Upcoming appointment with neurology for evaluation of symptoms    Patient's previous medical records, labs, and EKG were reviewed and discussed with the patient at today's visit.     Electronically signed by BINA Klein, 06/06/25, 1:03 PM EDT.

## 2025-06-04 NOTE — PROGRESS NOTES
Subjective:     Encounter Date:06/06/2025      Patient ID: Rachael Quevedo is a 79 y.o. female.    Chief Complaint:  History of Present Illness    Rachael Quevedo is a pleasant 79 y.o. female with history of CAD status post PCI, dyslipidemia, hypertension, sinus pauses/sick sinus syndrome status post pacemaker who presents to the office today accompanied by her  for evaluation of progressively worsening fatigue and dyspnea. Patient reports symptoms have been ongoing since January when she received her permanent pacemaker for sick sinus syndrome/ significant sinus pauses causing syncopal episodes.  Remote device check completed showing device is working well and patient is being paced less than 1% of the time, no abnormality.  She reports she is able to do all tasks needed, but now has to stop and rest at times prior to completing which is a change in her baseline.  For example, she usually does a stationary bike/treadmill for exercise about 3-4 times a week.  She used to be able to utilize machine for at least 10 to 20 minutes, however, recently she is only able to complete about 3 minutes before becoming too short of breath. Symptoms improve with rest. She denies chest pain, palpitations, syncope, orthopnea, recent illness, fever, nausea, vomiting.   Patient states she has been following closely with her PCP due to elevated magnesium and vitamin D levels.  However, her vitamin D has been back to normal since April and her symptoms have not improved.  She reports her symptoms are similar to when previously requiring cardiac intervention in 2020.  Recent cardiac workup includes echocardiogram from January showing normal LVEF with grade 1 diastolic dysfunction and no significant valvular abnormalities.  Myoview study at that time revealing no evidence of ischemia, overall low risk study.  Patient's vital signs are stable today, she takes all medications as prescribed.  She has never been a  smoker.      The following portions of the patient's history were reviewed and updated as appropriate: allergies, current medications, past family history, past medical history, past social history, past surgical history, and problem list.    Past Medical History:   Diagnosis Date    Allergic     Ankle fracture, left     Comments: 2016    Arthritis     Bursitis     Cataract, acquired     Coronary artery disease     Cough     Impression: Most likely reactive airway prescription as below Start antihistamine at home    Dense breast     Depression, major, recurrent, mild     Diverticulosis     GERD without esophagitis     Comments: Dr Duncan- protonix    Hematuria, microscopic     History of chicken pox     History of loop recorder     Hyperglycemia     Hyperlipidemia     Hypertension 01/04/2020    Injury of back     Injury of neck     Left bundle branch block (LBBB)     Malaise and fatigue     Medicare annual wellness visit, initial     with abnormal findings    Osteopenia 2022    Other specified menopausal and perimenopausal disorders     Other specified menopausal and postmenopausal disorders    Pap smear, low-risk     Renal insufficiency     S/P right rotator cuff repair     Screening for alcoholism     10 or more drinks per week    Screening for depression     Negative Depression Screening ( 9 or less) ()    Screening mammogram, encounter for     Shortness of breath     Stroke     1/5/2020 no residual effects    Syncope 01/22/2025    Vasovagal near-syncope     Impression: from illness and cough Discussed if there is loss of vision in an eye, confusion, weakness or numbness in an extremity, drooping of a side of the face, intractible pain, intractible vomiting, to go to the ER.    Vasovagal syncope 08/31/2020     Past Surgical History:   Procedure Laterality Date    ABLATION OF DYSRHYTHMIC FOCUS  8/2/2023    Ablation in upper spinal area    CARDIAC CATHETERIZATION N/A 01/27/2020    Procedure: Left Heart Cath  with angiogram;  Surgeon: Jermaine Delong MD;  Location:  [facility] CATH INVASIVE LOCATION;  Service: Cardiovascular    CARDIAC CATHETERIZATION N/A 01/27/2020    Procedure: Stent BOBBY coronary;  Surgeon: Jermaine Delong MD;  Location:  [facility] CATH INVASIVE LOCATION;  Service: Cardiovascular    CARDIAC CATHETERIZATION N/A 01/27/2020    Procedure: Left ventriculography;  Surgeon: Jermaine Delong MD;  Location:  [facility] CATH INVASIVE LOCATION;  Service: Cardiovascular    CARDIAC CATHETERIZATION N/A 01/27/2020    Procedure: Coronary angiography;  Surgeon: Jermaine Delong MD;  Location:  [facility] CATH INVASIVE LOCATION;  Service: Cardiovascular    CARDIAC ELECTROPHYSIOLOGY PROCEDURE N/A 02/05/2025    Procedure: Pacemaker DC new [employer] aware;  Surgeon: Alec Wong MD;  Location: [facility] CATH INVASIVE LOCATION;  Service: Cardiovascular;  Laterality: N/A;  [place]    CARDIAC SURGERY  2/5/2025    Pacemaker Placement    CATARACT EXTRACTION, BILATERAL Bilateral 1999    CHOLECYSTECTOMY  1993??    COLONOSCOPY  Last one 2023    CORONARY STENT PLACEMENT  1/28/2020    GALLBLADDER SURGERY  1996    INSERT / REPLACE / REMOVE PACEMAKER  2/5/25    LAPAROSCOPIC TUBAL LIGATION  1980    RENAL ARTERY STENT  01/26/2020    ROTATOR CUFF REPAIR Right 2002    TUBAL ABDOMINAL LIGATION  1979??     /71 (BP Location: Left arm, Patient Position: Sitting, Cuff Size: Adult) Comment: manual retake  Pulse 70   Wt 58.5 kg (129 lb)   LMP  (LMP Unknown)   SpO2 98%   BMI 25.19 kg/m²   Family History   Problem Relation Age of Onset    Hyperlipidemia Mother         Elevated cholesterol    Heart disease Mother     Heart attack Mother     Hypertension Father     Colon cancer Father     Hyperlipidemia Father         Elevated cholesterol    Heart disease Father     Cancer Father         Colon Cancer    Diabetes Brother     Heart disease Brother     Hyperlipidemia Brother     Hypertension Brother     Diabetes Paternal Aunt     Colon cancer Paternal Aunt      Cancer Paternal Aunt         Cant remember  name    Colon cancer Paternal Uncle     Diabetes Paternal Uncle     Diabetes Paternal Grandmother     Heart disease Paternal Grandmother     Arthritis Paternal Grandmother     Heart disease Maternal Uncle     Heart disease Maternal Uncle     Cancer Maternal Aunt     Cancer Paternal Uncle     Heart disease Maternal Uncle     Heart disease Maternal Uncle     Cancer Paternal Uncle     Cancer Maternal Aunt         Colon    Cancer Paternal Uncle         Colon    Breast cancer Neg Hx     Ovarian cancer Neg Hx        Current Outpatient Medications:     aspirin 81 MG EC tablet, Take 1 tablet by mouth Every Night., Disp: , Rfl:     calcium carbonate (OS-SHYLA) 600 MG tablet, Take 1 tablet by mouth 2 (Two) Times a Day With Meals., Disp: , Rfl:     fexofenadine (ALLEGRA) 180 MG tablet, Take 0.5 tablets by mouth Daily., Disp: , Rfl:     fluticasone (FLONASE) 50 MCG/ACT nasal spray, Administer 2 sprays into the nostril(s) as directed by provider Daily As Needed for Rhinitis., Disp: , Rfl:     Multiple Vitamins-Minerals (WOMENS MULTIVITAMIN + COLLAGEN PO), Take 1 tablet by mouth Daily., Disp: , Rfl:     pantoprazole (PROTONIX) 40 MG EC tablet, TAKE 1 TABLET BY MOUTH EVERY DAY IN THE MORNING BEFORE BREAKFAST, Disp: , Rfl:     polyethylene glycol (MIRALAX) 17 GM/SCOOP powder, Take 17 g by mouth Daily., Disp: , Rfl:     rosuvastatin (CRESTOR) 20 MG tablet, TAKE 1 TABLET BY MOUTH EVERY DAY, Disp: 90 tablet, Rfl: 3    valACYclovir (VALTREX) 500 MG tablet, Take 1 tablet by mouth Every 12 (Twelve) Hours. (Patient not taking: Reported on 6/6/2025), Disp: 6 tablet, Rfl: 12  No Known Allergies  Social History     Socioeconomic History    Marital status:    Tobacco Use    Smoking status: Never     Passive exposure: Past    Smokeless tobacco: Never    Tobacco comments:     Many family members smoked around me when I was a child   Vaping Use    Vaping status: Never Used   Substance  and Sexual Activity    Alcohol use: Yes     Alcohol/week: 2.0 standard drinks of alcohol     Types: 1 Glasses of wine, 1 Drinks containing 0.5 oz of alcohol per week     Comment: Both not weekly but only occassionally    Drug use: No    Sexual activity: Not Currently     Partners: Male     Birth control/protection: None     Comment: Very occasional     Review of Systems   Constitutional: Positive for malaise/fatigue.   Cardiovascular:  Positive for dyspnea on exertion. Negative for chest pain, leg swelling and palpitations.   Respiratory:  Negative for cough and shortness of breath.    Gastrointestinal:  Negative for abdominal pain, nausea and vomiting.   Neurological:  Negative for dizziness, headaches, light-headedness, numbness and weakness.   All other systems reviewed and are negative.         Objective:     Vitals reviewed.   Constitutional:       Appearance: Well-developed and not in distress.   Eyes:      Pupils: Pupils are equal, round, and reactive to light.   HENT:      Nose: Nose normal.   Pulmonary:      Effort: Pulmonary effort is normal.      Breath sounds: Normal breath sounds.   Cardiovascular:      Normal rate. Regular rhythm.   Pulses:     Intact distal pulses.   Edema:     Peripheral edema absent.   Abdominal:      Palpations: Abdomen is soft.   Musculoskeletal: Normal range of motion.      Cervical back: Normal range of motion and neck supple. Skin:     General: Skin is warm and dry.   Neurological:      General: No focal deficit present.      Mental Status: Alert and oriented to person, place and time.         Procedures      LAB RESULTS (LAST 7 DAYS)  Lab Review:   Echocardiogram   Results for orders placed during the hospital encounter of 01/22/25    Adult Transthoracic Echo Complete w/ Color, Spectral and Contrast if Necessary Per Protocol    Interpretation Summary    Left ventricular ejection fraction appears to be 61 - 65%.    Left ventricular diastolic function is consistent with (grade  I) impaired relaxation.    Estimated right ventricular systolic pressure from tricuspid regurgitation is normal (<35 mmHg).      Cardiac Catheterization   Results for orders placed during the hospital encounter of 20    Cardiac Catheterization/Vascular Study    Narrative  Heart Cath PCI Report    NAME:              Rachael Quevedo  :                1945  AGE/SEX:        74 y.o. female  MRN:                [id number]  ADM DATE:      [unfilled]  DOS:  ADM MD:          [unfilled]  ATT MD:           [unfilled]  REF MD:          [unfilled]      Pre-Procedure Notes  H&P Performed  []  Yes []  No       []  N/A    Indications:  []  ACS <= 24 HRS  []  ACS >24 HRS  [x]  New Onset Angina <= 2 mos  [x]  Worsening Angina  []  Resuscitated Cardiac Arrest  []  Angina on Exertion:  []  Suspected CAD  []  Valvular Disease  []  Pericardial Disease  []  Cardiac Arrythmia  []  Cardiomyopathy  []  LV Dysfunction  []  Syncope  []  Post Cardiac Transplant  []  Eval. For Exercise Clearance  []  Other  []  Pre-Operative Evaluation  If Pre-Op Eval:  Evaluation for Surgery Type:  []  Cardiac Surgery   []  Non-Cardiac Surgery  Functional Capacity:  []  <4 METS  []  >=4 METS w/o symptoms  []  >= 4 METS with symptoms  []  Unknown  Surgical Risk:  []  Low  []  Intermediate  []  High Risk: Vascular  []  High Risk Non-Vascular    Risks, Benefits, & Complications Discussed:  [x]  Yes  []  No  []  N/A    Questions Answered:  [x]  Yes  []  No  []  N/A    Consent Obtained:  [x]  Yes  []  No  []  N/A    CHF: []  Yes  [x]  No  If Yes:  Newly Diagnosed?  []  Yes  []  No  If Yes:  HF Type:  []  Diastolic  []  Systolic  []  Unknown      Procedure Description  The patient underwent successful [x]  Left  []  Right  []  Left & Right  Heart catheterization and coronary angiography via the  [x]  Femoral approach  []  Radial approach  []  Brachial approach    Procedure Narrative:  Informed consent was obtained from the patient after explaining risks  and benefits.  Patient was brought to the cardiac interventional artery and placed on the table in the usual fashion.  Right groin was shaved and prepped in sterile fashion.  2% again was used with midsternal anesthesia to the right groin.  A total of 20 cc was used.  A 6 Tanzanian sheath was placed in the right femoral artery.  A 6 Tanzanian pigtail catheter, 6 Tanz[place] JR4 catheter and a 6 [place] JL4 catheter was used for the procedure.  After the cardiac catheterization is complete, patient underwent a percutaneous coronary dimension.  After the cardiac intervention was complete patient was given aspirin Plavix and heparin per weight-based protocol.  A 6 [place] JL4 guide was used and left coronary artery was engaged.  A 190 cm wire was used and placed in the distal portion of the circumflex artery.  A 2.25/12 mm Xience stent was used and placed across the lesion and inflated at 12 james for 10 seconds.  Thereafter the balloon was taken out.  The wire was then placed in the LAD and a 2.5/12 mm Xience stent was used and placed across the lesion in the LAD and inflated at 12 james for 10 seconds.  Thereafter the balloon was taken out.  Reviewing angiogram showed no dissection or perforation.  Patient tolerated the procedure very well.  No complications noted.  Hemodynamic    LVEDP: 8  Estimated EF %: 60    Initial Aortic Pressure: 120/70    AV Gradient: No gradient    Rt. Heart Pressure:    Wall Motion:  Dominance:  [x]  Left  []  Right  []  Co-Dominant    Coronary Arteriography: (Please Code highest degree of stenosis)    Left Main %: 0  Proximal LAD %: 80%  Mid/Distal LAD %: 0  LCX %: OM1 with 90% ramus:  RCA %: 0  [place] %:  SVG(s) %:      PCI Procedure Notes    Segment #OM1  Culprit Lesion:  [x]  Yes  []  No  % Pre-Stenosis: 90%  pre-Proc TIMIFlow: NAOMI-3 flow  % Post-Stenosis: 0  Post-Proc TIMIFlow: NAOMI-3  Non-High/Non-C:     yes                     High/C:  Lesion Length (mm): 12 mm  Primary Device Used: Stent    Segment  #proximal LAD  Culprit Lesion:  [x]  Yes  []  No  % Pre-Stenosis: 80  Pre-Proc TIMIFlow: NAOMI-3 flow  % Post-Stenosis: 0%  Post-Proc TIMIFlow: NAOMI-3 flow  Non-High/Non-C:                          High/C:yes  Lesion Length (mm): 12 mm  Primary Device Used: Stent    Segment #  Culprit Lesion:  []  Yes  []  No  % Pre-Stenosis:  Pre-Proc TIMIFlow:  % Post-Stenosis:  Post-Proc TIMIFlow:  Non-High/Non-C:                          High/C:  Lesion Length (mm):  Primary Device Used:        Conscious Sedation:   [x]  Yes   []  No  No Flow Phenom:       []  Yes  [x]  No  Dissection:  []  Yes  [x]  No  Acute Closure: []  Yes  [x]  No  Perforation:  []  Yes  [x]  No    Complications: No complication    Estimated Blood Loss:  None      Impression and Recommendation: Severe two-vessel coronary artery disease  Normal LV function  Status post successful stent implantation of drug-eluting stents in the proximal LAD and the first marginal branch without any complications.    Electronically signed by Jermaine Delong MD, 01/27/20, 12:27 PM.      CBC        BMP        CMP         BNP        TROPONIN        CoAg        Creatinine Clearance  CrCl cannot be calculated (Patient's most recent lab result is older than the maximum 30 days allowed.).    ABG        Radiology  No radiology results for the last day            Assessment    Diagnoses and all orders for this visit:    1. Other fatigue (Primary)  -     Case Request Cath Lab: Left Heart Cath with angiogram  -     CBC & Differential; Future  -     Protime-INR; Future    2. SSS (sick sinus syndrome)    3. Sinus pause    4. Left bundle branch block (LBBB)  Overview:  Seeing Dr Delong      5. Pacemaker    6. Coronary artery disease involving native coronary artery of native heart without angina pectoris  Overview:  Cath by Dr Delong showed 80% LAD, 90% in circumflex with normal LV. Drug eluding stents placed in both.     Orders:  -     Case Request Cath Lab: Left Heart Cath with angiogram  -      CBC & Differential; Future  -     Basic Metabolic Panel; Future  -     Protime-INR; Future    7. Mixed hyperlipidemia                  MDM    Fatigue   LUNA   Progressively worsening since January  CBC, vitamin D, TSH unremarkable  Symptoms similar to when previously requiring cardiac intervention  Myoview study without evidence of ischemia  Echocardiogram with LVEF of 61 to 65%, grade 1 diastolic dysfunction  Remote device check shows device is working well, pacing less than 1%, no events recorded  Plan for outpatient cardiac catheterization   Risk/benefits discussed with patient and spouse     Coronary artery disease   History of PCI to proximal LAD and marginal branch (2020)   Denies chest pain  Recent Myoview without evidence of ischemia  Aspirin, statin    Sick sinus syndrome  Sinus Pause   Status post permanent pacemaker (2/5/2025)   Echocardiogram with normal LVEF, grade 1 diastolic dysfunction  Monitor study revealed significant pauses, 6 seconds and 19-second asystolic pulse  Pacemaker interrogation shows it is working well with no events in about 11 to 12 years battery longevity  Pacemaker maker site clean/dry/intact no erythema or hematoma noted     Hypertension  Blood pressure 150/73  Not currently on any antihypertensive medication  Monitor home pressure call office with readings for further medication titration  Goal BP less than 140/90    Dyslipidemia  Statin therapy  LDL 80  Goal LDL less than 70  Diet lifestyle modifications discussed    Ischemic CVA  Aspirin, statin  Previously followed with Dr Seipel   Upcoming appointment with neurology for evaluation of symptoms    Patient's previous medical records, labs, and EKG were reviewed and discussed with the patient at today's visit.     Electronically signed by BINA Klein, 06/06/25, 1:03 PM EDT.

## 2025-06-06 ENCOUNTER — OFFICE VISIT (OUTPATIENT)
Dept: CARDIOLOGY | Facility: CLINIC | Age: 80
End: 2025-06-06
Payer: MEDICARE

## 2025-06-06 VITALS
OXYGEN SATURATION: 98 % | WEIGHT: 129 LBS | HEART RATE: 70 BPM | SYSTOLIC BLOOD PRESSURE: 155 MMHG | DIASTOLIC BLOOD PRESSURE: 71 MMHG | BODY MASS INDEX: 25.19 KG/M2

## 2025-06-06 DIAGNOSIS — E78.2 MIXED HYPERLIPIDEMIA: ICD-10-CM

## 2025-06-06 DIAGNOSIS — I49.5 SSS (SICK SINUS SYNDROME): Chronic | ICD-10-CM

## 2025-06-06 DIAGNOSIS — I25.10 CORONARY ARTERY DISEASE INVOLVING NATIVE CORONARY ARTERY OF NATIVE HEART WITHOUT ANGINA PECTORIS: ICD-10-CM

## 2025-06-06 DIAGNOSIS — I45.5 SINUS PAUSE: Chronic | ICD-10-CM

## 2025-06-06 DIAGNOSIS — Z95.0 PACEMAKER: ICD-10-CM

## 2025-06-06 DIAGNOSIS — I44.7 LEFT BUNDLE BRANCH BLOCK (LBBB): ICD-10-CM

## 2025-06-06 DIAGNOSIS — R53.83 OTHER FATIGUE: Primary | ICD-10-CM

## 2025-06-06 PROCEDURE — 99214 OFFICE O/P EST MOD 30 MIN: CPT

## 2025-06-09 NOTE — PROGRESS NOTES
Subjective   Rachael Quevedo is a 79 y.o. female. Presents to [facility]    Chief Complaint   Patient presents with    Diabetes    Shortness of Breath       History of Present Illness  Patient does report she has seen her cardiologist pace maker is okay. She said that she has been having some SOA since having the pace maker placed but it is getting worse she feels like she is not back to herself.     Diabetes  Visit type:  Follow-up  Diabetes type:  Type 2  Disease course:  Stable  Associated symptoms:     fatigue      no chest pain and no weakness    Symptom course:  Stable  Hypoglycemia symptoms:     no dizziness, no headaches and no sweats    Blood glucose ranges (mg/dl) comment:  Does not check at home   Current diet:  Generally healthy  Meal planning:  Avoidance of concentrated sweets and low carbohydrate diet  Dietitian visit: no    Exercise:  Intermittently  ACE-I / ARB:  Is not being taken  Eye exam current: yes    Sees podiatrist: no    Shortness of Breath  Chronicity:  New  Onset:  1 to 4 weeks ago  Frequency:  Every several days  Fever:  No fever  Associated symptoms: leg swelling (3-4 weeks ago but has gotten better) and dry cough    Associated symptoms: no chest congestion, no chest pain, no congestion, no fever, no headaches, no leg pain, no sore throat, no syncope, no vomiting and no wheezing    Associated symptoms comment:  SOA on exertion    Aggravating factors:  Any activity and lying flat  Risk factors for DVT/PE:  No known risk factors  Treatments tried:  Rest       She sees Dr Flanagan for Cath on Monday    She has not felt right since she had the pacemaker put in. She is worried she has had a stroke maybe. She had visual change in a small sliver on the right edge on right eye.     She has also felt dizzy.  She sees neurology next month    She went to a [employer] last week, when she came out walking to truck. She could hardly get there. She felt she had to stop. She feels like she  has to push herself to do things. So this is why she is having a cath on Monday.     She has swelling in her legs in the evening but that went away.         I personally reviewed and updated the patient's allergies, medications, problem list, and past medical, surgical, social, and family history. I have reviewed and confirmed the accuracy of the History of Present Illness and Review of Symptoms as documented by the MA/LPN/RN. Alexa Shah MD    Allergies:  No Known Allergies    Social History:  Social History     Socioeconomic History    Marital status:    Tobacco Use    Smoking status: Never     Passive exposure: Past    Smokeless tobacco: Never    Tobacco comments:     Many family members smoked around me when I was a child   Vaping Use    Vaping status: Never Used   Substance and Sexual Activity    Alcohol use: Yes     Alcohol/week: 2.0 standard drinks of alcohol     Types: 1 Glasses of wine, 1 Drinks containing 0.5 oz of alcohol per week     Comment: Both not weekly but only occassionally    Drug use: No    Sexual activity: Not Currently     Partners: Male     Birth control/protection: None     Comment: Very occasional       Family History:  Family History   Problem Relation Age of Onset    Hyperlipidemia Mother         Elevated cholesterol    Heart disease Mother     Heart attack Mother     Hypertension Father     Colon cancer Father     Hyperlipidemia Father         Elevated cholesterol    Heart disease Father     Cancer Father         Colon Cancer    Diabetes Brother     Heart disease Brother     Hyperlipidemia Brother     Hypertension Brother     Diabetes Paternal Aunt     Colon cancer Paternal Aunt     Cancer Paternal Aunt         Cant remember  name    Colon cancer Paternal Uncle     Diabetes Paternal Uncle     Diabetes Paternal Grandmother     Heart disease Paternal Grandmother     Arthritis Paternal Grandmother     Heart disease Maternal Uncle     Heart disease Maternal Uncle      Cancer Maternal Aunt     Cancer Paternal Uncle     Heart disease Maternal Uncle     Heart disease Maternal Uncle     Cancer Paternal Uncle     Cancer Maternal Aunt         Colon    Cancer Paternal Uncle         Colon    Breast cancer Neg Hx     Ovarian cancer Neg Hx        Past Medical History :  Patient Active Problem List   Diagnosis    Coronary artery disease    Degeneration of intervertebral disc of lumbosacral region    Dense breast    Depression, major, recurrent, mild    GERD without esophagitis    History of chicken pox    Presence of other cardiac implants and grafts    Mixed hyperlipidemia    Left bundle branch block (LBBB)    Lumbar disc herniation with radiculopathy    Other specified menopausal and postmenopausal disorders    CVA (cerebral vascular accident)    Cervical radiculopathy    Long-term current use of opiate analgesic    DDD (degenerative disc disease), cervical    Medicare annual wellness visit, subsequent    Screening mammogram for breast cancer    Seasonal allergic rhinitis due to pollen    Overweight with body mass index (BMI) of 25 to 25.9 in adult    Type 2 diabetes mellitus without complication, without long-term current use of insulin    Neck mass    Thyroiditis    Lymphadenopathy    Oral phase dysphagia    Dysuria    Skin lesion    Mammogram declined    Syncope    Sinus pause    SSS (sick sinus syndrome)    Pacemaker    H/O cold sores    Elevated MCV    Hypermagnesemia    Other fatigue    Vision changes    Dizzy    Weakness       Medication List:    Current Outpatient Medications:     aspirin 81 MG EC tablet, Take 1 tablet by mouth Every Night., Disp: , Rfl:     calcium carbonate (OS-SHYLA) 600 MG tablet, Take 1 tablet by mouth 2 (Two) Times a Day With Meals., Disp: , Rfl:     fexofenadine (ALLEGRA) 180 MG tablet, Take 0.5 tablets by mouth Daily., Disp: , Rfl:     fluticasone (FLONASE) 50 MCG/ACT nasal spray, Administer 2 sprays into the nostril(s) as directed by provider Daily As  Needed for Rhinitis., Disp: , Rfl:     Multiple Vitamins-Minerals (WOMENS MULTIVITAMIN + COLLAGEN PO), Take 1 tablet by mouth Daily., Disp: , Rfl:     pantoprazole (PROTONIX) 40 MG EC tablet, TAKE 1 TABLET BY MOUTH EVERY DAY IN THE MORNING BEFORE BREAKFAST, Disp: , Rfl:     polyethylene glycol (MIRALAX) 17 GM/SCOOP powder, Take 17 g by mouth Daily., Disp: , Rfl:     rosuvastatin (CRESTOR) 20 MG tablet, TAKE 1 TABLET BY MOUTH EVERY DAY, Disp: 90 tablet, Rfl: 3    valACYclovir (VALTREX) 500 MG tablet, Take 1 tablet by mouth Every 12 (Twelve) Hours., Disp: 6 tablet, Rfl: 12  No current facility-administered medications for this visit.    Past Surgical History:  Past Surgical History:   Procedure Laterality Date    ABLATION OF DYSRHYTHMIC FOCUS  8/2/2023    Ablation in upper spinal area    CARDIAC CATHETERIZATION N/A 01/27/2020    Procedure: Left Heart Cath with angiogram;  Surgeon: Jermaine Delong MD;  [facility];  Service: Cardiovascular    CARDIAC CATHETERIZATION N/A 01/27/2020    Procedure: Stent BOBBY coronary;  Surgeon: Jermaine Delong MD;  [facility];  Service: Cardiovascular    CARDIAC CATHETERIZATION N/A 01/27/2020    Procedure: Left ventriculography;  Surgeon: Jermaine Delong MD;  [facility];  Service: Cardiovascular    CARDIAC CATHETERIZATION N/A 01/27/2020    Procedure: Coronary angiography;  Surgeon: Jermaine Delong MD;  [facility];  Service: Cardiovascular    CARDIAC ELECTROPHYSIOLOGY PROCEDURE N/A 02/05/2025    Procedure: Pacemaker DC new [employer] aware;  Surgeon: Alec Wong MD;  [facility];  Service: Cardiovascular;  Laterality: N/A;  [place]    CARDIAC SURGERY  2/5/2025    Pacemaker Placement    CATARACT EXTRACTION, BILATERAL Bilateral 1999    CHOLECYSTECTOMY  1993??    COLONOSCOPY  Last one 2023    CORONARY STENT PLACEMENT  1/28/2020    GALLBLADDER  "SURGERY  1996    INSERT / REPLACE / REMOVE PACEMAKER  2/5/25    LAPAROSCOPIC TUBAL LIGATION  1980    RENAL ARTERY STENT  01/26/2020    ROTATOR CUFF REPAIR Right 2002    TUBAL ABDOMINAL LIGATION  1979??         Physical Exam:      Vital Signs:    Vitals:    06/13/25 0900   BP: 146/88   Pulse: 83   Resp: 18   Temp: 97.6 °F (36.4 °C)   SpO2: 98%        /88   Pulse 83   Temp 97.6 °F (36.4 °C) (Temporal)   Resp 18   Ht 152.4 cm (60\")   Wt 58.4 kg (128 lb 12.8 oz)   LMP  (LMP Unknown)   SpO2 98%   BMI 25.15 kg/m²     Wt Readings from Last 3 Encounters:   06/16/25 58 kg (127 lb 13.9 oz)   06/13/25 58.4 kg (128 lb 12.8 oz)   06/06/25 58.5 kg (129 lb)       Result Review :   The following data was reviewed by: Alexa Shah MD on 06/13/2025:  A1C Last 3 Results          10/16/2024    09:01 2/28/2025    09:03 6/13/2025    09:41   HGBA1C Last 3 Results   Hemoglobin A1C 6.3  5.7  5.5           CT Head Without Contrast  Result Date: 6/13/2025  1.No evidence for acute intracranial abnormality. 2.Nonspecific white matter changes are noted with associated diffuse volume loss. These findings are likely related to chronic small vessel ischemic changes and/or age-related changes. 3.Changes of remote infarction are again noted involving the right basal ganglia. Electronically Signed: Scott Noyola MD  6/13/2025 10:17 AM EDT  Workstation ID: [id number]       Physical Exam  Vitals reviewed.   Constitutional:       Appearance: Normal appearance. She is well-developed.   HENT:      Head: Normocephalic and atraumatic.      Right Ear: Tympanic membrane normal.      Left Ear: Tympanic membrane normal.   Eyes:      General:         Right eye: No discharge.         Left eye: No discharge.      Extraocular Movements: Extraocular movements intact.      Pupils: Pupils are equal, round, and reactive to light.   Cardiovascular:      Rate and Rhythm: Normal rate and regular rhythm.      Heart sounds: Normal heart sounds. No murmur " heard.     No friction rub. No gallop.   Pulmonary:      Effort: Pulmonary effort is normal. No respiratory distress.      Breath sounds: Normal breath sounds. No wheezing or rales.   Musculoskeletal:         General: Normal range of motion.      Right lower leg: No edema.      Left lower leg: No edema.   Skin:     General: Skin is warm and dry.      Findings: No rash.   Neurological:      General: No focal deficit present.      Mental Status: She is alert and oriented to person, place, and time.      Cranial Nerves: No cranial nerve deficit.      Motor: No weakness.      Coordination: Coordination normal.      Gait: Gait normal.      Deep Tendon Reflexes: Reflexes normal.   Psychiatric:         Mood and Affect: Mood normal.         Behavior: Behavior is cooperative.         Assessment and Plan:    CT non diagnostic. Will get CTA  Problems Addressed this Visit          Cardiac and Vasculature    Presence of other cardiac implants and grafts (Chronic)    Pacemaker (Chronic)    Mixed hyperlipidemia     She is on crestor  Continue current treatment  Check labs         Relevant Orders    Comprehensive Metabolic Panel    Lipid Panel With / Chol / HDL Ratio       Endocrine and Metabolic    Type 2 diabetes mellitus without complication, without long-term current use of insulin - Primary    Diabetes is improving with lifestyle modifications.   Recommended a Mediterranean style of eating  Regular aerobic exercise.  Diabetes will be reassessed in 3 months         Relevant Orders    POC Glycosylated Hemoglobin (Hb A1C) (Completed)       Eye    Vision changes    CT negative  Carotid negative 1/2025  Will get CTA    Dicussed if there is loss of vision, confusion, weakness or numbness in an extremity, dropping of an eyelid or one side of the face, severe pain, intractable vomiting, go to the ER           Relevant Orders    CT Head Without Contrast (Completed)       Neuro    CVA (cerebral vascular accident)    She is on aspirin.  She is having fatigue and visual changes in her right eye.     CT scan negative    Will get CTA         Relevant Orders    CT Head Without Contrast (Completed)       Symptoms and Signs    Dizzy    Relevant Orders    CT Head Without Contrast (Completed)    Weakness    All over  So far labs negative. Heart work up on going    None on exam. FROM and 5/5 strength in ext    No focal neurological deficits.     Will getCTA            Other    Overweight with body mass index (BMI) of 25 to 25.9 in adult     Other Visit Diagnoses         SOB (shortness of breath)          Vitamin D deficiency        Relevant Orders    Vitamin D,25-Hydroxy          Diagnoses         Codes Comments      Type 2 diabetes mellitus without complication, without long-term current use of insulin    -  Primary ICD-10-CM: E11.9  ICD-9-CM: 250.00       Mixed hyperlipidemia     ICD-10-CM: E78.2  ICD-9-CM: 272.2       Overweight with body mass index (BMI) of 25 to 25.9 in adult     ICD-10-CM: E66.3, Z68.25  ICD-9-CM: 278.02, V85.21       SOB (shortness of breath)     ICD-10-CM: R06.02  ICD-9-CM: 786.05       Cerebrovascular accident (CVA) due to occlusion of small artery     ICD-10-CM: I63.81  ICD-9-CM: 434.91       Vision changes     ICD-10-CM: H53.9  ICD-9-CM: 368.9       Dizzy     ICD-10-CM: R42  ICD-9-CM: 780.4       Weakness     ICD-10-CM: R53.1  ICD-9-CM: 780.79       Vitamin D deficiency     ICD-10-CM: E55.9  ICD-9-CM: 268.9       Pacemaker     ICD-10-CM: Z95.0  ICD-9-CM: V45.01       Presence of other cardiac implants and grafts     ICD-10-CM: Z95.818  ICD-9-CM: V45.09              BMI is >= 25 and <30. (Overweight) The following options were offered after discussion;: weight loss educational material (shared in after visit summary), exercise counseling/recommendations, and nutrition counseling/recommendations           An After Visit Summary and PPPS were given to the patient.       This document is intended for medical expert use only. Reading  of this document by patients and/or patient's family without participating medical staff guidance may result in misinterpretation and unintended morbidity. Any interpretation of such data is the responsibility of the patient and/or family member responsible for the patient in concert with their primary or specialist providers, not to be left for sources of online searches such as [employer], [employer] or similar queries. Relying on these approaches to knowledge may result in misinterpretation, misguided goals of care and even death should patients or family members try recommendations outside of the realm of professional medical care.

## 2025-06-13 ENCOUNTER — OFFICE VISIT (OUTPATIENT)
Dept: FAMILY MEDICINE CLINIC | Facility: CLINIC | Age: 80
End: 2025-06-13
Payer: MEDICARE

## 2025-06-13 ENCOUNTER — HOSPITAL ENCOUNTER (OUTPATIENT)
Dept: CT IMAGING | Facility: HOSPITAL | Age: 80
Discharge: HOME | End: 2025-06-13
Payer: MEDICARE

## 2025-06-13 VITALS
BODY MASS INDEX: 25.29 KG/M2 | HEIGHT: 60 IN | TEMPERATURE: 97.6 F | WEIGHT: 128.8 LBS | DIASTOLIC BLOOD PRESSURE: 88 MMHG | SYSTOLIC BLOOD PRESSURE: 146 MMHG | HEART RATE: 83 BPM | OXYGEN SATURATION: 98 % | RESPIRATION RATE: 18 BRPM

## 2025-06-13 DIAGNOSIS — R06.02 SOB (SHORTNESS OF BREATH): ICD-10-CM

## 2025-06-13 DIAGNOSIS — R42 DIZZY: ICD-10-CM

## 2025-06-13 DIAGNOSIS — R53.1 WEAKNESS: ICD-10-CM

## 2025-06-13 DIAGNOSIS — I63.81 CEREBROVASCULAR ACCIDENT (CVA) DUE TO OCCLUSION OF SMALL ARTERY: ICD-10-CM

## 2025-06-13 DIAGNOSIS — E66.3 OVERWEIGHT WITH BODY MASS INDEX (BMI) OF 25 TO 25.9 IN ADULT: ICD-10-CM

## 2025-06-13 DIAGNOSIS — Z95.818 PRESENCE OF OTHER CARDIAC IMPLANTS AND GRAFTS: Chronic | ICD-10-CM

## 2025-06-13 DIAGNOSIS — H53.9 VISION CHANGES: ICD-10-CM

## 2025-06-13 DIAGNOSIS — E78.2 MIXED HYPERLIPIDEMIA: ICD-10-CM

## 2025-06-13 DIAGNOSIS — E11.9 TYPE 2 DIABETES MELLITUS WITHOUT COMPLICATION, WITHOUT LONG-TERM CURRENT USE OF INSULIN: Primary | ICD-10-CM

## 2025-06-13 DIAGNOSIS — Z95.0 PACEMAKER: Chronic | ICD-10-CM

## 2025-06-13 DIAGNOSIS — E55.9 VITAMIN D DEFICIENCY: ICD-10-CM

## 2025-06-13 LAB
EXPIRATION DATE: NORMAL
HBA1C MFR BLD: 5.5 % (ref 4.5–5.7)
Lab: NORMAL

## 2025-06-13 PROCEDURE — 70450 CT HEAD/BRAIN W/O DYE: CPT

## 2025-06-13 PROCEDURE — 3044F HG A1C LEVEL LT 7.0%: CPT | Performed by: FAMILY MEDICINE

## 2025-06-13 PROCEDURE — 83036 HEMOGLOBIN GLYCOSYLATED A1C: CPT | Performed by: FAMILY MEDICINE

## 2025-06-13 PROCEDURE — 99214 OFFICE O/P EST MOD 30 MIN: CPT | Performed by: FAMILY MEDICINE

## 2025-06-13 PROCEDURE — 1126F AMNT PAIN NOTED NONE PRSNT: CPT | Performed by: FAMILY MEDICINE

## 2025-06-16 ENCOUNTER — LAB (OUTPATIENT)
Dept: LAB | Facility: HOSPITAL | Age: 80
End: 2025-06-16
Payer: MEDICARE

## 2025-06-16 ENCOUNTER — HOSPITAL ENCOUNTER (OUTPATIENT)
Facility: HOSPITAL | Age: 80
Setting detail: HOSPITAL OUTPATIENT SURGERY
Discharge: HOME | End: 2025-06-16
Attending: INTERNAL MEDICINE | Admitting: INTERNAL MEDICINE
Payer: MEDICARE

## 2025-06-16 VITALS
OXYGEN SATURATION: 98 % | HEART RATE: 70 BPM | DIASTOLIC BLOOD PRESSURE: 58 MMHG | HEIGHT: 60 IN | BODY MASS INDEX: 25.1 KG/M2 | RESPIRATION RATE: 16 BRPM | SYSTOLIC BLOOD PRESSURE: 134 MMHG | WEIGHT: 127.87 LBS

## 2025-06-16 DIAGNOSIS — R53.83 OTHER FATIGUE: ICD-10-CM

## 2025-06-16 DIAGNOSIS — I25.10 CORONARY ARTERY DISEASE INVOLVING NATIVE CORONARY ARTERY OF NATIVE HEART WITHOUT ANGINA PECTORIS: ICD-10-CM

## 2025-06-16 LAB
ANION GAP SERPL CALCULATED.3IONS-SCNC: 9 MMOL/L (ref 5–15)
BASOPHILS # BLD AUTO: 0.06 10*3/MM3 (ref 0–0.2)
BASOPHILS NFR BLD AUTO: 0.8 % (ref 0–1.5)
BUN SERPL-MCNC: 12.5 MG/DL (ref 8–23)
BUN/CREAT SERPL: 12.6 (ref 7–25)
CALCIUM SPEC-SCNC: 9.4 MG/DL (ref 8.6–10.5)
CHLORIDE SERPL-SCNC: 110 MMOL/L (ref 98–107)
CO2 SERPL-SCNC: 21 MMOL/L (ref 22–29)
CREAT SERPL-MCNC: 0.99 MG/DL (ref 0.57–1)
DEPRECATED RDW RBC AUTO: 43.4 FL (ref 37–54)
EGFRCR SERPLBLD CKD-EPI 2021: 58.5 ML/MIN/1.73
EOSINOPHIL # BLD AUTO: 0.17 10*3/MM3 (ref 0–0.4)
EOSINOPHIL NFR BLD AUTO: 2.4 % (ref 0.3–6.2)
ERYTHROCYTE [DISTWIDTH] IN BLOOD BY AUTOMATED COUNT: 12.8 % (ref 12.3–15.4)
GLUCOSE SERPL-MCNC: 102 MG/DL (ref 65–99)
HCT VFR BLD AUTO: 44.2 % (ref 34–46.6)
HGB BLD-MCNC: 14.2 G/DL (ref 12–15.9)
IMM GRANULOCYTES # BLD AUTO: 0.02 10*3/MM3 (ref 0–0.05)
IMM GRANULOCYTES NFR BLD AUTO: 0.3 % (ref 0–0.5)
INR PPP: 0.98 (ref 0.9–1.1)
LYMPHOCYTES # BLD AUTO: 2.7 10*3/MM3 (ref 0.7–3.1)
LYMPHOCYTES NFR BLD AUTO: 37.9 % (ref 19.6–45.3)
MCH RBC QN AUTO: 29.6 PG (ref 26.6–33)
MCHC RBC AUTO-ENTMCNC: 32.1 G/DL (ref 31.5–35.7)
MCV RBC AUTO: 92.3 FL (ref 79–97)
MONOCYTES # BLD AUTO: 0.62 10*3/MM3 (ref 0.1–0.9)
MONOCYTES NFR BLD AUTO: 8.7 % (ref 5–12)
NEUTROPHILS NFR BLD AUTO: 3.55 10*3/MM3 (ref 1.7–7)
NEUTROPHILS NFR BLD AUTO: 49.9 % (ref 42.7–76)
NRBC BLD AUTO-RTO: 0 /100 WBC (ref 0–0.2)
PLATELET # BLD AUTO: 254 10*3/MM3 (ref 140–450)
PMV BLD AUTO: 11.9 FL (ref 6–12)
POTASSIUM SERPL-SCNC: 3.8 MMOL/L (ref 3.5–5.2)
PROTHROMBIN TIME: 12.9 SECONDS (ref 11.7–14.2)
RBC # BLD AUTO: 4.79 10*6/MM3 (ref 3.77–5.28)
SODIUM SERPL-SCNC: 140 MMOL/L (ref 136–145)
WBC NRBC COR # BLD AUTO: 7.12 10*3/MM3 (ref 3.4–10.8)

## 2025-06-16 PROCEDURE — 25010000002 MIDAZOLAM PER 1 MG: Performed by: INTERNAL MEDICINE

## 2025-06-16 PROCEDURE — C1894 INTRO/SHEATH, NON-LASER: HCPCS | Performed by: INTERNAL MEDICINE

## 2025-06-16 PROCEDURE — 80048 BASIC METABOLIC PNL TOTAL CA: CPT

## 2025-06-16 PROCEDURE — 93454 CORONARY ARTERY ANGIO S&I: CPT | Performed by: INTERNAL MEDICINE

## 2025-06-16 PROCEDURE — 25510000001 IOPAMIDOL PER 1 ML: Performed by: INTERNAL MEDICINE

## 2025-06-16 PROCEDURE — 85025 COMPLETE CBC W/AUTO DIFF WBC: CPT

## 2025-06-16 PROCEDURE — C1769 GUIDE WIRE: HCPCS | Performed by: INTERNAL MEDICINE

## 2025-06-16 PROCEDURE — 25010000002 FENTANYL CITRATE (PF) 100 MCG/2ML SOLUTION: Performed by: INTERNAL MEDICINE

## 2025-06-16 PROCEDURE — 25010000002 LIDOCAINE 2% SOLUTION: Performed by: INTERNAL MEDICINE

## 2025-06-16 PROCEDURE — 85610 PROTHROMBIN TIME: CPT

## 2025-06-16 PROCEDURE — 36415 COLL VENOUS BLD VENIPUNCTURE: CPT

## 2025-06-16 RX ORDER — LIDOCAINE HYDROCHLORIDE 20 MG/ML
INJECTION, SOLUTION INFILTRATION; PERINEURAL
Status: DISCONTINUED | OUTPATIENT
Start: 2025-06-16 | End: 2025-06-16 | Stop reason: HOSPADM

## 2025-06-16 RX ORDER — NITROGLYCERIN 0.4 MG/1
0.4 TABLET SUBLINGUAL
Status: CANCELLED | OUTPATIENT
Start: 2025-06-16

## 2025-06-16 RX ORDER — ACETAMINOPHEN 325 MG/1
650 TABLET ORAL EVERY 4 HOURS PRN
Status: CANCELLED | OUTPATIENT
Start: 2025-06-16

## 2025-06-16 RX ORDER — FENTANYL CITRATE 50 UG/ML
INJECTION, SOLUTION INTRAMUSCULAR; INTRAVENOUS
Status: DISCONTINUED | OUTPATIENT
Start: 2025-06-16 | End: 2025-06-16 | Stop reason: HOSPADM

## 2025-06-16 RX ORDER — MIDAZOLAM HYDROCHLORIDE 1 MG/ML
INJECTION, SOLUTION INTRAMUSCULAR; INTRAVENOUS
Status: DISCONTINUED | OUTPATIENT
Start: 2025-06-16 | End: 2025-06-16 | Stop reason: HOSPADM

## 2025-06-16 RX ORDER — IOPAMIDOL 755 MG/ML
INJECTION, SOLUTION INTRAVASCULAR
Status: DISCONTINUED | OUTPATIENT
Start: 2025-06-16 | End: 2025-06-16 | Stop reason: HOSPADM

## 2025-06-17 ENCOUNTER — TELEPHONE (OUTPATIENT)
Dept: CARDIAC REHAB | Facility: HOSPITAL | Age: 80
End: 2025-06-17
Payer: MEDICARE

## 2025-06-17 ENCOUNTER — TELEPHONE (OUTPATIENT)
Dept: FAMILY MEDICINE CLINIC | Facility: CLINIC | Age: 80
End: 2025-06-17

## 2025-06-17 NOTE — ASSESSMENT & PLAN NOTE
CT negative  Carotid negative 1/2025  Will get CTA    Dicussed if there is loss of vision, confusion, weakness or numbness in an extremity, dropping of an eyelid or one side of the face, severe pain, intractable vomiting, go to the ER

## 2025-06-17 NOTE — ASSESSMENT & PLAN NOTE
All over  So far labs negative. Heart work up on going    None on exam. FROM and 5/5 strength in ext    No focal neurological deficits.     Will getCTA

## 2025-06-17 NOTE — ASSESSMENT & PLAN NOTE
Diabetes is improving with lifestyle modifications.   Recommended a Mediterranean style of eating  Regular aerobic exercise.  Diabetes will be reassessed in 3 months

## 2025-06-17 NOTE — TELEPHONE ENCOUNTER
Called and spoke to patient advised to have labs done when she is fasting. She verbalized understanding. Also let her know that Dr. Shah has ordered a CTA of her head explained that is looks at the vessels in her head she understood pushing out the imaging about 2 weeks due to her just having a cath done.   Patient also mentioned her daughter that lives out of state has been looking into patient's previous reports she noticed there was  possible indication of something wrong with patient's heart valve she told malcolm that she would look into it her recent cath and compare. I told malcolm that her daughter is welcome to message us on [email or web address] since she has access to it or call us at the office since she is also on the verbal. She understood.

## 2025-06-17 NOTE — TELEPHONE ENCOUNTER
Patient reports palpitations two days ago but denies at this time  Patient reports previous attempts of cardioversion that were unsuccessful   Reports compliance with Eliquis, no missed doses   Remained in NSR throughout observation period     Plan   Rate/Rhythm Control: Sotalol  Antiplatelet/Anticoagulation: Eliquis  Outpatient follow-up with cardiology   Pt called regarding Cardiac Rehab referral .  Pt does not qualify at this time.  Pt notified.

## 2025-06-17 NOTE — TELEPHONE ENCOUNTER
"  Caller: Rachael Quevedo \"Carley\"    Relationship: Self    Best call back number: [phone]         Who are you requesting to speak with (clinical staff, provider,  specific staff member): CLINICAL       What was the call regarding: PATIENT IS CALLING TO REPORT THAT SHE HAD A HEART CATH DONE 6-16-25, [facility], STATES NO PROBLEMS WITH PROCEDURE, REQUESTING CALL BACK FROM [place]  THE ASSISTANT TO DR CONTRERAS TO FIND OUT NEXT STEPS IN TREATMENT PLAN.    Is it okay if the provider responds through [employer]: PREFER CALL BACK       "

## 2025-06-17 NOTE — ASSESSMENT & PLAN NOTE
She is on aspirin. She is having fatigue and visual changes in her right eye.     CT scan negative    Will get CTA

## 2025-06-26 ENCOUNTER — CLINICAL SUPPORT (OUTPATIENT)
Dept: FAMILY MEDICINE CLINIC | Facility: CLINIC | Age: 80
End: 2025-06-26
Payer: MEDICARE

## 2025-06-26 DIAGNOSIS — E87.5 SERUM POTASSIUM ELEVATED: Primary | ICD-10-CM

## 2025-06-27 LAB
BUN SERPL-MCNC: 13 MG/DL (ref 8–27)
BUN/CREAT SERPL: 14 (ref 12–28)
CALCIUM SERPL-MCNC: 9.8 MG/DL (ref 8.7–10.3)
CHLORIDE SERPL-SCNC: 105 MMOL/L (ref 96–106)
CO2 SERPL-SCNC: 21 MMOL/L (ref 20–29)
CREAT SERPL-MCNC: 0.92 MG/DL (ref 0.57–1)
EGFRCR SERPLBLD CKD-EPI 2021: 64 ML/MIN/1.73
GLUCOSE SERPL-MCNC: 136 MG/DL (ref 70–99)
POTASSIUM SERPL-SCNC: 4.6 MMOL/L (ref 3.5–5.2)
SODIUM SERPL-SCNC: 142 MMOL/L (ref 134–144)

## 2025-07-01 ENCOUNTER — HOSPITAL ENCOUNTER (OUTPATIENT)
Dept: CT IMAGING | Facility: HOSPITAL | Age: 80
Discharge: HOME | End: 2025-07-01
Admitting: FAMILY MEDICINE
Payer: MEDICARE

## 2025-07-01 DIAGNOSIS — H53.9 VISION CHANGES: ICD-10-CM

## 2025-07-01 DIAGNOSIS — R42 DIZZY: ICD-10-CM

## 2025-07-01 DIAGNOSIS — R53.1 WEAKNESS: ICD-10-CM

## 2025-07-01 PROCEDURE — 70496 CT ANGIOGRAPHY HEAD: CPT

## 2025-07-01 PROCEDURE — 25510000001 IOPAMIDOL PER 1 ML: Performed by: FAMILY MEDICINE

## 2025-07-01 RX ORDER — IOPAMIDOL 755 MG/ML
100 INJECTION, SOLUTION INTRAVASCULAR
Status: COMPLETED | OUTPATIENT
Start: 2025-07-01 | End: 2025-07-01

## 2025-07-01 RX ADMIN — IOPAMIDOL 100 ML: 755 INJECTION, SOLUTION INTRAVENOUS at 09:15

## 2025-07-09 ENCOUNTER — HOSPITAL ENCOUNTER (OUTPATIENT)
Facility: HOSPITAL | Age: 80
Setting detail: OBSERVATION
Discharge: HOME | End: 2025-07-10
Attending: EMERGENCY MEDICINE | Admitting: EMERGENCY MEDICINE
Payer: MEDICARE

## 2025-07-09 ENCOUNTER — TELEPHONE (OUTPATIENT)
Dept: CARDIOLOGY | Facility: CLINIC | Age: 80
End: 2025-07-09
Payer: MEDICARE

## 2025-07-09 DIAGNOSIS — R55 SYNCOPE, UNSPECIFIED SYNCOPE TYPE: Primary | ICD-10-CM

## 2025-07-09 DIAGNOSIS — R53.1 WEAKNESS: ICD-10-CM

## 2025-07-09 LAB
ALBUMIN SERPL-MCNC: 4.1 G/DL (ref 3.5–5.2)
ALBUMIN/GLOB SERPL: 1.6 G/DL
ALP SERPL-CCNC: 66 U/L (ref 39–117)
ALT SERPL W P-5'-P-CCNC: 19 U/L (ref 1–33)
ANION GAP SERPL CALCULATED.3IONS-SCNC: 12.9 MMOL/L (ref 5–15)
AST SERPL-CCNC: 25 U/L (ref 1–32)
BASOPHILS # BLD AUTO: 0.05 10*3/MM3 (ref 0–0.2)
BASOPHILS NFR BLD AUTO: 0.8 % (ref 0–1.5)
BILIRUB SERPL-MCNC: 0.4 MG/DL (ref 0–1.2)
BUN SERPL-MCNC: 10.7 MG/DL (ref 8–23)
BUN/CREAT SERPL: 12.7 (ref 7–25)
CALCIUM SPEC-SCNC: 9.8 MG/DL (ref 8.6–10.5)
CHLORIDE SERPL-SCNC: 107 MMOL/L (ref 98–107)
CO2 SERPL-SCNC: 20.1 MMOL/L (ref 22–29)
CREAT SERPL-MCNC: 0.84 MG/DL (ref 0.57–1)
DEPRECATED RDW RBC AUTO: 44.6 FL (ref 37–54)
EGFRCR SERPLBLD CKD-EPI 2021: 71.2 ML/MIN/1.73
EOSINOPHIL # BLD AUTO: 0.13 10*3/MM3 (ref 0–0.4)
EOSINOPHIL NFR BLD AUTO: 2.2 % (ref 0.3–6.2)
ERYTHROCYTE [DISTWIDTH] IN BLOOD BY AUTOMATED COUNT: 13.2 % (ref 12.3–15.4)
GEN 5 1HR TROPONIN T REFLEX: 14 NG/L
GLOBULIN UR ELPH-MCNC: 2.6 GM/DL
GLUCOSE SERPL-MCNC: 107 MG/DL (ref 65–99)
HCT VFR BLD AUTO: 42.1 % (ref 34–46.6)
HGB BLD-MCNC: 13.5 G/DL (ref 12–15.9)
IMM GRANULOCYTES # BLD AUTO: 0.01 10*3/MM3 (ref 0–0.05)
IMM GRANULOCYTES NFR BLD AUTO: 0.2 % (ref 0–0.5)
LYMPHOCYTES # BLD AUTO: 1.87 10*3/MM3 (ref 0.7–3.1)
LYMPHOCYTES NFR BLD AUTO: 31.5 % (ref 19.6–45.3)
MAGNESIUM SERPL-MCNC: 2.3 MG/DL (ref 1.6–2.4)
MCH RBC QN AUTO: 29.5 PG (ref 26.6–33)
MCHC RBC AUTO-ENTMCNC: 32.1 G/DL (ref 31.5–35.7)
MCV RBC AUTO: 92.1 FL (ref 79–97)
MONOCYTES # BLD AUTO: 0.54 10*3/MM3 (ref 0.1–0.9)
MONOCYTES NFR BLD AUTO: 9.1 % (ref 5–12)
NEUTROPHILS NFR BLD AUTO: 3.33 10*3/MM3 (ref 1.7–7)
NEUTROPHILS NFR BLD AUTO: 56.2 % (ref 42.7–76)
NRBC BLD AUTO-RTO: 0 /100 WBC (ref 0–0.2)
NT-PROBNP SERPL-MCNC: 431 PG/ML (ref 0–1800)
PLATELET # BLD AUTO: 221 10*3/MM3 (ref 140–450)
PMV BLD AUTO: 12.7 FL (ref 6–12)
POTASSIUM SERPL-SCNC: 3.8 MMOL/L (ref 3.5–5.2)
PROT SERPL-MCNC: 6.7 G/DL (ref 6–8.5)
RBC # BLD AUTO: 4.57 10*6/MM3 (ref 3.77–5.28)
SODIUM SERPL-SCNC: 140 MMOL/L (ref 136–145)
TROPONIN T % DELTA: -7
TROPONIN T NUMERIC DELTA: -1 NG/L
TROPONIN T SERPL HS-MCNC: 15 NG/L
TSH SERPL DL<=0.05 MIU/L-ACNC: 2.22 UIU/ML (ref 0.27–4.2)
WBC NRBC COR # BLD AUTO: 5.93 10*3/MM3 (ref 3.4–10.8)
WHOLE BLOOD HOLD COAG: NORMAL

## 2025-07-09 PROCEDURE — 99214 OFFICE O/P EST MOD 30 MIN: CPT | Performed by: INTERNAL MEDICINE

## 2025-07-09 PROCEDURE — 36415 COLL VENOUS BLD VENIPUNCTURE: CPT

## 2025-07-09 PROCEDURE — 93005 ELECTROCARDIOGRAM TRACING: CPT | Performed by: EMERGENCY MEDICINE

## 2025-07-09 PROCEDURE — G0378 HOSPITAL OBSERVATION PER HR: HCPCS

## 2025-07-09 PROCEDURE — 83880 ASSAY OF NATRIURETIC PEPTIDE: CPT | Performed by: INTERNAL MEDICINE

## 2025-07-09 PROCEDURE — 80053 COMPREHEN METABOLIC PANEL: CPT | Performed by: EMERGENCY MEDICINE

## 2025-07-09 PROCEDURE — 99285 EMERGENCY DEPT VISIT HI MDM: CPT

## 2025-07-09 PROCEDURE — 83735 ASSAY OF MAGNESIUM: CPT | Performed by: EMERGENCY MEDICINE

## 2025-07-09 PROCEDURE — 85025 COMPLETE CBC W/AUTO DIFF WBC: CPT | Performed by: EMERGENCY MEDICINE

## 2025-07-09 PROCEDURE — 84484 ASSAY OF TROPONIN QUANT: CPT | Performed by: EMERGENCY MEDICINE

## 2025-07-09 PROCEDURE — 96372 THER/PROPH/DIAG INJ SC/IM: CPT

## 2025-07-09 PROCEDURE — 84443 ASSAY THYROID STIM HORMONE: CPT | Performed by: PHYSICIAN ASSISTANT

## 2025-07-09 PROCEDURE — 25010000002 ENOXAPARIN PER 10 MG: Performed by: PHYSICIAN ASSISTANT

## 2025-07-09 RX ORDER — SODIUM CHLORIDE 0.9 % (FLUSH) 0.9 %
10 SYRINGE (ML) INJECTION AS NEEDED
Status: DISCONTINUED | OUTPATIENT
Start: 2025-07-09 | End: 2025-07-10 | Stop reason: HOSPADM

## 2025-07-09 RX ORDER — POLYETHYLENE GLYCOL 3350 17 G/17G
17 POWDER, FOR SOLUTION ORAL DAILY
Status: DISCONTINUED | OUTPATIENT
Start: 2025-07-09 | End: 2025-07-10 | Stop reason: HOSPADM

## 2025-07-09 RX ORDER — PANTOPRAZOLE SODIUM 40 MG/1
40 TABLET, DELAYED RELEASE ORAL DAILY
Status: DISCONTINUED | OUTPATIENT
Start: 2025-07-10 | End: 2025-07-10 | Stop reason: HOSPADM

## 2025-07-09 RX ORDER — ALUMINA, MAGNESIA, AND SIMETHICONE 2400; 2400; 240 MG/30ML; MG/30ML; MG/30ML
15 SUSPENSION ORAL EVERY 6 HOURS PRN
Status: DISCONTINUED | OUTPATIENT
Start: 2025-07-09 | End: 2025-07-10 | Stop reason: HOSPADM

## 2025-07-09 RX ORDER — ACETAMINOPHEN 325 MG/1
650 TABLET ORAL EVERY 6 HOURS PRN
Status: DISCONTINUED | OUTPATIENT
Start: 2025-07-09 | End: 2025-07-10 | Stop reason: HOSPADM

## 2025-07-09 RX ORDER — SODIUM CHLORIDE 9 MG/ML
40 INJECTION, SOLUTION INTRAVENOUS AS NEEDED
Status: DISCONTINUED | OUTPATIENT
Start: 2025-07-09 | End: 2025-07-10 | Stop reason: HOSPADM

## 2025-07-09 RX ORDER — ROSUVASTATIN CALCIUM 10 MG/1
20 TABLET, COATED ORAL DAILY
Status: DISCONTINUED | OUTPATIENT
Start: 2025-07-09 | End: 2025-07-09

## 2025-07-09 RX ORDER — ENOXAPARIN SODIUM 100 MG/ML
40 INJECTION SUBCUTANEOUS DAILY
Status: DISCONTINUED | OUTPATIENT
Start: 2025-07-09 | End: 2025-07-10 | Stop reason: HOSPADM

## 2025-07-09 RX ORDER — ONDANSETRON 2 MG/ML
4 INJECTION INTRAMUSCULAR; INTRAVENOUS EVERY 6 HOURS PRN
Status: DISCONTINUED | OUTPATIENT
Start: 2025-07-09 | End: 2025-07-10 | Stop reason: HOSPADM

## 2025-07-09 RX ORDER — NITROGLYCERIN 0.4 MG/1
0.4 TABLET SUBLINGUAL
Status: DISCONTINUED | OUTPATIENT
Start: 2025-07-09 | End: 2025-07-10 | Stop reason: HOSPADM

## 2025-07-09 RX ORDER — VALACYCLOVIR HYDROCHLORIDE 500 MG/1
500 TABLET, FILM COATED ORAL 2 TIMES DAILY PRN
COMMUNITY

## 2025-07-09 RX ORDER — SODIUM CHLORIDE 0.9 % (FLUSH) 0.9 %
10 SYRINGE (ML) INJECTION EVERY 12 HOURS SCHEDULED
Status: DISCONTINUED | OUTPATIENT
Start: 2025-07-09 | End: 2025-07-10 | Stop reason: HOSPADM

## 2025-07-09 RX ORDER — ROSUVASTATIN CALCIUM 10 MG/1
20 TABLET, COATED ORAL NIGHTLY
Status: DISCONTINUED | OUTPATIENT
Start: 2025-07-09 | End: 2025-07-10 | Stop reason: HOSPADM

## 2025-07-09 RX ORDER — ONDANSETRON 4 MG/1
4 TABLET, ORALLY DISINTEGRATING ORAL EVERY 6 HOURS PRN
Status: DISCONTINUED | OUTPATIENT
Start: 2025-07-09 | End: 2025-07-10 | Stop reason: HOSPADM

## 2025-07-09 RX ORDER — ASPIRIN 81 MG/1
81 TABLET ORAL NIGHTLY
Status: DISCONTINUED | OUTPATIENT
Start: 2025-07-09 | End: 2025-07-10 | Stop reason: HOSPADM

## 2025-07-09 RX ADMIN — ENOXAPARIN SODIUM 40 MG: 100 INJECTION SUBCUTANEOUS at 15:58

## 2025-07-09 RX ADMIN — Medication 10 ML: at 14:11

## 2025-07-09 RX ADMIN — ACETAMINOPHEN 650 MG: 325 TABLET, FILM COATED ORAL at 21:32

## 2025-07-09 RX ADMIN — Medication 10 ML: at 21:42

## 2025-07-09 RX ADMIN — ACETAMINOPHEN 650 MG: 325 TABLET, FILM COATED ORAL at 14:31

## 2025-07-09 RX ADMIN — ROSUVASTATIN CALCIUM 20 MG: 10 TABLET, FILM COATED ORAL at 21:42

## 2025-07-09 RX ADMIN — ASPIRIN 81 MG: 81 TABLET, COATED ORAL at 21:34

## 2025-07-09 NOTE — CASE MANAGEMENT/SOCIAL WORK
Discharge Planning Assessment  [facility]     Patient Name: Rachael Quevedo  MRN: [id number]  Today's Date: 7/9/2025    Admit Date: 7/9/2025    Plan: Routine home w/ spouse. Gene for DC transport.   Discharge Needs Assessment       Row Name 07/09/25 1322       Living Environment    People in Home spouse    Name(s) of People in Home  Gene    Current Living Arrangements home    Potentially Unsafe Housing Conditions none    In the past 12 months has the electric, gas, oil, or water company threatened to shut off services in your home? No    Primary Care Provided by self    Provides Primary Care For no one    Family Caregiver if Needed spouse    Family Caregiver Names Gene    Quality of Family Relationships helpful;involved;supportive    Able to Return to Prior Arrangements yes       Resource/Environmental Concerns    Resource/Environmental Concerns none    Transportation Concerns none       Transportation Needs    In the past 12 months, has lack of transportation kept you from medical appointments or from getting medications? no    In the past 12 months, has lack of transportation kept you from meetings, work, or from getting things needed for daily living? No       Food Insecurity    Within the past 12 months, you worried that your food would run out before you got the money to buy more. Never true    Within the past 12 months, the food you bought just didn't last and you didn't have money to get more. Never true       Transition Planning    Patient/Family Anticipates Transition to home with family    Patient/Family Anticipated Services at Transition none    Transportation Anticipated car, drives self;family or friend will provide       Discharge Needs Assessment    Readmission Within the Last 30 Days no previous admission in last 30 days    Equipment Currently Used at Home bp cuff    Concerns to be Addressed denies needs/concerns at this time    Anticipated Changes Related to Illness none    Equipment Needed  After Discharge none                   Discharge Plan       Row Name 07/09/25 1324       Plan    Plan Routine home w/ spouse. Gene for DC transport.    Plan Comments CM met with patient and  Gene at the bedside. Confirmed PCP, insurance, and pharmacy. Patient agreeable in M2B. Patient denies any difficulty affording medications. Patient is not current with any HHC/OPPT/OT services. Patient lives at home with her , is independent with ADLS/IADLS, and drives. DC Barriers: Cardiology consult pending.                  Continued Care and Services - Admitted Since 7/9/2025    No active coordination exists.          Demographic Summary       Row Name 07/09/25 1321       General Information    Admission Type observation    Arrived From emergency department    Required Notices Provided Observation Status Notice    Referral Source admission list    Reason for Consult discharge planning    Preferred Language English       Contact Information    Permission Granted to Share Info With     Contact Information Obtained for                    Functional Status       Row Name 07/09/25 1322       Functional Status    Usual Activity Tolerance good    Current Activity Tolerance good       Functional Status, IADL    Medications independent    Meal Preparation independent    Housekeeping independent    Laundry independent    Shopping independent           Corona Ricks RN      Cell number [phone]  Office number [phone]

## 2025-07-09 NOTE — TELEPHONE ENCOUNTER
Called patient back and I verbally expressed she will be fully evaluated in the ED for further evaluation on her symptoms of passing out and being severally dizzy.

## 2025-07-09 NOTE — TELEPHONE ENCOUNTER
"HUB WAS UNABLE TO WARM TRANSFER TO PRACTICE    Caller: Rachael Quevedo \"Carley\"    Relationship: Self    Best call back number: [phone]    What is the best time to reach you:ANY       What was the call regarding: PATIENT STATED THAT SHE IS ON HER WAY TO HOSPITAL AND SHE IS GOING TO [facility] BECAUSE IT IS THE [facility]. PATIENT STATED THAT SHE HAS ALMOST PASSED OUT THIS MORNING AROUND 8 AM AND IS HAVING A HEADACHE. PATIENT WOULD LIKE A CALL BACK AS SOON AS POSSIBLE. THANK YOU.    Is it okay if the provider responds through MyChart: CALL      "

## 2025-07-09 NOTE — PLAN OF CARE
Goal Outcome Evaluation:      A/Ox4, RA adlib with SBA R/T syncope DX. No C/O pain no n,v noted. Consulted cardiologist

## 2025-07-09 NOTE — H&P
Formerly [facility] Observation Unit H&P    Patient Name: Rachael Quevedo  : 1945  MRN: [id number]  Primary Care Physician: Alexa Shah MD  Date of admission: 2025     Patient Care Team:  Alexa Shah MD as PCP - General (Family Medicine)  Alexa Shah MD as PCP - Family Medicine  Jermaine Delong MD as Consulting Physician (Cardiology)  Pallavi Valdes APRN as Nurse Practitioner (Cardiology)          Subjective   History Present Illness     Chief Complaint:   Chief Complaint   Patient presents with    Syncope         History of Present Illness  Patient reports that on the morning of the day of presentation while seated at the kitchen table but without significant exertion she began to get a sensation in her abdomen and chest which has been similar to whenever she has had issues with syncope and near syncope before.  She denies any sort of pain just reports that it is a familiar symptom that typically precedes these events.  She felt lightheaded as if she were going to pass out and her  noted that she became very pale and diaphoretic.  Patient denies complete loss of consciousness but did feel very close to losing consciousness.  They report the symptoms lasted for approximately 5 minutes before slowly resolving spontaneously.  No falls or trauma reported and she denies any recent medication changes.  She did have pacemaker placed in 2025.      Review of Systems   Constitutional: Positive for diaphoresis.   HENT: Negative.     Eyes: Negative.    Cardiovascular:  Positive for near-syncope.   Respiratory: Negative.     Skin: Negative.    Musculoskeletal: Negative.    Gastrointestinal: Negative.    Genitourinary: Negative.    Neurological: Negative.    Psychiatric/Behavioral: Negative.           Personal History     Past Medical History:   Past Medical History:   Diagnosis Date    Allergic     Ankle fracture, left     Comments: 2016    Arthritis     Bursitis     Cataract, acquired      Coronary artery disease     Cough     Impression: Most likely reactive airway prescription as below Start antihistamine at home    Dense breast     Depression, major, recurrent, mild     Diverticulosis     GERD without esophagitis     Comments: Dr Duncan- protonix    Hematuria, microscopic     History of chicken pox     History of loop recorder     Hyperglycemia     Hyperlipidemia     Hypertension 01/04/2020    Injury of back     Injury of neck     Left bundle branch block (LBBB)     Malaise and fatigue     Medicare annual wellness visit, initial     with abnormal findings    Osteopenia 2022    Other specified menopausal and perimenopausal disorders     Other specified menopausal and postmenopausal disorders    Pap smear, low-risk     Renal insufficiency     S/P right rotator cuff repair     Screening for alcoholism     10 or more drinks per week    Screening for depression     Negative Depression Screening ( 9 or less) ()    Screening mammogram, encounter for     Shortness of breath     Stroke     1/5/2020 no residual effects    Syncope 01/22/2025    Vasovagal near-syncope     Impression: from illness and cough Discussed if there is loss of vision in an eye, confusion, weakness or numbness in an extremity, drooping of a side of the face, intractible pain, intractible vomiting, to go to the ER.    Vasovagal syncope 08/31/2020       Surgical History:      Past Surgical History:   Procedure Laterality Date    ABLATION OF DYSRHYTHMIC FOCUS  8/2/2023    Ablation in upper spinal area    CARDIAC CATHETERIZATION N/A 01/27/2020    Procedure: Left Heart Cath with angiogram;  Surgeon: Jermaine Delong MD;  Location: [facility] CATH INVASIVE LOCATION;  Service: Cardiovascular    CARDIAC CATHETERIZATION N/A 01/27/2020    Procedure: Stent BOBBY coronary;  Surgeon: Jermaine Delong MD;  Location:  [facility] CATH INVASIVE LOCATION;  Service: Cardiovascular    CARDIAC CATHETERIZATION N/A 01/27/2020    Procedure: Left ventriculography;   Surgeon: Jermaine Delong MD;  Location: [facility] CATH INVASIVE LOCATION;  Service: Cardiovascular    CARDIAC CATHETERIZATION N/A 01/27/2020    Procedure: Coronary angiography;  Surgeon: Jermaine Delong MD;  Location:  [facility] CATH INVASIVE LOCATION;  Service: Cardiovascular    CARDIAC CATHETERIZATION N/A 6/16/2025    Procedure: Left Heart Cath with angiogram;  Surgeon: Jermaine Delong MD;  Location: [facility] CATH INVASIVE LOCATION;  Service: Cardiovascular;  Laterality: N/A;    CARDIAC ELECTROPHYSIOLOGY PROCEDURE N/A 02/05/2025    Procedure: Pacemaker DC new [employer] aware;  Surgeon: Alec Wong MD;  Location: [facility] CATH INVASIVE LOCATION;  Service: Cardiovascular;  Laterality: N/A;  [place]    CARDIAC SURGERY  2/5/2025    Pacemaker Placement    CATARACT EXTRACTION, BILATERAL Bilateral 1999    CHOLECYSTECTOMY  1993??    COLONOSCOPY  Last one 2023    CORONARY STENT PLACEMENT  1/28/2020    GALLBLADDER SURGERY  1996    INSERT / REPLACE / REMOVE PACEMAKER  2/5/25    LAPAROSCOPIC TUBAL LIGATION  1980    RENAL ARTERY STENT  01/26/2020    ROTATOR CUFF REPAIR Right 2002    TUBAL ABDOMINAL LIGATION  1979??           Family History: family history includes Arthritis in her paternal grandmother; Cancer in her father, maternal aunt, maternal aunt, paternal aunt, paternal uncle, paternal uncle, and paternal uncle; Colon cancer in her father, paternal aunt, and paternal uncle; Diabetes in her brother, paternal aunt, paternal grandmother, and paternal uncle; Heart attack in her mother; Heart disease in her brother, father, maternal uncle, maternal uncle, maternal uncle, maternal uncle, mother, and paternal grandmother; Hyperlipidemia in her brother, father, and mother; Hypertension in her brother and father. Otherwise pertinent FHx was reviewed and unremarkable.     Social History:  reports that she has never smoked. She has been exposed to tobacco smoke. She has never used smokeless tobacco. She reports current alcohol use  of about 2.0 standard drinks of alcohol per week. She reports that she does not use drugs.      Medications:  Prior to Admission medications    Medication Sig Start Date End Date Taking? Authorizing Provider   aspirin 81 MG EC tablet Take 1 tablet by mouth Every Night.   Yes Jewel Briceno MD   calcium carbonate (OS-SHYLA) 600 MG tablet Take 1 tablet by mouth 2 (Two) Times a Day With Meals.   Yes Jewel Briceno MD   fexofenadine (ALLEGRA) 180 MG tablet Take 0.5 tablets by mouth Daily.   Yes Jewel Briceno MD   Multiple Vitamins-Minerals (WOMENS MULTIVITAMIN + COLLAGEN PO) Take 1 tablet by mouth Daily. 12/1/22  Yes Jewel Briceno MD   pantoprazole (PROTONIX) 40 MG EC tablet Take 1 tablet by mouth Daily. 1/11/24  Yes Jewel Briceno MD   polyethylene glycol (MIRALAX) 17 GM/SCOOP powder Take 17 g by mouth Daily.   Yes Jewel Briceno MD   rosuvastatin (CRESTOR) 20 MG tablet TAKE 1 TABLET BY MOUTH EVERY DAY 12/19/24  Yes Alexa Shah MD   fluticasone (FLONASE) 50 MCG/ACT nasal spray Administer 2 sprays into the nostril(s) as directed by provider Daily As Needed for Rhinitis.    Jewel Briceno MD   valACYclovir (VALTREX) 500 MG tablet Take 1 tablet by mouth 2 (Two) Times a Day As Needed (cold sores).    Jewel Briceno MD   valACYclovir (VALTREX) 500 MG tablet Take 1 tablet by mouth Every 12 (Twelve) Hours. 2/28/25 7/9/25  Alexa Shah MD       Allergies:  No Known Allergies    Objective   Objective     Vital Signs  Temp:  [97.8 °F (36.6 °C)-98.1 °F (36.7 °C)] 98.1 °F (36.7 °C)  Heart Rate:  [64-73] 69  Resp:  [14-17] 16  BP: (134-159)/(60-81) 134/60  SpO2:  [97 %-100 %] 97 %  on   ;   Device (Oxygen Therapy): room air  Body mass index is 25.14 kg/m².    Physical Exam  Vitals reviewed.   Constitutional:       General: She is not in acute distress.     Appearance: Normal appearance. She is not ill-appearing, toxic-appearing or diaphoretic.   HENT:      Head:  Normocephalic.      Right Ear: External ear normal.      Left Ear: External ear normal.      Nose: Nose normal.      Mouth/Throat:      Mouth: Mucous membranes are moist.   Eyes:      Extraocular Movements: Extraocular movements intact.   Cardiovascular:      Rate and Rhythm: Normal rate and regular rhythm.      Pulses: Normal pulses.      Heart sounds: Normal heart sounds.   Pulmonary:      Effort: Pulmonary effort is normal.      Breath sounds: Normal breath sounds.   Abdominal:      General: Bowel sounds are normal.      Palpations: Abdomen is soft.   Musculoskeletal:      Cervical back: Normal range of motion.      Right lower leg: No edema.      Left lower leg: No edema.   Skin:     General: Skin is warm and dry.      Capillary Refill: Capillary refill takes less than 2 seconds.   Neurological:      General: No focal deficit present.      Mental Status: She is alert.   Psychiatric:         Mood and Affect: Mood normal.         Behavior: Behavior normal.         Thought Content: Thought content normal.         Judgment: Judgment normal.         Results Review:  I have personally reviewed most recent cardiac tracings, lab results, and radiology images and interpretations and agree with findings, most notably: Troponin, CBC, CMP, EKG and recent echocardiogram.    Results from last 7 days   Lab Units 07/09/25  1008   WBC 10*3/mm3 5.93   HEMOGLOBIN g/dL 13.5   HEMATOCRIT % 42.1   PLATELETS 10*3/mm3 221     Results from last 7 days   Lab Units 07/09/25  1112 07/09/25  1008   SODIUM mmol/L  --  140   POTASSIUM mmol/L  --  3.8   CHLORIDE mmol/L  --  107   CO2 mmol/L  --  20.1*   BUN mg/dL  --  10.7   CREATININE mg/dL  --  0.84   GLUCOSE mg/dL  --  107*   CALCIUM mg/dL  --  9.8   ALK PHOS U/L  --  66   ALT (SGPT) U/L  --  19   AST (SGOT) U/L  --  25   HSTROP T ng/L 14* 15*     Estimated Creatinine Clearance: 44.2 mL/min (by C-G formula based on SCr of 0.84 mg/dL).  Brief Urine Lab Results  (Last result in the past 365  days)        Color   Clarity   Blood   Leuk Est   Nitrite   Protein   CREAT   Urine HCG        10/28/24 1419 Yellow   Clear   Trace   Negative   Negative   Negative                   Microbiology Results (last 10 days)       ** No results found for the last 240 hours. **            ECG/EMG Results (most recent)       Procedure Component Value Units Date/Time    ECG 12 Lead Syncope [[id number]] Collected: 07/09/25 0929     Updated: 07/09/25 0932     QT Interval 451 ms      QTC Interval 453 ms     Narrative:      HEART RATE=61  bpm  RR Mrswkmas=532  ms  NV Zmkpytcj=349  ms  P Horizontal Axis=-17  deg  P Front Axis=  deg  QRSD Kgkfpseu=575  ms  QT Gjcrogfj=896  ms  JKvT=803  ms  QRS Axis=-20  deg  T Wave Axis=136  deg  - ABNORMAL ECG -  Atrial-paced complexes  Left bundle branch block  ST elevation secondary to IVCD  Date and Time of Study:2025-07-09 09:29:53    Telemetry Scan [[id number]] Resulted: 07/09/25 0942     Updated: 07/09/25 1005    Telemetry Scan [[id number]] Resulted: 07/09/25 1526     Updated: 07/09/25 1550            Results for orders placed during the hospital encounter of 01/22/25    Duplex Carotid Ultrasound CAR 01/22/2025  7:51 PM    Interpretation Summary    Atherosclerotic plaque proximal right and left internal carotid arteries with less than 50% stenosis bilaterally.    Vertebral arteries patent with antegrade flow bilaterally.      Results for orders placed during the hospital encounter of 01/22/25    Adult Transthoracic Echo Complete w/ Color, Spectral and Contrast if Necessary Per Protocol    Interpretation Summary    Left ventricular ejection fraction appears to be 61 - 65%.    Left ventricular diastolic function is consistent with (grade I) impaired relaxation.    Estimated right ventricular systolic pressure from tricuspid regurgitation is normal (<35 mmHg).      No radiology results for the last 7 days      Estimated Creatinine Clearance: 44.2 mL/min (by C-G formula based on SCr of 0.84  mg/dL).    Assessment & Plan   Assessment/Plan       Active Hospital Problems    Diagnosis  POA    **Syncope [R55]  Yes      Resolved Hospital Problems   No resolved problems to display.     Syncope  - Troponin: 15, 14  - EKG showed atrial paced rhythm at 61  - Echocardiogram from January 2025 showed an EF of 61-65% with grade 1 diastolic dysfunction  - Check orthostatic vital signs and TSH  - Continue telemetry  - Cardiology consulted     CAD  - Continue aspirin and statin  - Telemetry     GERD  - PPI     Hyperlipidemia  - Statin            VTE Prophylaxis - Active VTE Prophylaxis:  Pharmacologic:        Start     Dose Route Frequency Stop    07/09/25 1600  enoxaparin sodium (LOVENOX) syringe 40 mg         40 mg SC Daily --                  Select Mechanical VTE Prophylaxis if Desired & Appropriate      CODE STATUS:    Code Status and Medical Interventions: CPR (Attempt to Resuscitate); Full Support   Ordered at: 07/09/25 1237     Code Status (Patient has no pulse and is not breathing):    CPR (Attempt to Resuscitate)     Medical Interventions (Patient has pulse or is breathing):    Full Support       This patient has been examined wearing personal protective equipment.     I discussed the patient's findings and my recommendations with patient and nursing staff.      Signature:Electronically signed by Arsh Urrutia PA-C, 07/09/25, 4:42 PM EDT.

## 2025-07-09 NOTE — CONSULTS
Cardiology Consult Note    Patient Identification:  Name: Rachael Quevedo  Age: 79 y.o.  Sex: female  :  1945  MRN: [id number]             Requesting Physician : Arsh Urrutia    Reason for Consultation / Chief Complaint :   Syncope    History of Present Illness:      Rachael Quevedo is a 71-year-old female with a PMH of:      CAD status post PCI  Sick sinus syndrome  Dual-chamber pacemaker  Chronic left bundle branch block  Hypertension  Dyslipidemia  CVA  GERD  Depression  Renal artery stent    Who presented to [facility] on 2025 following a syncopal episode.  Patient reports this morning while drinking her morning coffee she passed out.  She was mildly lightheaded and dizzy prior to episode.  She denies chest pain, shortness of breath, edema, recent illness, cough at time of episode.  Her  took her blood pressure about 5 minutes after episode and she was hypotensive at 98/60, about 10 minutes after that blood pressure returned to normal and 130s over 70s.  She has been referred to neurology outpatient with upcoming appointment at the end of this month.  Recent CTA of head completed last week revealed minimal atherosclerotic changes without flow-limiting stenosis, large vessel occlusion or aneurysm.  Of note patient was recently seen in office secondary to fatigue and worsening dyspnea despite negative Myoview study and echocardiogram earlier this year.  She underwent outpatient cardiac catheterization 2025 revealing patent stent to LAD and circumflex with nonobstructive disease to ostium of diagonal branch.    Echocardiogram from 2025 with LVEF of 61 to 65%, grade 1 diastolic dysfunction    Myoview study from 2025 without evidence of ischemia, fixed septal defect due to left bundle branch block    Cardiac catheterization from 2025:   Left Main %: 0  Proximal LAD %: Patent stent  Mid/Distal LAD %: 40% to 50% disease in the ostium of the diagonal branch  LCX %: Patent  stent with about 30% disease. Large and dominant  Ramus:   RCA %: 0. Small and nondominant  Lima %:   SVG(s) %:     Labs  7/9/2025: Troponin 15--14, BUN/creatinine 2.7/0.84, glucose 107, TSH 2.220, CBC unremarkable    Negative orthostatic vital signs     Obtain device interrogation     Further recommendations and assessment per Dr. Wong     Electronically signed by BINA Klein, 07/09/25, 3:58 PM EDT.    Cardiology attending addendum :    I have personally performed a face-to-face diagnostic evaluation, physical exam and reviewed data on this patient.  I have reviewed documentation done by me and nurse practitioner  and corrected as needed.  And agree with the different components of documentation.Greater than 50% of the time spent in the care of this patient was provided by attending consultant/me.       Assessment:  :    Syncope  Elevated troponin  Sick sinus syndrome status post dual-chamber permanent pacemaker 2/5/2025  Left bundle branch block  CAD history of PCI  Hypertension  Dyslipidemia  History of CVA      Recommendations / Plan:        Patient presented 7/9/2025 following syncope.  Patient reportedly passed out while drinking coffee in the morning.  Patient reportedly was hypotensive at 98/60 after 5 minutes from the episode.  And 10 minutes after it became normal.  Etiology of patient's syncope is unclear probably is vasovagal.  Patient is not orthostatic currently.  Will check a.m. cortisol level.  Will check patient's pacemaker.  Will repeat an echocardiogram.  Patient underwent cardiac cath 6/16/2025 which revealed patent stent in LAD and LCx with nonobstructive disease in ostial diagonal.  Echocardiogram 1/25/2025 revealed normal LV systolic function.  Patient's syncope appears vasovagal.  Will check telemetry for rhythm.  Will get pacemaker interrogated.  Patient's primary cardiologist  will follow-up in AM.  Discussed with patient and her  at bedside in detail.  Advised  "them to maintain a diary of symptoms.  Because most of the time patient is only able to tell that she feels \"bad\", but is not able to pinpoint the symptoms it is making her feel bad.  Patient reportedly snores, according to .  Patient might benefit from sleep apnea evaluation as outpatient to evaluate her fatigue.      Cardiographics  ECG: EKG tracing was  personally reviewed/interpreted by me  Telemetry Scan   Final Result      Telemetry Scan   Final Result      ECG 12 Lead Syncope   Preliminary Result   HEART RATE=61  bpm   RR Bneuhwkf=191  ms   DE Nhcsgppz=638  ms   P Horizontal Axis=-17  deg   P Front Axis=  deg   QRSD Dlvjmdai=317  ms   QT [place]ouor=354  ms   XGiF=393  ms   QRS Axis=-20  deg   T Wave Axis=136  deg   - ABNORMAL ECG -   Atrial-paced complexes   Left bundle branch block   ST elevation secondary to IVCD   Date and Time of Study:2025-07-09 09:29:53          Telemetry: Sinus rhythm             Diagnosis Plan   1. Syncope, unspecified syncope type                   Past Medical History:  Past Medical History:   Diagnosis Date    Allergic     Ankle fracture, left     Comments: 2016    Arthritis     Bursitis     Cataract, acquired     Coronary artery disease     Cough     Impression: Most likely reactive airway prescription as below Start antihistamine at home    Dense breast     Depression, major, recurrent, mild     Diverticulosis     GERD without esophagitis     Comments: Dr Duncan- protonix    Hematuria, microscopic     History of chicken pox     History of loop recorder     Hyperglycemia     Hyperlipidemia     Hypertension 01/04/2020    Injury of back     Injury of neck     Left bundle branch block (LBBB)     Malaise and fatigue     Medicare annual wellness visit, initial     with abnormal findings    Osteopenia 2022    Other specified menopausal and perimenopausal disorders     Other specified menopausal and postmenopausal disorders    Pap smear, low-risk     Renal insufficiency     S/P " right rotator cuff repair     Screening for alcoholism     10 or more drinks per week    Screening for depression     Negative Depression Screening ( 9 or less) ()    Screening mammogram, encounter for     Shortness of breath     Stroke     1/5/2020 no residual effects    Syncope 01/22/2025    Vasovagal near-syncope     Impression: from illness and cough Discussed if there is loss of vision in an eye, confusion, weakness or numbness in an extremity, drooping of a side of the face, intractible pain, intractible vomiting, to go to the ER.    Vasovagal syncope 08/31/2020     Past Surgical History:  Past Surgical History:   Procedure Laterality Date    ABLATION OF DYSRHYTHMIC FOCUS  8/2/2023    Ablation in upper spinal area    CARDIAC CATHETERIZATION N/A 01/27/2020    Procedure: Left Heart Cath with angiogram;  Surgeon: Jermaine Delong MD;  Location: [facility] CATH INVASIVE LOCATION;  Service: Cardiovascular    CARDIAC CATHETERIZATION N/A 01/27/2020    Procedure: Stent BOBBY coronary;  Surgeon: Jermaine Delong MD;  Location: [facility] CATH INVASIVE LOCATION;  Service: Cardiovascular    CARDIAC CATHETERIZATION N/A 01/27/2020    Procedure: Left ventriculography;  Surgeon: Jermaine Delong MD;  Location: [facility] CATH INVASIVE LOCATION;  Service: Cardiovascular    CARDIAC CATHETERIZATION N/A 01/27/2020    Procedure: Coronary angiography;  Surgeon: Jermaine Delong MD;  Location: [facility] CATH INVASIVE LOCATION;  Service: Cardiovascular    CARDIAC CATHETERIZATION N/A 6/16/2025    Procedure: Left Heart Cath with angiogram;  Surgeon: Jermaine Delong MD;  Location: [facility] CATH INVASIVE LOCATION;  Service: Cardiovascular;  Laterality: N/A;    CARDIAC ELECTROPHYSIOLOGY PROCEDURE N/A 02/05/2025    Procedure: Pacemaker DC new Abbott aware;  Surgeon: Alec Wong MD;  Location: [facility] CATH INVASIVE LOCATION;  Service: Cardiovascular;  Laterality: N/A;  [place]    CARDIAC SURGERY  2/5/2025    Pacemaker Placement    CATARACT  EXTRACTION, BILATERAL Bilateral 1999    CHOLECYSTECTOMY  1993??    COLONOSCOPY  Last one 2023    CORONARY STENT PLACEMENT  1/28/2020    GALLBLADDER SURGERY  1996    INSERT / REPLACE / REMOVE PACEMAKER  2/5/25    LAPAROSCOPIC TUBAL LIGATION  1980    RENAL ARTERY STENT  01/26/2020    ROTATOR CUFF REPAIR Right 2002    TUBAL ABDOMINAL LIGATION  1979??      Allergies:  No Known Allergies  Home Meds:  Medications Prior to Admission   Medication Sig Dispense Refill Last Dose/Taking    aspirin 81 MG EC tablet Take 1 tablet by mouth Every Night.   7/8/2025    calcium carbonate (OS-SHYLA) 600 MG tablet Take 1 tablet by mouth 2 (Two) Times a Day With Meals.   7/9/2025    fexofenadine (ALLEGRA) 180 MG tablet Take 0.5 tablets by mouth Daily.   7/9/2025    Multiple Vitamins-Minerals (WOMENS MULTIVITAMIN + COLLAGEN PO) Take 1 tablet by mouth Daily.   7/9/2025    pantoprazole (PROTONIX) 40 MG EC tablet Take 1 tablet by mouth Daily.   7/9/2025    polyethylene glycol (MIRALAX) 17 GM/SCOOP powder Take 17 g by mouth Daily.   7/8/2025    rosuvastatin (CRESTOR) 20 MG tablet TAKE 1 TABLET BY MOUTH EVERY DAY 90 tablet 3 7/8/2025    fluticasone (FLONASE) 50 MCG/ACT nasal spray Administer 2 sprays into the nostril(s) as directed by provider Daily As Needed for Rhinitis.       valACYclovir (VALTREX) 500 MG tablet Take 1 tablet by mouth 2 (Two) Times a Day As Needed (cold sores).        Current Meds:     Current Facility-Administered Medications:     acetaminophen (TYLENOL) tablet 650 mg, 650 mg, Oral, Q6H PRN, Arsh Urrutia PA-C, 650 mg at 07/09/25 1431    aluminum-magnesium hydroxide-simethicone (MAALOX MAX) 400-400-40 MG/5ML suspension 15 mL, 15 mL, Oral, Q6H PRN, Arsh Urrutia PA-C    aspirin EC tablet 81 mg, 81 mg, Oral, Nightly, Arsh Urrutia PA-C    Calcium Replacement - Follow Nurse / BPA Driven Protocol, , Not Applicable, PRN, Arsh Urrutia PA-C    enoxaparin sodium (LOVENOX) syringe 40 mg, 40 mg,  Subcutaneous, Daily, Arsh Urrutia PA-C, 40 mg at 07/09/25 1558    Magnesium Standard Dose Replacement - Follow Nurse / BPA Driven Protocol, , Not Applicable, PRN, Arsh Urrutia PA-C    melatonin tablet 5 mg, 5 mg, Oral, Nightly PRN, Arsh Urrutia PA-C    nitroglycerin (NITROSTAT) SL tablet 0.4 mg, 0.4 mg, Sublingual, Q5 Min PRN, Arsh Urrutia PA-C    ondansetron ODT (ZOFRAN-ODT) disintegrating tablet 4 mg, 4 mg, Oral, Q6H PRN **OR** ondansetron (ZOFRAN) injection 4 mg, 4 mg, Intravenous, Q6H PRN, Arsh Urrutia PA-C    [START ON 7/10/2025] pantoprazole (PROTONIX) EC tablet 40 mg, 40 mg, Oral, Daily, Arsh Urrutia PA-C    Phosphorus Replacement - Follow Nurse / BPA Driven Protocol, , Not Applicable, PRN, Arsh Urrutia PA-C    polyethylene glycol (MIRALAX) packet 17 g, 17 g, Oral, Daily, Arsh Urrutia PA-C    Potassium Replacement - Follow Nurse / BPA Driven Protocol, , Not Applicable, PRN, Arsh Urrutia PA-C    rosuvastatin (CRESTOR) tablet 20 mg, 20 mg, Oral, Nightly, Arsh Urrutia PA-C    [COMPLETED] Insert Peripheral IV, , , Once **AND** sodium chloride 0.9 % flush 10 mL, 10 mL, Intravenous, PRN, Arsh Urrutia PA-C    sodium chloride 0.9 % flush 10 mL, 10 mL, Intravenous, Q12H, Arsh Urrutia PA-C, 10 mL at 07/09/25 1411    sodium chloride 0.9 % flush 10 mL, 10 mL, Intravenous, PRN, Arsh Urrutia PA-C    sodium chloride 0.9 % infusion 40 mL, 40 mL, Intravenous, PRN, Arsh Urrutia PA-C  Social History:   Social History     Tobacco Use    Smoking status: Never     Passive exposure: Past    Smokeless tobacco: Never    Tobacco comments:     Many family members smoked around me when I was a child   Substance Use Topics    Alcohol use: Yes     Alcohol/week: 2.0 standard drinks of alcohol     Types: 1 Glasses of wine, 1 Drinks containing 0.5 oz of alcohol per week     Comment: monthly      Family History:  Family History  "  Problem Relation Age of Onset    Hyperlipidemia Mother         Elevated cholesterol    Heart disease Mother     Heart attack Mother     Hypertension Father     Colon cancer Father     Hyperlipidemia Father         Elevated cholesterol    Heart disease Father     Cancer Father         Colon Cancer    Diabetes Brother     Heart disease Brother     Hyperlipidemia Brother     Hypertension Brother     Diabetes Paternal Aunt     Colon cancer Paternal Aunt     Cancer Paternal Aunt         Cant remember  name    Colon cancer Paternal Uncle     Diabetes Paternal Uncle     Diabetes Paternal Grandmother     Heart disease Paternal Grandmother     Arthritis Paternal Grandmother     Heart disease Maternal Uncle     Heart disease Maternal Uncle     Cancer Maternal Aunt     Cancer Paternal Uncle     Heart disease Maternal Uncle     Heart disease Maternal Uncle     Cancer Paternal Uncle     Cancer Maternal Aunt         Colon    Cancer Paternal Uncle         Colon    Breast cancer Neg Hx     Ovarian cancer Neg Hx         Review of Systems :   Review of Systems   Constitutional: Negative for chills, fever and malaise/fatigue.   Cardiovascular:  Positive for syncope. Negative for chest pain, leg swelling, near-syncope and orthopnea.   Gastrointestinal:  Negative for abdominal pain, nausea and vomiting.   Neurological:  Positive for dizziness and light-headedness. Negative for headaches and weakness.   Psychiatric/Behavioral:  Negative for altered mental status.             Constitutional:  Temp:  [97.8 °F (36.6 °C)-98.1 °F (36.7 °C)] 98.1 °F (36.7 °C)  Heart Rate:  [64-73] 69  Resp:  [14-17] 16  BP: (134-159)/(60-81) 134/60    Physical Exam   /60 (BP Location: Right arm, Patient Position: Lying)   Pulse 69   Temp 98.1 °F (36.7 °C) (Oral)   Resp 16   Ht 152.4 cm (60\")   Wt 58.4 kg (128 lb 12 oz)   LMP  (LMP Unknown)   SpO2 97%   BMI 25.14 kg/m²   Physical Exam  General:  Appears in no acute distress  Eyes: Sclerae " are anicteric,  conjunctivae are clear   HEENT:  No JVD. Thyroid not visibly enlarged. No mucosal pallor or cyanosis  Respiratory: Respirations regular and unlabored at rest.  Bilaterally good breath sounds with good air entry in all fields. No crackles, rubs or wheezes auscultated  Cardiovascular: S1,S2 Regular rate and rhythm. No murmur, rub or gallop auscultated. No pretibial pitting edema  Gastrointestinal: Abdomen nondistended.  Musculoskeletal:  No abnormal movements  Extremities: No digital clubbing or cyanosis  Skin: Color pink. Skin warm and dry to touch.   Neuro: Alert and awake, no lateralizing deficits appreciated        Echocardiogram:   Results for orders placed during the hospital encounter of 01/22/25    Adult Transthoracic Echo Complete w/ Color, Spectral and Contrast if Necessary Per Protocol 01/23/2025 10:20 PM    Interpretation Summary    Left ventricular ejection fraction appears to be 61 - 65%.    Left ventricular diastolic function is consistent with (grade I) impaired relaxation.    Estimated right ventricular systolic pressure from tricuspid regurgitation is normal (<35 mmHg).      STRESS TEST  Results for orders placed during the hospital encounter of 01/22/25    Stress Test With Myocardial Perfusion One Day 01/24/2025 12:34 PM    Interpretation Summary    Left ventricular ejection fraction is hyperdynamic (Calculated EF > 70%).    LEXISCAN CARDIOLITE REPORT    DATE OF PROCEDURE:  01/24/25    INDICATION FOR PROCEDURE: Syncope, left bundle branch block, CAD, PCI    PROCEDURE PERFORMED: Lexiscan Cardiolite    PROCEDURE COMMENTS:    After informed consent was obtained.  Patient's resting heart rate was 61 bpm, resting blood pressure was 155/80, resting EKG revealed sinus rhythm with left bundle branch block.  Patient was given 0.4 mg of regadenosine for stress testing.  There was no significant change in heart rate, blood pressure, symptoms with regadenoson injection.  Patient tolerated  procedure well.  Complications were none.    NUCLEAR IMAGIN.  There was moderate fixed septal defect probably due to left bundle branch block, no ischemia seen.  2.  Gated images reveal normal LV size and contractility, LVEF of 71%.    CONCLUSION:  1.  Lexiscan Cardiolite with fixed septal defect due to left bundle branch block, negative for ischemia.  2.  Normal wall motion.  LVEF of 71%.    RECOMMENDATIONS:    Clinical correlation recommended.      Alec Wong MD  25  12:33 EST        Cardiolite (Tc-99m sestamibi) stress test    HEART CATHETERIZATION  Results for orders placed during the hospital encounter of 25    Cardiac Catheterization/Vascular Study        Imaging  Chest X-ray:   Imaging Results (Last 24 Hours)       ** No results found for the last 24 hours. **            Lab Review: I have reviewed the labs  Results from last 7 days   Lab Units 25  1112 25  1008   HSTROP T ng/L 14* 15*     Results from last 7 days   Lab Units 25  1008   MAGNESIUM mg/dL 2.3     Results from last 7 days   Lab Units 25  1008   SODIUM mmol/L 140   POTASSIUM mmol/L 3.8   BUN mg/dL 10.7   CREATININE mg/dL 0.84   CALCIUM mg/dL 9.8             Results from last 7 days   Lab Units 25  1008   WBC 10*3/mm3 5.93   HEMOGLOBIN g/dL 13.5   HEMATOCRIT % 42.1   PLATELETS 10*3/mm3 221                 Alec Wong MD  2025, 17:38 EDT      EMR Dragon/Transcription:   Dictated utilizing Dragon dictation

## 2025-07-09 NOTE — NURSING NOTE
This nurse interrogated pacemaker with [facility] [employer]. Also spoke with rep from [facility] she said will be here tomorrow am and check to make sure went through

## 2025-07-09 NOTE — ED PROVIDER NOTES
Subjective   History of Present Illness  Chief complaint passed out    History of present illness a 79-year-old female with multiple health problems including recent pacemaker placement who states that she was sitting at the table drinking her coffee this morning she had not eaten breakfast as she got somewhat nauseated and sweaty and lightheaded and had some pressure in her upper abdomen chest and blacked out briefly.  Patient was brought to the hospital for evaluation.  She has no complaints currently denies chest pain neck arm jaw pain leg pain or swelling no recent long car ride plane ride immobilization foreign travels antibiotic use.  Denies any headache visual changes speech difficulty or weakness to one-sided the other nothing otherwise seem to trigger this or make it better or worse.      Review of Systems   Constitutional:  Negative for chills and fever.   Respiratory:  Positive for chest tightness. Negative for shortness of breath.    Cardiovascular:  Negative for chest pain, palpitations and leg swelling.   Gastrointestinal:  Positive for nausea. Negative for abdominal pain and vomiting.   Genitourinary:  Negative for difficulty urinating and dysuria.   Skin:  Negative for rash.   Neurological:  Positive for syncope and weakness.   Psychiatric/Behavioral:  Negative for confusion.        Past Medical History:   Diagnosis Date    Allergic     Ankle fracture, left     Comments: 2016    Arthritis     Bursitis     Cataract, acquired     Coronary artery disease     Cough     Impression: Most likely reactive airway prescription as below Start antihistamine at home    Dense breast     Depression, major, recurrent, mild     Diverticulosis     GERD without esophagitis     Comments: Dr Duncan- protonix    Hematuria, microscopic     History of chicken pox     History of loop recorder     Hyperglycemia     Hyperlipidemia     Hypertension 01/04/2020    Injury of back     Injury of neck     Left bundle branch block  (LBBB)     Malaise and fatigue     Medicare annual wellness visit, initial     with abnormal findings    Osteopenia 2022    Other specified menopausal and perimenopausal disorders     Other specified menopausal and postmenopausal disorders    Pap smear, low-risk     Renal insufficiency     S/P right rotator cuff repair     Screening for alcoholism     10 or more drinks per week    Screening for depression     Negative Depression Screening ( 9 or less) ()    Screening mammogram, encounter for     Shortness of breath     Stroke     1/5/2020 no residual effects    Syncope 01/22/2025    Vasovagal near-syncope     Impression: from illness and cough Discussed if there is loss of vision in an eye, confusion, weakness or numbness in an extremity, drooping of a side of the face, intractible pain, intractible vomiting, to go to the ER.    Vasovagal syncope 08/31/2020       No Known Allergies    Past Surgical History:   Procedure Laterality Date    ABLATION OF DYSRHYTHMIC FOCUS  8/2/2023    Ablation in upper spinal area    CARDIAC CATHETERIZATION N/A 01/27/2020    Procedure: Left Heart Cath with angiogram;  Surgeon: Jermaine Delong MD;  Location: [facility] CATH INVASIVE LOCATION;  Service: Cardiovascular    CARDIAC CATHETERIZATION N/A 01/27/2020    Procedure: Stent BOBBY coronary;  Surgeon: Jermaine Delong MD;  Location: [facility] CATH INVASIVE LOCATION;  Service: Cardiovascular    CARDIAC CATHETERIZATION N/A 01/27/2020    Procedure: Left ventriculography;  Surgeon: Jermaine Delong MD;  Location: [facility] CATH INVASIVE LOCATION;  Service: Cardiovascular    CARDIAC CATHETERIZATION N/A 01/27/2020    Procedure: Coronary angiography;  Surgeon: Jermaine Delong MD;  Location: [facility] CATH INVASIVE LOCATION;  Service: Cardiovascular    CARDIAC CATHETERIZATION N/A 6/16/2025    Procedure: Left Heart Cath with angiogram;  Surgeon: Jermaine Delong MD;  Location: [facility] CATH INVASIVE LOCATION;  Service: Cardiovascular;  Laterality: N/A;    CARDIAC  ELECTROPHYSIOLOGY PROCEDURE N/A 02/05/2025    Procedure: Pacemaker DC new [employer] aware;  Surgeon: Alec Wong MD;  Location: [facility] CATH INVASIVE LOCATION;  Service: Cardiovascular;  Laterality: N/A;  [place]    CARDIAC SURGERY  2/5/2025    Pacemaker Placement    CATARACT EXTRACTION, BILATERAL Bilateral 1999    CHOLECYSTECTOMY  1993??    COLONOSCOPY  Last one 2023    CORONARY STENT PLACEMENT  1/28/2020    GALLBLADDER SURGERY  1996    INSERT / REPLACE / REMOVE PACEMAKER  2/5/25    LAPAROSCOPIC TUBAL LIGATION  1980    RENAL ARTERY STENT  01/26/2020    ROTATOR CUFF REPAIR Right 2002    TUBAL ABDOMINAL LIGATION  1979??       Family History   Problem Relation Age of Onset    Hyperlipidemia Mother         Elevated cholesterol    Heart disease Mother     Heart attack Mother     Hypertension Father     Colon cancer Father     Hyperlipidemia Father         Elevated cholesterol    Heart disease Father     Cancer Father         Colon Cancer    Diabetes Brother     Heart disease Brother     Hyperlipidemia Brother     Hypertension Brother     Diabetes Paternal Aunt     Colon cancer Paternal Aunt     Cancer Paternal Aunt         Cant remember  name    Colon cancer Paternal Uncle     Diabetes Paternal Uncle     Diabetes Paternal Grandmother     Heart disease Paternal Grandmother     Arthritis Paternal Grandmother     Heart disease Maternal Uncle     Heart disease Maternal Uncle     Cancer Maternal Aunt     Cancer Paternal Uncle     Heart disease Maternal Uncle     Heart disease Maternal Uncle     Cancer Paternal Uncle     Cancer Maternal Aunt         Colon    Cancer Paternal Uncle         Colon    Breast cancer Neg Hx     Ovarian cancer Neg Hx        Social History     Socioeconomic History    Marital status:    Tobacco Use    Smoking status: Never     Passive exposure: Past    Smokeless tobacco: Never    Tobacco comments:     Many family members smoked around me when I was a child   Vaping Use     Vaping status: Never Used   Substance and Sexual Activity    Alcohol use: Yes     Alcohol/week: 2.0 standard drinks of alcohol     Types: 1 Glasses of wine, 1 Drinks containing 0.5 oz of alcohol per week     Comment: monthly    Drug use: No    Sexual activity: Not Currently     Partners: Male     Birth control/protection: None     Comment: Very occasional     Prior to Admission medications    Medication Sig Start Date End Date Taking? Authorizing Provider   aspirin 81 MG EC tablet Take 1 tablet by mouth Every Night.   Yes Jewel Briceno MD   calcium carbonate (OS-SHYLA) 600 MG tablet Take 1 tablet by mouth 2 (Two) Times a Day With Meals.   Yes Jewel Briceno MD   fexofenadine (ALLEGRA) 180 MG tablet Take 0.5 tablets by mouth Daily.   Yes Jewel Briceno MD   Multiple Vitamins-Minerals (WOMENS MULTIVITAMIN + COLLAGEN PO) Take 1 tablet by mouth Daily. 12/1/22  Yes Jewel Briceno MD   pantoprazole (PROTONIX) 40 MG EC tablet Take 1 tablet by mouth Daily. 1/11/24  Yes Jewel Briceno MD   polyethylene glycol (MIRALAX) 17 GM/SCOOP powder Take 17 g by mouth Daily.   Yes Jewel Briceno MD   rosuvastatin (CRESTOR) 20 MG tablet TAKE 1 TABLET BY MOUTH EVERY DAY 12/19/24  Yes Alexa Shah MD   fluticasone (FLONASE) 50 MCG/ACT nasal spray Administer 2 sprays into the nostril(s) as directed by provider Daily As Needed for Rhinitis.    Jewel Briceno MD   valACYclovir (VALTREX) 500 MG tablet Take 1 tablet by mouth 2 (Two) Times a Day As Needed (cold sores).    Jewel Briceno MD   valACYclovir (VALTREX) 500 MG tablet Take 1 tablet by mouth Every 12 (Twelve) Hours. 2/28/25 7/9/25  Alexa Shah MD          Objective   Physical Exam  Constitutional this is a 79-year-old awake alert no acute distress triage vital signs reviewed HEENT extraocular muscles are intact pupils equal round react there is no photophobia mouth clear neck supple no adenopathy no JV no bruits no  meningeal signs back no cervical thoracic lumbar spine tenderness noted lungs are clear no retraction no use of accessory heart was regular without murmur rub abdomen soft nontender good bowel sounds no peritoneal findings or pulsatile masses extremities pulses equal upper and lower extremities no edema no cords no Homans' sign no evidence of DVT skin is warm and dry without rashes or cellulitic changes neurologic awake alert orientated x 3 no facial asymmetry speech normal there is no focal weakness  Procedures           ED Course      Results for orders placed or performed during the hospital encounter of 07/09/25   ECG 12 Lead Syncope    Collection Time: 07/09/25  9:29 AM   Result Value Ref Range    QT Interval 451 ms    QTC Interval 453 ms   Comprehensive Metabolic Panel    Collection Time: 07/09/25 10:08 AM    Specimen: Arm, Left; Blood   Result Value Ref Range    Glucose 107 (H) 65 - 99 mg/dL    BUN 10.7 8.0 - 23.0 mg/dL    Creatinine 0.84 0.57 - 1.00 mg/dL    Sodium 140 136 - 145 mmol/L    Potassium 3.8 3.5 - 5.2 mmol/L    Chloride 107 98 - 107 mmol/L    CO2 20.1 (L) 22.0 - 29.0 mmol/L    Calcium 9.8 8.6 - 10.5 mg/dL    Total Protein 6.7 6.0 - 8.5 g/dL    Albumin 4.1 3.5 - 5.2 g/dL    ALT (SGPT) 19 1 - 33 U/L    AST (SGOT) 25 1 - 32 U/L    Alkaline Phosphatase 66 39 - 117 U/L    Total Bilirubin 0.4 0.0 - 1.2 mg/dL    Globulin 2.6 gm/dL    A/G Ratio 1.6 g/dL    BUN/Creatinine Ratio 12.7 7.0 - 25.0    Anion Gap 12.9 5.0 - 15.0 mmol/L    eGFR 71.2 >60.0 mL/min/1.73   High Sensitivity Troponin T    Collection Time: 07/09/25 10:08 AM    Specimen: Arm, Left; Blood   Result Value Ref Range    HS Troponin T 15 (H) <14 ng/L   Magnesium    Collection Time: 07/09/25 10:08 AM    Specimen: Arm, Left; Blood   Result Value Ref Range    Magnesium 2.3 1.6 - 2.4 mg/dL   CBC Auto Differential    Collection Time: 07/09/25 10:08 AM    Specimen: Arm, Left; Blood   Result Value Ref Range    WBC 5.93 3.40 - 10.80 10*3/mm3    RBC  4.57 3.77 - 5.28 10*6/mm3    Hemoglobin 13.5 12.0 - 15.9 g/dL    Hematocrit 42.1 34.0 - 46.6 %    MCV 92.1 79.0 - 97.0 fL    MCH 29.5 26.6 - 33.0 pg    MCHC 32.1 31.5 - 35.7 g/dL    RDW 13.2 12.3 - 15.4 %    RDW-SD 44.6 37.0 - 54.0 fl    MPV 12.7 (H) 6.0 - 12.0 fL    Platelets 221 140 - 450 10*3/mm3    Neutrophil % 56.2 42.7 - 76.0 %    Lymphocyte % 31.5 19.6 - 45.3 %    Monocyte % 9.1 5.0 - 12.0 %    Eosinophil % 2.2 0.3 - 6.2 %    Basophil % 0.8 0.0 - 1.5 %    Immature Grans % 0.2 0.0 - 0.5 %    Neutrophils, Absolute 3.33 1.70 - 7.00 10*3/mm3    Lymphocytes, Absolute 1.87 0.70 - 3.10 10*3/mm3    Monocytes, Absolute 0.54 0.10 - 0.90 10*3/mm3    Eosinophils, Absolute 0.13 0.00 - 0.40 10*3/mm3    Basophils, Absolute 0.05 0.00 - 0.20 10*3/mm3    Immature Grans, Absolute 0.01 0.00 - 0.05 10*3/mm3    nRBC 0.0 0.0 - 0.2 /100 WBC   Light Blue Top    Collection Time: 07/09/25 10:10 AM   Result Value Ref Range    Extra Tube Hold for add-ons.    High Sensitivity Troponin T 1Hr    Collection Time: 07/09/25 11:12 AM    Specimen: Arm, Left; Blood   Result Value Ref Range    HS Troponin T 14 (H) <14 ng/L    Troponin T Numeric Delta -1 ng/L    Troponin T % Delta -7 Abnormal if >/= 20%   TSH    Collection Time: 07/09/25 11:12 AM    Specimen: Arm, Left; Blood   Result Value Ref Range    TSH 2.220 0.270 - 4.200 uIU/mL     No radiology results for the last day  Medications   sodium chloride 0.9 % flush 10 mL (has no administration in time range)   aspirin EC tablet 81 mg (has no administration in time range)   pantoprazole (PROTONIX) EC tablet 40 mg (has no administration in time range)   polyethylene glycol (MIRALAX) packet 17 g (17 g Oral Not Given 7/9/25 1421)   nitroglycerin (NITROSTAT) SL tablet 0.4 mg (has no administration in time range)   sodium chloride 0.9 % flush 10 mL (10 mL Intravenous Given 7/9/25 1411)   sodium chloride 0.9 % flush 10 mL (has no administration in time range)   sodium chloride 0.9 % infusion 40 mL (has  no administration in time range)   aluminum-magnesium hydroxide-simethicone (MAALOX MAX) 400-400-40 MG/5ML suspension 15 mL (has no administration in time range)   ondansetron ODT (ZOFRAN-ODT) disintegrating tablet 4 mg (has no administration in time range)     Or   ondansetron (ZOFRAN) injection 4 mg (has no administration in time range)   melatonin tablet 5 mg (has no administration in time range)   enoxaparin sodium (LOVENOX) syringe 40 mg (40 mg Subcutaneous Given 7/9/25 5138)   Potassium Replacement - Follow Nurse / BPA Driven Protocol (has no administration in time range)   Magnesium Standard Dose Replacement - Follow Nurse / BPA Driven Protocol (has no administration in time range)   Phosphorus Replacement - Follow Nurse / BPA Driven Protocol (has no administration in time range)   Calcium Replacement - Follow Nurse / BPA Driven Protocol (has no administration in time range)   rosuvastatin (CRESTOR) tablet 20 mg (has no administration in time range)   acetaminophen (TYLENOL) tablet 650 mg (650 mg Oral Given 7/9/25 1431)                                              EKG my interpretation atrial paced complexes rate of 60 left bundle branch block interventricular duction delay QTc of 453 abnormal EKG but unchanged from 2/5/2025 abnormal EKG          Medical Decision Making  Medical decision making.  IV established monitor placed by review of paced rhythm EKG my interpretation atrial paced rhythm rate of 60 left bundle branch block QTc of 453 abnormal but unchanged from 2/5/2025 abnormal EKG obtained by independent review comprehensive metabolic profile unremarkable troponin was 15 magnesium 2.3 CBC unremarkable 1 hour troponin was 14.  Patient repeat exam is resting company no distress.  She had no further syncopal episodes.  Recently had a cardiac pacemaker placed states it was interrogated a couple weeks ago and that cardiologist office and was fine.  I do not see any evidence of an acute stroke or acute ST  elevation myocardial infarction DVT pulmonary embolism or dissection pericardial effusion sepsis bacteremia acute intra-abdominal process aneurysm rupture AAA ischemic bowel based on my history and physical clinical findings although not a complete list of all possibilities.  She be placed in observation cardiac monitoring and cardiac consultation she was agreeable provider notified case discussed.    Problems Addressed:  Syncope, unspecified syncope type: chronic illness or injury    Amount and/or Complexity of Data Reviewed  Labs: ordered. Decision-making details documented in ED Course.  ECG/medicine tests: ordered and independent interpretation performed. Decision-making details documented in ED Course.    Risk  Prescription drug management.  Decision regarding hospitalization.        Final diagnoses:   Syncope, unspecified syncope type       ED Disposition  ED Disposition       ED Disposition   Decision to Admit    Condition   --    Comment   --               No follow-up provider specified.       Medication List        ASK your doctor about these medications      valACYclovir 500 MG tablet  Commonly known as: VALTREX  Ask about: Which instructions should I use?                 Bill Santana MD  07/09/25 2557

## 2025-07-09 NOTE — NURSING NOTE
Called to [facility] [phone] spoke with joy aburto on needing this patient's pacemaker interrogated

## 2025-07-10 ENCOUNTER — READMISSION MANAGEMENT (OUTPATIENT)
Dept: CALL CENTER | Facility: HOSPITAL | Age: 80
End: 2025-07-10
Payer: MEDICARE

## 2025-07-10 ENCOUNTER — APPOINTMENT (OUTPATIENT)
Dept: CARDIOLOGY | Facility: HOSPITAL | Age: 80
End: 2025-07-10
Payer: MEDICARE

## 2025-07-10 VITALS
SYSTOLIC BLOOD PRESSURE: 140 MMHG | RESPIRATION RATE: 11 BRPM | HEIGHT: 60 IN | OXYGEN SATURATION: 97 % | WEIGHT: 128.31 LBS | TEMPERATURE: 97.9 F | HEART RATE: 69 BPM | BODY MASS INDEX: 25.19 KG/M2 | DIASTOLIC BLOOD PRESSURE: 62 MMHG

## 2025-07-10 LAB
ANION GAP SERPL CALCULATED.3IONS-SCNC: 9.5 MMOL/L (ref 5–15)
AORTIC DIMENSIONLESS INDEX: 0.64 (DI)
AV MEAN PRESS GRAD SYS DOP V1V2: 3 MMHG
AV VMAX SYS DOP: 127 CM/SEC
BASOPHILS # BLD AUTO: 0.05 10*3/MM3 (ref 0–0.2)
BASOPHILS NFR BLD AUTO: 0.8 % (ref 0–1.5)
BH CV ECHO LEFT VENTRICLE GLOBAL LONGITUDINAL STRAIN: -14.4 %
BH CV ECHO MEAS - ACS: 1.7 CM
BH CV ECHO MEAS - AO MAX PG: 6.5 MMHG
BH CV ECHO MEAS - AO V2 VTI: 28.5 CM
BH CV ECHO MEAS - AVA(I,D): 1.8 CM2
BH CV ECHO MEAS - EDV(CUBED): 79.5 ML
BH CV ECHO MEAS - EDV(MOD-SP4): 65.7 ML
BH CV ECHO MEAS - EF(MOD-SP4): 59.2 %
BH CV ECHO MEAS - ESV(CUBED): 22 ML
BH CV ECHO MEAS - ESV(MOD-SP4): 26.8 ML
BH CV ECHO MEAS - FS: 34.9 %
BH CV ECHO MEAS - IVS/LVPW: 1 CM
BH CV ECHO MEAS - IVSD: 0.7 CM
BH CV ECHO MEAS - LA DIMENSION: 3.7 CM
BH CV ECHO MEAS - LAT PEAK E' VEL: 7.1 CM/SEC
BH CV ECHO MEAS - LV DIASTOLIC VOL/BSA (35-75): 42.5 CM2
BH CV ECHO MEAS - LV MASS(C)D: 88.5 GRAMS
BH CV ECHO MEAS - LV MAX PG: 3 MMHG
BH CV ECHO MEAS - LV MEAN PG: 2 MMHG
BH CV ECHO MEAS - LV SYSTOLIC VOL/BSA (12-30): 17.4 CM2
BH CV ECHO MEAS - LV V1 MAX: 86.5 CM/SEC
BH CV ECHO MEAS - LV V1 VTI: 18.1 CM
BH CV ECHO MEAS - LVIDD: 4.3 CM
BH CV ECHO MEAS - LVIDS: 2.8 CM
BH CV ECHO MEAS - LVOT AREA: 2.8 CM2
BH CV ECHO MEAS - LVOT DIAM: 1.9 CM
BH CV ECHO MEAS - LVPWD: 0.7 CM
BH CV ECHO MEAS - MED PEAK E' VEL: 7 CM/SEC
BH CV ECHO MEAS - MV A DUR: 0.13 SEC
BH CV ECHO MEAS - MV A MAX VEL: 79.1 CM/SEC
BH CV ECHO MEAS - MV DEC SLOPE: 516 CM/SEC2
BH CV ECHO MEAS - MV DEC TIME: 0.22 SEC
BH CV ECHO MEAS - MV E MAX VEL: 57.6 CM/SEC
BH CV ECHO MEAS - MV E/A: 0.73
BH CV ECHO MEAS - MV MAX PG: 3.4 MMHG
BH CV ECHO MEAS - MV MEAN PG: 1 MMHG
BH CV ECHO MEAS - MV P1/2T: 37.9 MSEC
BH CV ECHO MEAS - MV V2 VTI: 21.9 CM
BH CV ECHO MEAS - MVA(P1/2T): 5.8 CM2
BH CV ECHO MEAS - MVA(VTI): 2.34 CM2
BH CV ECHO MEAS - PA ACC TIME: 0.1 SEC
BH CV ECHO MEAS - PA V2 MAX: 94.1 CM/SEC
BH CV ECHO MEAS - PI END-D VEL: 89.8 CM/SEC
BH CV ECHO MEAS - RV MAX PG: 2.7 MMHG
BH CV ECHO MEAS - RV V1 MAX: 82.2 CM/SEC
BH CV ECHO MEAS - RV V1 VTI: 19 CM
BH CV ECHO MEAS - RVDD: 3.1 CM
BH CV ECHO MEAS - SV(LVOT): 51.3 ML
BH CV ECHO MEAS - SV(MOD-SP4): 38.9 ML
BH CV ECHO MEAS - SVI(LVOT): 33.2 ML/M2
BH CV ECHO MEAS - SVI(MOD-SP4): 25.2 ML/M2
BH CV ECHO MEAS - TAPSE (>1.6): 2.22 CM
BH CV ECHO MEAS - TR MAX PG: 21.3 MMHG
BH CV ECHO MEAS - TR MAX VEL: 231 CM/SEC
BH CV ECHO MEASUREMENTS AVERAGE E/E' RATIO: 8.17
BH CV XLRA - RV BASE: 3.6 CM
BH CV XLRA - RV LENGTH: 5.1 CM
BH CV XLRA - RV MID: 2.6 CM
BH CV XLRA - TDI S': 10.6 CM/SEC
BUN SERPL-MCNC: 13 MG/DL (ref 8–23)
BUN/CREAT SERPL: 14.1 (ref 7–25)
CALCIUM SPEC-SCNC: 9.3 MG/DL (ref 8.6–10.5)
CHLORIDE SERPL-SCNC: 107 MMOL/L (ref 98–107)
CO2 SERPL-SCNC: 21.5 MMOL/L (ref 22–29)
CORTIS SERPL-MCNC: 4.82 MCG/DL
CREAT SERPL-MCNC: 0.92 MG/DL (ref 0.57–1)
DEPRECATED RDW RBC AUTO: 45.1 FL (ref 37–54)
EGFRCR SERPLBLD CKD-EPI 2021: 63.9 ML/MIN/1.73
EOSINOPHIL # BLD AUTO: 0.16 10*3/MM3 (ref 0–0.4)
EOSINOPHIL NFR BLD AUTO: 2.5 % (ref 0.3–6.2)
ERYTHROCYTE [DISTWIDTH] IN BLOOD BY AUTOMATED COUNT: 13.2 % (ref 12.3–15.4)
GLUCOSE SERPL-MCNC: 106 MG/DL (ref 65–99)
HCT VFR BLD AUTO: 38.8 % (ref 34–46.6)
HGB BLD-MCNC: 12.3 G/DL (ref 12–15.9)
IMM GRANULOCYTES # BLD AUTO: 0.01 10*3/MM3 (ref 0–0.05)
IMM GRANULOCYTES NFR BLD AUTO: 0.2 % (ref 0–0.5)
LV EF 3D SEGMENTATION: 59 %
LYMPHOCYTES # BLD AUTO: 2.92 10*3/MM3 (ref 0.7–3.1)
LYMPHOCYTES NFR BLD AUTO: 46.3 % (ref 19.6–45.3)
MAGNESIUM SERPL-MCNC: 2.2 MG/DL (ref 1.6–2.4)
MCH RBC QN AUTO: 29.1 PG (ref 26.6–33)
MCHC RBC AUTO-ENTMCNC: 31.7 G/DL (ref 31.5–35.7)
MCV RBC AUTO: 91.7 FL (ref 79–97)
MONOCYTES # BLD AUTO: 0.59 10*3/MM3 (ref 0.1–0.9)
MONOCYTES NFR BLD AUTO: 9.4 % (ref 5–12)
NEUTROPHILS NFR BLD AUTO: 2.57 10*3/MM3 (ref 1.7–7)
NEUTROPHILS NFR BLD AUTO: 40.8 % (ref 42.7–76)
NRBC BLD AUTO-RTO: 0 /100 WBC (ref 0–0.2)
PLATELET # BLD AUTO: 223 10*3/MM3 (ref 140–450)
PMV BLD AUTO: 12.2 FL (ref 6–12)
POTASSIUM SERPL-SCNC: 4 MMOL/L (ref 3.5–5.2)
QT INTERVAL: 451 MS
QTC INTERVAL: 453 MS
RBC # BLD AUTO: 4.23 10*6/MM3 (ref 3.77–5.28)
SINUS: 2.9 CM
SODIUM SERPL-SCNC: 138 MMOL/L (ref 136–145)
STJ: 2.8 CM
WBC NRBC COR # BLD AUTO: 6.3 10*3/MM3 (ref 3.4–10.8)

## 2025-07-10 PROCEDURE — G0378 HOSPITAL OBSERVATION PER HR: HCPCS

## 2025-07-10 PROCEDURE — 82533 TOTAL CORTISOL: CPT | Performed by: INTERNAL MEDICINE

## 2025-07-10 PROCEDURE — 93306 TTE W/DOPPLER COMPLETE: CPT

## 2025-07-10 PROCEDURE — 99214 OFFICE O/P EST MOD 30 MIN: CPT | Performed by: INTERNAL MEDICINE

## 2025-07-10 PROCEDURE — 83735 ASSAY OF MAGNESIUM: CPT | Performed by: PHYSICIAN ASSISTANT

## 2025-07-10 PROCEDURE — 93356 MYOCRD STRAIN IMG SPCKL TRCK: CPT | Performed by: INTERNAL MEDICINE

## 2025-07-10 PROCEDURE — 93356 MYOCRD STRAIN IMG SPCKL TRCK: CPT

## 2025-07-10 PROCEDURE — 80048 BASIC METABOLIC PNL TOTAL CA: CPT | Performed by: PHYSICIAN ASSISTANT

## 2025-07-10 PROCEDURE — 93306 TTE W/DOPPLER COMPLETE: CPT | Performed by: INTERNAL MEDICINE

## 2025-07-10 PROCEDURE — 85025 COMPLETE CBC W/AUTO DIFF WBC: CPT | Performed by: PHYSICIAN ASSISTANT

## 2025-07-10 RX ADMIN — PANTOPRAZOLE SODIUM 40 MG: 40 TABLET, DELAYED RELEASE ORAL at 08:39

## 2025-07-10 RX ADMIN — POLYETHYLENE GLYCOL 3350 17 G: 17 POWDER, FOR SOLUTION ORAL at 08:40

## 2025-07-10 RX ADMIN — Medication 10 ML: at 08:41

## 2025-07-10 NOTE — PROGRESS NOTES
CARDIOLOGY PROGRESS NOTE:    Rachael Quevedo  79 y.o.  female  1945  [id number]      Referring Provider: Hospitalist    Reason for follow-up: Syncope     Patient Care Team:  Alexa Shah MD as PCP - General (Family Medicine)  Alexa Shah MD as PCP - Family Medicine  Jermaine Delong MD as Consulting Physician (Cardiology)  Pallavi Valdes APRN as Nurse Practitioner (Cardiology)    Subjective .  Doing well without any symptoms    Objective lying in bed comfortably     Review of Systems   Constitutional: Negative for malaise/fatigue.   Cardiovascular:  Negative for chest pain, dyspnea on exertion, leg swelling and palpitations.   Respiratory:  Negative for cough and shortness of breath.    Gastrointestinal:  Negative for abdominal pain, nausea and vomiting.   Neurological:  Negative for dizziness, headaches, light-headedness, numbness and weakness.   All other systems reviewed and are negative.      Allergies: Patient has no known allergies.    Scheduled Meds:aspirin, 81 mg, Oral, Nightly  enoxaparin sodium, 40 mg, Subcutaneous, Daily  pantoprazole, 40 mg, Oral, Daily  polyethylene glycol, 17 g, Oral, Daily  rosuvastatin, 20 mg, Oral, Nightly  sodium chloride, 10 mL, Intravenous, Q12H      Continuous Infusions:   PRN Meds:.  acetaminophen    aluminum-magnesium hydroxide-simethicone    Calcium Replacement - Follow Nurse / BPA Driven Protocol    Magnesium Standard Dose Replacement - Follow Nurse / BPA Driven Protocol    melatonin    nitroglycerin    ondansetron ODT **OR** ondansetron    Phosphorus Replacement - Follow Nurse / BPA Driven Protocol    Potassium Replacement - Follow Nurse / BPA Driven Protocol    [COMPLETED] Insert Peripheral IV **AND** sodium chloride    sodium chloride    sodium chloride        VITAL SIGNS  Vitals:    07/10/25 0049 07/10/25 0420 07/10/25 0654 07/10/25 0817   BP: 138/68 157/68  140/62   BP Location: Right arm Left arm  Right arm   Patient Position: Lying Lying  Lying  "  Pulse: 74 72  69   Resp: 13 16  11   Temp: 97.8 °F (36.6 °C) 98.1 °F (36.7 °C)  97.9 °F (36.6 °C)   TempSrc: Oral Oral  Oral   SpO2: 98% 97%  97%   Weight:   58.2 kg (128 lb 4.9 oz)    Height:           Flowsheet Rows      Flowsheet Row First Filed Value   Admission Height 152.4 cm (60\") Documented at 07/09/2025 0912   Admission Weight 59 kg (130 lb 1.1 oz) Documented at 07/09/2025 0912             TELEMETRY: Sinus rhythm    Physical Exam:  Constitutional:       Appearance: Well-developed.   Eyes:      General: No scleral icterus.     Conjunctiva/sclera: Conjunctivae normal.   HENT:      Head: Normocephalic and atraumatic.   Neck:      Vascular: No carotid bruit or JVD.   Pulmonary:      Effort: Pulmonary effort is normal.      Breath sounds: Normal breath sounds. No wheezing. No rales.   Cardiovascular:      Normal rate. Regular rhythm.   Pulses:     Intact distal pulses.   Abdominal:      General: Bowel sounds are normal.      Palpations: Abdomen is soft.   Musculoskeletal:      Cervical back: Normal range of motion and neck supple. Skin:     General: Skin is warm and dry.      Findings: No rash.   Neurological:      Mental Status: Alert.          Results Review:   I reviewed the patient's new clinical results.  Lab Results (last 24 hours)       Procedure Component Value Units Date/Time    Basic Metabolic Panel [[id number]]  (Abnormal) Collected: 07/10/25 0309    Specimen: Blood from Arm, Left Updated: 07/10/25 0356     Glucose 106 mg/dL      BUN 13.0 mg/dL      Creatinine 0.92 mg/dL      Sodium 138 mmol/L      Potassium 4.0 mmol/L      Chloride 107 mmol/L      CO2 21.5 mmol/L      Calcium 9.3 mg/dL      BUN/Creatinine Ratio 14.1     Anion Gap 9.5 mmol/L      eGFR 63.9 mL/min/1.73     Narrative:      GFR Categories in Chronic Kidney Disease (CKD)              GFR Category          GFR (mL/min/1.73)    Interpretation  G1                    90 or greater        Normal or high (1)  G2                    60-89        "         Mild decrease (1)  G3a                   45-59                Mild to moderate decrease  G3b                   30-44                Moderate to severe decrease  G4                    15-29                Severe decrease  G5                    14 or less           Kidney failure    (1)In the absence of evidence of kidney disease, neither GFR category G1 or G2 fulfill the criteria for CKD.    eGFR calculation 2021 CKD-EPI creatinine equation, which does not include race as a factor    Magnesium [[id number]]  (Normal) Collected: 07/10/25 0309    Specimen: Blood from Arm, Left Updated: 07/10/25 0356     Magnesium 2.2 mg/dL     Cortisol [[id number]] Collected: 07/10/25 0309    Specimen: Blood from Arm, Left Updated: 07/10/25 0356     Cortisol 4.82 mcg/dL     Narrative:      Cortisol Reference Ranges:    Cortisol 6AM - 10AM Range: 6.02-18.40 mcg/dl  Cortisol 4PM - 8PM Range: 2.68-10.50 mcg/dl      Results may be falsely increased if patient taking Biotin.      CBC & Differential [[id number]]  (Abnormal) Collected: 07/10/25 0309    Specimen: Blood from Arm, Left Updated: 07/10/25 0328    Narrative:      The following orders were created for panel order CBC & Differential.  Procedure                               Abnormality         Status                     ---------                               -----------         ------                     CBC Auto Differential[[id number]]        Abnormal            Final result                 Please view results for these tests on the individual orders.    CBC Auto Differential [[id number]]  (Abnormal) Collected: 07/10/25 0309    Specimen: Blood from Arm, Left Updated: 07/10/25 0328     WBC 6.30 10*3/mm3      RBC 4.23 10*6/mm3      Hemoglobin 12.3 g/dL      Hematocrit 38.8 %      MCV 91.7 fL      MCH 29.1 pg      MCHC 31.7 g/dL      RDW 13.2 %      RDW-SD 45.1 fl      MPV 12.2 fL      Platelets 223 10*3/mm3      Neutrophil % 40.8 %      Lymphocyte % 46.3 %      Monocyte % 9.4 %       Eosinophil % 2.5 %      Basophil % 0.8 %      Immature Grans % 0.2 %      Neutrophils, Absolute 2.57 10*3/mm3      Lymphocytes, Absolute 2.92 10*3/mm3      Monocytes, Absolute 0.59 10*3/mm3      Eosinophils, Absolute 0.16 10*3/mm3      Basophils, Absolute 0.05 10*3/mm3      Immature Grans, Absolute 0.01 10*3/mm3      nRBC 0.0 /100 WBC     BNP [[id number]]  (Normal) Collected: 07/09/25 1945    Specimen: Blood from Arm, Left Updated: 07/09/25 2032     proBNP 431.0 pg/mL     Narrative:      This assay is used as an aid in the diagnosis of individuals suspected of having heart failure. It can be used as an aid in the diagnosis of acute decompensated heart failure (ADHF) in patients presenting with signs and symptoms of ADHF to the emergency department (ED). In addition, NT-proBNP of <300 pg/mL indicates ADHF is not likely.    Age Range Result Interpretation  NT-proBNP Concentration (pg/mL:      <50             Positive            >450                   Gray                 300-450                    Negative             <300    50-75           Positive            >900                  Gray                300-900                  Negative            <300      >75             Positive            >1800                  Gray                300-1800                  Negative            <300    TSH [[id number]]  (Normal) Collected: 07/09/25 1112    Specimen: Blood from Arm, Left Updated: 07/09/25 1353     TSH 2.220 uIU/mL     High Sensitivity Troponin T 1Hr [[id number]]  (Abnormal) Collected: 07/09/25 1112    Specimen: Blood from Arm, Left Updated: 07/09/25 1138     HS Troponin T 14 ng/L      Troponin T Numeric Delta -1 ng/L      Troponin T % Delta -7    Narrative:      High Sensitive Troponin T Reference Range:  <14.0 ng/L- Negative Female for AMI  <22.0 ng/L- Negative Male for AMI  >=14 - Abnormal Female indicating possible myocardial injury.  >=22 - Abnormal Male indicating possible myocardial injury.   Clinicians would  have to utilize clinical acumen, EKG, Troponin, and serial changes to determine if it is an Acute Myocardial Infarction or myocardial injury due to an underlying chronic condition.                 Imaging Results (Last 24 Hours)       ** No results found for the last 24 hours. **            EKG      I personally viewed and interpreted the patient's EKG/Telemetry data:    ECHOCARDIOGRAM:  Results for orders placed during the hospital encounter of 25    Adult Transthoracic Echo Complete w/ Color, Spectral and Contrast if Necessary Per Protocol 2025 2220    Interpretation Summary    Left ventricular ejection fraction appears to be 61 - 65%.    Left ventricular diastolic function is consistent with (grade I) impaired relaxation.    Estimated right ventricular systolic pressure from tricuspid regurgitation is normal (<35 mmHg).       STRESS MYOVIEW:  Results for orders placed during the hospital encounter of 25    Stress Test With Myocardial Perfusion One Day 2025 1234    Interpretation Summary    Left ventricular ejection fraction is hyperdynamic (Calculated EF > 70%).    LEXISCAN CARDIOLITE REPORT    DATE OF PROCEDURE:  25    INDICATION FOR PROCEDURE: Syncope, left bundle branch block, CAD, PCI    PROCEDURE PERFORMED: Cocodotiscan Cardiolite    PROCEDURE COMMENTS:    After informed consent was obtained.  Patient's resting heart rate was 61 bpm, resting blood pressure was 155/80, resting EKG revealed sinus rhythm with left bundle branch block.  Patient was given 0.4 mg of regadenosine for stress testing.  There was no significant change in heart rate, blood pressure, symptoms with regadenoson injection.  Patient tolerated procedure well.  Complications were none.    NUCLEAR IMAGIN.  There was moderate fixed septal defect probably due to left bundle branch block, no ischemia seen.  2.  Gated images reveal normal LV size and contractility, LVEF of 71%.    CONCLUSION:  1.  Lexiscan  Cardiolite with fixed septal defect due to left bundle branch block, negative for ischemia.  2.  Normal wall motion.  LVEF of 71%.    RECOMMENDATIONS:    Clinical correlation recommended.      Alec Wong MD  01/24/25  12:33 EST       CARDIAC CATHETERIZATION:  Results for orders placed during the hospital encounter of 06/16/25    Cardiac Catheterization/Vascular Study       OTHER:         Assessment & Plan     Syncope  Patient presented with a syncopal episode and is ruled out for MI by EKG and enzymes  Patient had her pacemaker interrogated which is working very well  Patient is followed by a neurologist as an outpatient will have workup done for the same.    Coronary disease  Patient had patent stents to the LAD and the circumflex artery and has nonobstructive disease now and is currently stable without any angina and has normally function and is ruled out for MI by EKG and enzymes.    Pacemaker placement  Patient had sick sinus syndrome and status post permanent pacemaker placement which is working very well    Hypertension  Patient blood pressure currently stable on medical therapy    Hyperlipidemia  Patient is currently stable on statins.        Jermaine Delong MD  07/10/25  11:08 EDT

## 2025-07-10 NOTE — CASE MANAGEMENT/SOCIAL WORK
Continued Stay Note  [facility] [place]     Patient Name: Rachael Quevedo  MRN: [id number]  Today's Date: 7/10/2025    Admit Date: 7/9/2025    Plan: Home with spouse.   Discharge Plan       Row Name 07/10/25 1031       Plan    Plan Home with spouse.    Patient/Family in Agreement with Plan yes    Plan Comments Patient will discharge home with spouse. D/C barriers: pending ECHO             LU Wright, RN, [facility] [place]visor   [address] [address]  Phone: [phone]  Fax: [phone]

## 2025-07-10 NOTE — DISCHARGE SUMMARY
[facility]    Alexa Shah MD    CHIEF COMPLAINT:     Near syncope    HISTORY OF PRESENT ILLNESS:    Obtained from admitting physician LUIS MIGUEL on 7/9/2025:  History of present illness a 79-year-old female with multiple health problems including recent pacemaker placement who states that she was sitting at the table drinking her coffee this morning she had not eaten breakfast as she got somewhat nauseated and sweaty and lightheaded and had some pressure in her upper abdomen chest and blacked out briefly.  Patient was brought to the hospital for evaluation.  She has no complaints currently denies chest pain neck arm jaw pain leg pain or swelling no recent long car ride plane ride immobilization foreign travels antibiotic use.  Denies any headache visual changes speech difficulty or weakness to one-sided the other nothing otherwise seem to trigger this or make it better or worse.    07/09/25 (postobservation admission):  Patient reports that on the morning of the day of presentation while seated at the kitchen table but without significant exertion she began to get a sensation in her abdomen and chest which has been similar to whenever she has had issues with syncope and near syncope before.  She denies any sort of pain just reports that it is a familiar symptom that typically precedes these events.  She felt lightheaded as if she were going to pass out and her  noted that she became very pale and diaphoretic.  Patient denies complete loss of consciousness but did feel very close to losing consciousness.  They report the symptoms lasted for approximately 5 minutes before slowly resolving spontaneously.  No falls or trauma reported and she denies any recent medication changes.  She did have pacemaker placed in February 2025.    7/10/2025:  Patient reports she did generally well overnight without recurrence of symptoms.  She does note that she has been told that she snores in the past but  has not been evaluated for MARY ANNE previously            Past Medical History:   Diagnosis Date    Allergic     Ankle fracture, left     Comments: 2016    Arthritis     Bursitis     Cataract, acquired     Coronary artery disease     Cough     Impression: Most likely reactive airway prescription as below Start antihistamine at home    Dense breast     Depression, major, recurrent, mild     Diverticulosis     GERD without esophagitis     Comments: Dr Duncan- protonix    Hematuria, microscopic     History of chicken pox     History of loop recorder     Hyperglycemia     Hyperlipidemia     Hypertension 01/04/2020    Injury of back     Injury of neck     Left bundle branch block (LBBB)     Malaise and fatigue     Medicare annual wellness visit, initial     with abnormal findings    Osteopenia 2022    Other specified menopausal and perimenopausal disorders     Other specified menopausal and postmenopausal disorders    Pap smear, low-risk     Renal insufficiency     S/P right rotator cuff repair     Screening for alcoholism     10 or more drinks per week    Screening for depression     Negative Depression Screening ( 9 or less) ()    Screening mammogram, encounter for     Shortness of breath     Stroke     1/5/2020 no residual effects    Syncope 01/22/2025    Vasovagal near-syncope     Impression: from illness and cough Discussed if there is loss of vision in an eye, confusion, weakness or numbness in an extremity, drooping of a side of the face, intractible pain, intractible vomiting, to go to the ER.    Vasovagal syncope 08/31/2020     Past Surgical History:   Procedure Laterality Date    ABLATION OF DYSRHYTHMIC FOCUS  8/2/2023    Ablation in upper spinal area    CARDIAC CATHETERIZATION N/A 01/27/2020    Procedure: Left Heart Cath with angiogram;  Surgeon: Jermaine Delong MD;  Location: [facility] CATH INVASIVE LOCATION;  Service: Cardiovascular    CARDIAC CATHETERIZATION N/A 01/27/2020    Procedure: Stent BOBBY coronary;   Surgeon: Jermaine Delong MD;  Location:  [facility] CATH INVASIVE LOCATION;  Service: Cardiovascular    CARDIAC CATHETERIZATION N/A 01/27/2020    Procedure: Left ventriculography;  Surgeon: Jermaine Delong MD;  Location:  [facility] CATH INVASIVE LOCATION;  Service: Cardiovascular    CARDIAC CATHETERIZATION N/A 01/27/2020    Procedure: Coronary angiography;  Surgeon: Jermaine Delong MD;  Location:  [facility] CATH INVASIVE LOCATION;  Service: Cardiovascular    CARDIAC CATHETERIZATION N/A 6/16/2025    Procedure: Left Heart Cath with angiogram;  Surgeon: Jermaine Delong MD;  Location:  [facility] CATH INVASIVE LOCATION;  Service: Cardiovascular;  Laterality: N/A;    CARDIAC ELECTROPHYSIOLOGY PROCEDURE N/A 02/05/2025    Procedure: Pacemaker DC new Abbott aware;  Surgeon: Alec Wong MD;  Location: [facility] CATH INVASIVE LOCATION;  Service: Cardiovascular;  Laterality: N/A;  [place]    CARDIAC SURGERY  2/5/2025    Pacemaker Placement    CATARACT EXTRACTION, BILATERAL Bilateral 1999    CHOLECYSTECTOMY  1993??    COLONOSCOPY  Last one 2023    CORONARY STENT PLACEMENT  1/28/2020    GALLBLADDER SURGERY  1996    INSERT / REPLACE / REMOVE PACEMAKER  2/5/25    LAPAROSCOPIC TUBAL LIGATION  1980    RENAL ARTERY STENT  01/26/2020    ROTATOR CUFF REPAIR Right 2002    TUBAL ABDOMINAL LIGATION  1979??     Family History   Problem Relation Age of Onset    Hyperlipidemia Mother         Elevated cholesterol    Heart disease Mother     Heart attack Mother     Hypertension Father     Colon cancer Father     Hyperlipidemia Father         Elevated cholesterol    Heart disease Father     Cancer Father         Colon Cancer    Diabetes Brother     Heart disease Brother     Hyperlipidemia Brother     Hypertension Brother     Diabetes Paternal Aunt     Colon cancer Paternal Aunt     Cancer Paternal Aunt         Cant remember  name    Colon cancer Paternal Uncle     Diabetes Paternal Uncle     Diabetes Paternal Grandmother     Heart disease Paternal  Grandmother     Arthritis Paternal Grandmother     Heart disease Maternal Uncle     Heart disease Maternal Uncle     Cancer Maternal Aunt     Cancer Paternal Uncle     Heart disease Maternal Uncle     Heart disease Maternal Uncle     Cancer Paternal Uncle     Cancer Maternal Aunt         Colon    Cancer Paternal Uncle         Colon    Breast cancer Neg Hx     Ovarian cancer Neg Hx      Social History     Tobacco Use    Smoking status: Never     Passive exposure: Past    Smokeless tobacco: Never    Tobacco comments:     Many family members smoked around me when I was a child   Vaping Use    Vaping status: Never Used   Substance Use Topics    Alcohol use: Yes     Alcohol/week: 2.0 standard drinks of alcohol     Types: 1 Glasses of wine, 1 Drinks containing 0.5 oz of alcohol per week     Comment: monthly    Drug use: No     Medications Prior to Admission   Medication Sig Dispense Refill Last Dose/Taking    aspirin 81 MG EC tablet Take 1 tablet by mouth Every Night.   7/8/2025    calcium carbonate (OS-SHYLA) 600 MG tablet Take 1 tablet by mouth 2 (Two) Times a Day With Meals.   7/9/2025    fexofenadine (ALLEGRA) 180 MG tablet Take 0.5 tablets by mouth Daily.   7/9/2025    Multiple Vitamins-Minerals (WOMENS MULTIVITAMIN + COLLAGEN PO) Take 1 tablet by mouth Daily.   7/9/2025    pantoprazole (PROTONIX) 40 MG EC tablet Take 1 tablet by mouth Daily.   7/9/2025    polyethylene glycol (MIRALAX) 17 GM/SCOOP powder Take 17 g by mouth Daily.   7/8/2025    rosuvastatin (CRESTOR) 20 MG tablet TAKE 1 TABLET BY MOUTH EVERY DAY 90 tablet 3 7/8/2025    fluticasone (FLONASE) 50 MCG/ACT nasal spray Administer 2 sprays into the nostril(s) as directed by provider Daily As Needed for Rhinitis.       valACYclovir (VALTREX) 500 MG tablet Take 1 tablet by mouth 2 (Two) Times a Day As Needed (cold sores).        Allergies:  Patient has no known allergies.    Immunization History   Administered Date(s) Administered    COVID-19 (PFIZER) Purple Cap  Monovalent 01/21/2021, 02/04/2021, 03/19/2021, 11/17/2021    Fluzone  >6mos 10/17/2016    Fluzone (or Fluarix & Flulaval for VFC) >6mos 11/01/2023    Fluzone High-Dose 65+YRS 09/18/2012, 10/23/2013, 10/15/2015, 10/21/2019    Fluzone High-Dose 65+yrs 09/18/2020, 10/16/2021    Hepatitis A 08/19/2003    Pneumococcal Polysaccharide (PPSV23) 10/23/2013           REVIEW OF SYSTEMS:    Constitutional: Positive for diaphoresis.   HENT: Negative.     Eyes: Negative.    Cardiovascular:  Positive for near-syncope.   Respiratory: Negative.     Skin: Negative.    Musculoskeletal: Negative.    Gastrointestinal: Negative.    Genitourinary: Negative.    Neurological: Negative.    Psychiatric/Behavioral: Negative.     Vital Signs  Temp:  [97.8 °F (36.6 °C)-98.2 °F (36.8 °C)] 97.9 °F (36.6 °C)  Heart Rate:  [66-74] 69  Resp:  [11-17] 11  BP: (110-159)/(60-81) 140/62          Physical Exam:  Physical Exam  Constitutional:       General: She is not in acute distress.     Appearance: Normal appearance. She is normal weight. She is not ill-appearing, toxic-appearing or diaphoretic.   HENT:      Head: Normocephalic.      Right Ear: External ear normal.      Left Ear: External ear normal.      Nose: Nose normal.      Mouth/Throat:      Mouth: Mucous membranes are moist.   Eyes:      Extraocular Movements: Extraocular movements intact.   Cardiovascular:      Rate and Rhythm: Normal rate and regular rhythm.      Pulses: Normal pulses.   Pulmonary:      Effort: Pulmonary effort is normal.      Breath sounds: Normal breath sounds.   Abdominal:      General: Bowel sounds are normal.      Palpations: Abdomen is soft.   Musculoskeletal:         General: Normal range of motion.      Cervical back: Normal range of motion.      Right lower leg: No edema.      Left lower leg: No edema.   Skin:     General: Skin is warm and dry.      Capillary Refill: Capillary refill takes less than 2 seconds.   Neurological:      General: No focal deficit present.       Mental Status: She is alert.   Psychiatric:         Mood and Affect: Mood normal.         Behavior: Behavior normal.         Thought Content: Thought content normal.         Judgment: Judgment normal.         Emotional Behavior:   Normal   Debilities:  None  Results Review:    I reviewed the patient's new clinical results.  Lab Results (most recent)       Procedure Component Value Units Date/Time    High Sensitivity Troponin T 1Hr [[id number]]  (Abnormal) Collected: 07/09/25 1112    Specimen: Blood from Arm, Left Updated: 07/09/25 1138     HS Troponin T 14 ng/L      Troponin T Numeric Delta -1 ng/L      Troponin T % Delta -7    Narrative:      High Sensitive Troponin T Reference Range:  <14.0 ng/L- Negative Female for AMI  <22.0 ng/L- Negative Male for AMI  >=14 - Abnormal Female indicating possible myocardial injury.  >=22 - Abnormal Male indicating possible myocardial injury.   Clinicians would have to utilize clinical acumen, EKG, Troponin, and serial changes to determine if it is an Acute Myocardial Infarction or myocardial injury due to an underlying chronic condition.         Comprehensive Metabolic Panel [[id number]]  (Abnormal) Collected: 07/09/25 1008    Specimen: Blood from Arm, Left Updated: 07/09/25 1055     Glucose 107 mg/dL      BUN 10.7 mg/dL      Creatinine 0.84 mg/dL      Sodium 140 mmol/L      Potassium 3.8 mmol/L      Chloride 107 mmol/L      CO2 20.1 mmol/L      Calcium 9.8 mg/dL      Total Protein 6.7 g/dL      Albumin 4.1 g/dL      ALT (SGPT) 19 U/L      AST (SGOT) 25 U/L      Alkaline Phosphatase 66 U/L      Total Bilirubin 0.4 mg/dL      Globulin 2.6 gm/dL      A/G Ratio 1.6 g/dL      BUN/Creatinine Ratio 12.7     Anion Gap 12.9 mmol/L      eGFR 71.2 mL/min/1.73     Narrative:      GFR Categories in Chronic Kidney Disease (CKD)              GFR Category          GFR (mL/min/1.73)    Interpretation  G1                    90 or greater        Normal or high (1)  G2                    60-89                 Mild decrease (1)  G3a                   45-59                Mild to moderate decrease  G3b                   30-44                Moderate to severe decrease  G4                    15-29                Severe decrease  G5                    14 or less           Kidney failure    (1)In the absence of evidence of kidney disease, neither GFR category G1 or G2 fulfill the criteria for CKD.    eGFR calculation 2021 CKD-EPI creatinine equation, which does not include race as a factor    High Sensitivity Troponin T [[id number]]  (Abnormal) Collected: 07/09/25 1008    Specimen: Blood from Arm, Left Updated: 07/09/25 1055     HS Troponin T 15 ng/L     Narrative:      High Sensitive Troponin T Reference Range:  <14.0 ng/L- Negative Female for AMI  <22.0 ng/L- Negative Male for AMI  >=14 - Abnormal Female indicating possible myocardial injury.  >=22 - Abnormal Male indicating possible myocardial injury.   Clinicians would have to utilize clinical acumen, EKG, Troponin, and serial changes to determine if it is an Acute Myocardial Infarction or myocardial injury due to an underlying chronic condition.         Magnesium [[id number]]  (Normal) Collected: 07/09/25 1008    Specimen: Blood from Arm, Left Updated: 07/09/25 1055     Magnesium 2.3 mg/dL     [place] Draw [[id number]] Collected: 07/09/25 1010    Specimen: Blood from Arm, Left Updated: 07/09/25 1030    Narrative:      The following orders were created for panel order [place] Draw.  Procedure                               Abnormality         Status                     ---------                               -----------         ------                     Light Blue Top[[id number]]                                   Final result                 Please view results for these tests on the individual orders.    Light Blue Top [[id number]] Collected: 07/09/25 1010    Specimen: Blood from Arm, Left Updated: 07/09/25 1030     Extra Tube Hold for add-ons.     Comment: Auto  resulted       CBC & Differential [[id number]]  (Abnormal) Collected: 07/09/25 1008    Specimen: Blood from Arm, Left Updated: 07/09/25 1024    Narrative:      The following orders were created for panel order CBC & Differential.  Procedure                               Abnormality         Status                     ---------                               -----------         ------                     CBC Auto Differential[[id number]]        Abnormal            Final result                 Please view results for these tests on the individual orders.    CBC Auto Differential [[id number]]  (Abnormal) Collected: 07/09/25 1008    Specimen: Blood from Arm, Left Updated: 07/09/25 1024     WBC 5.93 10*3/mm3      RBC 4.57 10*6/mm3      Hemoglobin 13.5 g/dL      Hematocrit 42.1 %      MCV 92.1 fL      MCH 29.5 pg      MCHC 32.1 g/dL      RDW 13.2 %      RDW-SD 44.6 fl      MPV 12.7 fL      Platelets 221 10*3/mm3      Neutrophil % 56.2 %      Lymphocyte % 31.5 %      Monocyte % 9.1 %      Eosinophil % 2.2 %      Basophil % 0.8 %      Immature Grans % 0.2 %      Neutrophils, Absolute 3.33 10*3/mm3      Lymphocytes, Absolute 1.87 10*3/mm3      Monocytes, Absolute 0.54 10*3/mm3      Eosinophils, Absolute 0.13 10*3/mm3      Basophils, Absolute 0.05 10*3/mm3      Immature Grans, Absolute 0.01 10*3/mm3      nRBC 0.0 /100 WBC             Imaging Results (Most Recent)       None          reviewed    ECG/EMG Results (most recent)       Procedure Component Value Units Date/Time    Telemetry Scan [[id number]] Resulted: 07/09/25 0942     Updated: 07/09/25 1005    Telemetry Scan [[id number]] Resulted: 07/09/25 1526     Updated: 07/09/25 1550    Telemetry Scan [[id number]] Resulted: 07/09/25 2008     Updated: 07/10/25 0004    Telemetry Scan [[id number]] Resulted: 07/09/25 2347     Updated: 07/10/25 0004    Adult Transthoracic Echo Complete W/ Cont if Necessary Per Protocol - In process [[id number]] Resulted: 07/10/25 0312     Updated: 07/10/25  9855    This result has not been signed. Information might be incomplete.       EF_3D-VOL 59.0 %      LV GLOBAL STRAIN  -14.4 %      LVIDd 4.3 cm      LVIDs 2.8 cm      IVSd 0.70 cm      LVPWd 0.70 cm      FS 34.9 %      IVS/LVPW 1.00 cm      ESV(cubed) 22.0 ml      LV Sys Vol (BSA corrected) 17.4 cm2      EDV(cubed) 79.5 ml      LV Flores Vol (BSA corrected) 42.5 cm2      LV mass(C)d 88.5 grams      LVOT area 2.8 cm2      LVOT diam 1.90 cm      EDV(MOD-sp4) 65.7 ml      ESV(MOD-sp4) 26.8 ml      SV(MOD-sp4) 38.9 ml      SVi(MOD-SP4) 25.2 ml/m2      SVi (LVOT) 33.2 ml/m2      EF(MOD-sp4) 59.2 %      MV E max heber 57.6 cm/sec      MV A max heber 79.1 cm/sec      MV dec time 0.22 sec      MV E/A 0.73     MV A dur 0.13 sec      Med Peak E' Heber 7.0 cm/sec      Lat Peak E' Heber 7.1 cm/sec      TR max heber 231.0 cm/sec      Avg E/e' ratio 8.17     SV(LVOT) 51.3 ml      RVIDd 3.1 cm      RV Base 3.6 cm      RV Mid 2.6 cm      RV Length 5.1 cm      TAPSE (>1.6) 2.22 cm      RV S' 10.6 cm/sec      LA dimension (2D)  3.7 cm      LV V1 max 86.5 cm/sec      LV V1 max PG 3.0 mmHg      LV V1 mean PG 2.00 mmHg      LV V1 VTI 18.1 cm      Ao pk heber 127.0 cm/sec      Ao max PG 6.5 mmHg      Ao mean PG 3.0 mmHg      Ao V2 VTI 28.5 cm      TITI(I,D) 1.80 cm2      Dimensionless Index 0.68 (DI)      MV max PG 3.4 mmHg      MV mean PG 1.00 mmHg      MV V2 VTI 21.9 cm      MV P1/2t 37.9 msec      MVA(P1/2t) 5.8 cm2      MVA(VTI) 2.34 cm2      MV dec slope 516.0 cm/sec2      TR max PG 21.3 mmHg      RV V1 max PG 2.7 mmHg      RV V1 max 82.2 cm/sec      RV V1 VTI 19.0 cm      PA V2 max 94.1 cm/sec      PA acc time 0.10 sec      PI end-d heber 89.8 cm/sec      ACS 1.70 cm      Sinus 2.9 cm      STJ 2.8 cm     Telemetry Scan [[id number]] Resulted: 07/10/25 0411     Updated: 07/10/25 0450    ECG 12 Lead Syncope [[id number]] Collected: 07/09/25 0929     Updated: 07/10/25 0709     QT Interval 451 ms      QTC Interval 453 ms     Narrative:      HEART RATE=61   bpm  RR Xdjzkjcx=847  ms  OK Ohqnviju=176  ms  P Horizontal Axis=-17  deg  P Front Axis=  deg  QRSD Illrafwa=163  ms  QT Mbmcueqv=045  ms  BDpW=330  ms  QRS Axis=-20  deg  T Wave Axis=136  deg  - ABNORMAL ECG -  Atrial-paced complexes  Left bundle branch block  ST elevation secondary to IVCD  When compared with ECG of 05-Feb-2025 10:52:44,  Significant change in rhythm  Electronically Signed By: Bill Santana ([facility]) 2025-07-10 07:09:32  Date and Time of Study:2025-07-09 09:29:53    Telemetry Scan [[id number]] Resulted: 07/10/25 0828     Updated: 07/10/25 0850          reviewed    Results for orders placed during the hospital encounter of 01/22/25    Duplex Carotid Ultrasound CAR 01/22/2025  7:51 PM    Interpretation Summary    Atherosclerotic plaque proximal right and left internal carotid arteries with less than 50% stenosis bilaterally.    Vertebral arteries patent with antegrade flow bilaterally.      Results for orders placed during the hospital encounter of 01/22/25    Adult Transthoracic Echo Complete w/ Color, Spectral and Contrast if Necessary Per Protocol    Interpretation Summary    Left ventricular ejection fraction appears to be 61 - 65%.    Left ventricular diastolic function is consistent with (grade I) impaired relaxation.    Estimated right ventricular systolic pressure from tricuspid regurgitation is normal (<35 mmHg).      Microbiology Results (last 10 days)       ** No results found for the last 240 hours. **            Assessment & Plan     Syncope        Syncope  - Troponin: 15, 14  - EKG showed atrial paced rhythm at 61  - Echocardiogram from January 2025 showed an EF of 61-65% with grade 1 diastolic dysfunction  - Check orthostatic vital signs and TSH  - Continue telemetry  - Cardiology consulted  - Cortisol level: 4.82  - Echocardiogram obtained  - Outpatient referral to sleep medicine    CAD  - Continue aspirin and statin  - Telemetry    GERD  - PPI    Hyperlipidemia  - Statin    I discussed  the patients findings and my recommendations with patient and nursing staff.     Discharge Diagnosis:      Syncope      Hospital Course  Patient is a 79 y.o. female presented with near syncopal episode with an HPI noted above.  Serial troponins were assessed found be 15, 14 with CBC and CMP being generally unremarkable.  BMP was ordered and found to be 431.0.  EKG was obtained which showed an atrial paced rhythm at 61.  Review of record shows patient an echocardiogram in January 2025 within normal EF of 61-65% and grade 1 diastolic dysfunction at that time.  Orthostatic vital signs were obtained and found to be unremarkable.  TSH was found to be 2.220.  Cardiology was consulted who ordered a.m. cortisol level which was found to be 4.82.  Echocardiogram was also ordered, pacemaker was interrogated and cardiologist reevaluated patient on 7/10 noting she is in good condition for discharge with planned outpatient follow-up.  At this time patient is felt to be in good condition for discharge with close follow-up with her PCP as well as cardiology on an outpatient basis.  Her full testing/results and plan were discussed with patient along with concerning/alarm symptoms for which to call 911/return to the ED.  All questions were answered and she verbalizes her understanding and agreement.    Past Medical History:     Past Medical History:   Diagnosis Date    Allergic     Ankle fracture, left     Comments: 2016    Arthritis     Bursitis     Cataract, acquired     Coronary artery disease     Cough     Impression: Most likely reactive airway prescription as below Start antihistamine at home    Dense breast     Depression, major, recurrent, mild     Diverticulosis     GERD without esophagitis     Comments: Dr Duncan- protonix    Hematuria, microscopic     History of chicken pox     History of loop recorder     Hyperglycemia     Hyperlipidemia     Hypertension 01/04/2020    Injury of back     Injury of neck     Left bundle  branch block (LBBB)     Malaise and fatigue     Medicare annual wellness visit, initial     with abnormal findings    Osteopenia 2022    Other specified menopausal and perimenopausal disorders     Other specified menopausal and postmenopausal disorders    Pap smear, low-risk     Renal insufficiency     S/P right rotator cuff repair     Screening for alcoholism     10 or more drinks per week    Screening for depression     Negative Depression Screening ( 9 or less) ()    Screening mammogram, encounter for     Shortness of breath     Stroke     1/5/2020 no residual effects    Syncope 01/22/2025    Vasovagal near-syncope     Impression: from illness and cough Discussed if there is loss of vision in an eye, confusion, weakness or numbness in an extremity, drooping of a side of the face, intractible pain, intractible vomiting, to go to the ER.    Vasovagal syncope 08/31/2020       Past Surgical History:     Past Surgical History:   Procedure Laterality Date    ABLATION OF DYSRHYTHMIC FOCUS  8/2/2023    Ablation in upper spinal area    CARDIAC CATHETERIZATION N/A 01/27/2020    Procedure: Left Heart Cath with angiogram;  Surgeon: Jermaine Delong MD;  Location: [facility] CATH INVASIVE LOCATION;  Service: Cardiovascular    CARDIAC CATHETERIZATION N/A 01/27/2020    Procedure: Stent BOBBY coronary;  Surgeon: Jermaine Delong MD;  Location: [facility] CATH INVASIVE LOCATION;  Service: Cardiovascular    CARDIAC CATHETERIZATION N/A 01/27/2020    Procedure: Left ventriculography;  Surgeon: Jermaine Delong MD;  Location: [facility] CATH INVASIVE LOCATION;  Service: Cardiovascular    CARDIAC CATHETERIZATION N/A 01/27/2020    Procedure: Coronary angiography;  Surgeon: Jermaine Delong MD;  Location: [facility] CATH INVASIVE LOCATION;  Service: Cardiovascular    CARDIAC CATHETERIZATION N/A 6/16/2025    Procedure: Left Heart Cath with angiogram;  Surgeon: Jermaine Delong MD;  Location: [facility] CATH INVASIVE LOCATION;  Service: Cardiovascular;  Laterality:  N/A;    CARDIAC ELECTROPHYSIOLOGY PROCEDURE N/A 02/05/2025    Procedure: Pacemaker DC new Abbott aware;  Surgeon: Alec Wong MD;  Location: [facility] CATH INVASIVE LOCATION;  Service: Cardiovascular;  Laterality: N/A;  [place]    CARDIAC SURGERY  2/5/2025    Pacemaker Placement    CATARACT EXTRACTION, BILATERAL Bilateral 1999    CHOLECYSTECTOMY  1993??    COLONOSCOPY  Last one 2023    CORONARY STENT PLACEMENT  1/28/2020    GALLBLADDER SURGERY  1996    INSERT / REPLACE / REMOVE PACEMAKER  2/5/25    LAPAROSCOPIC TUBAL LIGATION  1980    RENAL ARTERY STENT  01/26/2020    ROTATOR CUFF REPAIR Right 2002    TUBAL ABDOMINAL LIGATION  1979??       Social History:   Social History     Socioeconomic History    Marital status:    Tobacco Use    Smoking status: Never     Passive exposure: Past    Smokeless tobacco: Never    Tobacco comments:     Many family members smoked around me when I was a child   Vaping Use    Vaping status: Never Used   Substance and Sexual Activity    Alcohol use: Yes     Alcohol/week: 2.0 standard drinks of alcohol     Types: 1 Glasses of wine, 1 Drinks containing 0.5 oz of alcohol per week     Comment: monthly    Drug use: No    Sexual activity: Not Currently     Partners: Male     Birth control/protection: None     Comment: Very occasional       Procedures Performed         Consults:   Consults       Date and Time Order Name Status Description    7/9/2025 12:44 PM Inpatient Cardiology Consult Completed             Condition on Discharge:     Stable    Discharge Disposition  Home or Self Care    Discharge Medications     Discharge Medications        Continue These Medications        Instructions Start Date   aspirin 81 MG EC tablet   81 mg, Nightly      calcium carbonate 600 MG tablet  Commonly known as: OS-SHYLA   600 mg, 2 Times Daily With Meals      fexofenadine 180 MG tablet  Commonly known as: ALLEGRA   90 mg, Daily      fluticasone 50 MCG/ACT nasal spray  Commonly known as:  FLONASE   2 sprays, Daily PRN      pantoprazole 40 MG EC tablet  Commonly known as: PROTONIX   Take 1 tablet by mouth Daily.      polyethylene glycol 17 GM/SCOOP powder  Commonly known as: MIRALAX   17 g, Daily      rosuvastatin 20 MG tablet  Commonly known as: CRESTOR   20 mg, Oral, Daily      valACYclovir 500 MG tablet  Commonly known as: VALTREX   500 mg, Oral, 2 Times Daily PRN      WOMENS MULTIVITAMIN + COLLAGEN PO   1 tablet, Daily               Discharge Diet:     Activity at Discharge:     Follow-up Appointments  Future Appointments   Date Time Provider Department Center   7/28/2025 10:00 AM Angela Gomez APRN [facility]   9/3/2025 10:00 AM [facility] DEVICE CHECK [facility]   9/3/2025 10:30 AM Jermaine Delong MD [facility]   10/24/2025 10:00 AM Alexa Shah MD [facility]     Additional Instructions for the Follow-ups that You Need to Schedule       Ambulatory Referral to Sleep Medicine   As directed      Follow-up needed: Yes        Discharge Follow-up with PCP   As directed       Currently Documented PCP:    Alexa hSah MD    PCP Phone Number:    [phone]     Follow Up Details: 5 to 7 days        Discharge Follow-up with Specified Provider: Cardiology   As directed      To: Cardiology                Test Results Pending at Discharge  Pending Results       Procedure [Order ID] Specimen - Date/Time    Adult Transthoracic Echo Complete W/ Cont if Necessary Per Protocol - In process [[id number]] Resulted: 07/10/25 0312     Updated: 07/10/25 0312    This result has not been signed. Information might be incomplete.       EF_3D-VOL 59.0 %      LV GLOBAL STRAIN  -14.4 %      LVIDd 4.3 cm      LVIDs 2.8 cm      IVSd 0.70 cm      LVPWd 0.70 cm      FS 34.9 %      IVS/LVPW 1.00 cm      ESV(cubed) 22.0 ml      LV Sys Vol (BSA corrected) 17.4 cm2      EDV(cubed) 79.5 ml      LV Flores Vol (BSA corrected) 42.5 cm2      LV mass(C)d 88.5 grams      LVOT area  2.8 cm2      LVOT diam 1.90 cm      EDV(MOD-sp4) 65.7 ml      ESV(MOD-sp4) 26.8 ml      SV(MOD-sp4) 38.9 ml      SVi(MOD-SP4) 25.2 ml/m2      SVi (LVOT) 33.2 ml/m2      EF(MOD-sp4) 59.2 %      MV E max heber 57.6 cm/sec      MV A max heber 79.1 cm/sec      MV dec time 0.22 sec      MV E/A 0.73     MV A dur 0.13 sec      Med Peak E' Heber 7.0 cm/sec      Lat Peak E' Heber 7.1 cm/sec      TR max heber 231.0 cm/sec      Avg E/e' ratio 8.17     SV(LVOT) 51.3 ml      RVIDd 3.1 cm      RV Base 3.6 cm      RV Mid 2.6 cm      RV Length 5.1 cm      TAPSE (>1.6) 2.22 cm      RV S' 10.6 cm/sec      LA dimension (2D)  3.7 cm      LV V1 max 86.5 cm/sec      LV V1 max PG 3.0 mmHg      LV V1 mean PG 2.00 mmHg      LV V1 VTI 18.1 cm      Ao pk heber 127.0 cm/sec      Ao max PG 6.5 mmHg      Ao mean PG 3.0 mmHg      Ao V2 VTI 28.5 cm      TITI(I,D) 1.80 cm2      Dimensionless Index 0.68 (DI)      MV max PG 3.4 mmHg      MV mean PG 1.00 mmHg      MV V2 VTI 21.9 cm      MV P1/2t 37.9 msec      MVA(P1/2t) 5.8 cm2      MVA(VTI) 2.34 cm2      MV dec slope 516.0 cm/sec2      TR max PG 21.3 mmHg      RV V1 max PG 2.7 mmHg      RV V1 max 82.2 cm/sec      RV V1 VTI 19.0 cm      PA V2 max 94.1 cm/sec      PA acc time 0.10 sec      PI end-d heber 89.8 cm/sec      ACS 1.70 cm      Sinus 2.9 cm      STJ 2.8 cm              Risk for Readmission (LACE) Score: 4 (7/10/2025  6:00 AM)      Greater than 30 minutes spent in discharge activities for this patient    Signature:Electronically signed by Arsh Urrutia PA-C, 07/10/25, 10:31 AM EDT.

## 2025-07-10 NOTE — OUTREACH NOTE
Prep Survey      Flowsheet Row Responses   [facility] patient discharged from? [facility]   Is LACE score < 7 ? Yes   Eligibility [facility]   Date of Admission 07/09/25   Date of Discharge 07/10/25   Discharge Disposition Home or Self Care   Discharge diagnosis syncope   Does the patient have one of the following disease processes/diagnoses(primary or secondary)? Other   Does the patient have Home health ordered? No   Is there a DME ordered? No   Prep survey completed? Yes            ALEKS MORALES - Registered Nurse

## 2025-07-10 NOTE — PLAN OF CARE
Goal Outcome Evaluation:  Plan of Care Reviewed With: patient, spouse   D/C to home

## 2025-07-10 NOTE — PLAN OF CARE
Problem: Adult Inpatient Plan of Care  Goal: Absence of Hospital-Acquired Illness or Injury  Intervention: Prevent Infection  Recent Flowsheet Documentation  Taken 7/10/2025 0400 by Pallavi Jonas RNA  Infection Prevention: single patient room provided  Taken 7/10/2025 0300 by Pallavi Jonas RNA  Infection Prevention:   single patient room provided   rest/sleep promoted   hand hygiene promoted  Taken 7/10/2025 0200 by Plalavi Jonas RNA  Infection Prevention:   single patient room provided   rest/sleep promoted   hand hygiene promoted  Taken 7/9/2025 1900 by Pallavi Jonas RNA  Infection Prevention: single patient room provided     Problem: Comorbidity Management  Goal: Blood Pressure in Desired Range  Intervention: Maintain Blood Pressure Management  Recent Flowsheet Documentation  Taken 7/10/2025 0300 by Pallavi Jonas RNA  Medication Review/Management: medications reviewed  Taken 7/10/2025 0200 by Pallavi Jonas RNA  Medication Review/Management: medications reviewed   Goal Outcome Evaluation:   - Decreased syncope episodes    - Decreased risk of falls   - Prevention of injury

## 2025-07-10 NOTE — PLAN OF CARE
Goal Outcome Evaluation:  Plan of Care Reviewed With: patient, spouse      A/O x4 RA adlib. Awaiting ECHO results

## 2025-07-11 ENCOUNTER — TRANSITIONAL CARE MANAGEMENT TELEPHONE ENCOUNTER (OUTPATIENT)
Dept: CALL CENTER | Facility: HOSPITAL | Age: 80
End: 2025-07-11
Payer: MEDICARE

## 2025-07-11 NOTE — CASE MANAGEMENT/SOCIAL WORK
Case Management Discharge Note      Final Note: Home        Transportation Services  Transportation: Private Transportation  Private: Car    Final Discharge Disposition Code: 01 - home or self-care

## 2025-07-11 NOTE — OUTREACH NOTE
Call Center TCM Note      Flowsheet Row Responses   [facility] patient discharged from? [facility]   Does the patient have one of the following disease processes/diagnoses(primary or secondary)? Other   TCM attempt successful? Yes  [vr for Gene, spouse as well as dtr Alexandria and Windy, dtrs]   Call start time 1404   Call end time 1406   Discharge diagnosis syncope   Does the patient have all medications ordered at discharge? N/A   Prescription comments no changes in meds, no questions regarding meds   Is the patient taking all medications as directed (includes completed medication regime)? Yes   Comments [facility] 7/17/25@500pm (appt in place at time of call)   Does the patient have an appointment with their PCP within 7-14 days of discharge? Yes   Nursing Interventions Confirmed date/time of appointment   Has home health visited the patient within 72 hours of discharge? N/A   Psychosocial issues? No   Did the patient receive a copy of their discharge instructions? Yes   Nursing interventions Reviewed instructions with patient   What is the patient's perception of their health status since discharge? Improving  [Pt is doing well, denies any syncope at this time.  Keeping hydrated. will journal her BP and pulse to bring to f/u appt.  No questions today]   Is the patient/caregiver able to teach back signs and symptoms related to disease process for when to call PCP? Yes   Is the patient/caregiver able to teach back signs and symptoms related to disease process for when to call 911? Yes   TCM call completed? Yes   Call end time 1406            ЕКАТЕРИНА Humphrey Registered Nurse    7/11/2025, 14:07 EDT

## 2025-07-18 ENCOUNTER — TELEPHONE (OUTPATIENT)
Dept: FAMILY MEDICINE CLINIC | Facility: CLINIC | Age: 80
End: 2025-07-18
Payer: MEDICARE

## 2025-07-21 ENCOUNTER — OFFICE VISIT (OUTPATIENT)
Dept: FAMILY MEDICINE CLINIC | Facility: CLINIC | Age: 80
End: 2025-07-21
Payer: MEDICARE

## 2025-07-21 VITALS
SYSTOLIC BLOOD PRESSURE: 122 MMHG | OXYGEN SATURATION: 96 % | TEMPERATURE: 96.9 F | DIASTOLIC BLOOD PRESSURE: 64 MMHG | BODY MASS INDEX: 25.76 KG/M2 | WEIGHT: 131.2 LBS | RESPIRATION RATE: 18 BRPM | HEIGHT: 60 IN | HEART RATE: 85 BPM

## 2025-07-21 DIAGNOSIS — Z09 HOSPITAL DISCHARGE FOLLOW-UP: Primary | ICD-10-CM

## 2025-07-21 DIAGNOSIS — E66.3 OVERWEIGHT WITH BODY MASS INDEX (BMI) OF 25 TO 25.9 IN ADULT: ICD-10-CM

## 2025-07-21 DIAGNOSIS — R55 SYNCOPE, UNSPECIFIED SYNCOPE TYPE: ICD-10-CM

## 2025-07-21 DIAGNOSIS — K21.9 GERD WITHOUT ESOPHAGITIS: ICD-10-CM

## 2025-07-21 RX ORDER — OMEPRAZOLE 40 MG/1
40 CAPSULE, DELAYED RELEASE ORAL DAILY
Qty: 30 CAPSULE | Refills: 0 | Status: SHIPPED | OUTPATIENT
Start: 2025-07-21

## 2025-07-21 NOTE — PROGRESS NOTES
Subjective   Rachael Quevedo is a 79 y.o. female. Presents to [facility]    Rachael was seen at [facility] . She was admitted on 07/09/25  for near syncope. She was discharged on 07/10/25. Discharge diagnosis was syncope. Labs that were performed during the encounter included: CMP, CBC, magnesium, and troponin. Diagnostic studies that were performed included: EKG- . Currently Rachael receives care at home. Complications from the hospital stay include none. The patient stated that they do not need help with their daily life and activities. The patient stated that they do have emotional support at home.  Current outpatient and discharge medications have been reconciled for the patient.  Reviewed by: Alexa Shah MD    She is feeling better. She has not had any more episodes like this one.       Chief Complaint   Patient presents with    Hospital Follow Up Visit    Syncope       History of Present Illness  Syncope:   Patient complains of near syncope. History was given by the patient. Onset was 2 weeks ago, with resolved course since that time. The syncopal episode occurred while the patient was seated.  The fainting episode followed none.  Patient describes the episode as a sudden loss of consciousness without warning. Patient also has associated symptoms of  nausea and chest pressure. There has been only two episodes. The patient denies abdominal pain, nausea, and tachycardia/palpitations. Taking culprit meds?: none.  Current treatment includes none.             She is feeling better. She has not had any more episodes like this one.         I personally reviewed and updated the patient's allergies, medications, problem list, and past medical, surgical, social, and family history. I have reviewed and confirmed the accuracy of the History of Present Illness and Review of Symptoms as documented by the MA/LPN/RN. Alexa Shah MD    Allergies:  No Known Allergies    Social  History:  Social History     Socioeconomic History    Marital status:    Tobacco Use    Smoking status: Never     Passive exposure: Past    Smokeless tobacco: Never    Tobacco comments:     Many family members smoked around me when I was a child   Vaping Use    Vaping status: Never Used   Substance and Sexual Activity    Alcohol use: Yes     Alcohol/week: 2.0 standard drinks of alcohol     Types: 1 Glasses of wine, 1 Drinks containing 0.5 oz of alcohol per week     Comment: monthly    Drug use: No    Sexual activity: Not Currently     Partners: Male     Birth control/protection: None     Comment: Very occasional       Family History:  Family History   Problem Relation Age of Onset    Hyperlipidemia Mother         Elevated cholesterol    Heart disease Mother     Heart attack Mother     Hypertension Father     Colon cancer Father     Hyperlipidemia Father         Elevated cholesterol    Heart disease Father     Cancer Father         Colon Cancer    Diabetes Brother     Heart disease Brother     Hyperlipidemia Brother     Hypertension Brother     Diabetes Paternal Aunt     Colon cancer Paternal Aunt     Cancer Paternal Aunt         Cant remember  name    Colon cancer Paternal Uncle     Diabetes Paternal Uncle     Diabetes Paternal Grandmother     Heart disease Paternal Grandmother     Arthritis Paternal Grandmother     Heart disease Maternal Uncle     Heart disease Maternal Uncle     Cancer Maternal Aunt     Cancer Paternal Uncle     Heart disease Maternal Uncle     Heart disease Maternal Uncle     Cancer Paternal Uncle     Cancer Maternal Aunt         Colon    Cancer Paternal Uncle         Colon    Breast cancer Neg Hx     Ovarian cancer Neg Hx        Past Medical History :  Patient Active Problem List   Diagnosis    Coronary artery disease    Degeneration of intervertebral disc of lumbosacral region    Dense breast    Depression, major, recurrent, mild    GERD without esophagitis    History of chicken  pox    Presence of other cardiac implants and grafts    Mixed hyperlipidemia    Left bundle branch block (LBBB)    Lumbar disc herniation with radiculopathy    Other specified menopausal and postmenopausal disorders    CVA (cerebral vascular accident)    Cervical radiculopathy    Long-term current use of opiate analgesic    DDD (degenerative disc disease), cervical    Medicare annual wellness visit, subsequent    Screening mammogram for breast cancer    Seasonal allergic rhinitis due to pollen    Overweight with body mass index (BMI) of 25 to 25.9 in adult    Type 2 diabetes mellitus without complication, without long-term current use of insulin    Neck mass    Thyroiditis    Lymphadenopathy    Oral phase dysphagia    Dysuria    Skin lesion    Mammogram declined    Syncope    Sinus pause    SSS (sick sinus syndrome)    Pacemaker    H/O cold sores    Elevated MCV    Hypermagnesemia    Other fatigue    Vision changes    Dizzy    Weakness       Medication List:    Current Outpatient Medications:     aspirin 81 MG EC tablet, Take 1 tablet by mouth Every Night., Disp: , Rfl:     calcium carbonate (OS-SHYLA) 600 MG tablet, Take 1 tablet by mouth 2 (Two) Times a Day With Meals., Disp: , Rfl:     fexofenadine (ALLEGRA) 180 MG tablet, Take 0.5 tablets by mouth Daily., Disp: , Rfl:     fluticasone (FLONASE) 50 MCG/ACT nasal spray, Administer 2 sprays into the nostril(s) as directed by provider Daily As Needed for Rhinitis., Disp: , Rfl:     Multiple Vitamins-Minerals (WOMENS MULTIVITAMIN + COLLAGEN PO), Take 1 tablet by mouth Daily., Disp: , Rfl:     polyethylene glycol (MIRALAX) 17 GM/SCOOP powder, Take 17 g by mouth Daily., Disp: , Rfl:     rosuvastatin (CRESTOR) 20 MG tablet, TAKE 1 TABLET BY MOUTH EVERY DAY, Disp: 90 tablet, Rfl: 3    valACYclovir (VALTREX) 500 MG tablet, Take 1 tablet by mouth 2 (Two) Times a Day As Needed (cold sores)., Disp: , Rfl:     omeprazole (priLOSEC) 40 MG capsule, Take 1 capsule by mouth Daily.,  Disp: 30 capsule, Rfl: 0    Past Surgical History:  Past Surgical History:   Procedure Laterality Date    ABLATION OF DYSRHYTHMIC FOCUS  8/2/2023    Ablation in upper spinal area    CARDIAC CATHETERIZATION N/A 01/27/2020    Procedure: Left Heart Cath with angiogram;  Surgeon: Jermaine Delong MD;  Location:  [facility];  Service: Cardiovascular    CARDIAC CATHETERIZATION N/A 01/27/2020    Procedure: Stent BOBBY coronary;  Surgeon: Jermaine Delong MD;  Location:  [facility];  Service: Cardiovascular    CARDIAC CATHETERIZATION N/A 01/27/2020    Procedure: Left ventriculography;  Surgeon: Jermaine Delong MD;  Location:  [facility];  Service: Cardiovascular    CARDIAC CATHETERIZATION N/A 01/27/2020    Procedure: Coronary angiography;  Surgeon: Jermaine Delong MD;  Location:  [facility] LOC[facility];  Service: Cardiovascular    CARDIAC CATHETERIZATION N/A 6/16/2025    Procedure: Left Heart Cath with angiogram;  Surgeon: Jermaine Delong MD;  Location:  [facility]VASIVE [facility];  Service: Cardiovascular;  Laterality: N/A;    CARDIAC ELECTROPHYSIOLOGY PROCEDURE N/A 02/05/2025    Procedure: Pacemaker DC new Abbott aware;  Surgeon: Alec Wong MD;  Location:  [facility] C[facility] INVASIVE LOCATION;  Service: Cardiovascular;  Laterality: N/A;  [place]    CARDIAC SURGERY  2/5/2025    Pacemaker Placement    CATARACT EXTRACTION, BILATERAL Bilateral 1999    CHOLECYSTECTOMY  1993??    COLONOSCOPY  Last one 2023    CORONARY STENT PLACEMENT  1/28/2020    GALLBLADDER SURGERY  1996    INSERT / REPLACE / REMOVE PACEMAKER  2/5/25    LAPAROSCOPIC TUBAL LIGATION  1980    RENAL ARTERY STENT  01/26/2020    ROTATOR CUFF REPAIR Right 2002    TUBAL ABDOMINAL LIGATION  1979??       Review of Systems:  Review of Systems   Constitutional:  Negative for activity change and fever.   HENT:  Negative for ear pain, rhinorrhea, sinus pressure and voice change.    Eyes:  Negative for visual  "disturbance.   Respiratory:  Negative for cough and shortness of breath.    Cardiovascular:  Negative for chest pain.   Gastrointestinal:  Negative for abdominal pain, diarrhea, nausea and vomiting.   Endocrine: Negative for cold intolerance and heat intolerance.   Genitourinary:  Negative for frequency and urgency.   Musculoskeletal:  Negative for arthralgias.   Skin:  Negative for rash.   Neurological:  Negative for syncope.   Hematological:  Does not bruise/bleed easily.   Psychiatric/Behavioral:  Negative for depressed mood. The patient is not nervous/anxious.        Physical Exam:  Vital Signs:  Vital Signs:   /64 (BP Location: Right arm, Patient Position: Sitting, Cuff Size: Adult)   Pulse 85   Temp 96.9 °F (36.1 °C) (Temporal)   Resp 18   Ht 152.4 cm (60\")   Wt 59.5 kg (131 lb 3.2 oz)   SpO2 96% Comment: ra  BMI 25.62 kg/m²     Result Review :   The following data was reviewed by: Alexa Shah MD on 07/21/2025:              Latest Reference Range & Units 07/09/25 10:08 07/09/25 11:12 07/09/25 19:45 07/10/25 03:09   HS Troponin T <14 ng/L 15 (H) 14 (H)     Troponin T % Delta Abnormal if >/= 20%   -7     Troponin T Numeric Delta ng/L  -1     proBNP 0.0 - 1,800.0 pg/mL   431.0    Sodium 136 - 145 mmol/L 140   138   Potassium 3.5 - 5.2 mmol/L 3.8   4.0   Chloride 98 - 107 mmol/L 107   107   CO2 22.0 - 29.0 mmol/L 20.1 (L)   21.5 (L)   Anion Gap 5.0 - 15.0 mmol/L 12.9   9.5   BUN 8.0 - 23.0 mg/dL 10.7   13.0   Creatinine 0.57 - 1.00 mg/dL 0.84   0.92   BUN/Creatinine Ratio 7.0 - 25.0  12.7   14.1   eGFR >60.0 mL/min/1.73 71.2   63.9   Glucose 65 - 99 mg/dL 107 (H)   106 (H)   Calcium 8.6 - 10.5 mg/dL 9.8   9.3   Magnesium 1.6 - 2.4 mg/dL 2.3   2.2   Alkaline Phosphatase 39 - 117 U/L 66      Total Protein 6.0 - 8.5 g/dL 6.7      Albumin 3.5 - 5.2 g/dL 4.1      Globulin gm/dL 2.6      A/G Ratio g/dL 1.6      AST (SGOT) 1 - 32 U/L 25      ALT (SGPT) 1 - 33 U/L 19      Total Bilirubin 0.0 - 1.2 mg/dL " 0.4      Cortisol mcg/dL    4.82   TSH Baseline 0.270 - 4.200 uIU/mL  2.220     WBC 3.40 - 10.80 10*3/mm3 5.93   6.30   RBC 3.77 - 5.28 10*6/mm3 4.57   4.23   Hemoglobin 12.0 - 15.9 g/dL 13.5   12.3   Hematocrit 34.0 - 46.6 % 42.1   38.8   Platelets 140 - 450 10*3/mm3 221   223   RDW 12.3 - 15.4 % 13.2   13.2   MCV 79.0 - 97.0 fL 92.1   91.7   MCH 26.6 - 33.0 pg 29.5   29.1   MCHC 31.5 - 35.7 g/dL 32.1   31.7   MPV 6.0 - 12.0 fL 12.7 (H)   12.2 (H)   RDW-SD 37.0 - 54.0 fl 44.6   45.1   Neutrophil Rel % 42.7 - 76.0 % 56.2   40.8 (L)   Lymphocyte Rel % 19.6 - 45.3 % 31.5   46.3 (H)   Monocyte Rel % 5.0 - 12.0 % 9.1   9.4   Eosinophil Rel % 0.3 - 6.2 % 2.2   2.5   Basophil Rel % 0.0 - 1.5 % 0.8   0.8   Immature Granulocyte Rel % 0.0 - 0.5 % 0.2   0.2   Neutrophils Absolute 1.70 - 7.00 10*3/mm3 3.33   2.57   Lymphocytes Absolute 0.70 - 3.10 10*3/mm3 1.87   2.92   Monocytes Absolute 0.10 - 0.90 10*3/mm3 0.54   0.59   Eosinophils Absolute 0.00 - 0.40 10*3/mm3 0.13   0.16   Basophils Absolute 0.00 - 0.20 10*3/mm3 0.05   0.05   Immature Grans, Absolute 0.00 - 0.05 10*3/mm3 0.01   0.01   nRBC 0.0 - 0.2 /100 WBC 0.0   0.0   (H): Data is abnormally high  (L): Data is abnormally low    CT Angiogram Head  Result Date: 7/2/2025  Impression: Essentially normal CT angiogram of the head. Some minimal atherosclerotic changes are present, without flow-limiting stenosis, large vessel occlusion or aneurysm. Electronically Signed: Jarrod Romano MD  7/2/2025 6:22 AM EDT  Workstation ID: [id number]       Physical Exam  Vitals reviewed.   Constitutional:       Appearance: Normal appearance. She is well-developed.   HENT:      Head: Normocephalic and atraumatic.   Eyes:      General:         Right eye: No discharge.         Left eye: No discharge.   Cardiovascular:      Rate and Rhythm: Normal rate and regular rhythm.      Heart sounds: Normal heart sounds. No murmur heard.     No friction rub. No gallop.   Pulmonary:      Effort: Pulmonary  effort is normal. No respiratory distress.      Breath sounds: Normal breath sounds. No wheezing or rales.   Skin:     General: Skin is warm and dry.      Findings: No rash.   Neurological:      Mental Status: She is alert and oriented to person, place, and time.      Coordination: Coordination normal.      Gait: Gait normal.   Psychiatric:         Behavior: Behavior is cooperative.         Assessment and Plan:  Problems Addressed this Visit          Endocrine and Metabolic    Overweight with body mass index (BMI) of 25 to 25.9 in adult       Gastrointestinal Abdominal     GERD without esophagitis    She was on protonix.   Change to omeprazole and see if it helps since it has been worse lately  Follow up in a few weeks or sooner if needed         Relevant Medications    omeprazole (priLOSEC) 40 MG capsule       Symptoms and Signs    Syncope (Chronic)    Resolved  No reason found  Work up negative  Now she has been referred for sleep apnea eval by hospital to see if this is the cause.           Other Visit Diagnoses         Hospital discharge follow-up    -  Primary          Diagnoses         Codes Comments      Hospital discharge follow-up    -  Primary ICD-10-CM: Z09  ICD-9-CM: V67.59       Syncope, unspecified syncope type     ICD-10-CM: R55  ICD-9-CM: 780.2       GERD without esophagitis     ICD-10-CM: K21.9  ICD-9-CM: 530.81       Overweight with body mass index (BMI) of 25 to 25.9 in adult     ICD-10-CM: E66.3, Z68.25  ICD-9-CM: 278.02, V85.21              An After Visit Summary and PPPS were given to the patient.

## 2025-07-23 NOTE — PROGRESS NOTES
Subjective: Syncope    Patient ID: Rachael Quevedo is a 79 y.o. female.    CHIEF COMPLAINT: Syncope      History of Present IllnessMs Quevedo is a 79 year old  female who presented with her , Luis E for this visit. Had a pacer placed, but has had 1 episode since pace maker was placed.   Sitting at kitchen table drinking coffee, weird feeling in chest to arms to head and passes out x 1.   Prior to getting a pacemaker she had these very frequent but since having the pacemaker she had not had any until this episode.    She also reports she has paresthesia in her right foot and her right 3 toes.  She states she has had a radiculopathy in that leg in an L5 distribution after conversing with her and getting her history.  She has seen in the past Dr. Castro who I believe was an orthopedic physician, who put her in a brace many years ago.  She states that the right leg pain is now gone and she has paresthesia in the right foot.  She states she does have moderate low back pain.          Testing:  Date of Service: 2025  Patient Name: Rachael Quevedo  : 1945  MRN: [id number]     Date of Admission: 2025  Discharge Diagnosis: Syncope  Date of Discharge: 2025  Primary Care Physician: Alexa Shah MD     Presenting Problem:   Syncope [R55]   Active and Resolved Hospital Problems:  Active Hospital Problems    Diagnosis POA   **Syncope [R55] Yes     Duplex Carotid Ultrasound CAR   Interpretation Summary    Atherosclerotic plaque proximal right and left internal carotid arteries with less than 50% stenosis bilaterally.    Vertebral arteries patent with antegrade flow bilaterally.     Adult Transthoracic Echo Complete w/ Color, Spectral and Contrast if Necessary Per Protocol   Interpretation Summary    Left ventricular ejection fraction appears to be 61 - 65%.    Left ventricular diastolic function is consistent with (grade I) impaired relaxation.    Estimated right ventricular systolic  "pressure from tricuspid regurgitation is normal (<35 mmHg).     MRI Brain Without Contrast   Result Date: 1/23/2025  Impression: Impression: Mild to moderate typical chronic and age-related findings are present as above. There is no evidence of recent infarct, hemorrhage or intracranial mass effect. Electronically Signed: Jarrod Romano MD  1/23/2025 12:27 PM EST  Workstation ID: [id number]     Duplex Carotid Ultrasound CAR   Interpretation Summary    Atherosclerotic plaque proximal right and left internal carotid arteries with less than 50% stenosis bilaterally.    Vertebral arteries patent with antegrade flow bilaterally.     Adult Transthoracic Echo Complete w/ Color, Spectral and Contrast if Necessary Per Protocol  Interpretation Summary    Left ventricular ejection fraction appears to be 61 - 65%.    Left ventricular diastolic function is consistent with (grade I) impaired relaxation.    Estimated right ventricular systolic pressure from tricuspid regurgitation is normal (<35 mmHg).        CT ANGIOGRAM HEAD   Date of Exam: 7/1/2025 9:08 AM EDT   Indication: hx of cva, vision changes, dizziness.  IMPRESSION:  Impression:  Essentially normal CT angiogram of the head.   Some minimal atherosclerotic changes are present, without flow-limiting stenosis,   large vessel occlusion or aneurysm.    Electronically Signed: Jarrod Romano MD    7/2/2025 6:22 AM EDT     CT HEAD WO CONTRAST   Date of Exam: 6/13/2025 9:49 AM EDT   Indication: hx of cva, symptoms similar to previous CVA.   Comparison: MRI head 1/23/2025   1.No evidence for acute intracranial abnormality.  2.Nonspecific white matter changes are noted with associated diffuse volume loss. These findings are likely related to chronic small vessel ischemic changes and/or age-related changes.  3.Changes of remote infarction are again noted involving the right basal ganglia.     Electronically Signed: Scott Noyola MD    6/13/2025 10:17 AM EDT     HPI:   \"A 79 y.o. old " "female patient with PMH of hypertension hyperlipidemia left bundle branch block CKD CVA presents to the hospital with complaints of syncope episode in the bathroom. She has pre syncope in past few years even though she is sitting in the chair. Has follow up with Dr Delong before and loop recorder before. [facility] ED,  Labs essentially stable in the outside facility ED.  CT head CT C spine CT angio chest with no issue. EKG with LBBB> Was transferred to [facility] for further workup for syncope.  Echocardiogram and carotid Doppler cardiology to see.     Admitted for syncope workup.\"  Hospital Course:  Cardiology evaluated patient echocardiogram noted LVEF to be 61 to 65%.  With LV diastolic function consistent with grade 1 impaired relaxation.  Patient underwent Cardiolite stress test, which did not show any significant abnormalities.  Patient remained stable, without any new or worsening signs and symptoms during admission.  Patient is ready to be discharged home.  M cot will be placed prior to discharge.  Patient is to wear this for 2 weeks.  Follow-up with cardiology in 4 weeks.      The following portions of the patient's history were reviewed and updated as appropriate: allergies, current medications, past family history, past medical history, past social history, past surgical history and problem list.      Family History   Problem Relation Age of Onset    Hyperlipidemia Mother         Elevated cholesterol    Heart disease Mother     Heart attack Mother     Hypertension Father     Colon cancer Father     Hyperlipidemia Father         Elevated cholesterol    Heart disease Father     Cancer Father         Colon Cancer    Diabetes Brother     Heart disease Brother     Hyperlipidemia Brother     Hypertension Brother     Diabetes Paternal Aunt     Colon cancer Paternal Aunt     Cancer Paternal Aunt         Cant remember  name    Colon cancer Paternal Uncle     Diabetes Paternal Uncle     Diabetes Paternal Grandmother  "    Heart disease Paternal Grandmother     Arthritis Paternal Grandmother     Heart disease Maternal Uncle     Heart disease Maternal Uncle     Cancer Maternal Aunt     Cancer Paternal Uncle     Heart disease Maternal Uncle     Heart disease Maternal Uncle     Cancer Paternal Uncle     Cancer Maternal Aunt         Colon    Cancer Paternal Uncle         Colon    Breast cancer Neg Hx     Ovarian cancer Neg Hx        Past Medical History:   Diagnosis Date    Allergic     Ankle fracture, left     Comments: 2016    Arthritis     Bursitis     Cataract, acquired     Coronary artery disease     Cough     Impression: Most likely reactive airway prescription as below Start antihistamine at home    Dense breast     Depression, major, recurrent, mild     Diverticulosis     GERD without esophagitis     Comments: Dr Duncan- protonix    Hematuria, microscopic     History of chicken pox     History of loop recorder     Hyperglycemia     Hyperlipidemia     Hypertension 01/04/2020    Injury of back     Injury of neck     Left bundle branch block (LBBB)     Malaise and fatigue     Medicare annual wellness visit, initial     with abnormal findings    Osteopenia 2022    Other specified menopausal and perimenopausal disorders     Other specified menopausal and postmenopausal disorders    Pap smear, low-risk     Renal insufficiency     S/P right rotator cuff repair     Screening for alcoholism     10 or more drinks per week    Screening for depression     Negative Depression Screening ( 9 or less) ()    Screening mammogram, encounter for     Shortness of breath     Stroke     1/5/2020 no residual effects    Syncope 01/22/2025    Vasovagal near-syncope     Impression: from illness and cough Discussed if there is loss of vision in an eye, confusion, weakness or numbness in an extremity, drooping of a side of the face, intractible pain, intractible vomiting, to go to the ER.    Vasovagal syncope 08/31/2020       Social History      Socioeconomic History    Marital status:    Tobacco Use    Smoking status: Never     Passive exposure: Past    Smokeless tobacco: Never    Tobacco comments:     Many family members smoked around me when I was a child   Vaping Use    Vaping status: Never Used   Substance and Sexual Activity    Alcohol use: Yes     Alcohol/week: 2.0 standard drinks of alcohol     Types: 1 Glasses of wine, 1 Drinks containing 0.5 oz of alcohol per week     Comment: monthly    Drug use: No    Sexual activity: Not Currently     Partners: Male     Birth control/protection: None     Comment: Very occasional         Current Outpatient Medications:     aspirin 81 MG EC tablet, Take 1 tablet by mouth Every Night., Disp: , Rfl:     calcium carbonate (OS-SHYLA) 600 MG tablet, Take 1 tablet by mouth 2 (Two) Times a Day With Meals., Disp: , Rfl:     fexofenadine (ALLEGRA) 180 MG tablet, Take 0.5 tablets by mouth Daily., Disp: , Rfl:     fluticasone (FLONASE) 50 MCG/ACT nasal spray, Administer 2 sprays into the nostril(s) as directed by provider Daily As Needed for Rhinitis., Disp: , Rfl:     Multiple Vitamins-Minerals (WOMENS MULTIVITAMIN + COLLAGEN PO), Take 1 tablet by mouth Daily., Disp: , Rfl:     omeprazole (priLOSEC) 40 MG capsule, Take 1 capsule by mouth Daily., Disp: 30 capsule, Rfl: 0    pantoprazole (PROTONIX) 40 MG EC tablet, Take 1 tablet by mouth Daily., Disp: , Rfl:     polyethylene glycol (MIRALAX) 17 GM/SCOOP powder, Take 17 g by mouth Daily., Disp: , Rfl:     rosuvastatin (CRESTOR) 20 MG tablet, TAKE 1 TABLET BY MOUTH EVERY DAY, Disp: 90 tablet, Rfl: 3    valACYclovir (VALTREX) 500 MG tablet, Take 1 tablet by mouth 2 (Two) Times a Day As Needed (cold sores)., Disp: , Rfl:     Review of Systems   All other systems reviewed and are negative.       I have reviewed ROS completed by medical assistant.     Objective:    Neurological Exam  Mental Status  Awake and alert. Oriented only to person, place and time. Speech is  normal. Language is fluent with no aphasia. Attention and concentration are normal. Fund of knowledge is appropriate for level of education.    Cranial Nerves  CN II: Right visual acuity: Finger movement. Left visual acuity: Finger movement. Right normal visual field. Left normal visual field. Right funduscopic exam: not visualized. Left funduscopic exam: not visualized.  CN III, IV, VI: Extraocular movements intact bilaterally. No nystagmus. Normal saccades. Normal smooth pursuit.   Right pupil: 2 mm. Reactive to light. Reactive to accommodation.   Left pupil: 2 mm. Reactive to light. Reactive to accommodation.  CN V:  Right: Facial sensation is normal. Jaw strength is normal.  Left: Facial sensation is normal on the left. Jaw strength is normal.  CN VII:  Right: There is no facial weakness.  Left: There is no facial weakness.  CN XI: Shoulder shrug strength is normal.  CN XII: Tongue midline without atrophy or fasciculations.    Motor  Normal muscle bulk throughout. No fasciculations present. Normal muscle tone.                                               Right                     Left  Deltoid                                   5                          5   Biceps                                   5                          5  Brachioradialis                      5                          5   Triceps                                  5                          5   Pronator                                5                          5   Supinator                              5                           5   Wrist flexor                            5                          5   Wrist extensor                       5                          5   Finger flexor                          5                          5   Finger extensor                     5                          5   Interossei                              5                          5   Abductor pollicis brevis         5                          5    Flexor pollicis brevis             5                          5   Opponens pollicis                 5                          5  Extensor digitorum               5                          5  Abductor digiti minimi           5                          5   Quadriceps                           5                          5   Hamstring                             5                          5   Gastrocnemius                     5                           5   Anterior tibialis                      5                          5   Posterior tibialis                    5                          5   Peroneal                               5                          5  Ankle dorsiflexor                   5                          5  Ankle plantar flexor              5                           5  Extensor hallucis longus                                5    Sensory  Light touch abnormality: Decreased in the right foot outer aspect, middle, fourth and pinky toe.. Pinprick abnormality: Decreased in the right foot outer aspect, middle, fourth and pinky toe.. Temperature abnormality: Decreased in the right foot outer aspect, middle, fourth and pinky toe.. Vibration is normal in upper and lower extremities.   Right: There is a sensory level at L5. Loss of sensation in the L5 dermatome.    Reflexes                                            Right                      Left  Brachioradialis                    2+                         2+  Biceps                                 2+                         2+  Triceps                                2+                         2+  Finger flex                           2+                         2+  Hamstring                            2+                         2+  Patellar                                2+                         2+  Achilles                                2+                         2+  Right Plantar: downgoing  Left Plantar: downgoing    Right pathological reflexes:  Ankle clonus absent.  Left pathological reflexes: Ankle clonus absent.    Coordination  Right: Finger-to-nose normal. Rapid alternating movement normal. Heel-to-shin normal.Left: Finger-to-nose normal. Rapid alternating movement normal. Heel-to-shin normal.    Gait  Casual gait is normal including stance, stride, and arm swing.Normal toe walking. Normal heel walking. Normal tandem gait. Romberg is absent.     Assessment/Plan:  Discussion:   Syncope  For syncope the patient will be sent for a sleep deprived 4-hour EEG.  She was told not to drive if she has any more syncopal spells unless she is gone 90 days without a spell.The patient was told to observe all seizure precautions, including, but not limited to:    Take showers instead of baths, avoid swimming without observation, Avoid open heat sources, Avoid working at heights, and Avoid engaging in all potentially hazardous activities , Avoid use of fire arms/hunting,  Patient expressed clear understanding.     Low back pain and paresthesia right foot toes middle, fourth and pinky toe.  The patient will be sent of an MRI of her low back with and without contrast.  She will also be scheduled for an EMG nerve conduction study of the right leg and foot.    She will be scheduled back for a follow-up visit in 6 months and will be notified as results come in.  I did discuss with her all previous testing that is noted above.         Diagnoses and all orders for this visit:    1. Syncope and collapse (Primary)  -     EEG Continuous Monitoring With Video; Future  -     MRI Brain With & Without Contrast; Future    2. Chronic bilateral low back pain with right-sided sciatica  -     MRI Lumbar Spine Without Contrast; Future    3. Paresthesia of right foot  -     EMG & Nerve Conduction Test; Future        Return in about 6 months (around 1/28/2026) for Angela Gomez .    I spent 60 minutes caring for Rachael on this date of service. This time includes time spent by me in  the following activities: preparing for the visit, reviewing tests, obtaining and/or reviewing a separately obtained history, performing a medically appropriate examination and/or evaluation, counseling and educating the patient/family/caregiver, ordering medications, tests, or procedures, referring and communicating with other health care professionals, documenting information in the medical record, and independently interpreting results and communicating that information with the patient/family/caregiver.      This document has been electronically signed by Angela HOLLY on July 28, 2025 10:20 EDT

## 2025-07-28 ENCOUNTER — OFFICE VISIT (OUTPATIENT)
Dept: NEUROLOGY | Facility: CLINIC | Age: 80
End: 2025-07-28
Payer: MEDICARE

## 2025-07-28 VITALS
DIASTOLIC BLOOD PRESSURE: 74 MMHG | BODY MASS INDEX: 25.52 KG/M2 | WEIGHT: 130 LBS | HEART RATE: 69 BPM | HEIGHT: 60 IN | SYSTOLIC BLOOD PRESSURE: 159 MMHG

## 2025-07-28 DIAGNOSIS — R55 SYNCOPE AND COLLAPSE: Primary | ICD-10-CM

## 2025-07-28 DIAGNOSIS — G89.29 CHRONIC BILATERAL LOW BACK PAIN WITH RIGHT-SIDED SCIATICA: ICD-10-CM

## 2025-07-28 DIAGNOSIS — M54.41 CHRONIC BILATERAL LOW BACK PAIN WITH RIGHT-SIDED SCIATICA: ICD-10-CM

## 2025-07-28 DIAGNOSIS — R20.2 PARESTHESIA OF RIGHT FOOT: ICD-10-CM

## 2025-07-28 PROCEDURE — G2211 COMPLEX E/M VISIT ADD ON: HCPCS | Performed by: NURSE PRACTITIONER

## 2025-07-28 PROCEDURE — 1159F MED LIST DOCD IN RCRD: CPT | Performed by: NURSE PRACTITIONER

## 2025-07-28 PROCEDURE — 1160F RVW MEDS BY RX/DR IN RCRD: CPT | Performed by: NURSE PRACTITIONER

## 2025-07-28 PROCEDURE — 99215 OFFICE O/P EST HI 40 MIN: CPT | Performed by: NURSE PRACTITIONER

## 2025-07-29 ENCOUNTER — PATIENT ROUNDING (BHMG ONLY) (OUTPATIENT)
Dept: NEUROLOGY | Facility: CLINIC | Age: 80
End: 2025-07-29
Payer: MEDICARE

## 2025-07-30 ENCOUNTER — PATIENT MESSAGE (OUTPATIENT)
Dept: FAMILY MEDICINE CLINIC | Facility: CLINIC | Age: 80
End: 2025-07-30
Payer: MEDICARE

## 2025-07-30 DIAGNOSIS — L98.9 FACIAL LESION: Primary | ICD-10-CM

## 2025-07-30 NOTE — ASSESSMENT & PLAN NOTE
She was on protonix.   Change to omeprazole and see if it helps since it has been worse lately  Follow up in a few weeks or sooner if needed

## 2025-07-30 NOTE — ASSESSMENT & PLAN NOTE
Resolved  No reason found  Work up negative  Now she has been referred for sleep apnea eval by hospital to see if this is the cause.

## 2025-08-12 DIAGNOSIS — I10 PRIMARY HYPERTENSION: Primary | ICD-10-CM

## 2025-08-12 RX ORDER — AMLODIPINE BESYLATE 2.5 MG/1
2.5 TABLET ORAL DAILY
Qty: 30 TABLET | Refills: 0 | Status: SHIPPED | OUTPATIENT
Start: 2025-08-12

## 2025-08-19 ENCOUNTER — RESULTS FOLLOW-UP (OUTPATIENT)
Dept: NEUROLOGY | Facility: CLINIC | Age: 80
End: 2025-08-19
Payer: MEDICARE

## 2025-08-19 ENCOUNTER — HOSPITAL ENCOUNTER (OUTPATIENT)
Dept: MRI IMAGING | Facility: HOSPITAL | Age: 80
Discharge: HOME | End: 2025-08-19
Payer: MEDICARE

## 2025-08-19 DIAGNOSIS — M54.41 CHRONIC BILATERAL LOW BACK PAIN WITH RIGHT-SIDED SCIATICA: ICD-10-CM

## 2025-08-19 DIAGNOSIS — G89.29 CHRONIC BILATERAL LOW BACK PAIN WITH RIGHT-SIDED SCIATICA: ICD-10-CM

## 2025-08-19 DIAGNOSIS — R55 SYNCOPE AND COLLAPSE: ICD-10-CM

## 2025-08-19 PROCEDURE — 70553 MRI BRAIN STEM W/O & W/DYE: CPT

## 2025-08-19 PROCEDURE — 25010000002 GADOTERIDOL PER 1 ML: Performed by: NURSE PRACTITIONER

## 2025-08-19 PROCEDURE — 72148 MRI LUMBAR SPINE W/O DYE: CPT

## 2025-08-19 PROCEDURE — 76014 MR SFTY IMPLT&/FB ASMT STF 1: CPT

## 2025-08-19 PROCEDURE — A9579 GAD-BASE MR CONTRAST NOS,1ML: HCPCS | Performed by: NURSE PRACTITIONER

## 2025-08-19 RX ORDER — GADOTERIDOL 279.3 MG/ML
15 INJECTION INTRAVENOUS
Status: COMPLETED | OUTPATIENT
Start: 2025-08-19 | End: 2025-08-19

## 2025-08-19 RX ADMIN — GADOTERIDOL 15 ML: 279.3 INJECTION, SOLUTION INTRAVENOUS at 13:54

## 2025-08-21 ENCOUNTER — OFFICE VISIT (OUTPATIENT)
Dept: FAMILY MEDICINE CLINIC | Facility: CLINIC | Age: 80
End: 2025-08-21
Payer: MEDICARE

## 2025-08-21 VITALS
TEMPERATURE: 96.9 F | OXYGEN SATURATION: 100 % | SYSTOLIC BLOOD PRESSURE: 132 MMHG | HEART RATE: 73 BPM | DIASTOLIC BLOOD PRESSURE: 70 MMHG | RESPIRATION RATE: 17 BRPM | WEIGHT: 131 LBS | HEIGHT: 60 IN | BODY MASS INDEX: 25.72 KG/M2

## 2025-08-21 DIAGNOSIS — L98.9 SKIN LESION: ICD-10-CM

## 2025-08-21 DIAGNOSIS — I10 PRIMARY HYPERTENSION: Primary | Chronic | ICD-10-CM

## 2025-08-21 DIAGNOSIS — E66.3 OVERWEIGHT WITH BODY MASS INDEX (BMI) OF 25 TO 25.9 IN ADULT: ICD-10-CM

## 2025-08-21 RX ORDER — MICONAZOLE NITRATE 2 G/100G
CREAM TOPICAL
COMMUNITY
Start: 2025-08-15

## 2025-08-22 ENCOUNTER — CLINICAL SUPPORT (OUTPATIENT)
Dept: FAMILY MEDICINE CLINIC | Facility: CLINIC | Age: 80
End: 2025-08-22
Payer: MEDICARE

## 2025-08-22 DIAGNOSIS — I10 PRIMARY HYPERTENSION: Primary | ICD-10-CM

## 2025-08-25 ENCOUNTER — CLINICAL SUPPORT (OUTPATIENT)
Dept: FAMILY MEDICINE CLINIC | Facility: CLINIC | Age: 80
End: 2025-08-25
Payer: MEDICARE

## 2025-08-25 VITALS — HEART RATE: 80 BPM | DIASTOLIC BLOOD PRESSURE: 67 MMHG | SYSTOLIC BLOOD PRESSURE: 137 MMHG

## 2025-08-25 DIAGNOSIS — I10 PRIMARY HYPERTENSION: Primary | ICD-10-CM

## (undated) DEVICE — NDL PERC 1PRT THNWALL W/BASEPLT 18G 7CM

## (undated) DEVICE — CATH DIAG IMPULSE PIG .056 6F 110CM

## (undated) DEVICE — PENCL SMOKE/EVAC MEGADYNE TELESCP 10FT

## (undated) DEVICE — DGW .035 MC J3MM 150CM T H AMP: Brand: EMERALD

## (undated) DEVICE — GUIDE CATHETER: Brand: MACH1™

## (undated) DEVICE — HI-TORQUE WHISPER MS GUIDE WIRE .014 STRAIGHT TIP 3.0 CM X 190 CM: Brand: HI-TORQUE WHISPER

## (undated) DEVICE — CABL BIPOL W/ALLGTR CLIP/SM 12FT

## (undated) DEVICE — 3M™ PATIENT PLATE, CORDED, SPLIT, LARGE, 40 PER CASE, 1179: Brand: 3M™

## (undated) DEVICE — ELECTRD DEFIB M/FUNC PROPADZ RADIOL 2PK

## (undated) DEVICE — DRAPE,INSTRUMENT,MAGNETIC,10X16: Brand: MEDLINE

## (undated) DEVICE — DEV INFL COMPAK W/ACCESSPLUS IN4530

## (undated) DEVICE — PACEMAKER CDS: Brand: MEDLINE INDUSTRIES, INC.

## (undated) DEVICE — STPCK 3WY HP ROT

## (undated) DEVICE — SUT MNCRYL 2/0 CT1 36IN

## (undated) DEVICE — SUT SILK 2/0 SH CR8 18IN CR8 C012D

## (undated) DEVICE — IMMOB SHLDR CUT/AWAY UNIV

## (undated) DEVICE — PINNACLE INTRODUCER SHEATH: Brand: PINNACLE

## (undated) DEVICE — COVER,LIGHT HANDLE,FLX,1/PK: Brand: MEDLINE INDUSTRIES, INC.

## (undated) DEVICE — PK TRY HEART CATH 50

## (undated) DEVICE — TBG NAMIC PRESS MONTR A/ F/M 12IN

## (undated) DEVICE — CATH DIAG IMPULSE FL4 6F 100CM

## (undated) DEVICE — 3M™ IOBAN™ 2 ANTIMICROBIAL INCISE DRAPE 6650EZ: Brand: IOBAN™ 2

## (undated) DEVICE — RADIFOCUS OBTURATOR: Brand: RADIFOCUS

## (undated) DEVICE — CATH DIAG IMPULSE FR4 6F 100CM

## (undated) DEVICE — CONTRST ISOVUE300 61PCT 50ML

## (undated) DEVICE — GW DIAG EMERALD HEPCOAT MOVE JTIP STD .035 3MM 150CM

## (undated) DEVICE — REFLEX ONE, SKIN STAPLER, 35 WIDE: Brand: REFLEX

## (undated) DEVICE — INTRO SHEATH PRELUDE SNAP .038 6F 13CM W/SDPRT